# Patient Record
Sex: FEMALE | Race: WHITE | NOT HISPANIC OR LATINO | Employment: OTHER | URBAN - NONMETROPOLITAN AREA
[De-identification: names, ages, dates, MRNs, and addresses within clinical notes are randomized per-mention and may not be internally consistent; named-entity substitution may affect disease eponyms.]

---

## 2019-03-01 ENCOUNTER — HOSPITAL ENCOUNTER (OUTPATIENT)
Dept: BONE DENSITY | Facility: HOSPITAL | Age: 74
Discharge: HOME | End: 2019-03-01
Admitting: FAMILY MEDICINE

## 2019-03-01 DIAGNOSIS — N95.9 POSTMENOPAUSAL SYMPTOMS: ICD-10-CM

## 2019-03-01 PROCEDURE — 77080 DXA BONE DENSITY AXIAL: CPT | Performed by: RADIOLOGY

## 2019-03-01 PROCEDURE — 77080 DXA BONE DENSITY AXIAL: CPT

## 2021-05-13 ENCOUNTER — TRANSCRIBE ORDERS (OUTPATIENT)
Dept: ADMINISTRATIVE | Facility: HOSPITAL | Age: 76
End: 2021-05-13

## 2021-05-13 DIAGNOSIS — R41.82 ALTERED MENTAL STATUS, UNSPECIFIED ALTERED MENTAL STATUS TYPE: Primary | ICD-10-CM

## 2021-05-13 DIAGNOSIS — R47.9 SPEECH DISTURBANCE, UNSPECIFIED TYPE: ICD-10-CM

## 2021-05-14 ENCOUNTER — HOSPITAL ENCOUNTER (OUTPATIENT)
Dept: CT IMAGING | Facility: HOSPITAL | Age: 76
Discharge: HOME | End: 2021-05-14
Admitting: FAMILY MEDICINE

## 2021-05-14 ENCOUNTER — APPOINTMENT (OUTPATIENT)
Dept: CT IMAGING | Facility: HOSPITAL | Age: 76
End: 2021-05-14

## 2021-05-14 DIAGNOSIS — R41.82 ALTERED MENTAL STATUS, UNSPECIFIED ALTERED MENTAL STATUS TYPE: ICD-10-CM

## 2021-05-14 DIAGNOSIS — R47.9 SPEECH DISTURBANCE, UNSPECIFIED TYPE: ICD-10-CM

## 2021-05-14 PROCEDURE — 70450 CT HEAD/BRAIN W/O DYE: CPT

## 2021-05-14 PROCEDURE — 70450 CT HEAD/BRAIN W/O DYE: CPT | Performed by: RADIOLOGY

## 2025-06-21 ENCOUNTER — APPOINTMENT (OUTPATIENT)
Dept: CT IMAGING | Facility: HOSPITAL | Age: 80
End: 2025-06-21
Payer: MEDICARE

## 2025-06-21 ENCOUNTER — HOSPITAL ENCOUNTER (INPATIENT)
Facility: HOSPITAL | Age: 80
LOS: 17 days | Discharge: NURSING, REHAB OR LONG-TERM CARE | End: 2025-07-09
Admitting: HOSPITALIST
Payer: MEDICARE

## 2025-06-21 DIAGNOSIS — M54.40 ACUTE LOW BACK PAIN WITH SCIATICA, SCIATICA LATERALITY UNSPECIFIED, UNSPECIFIED BACK PAIN LATERALITY: ICD-10-CM

## 2025-06-21 DIAGNOSIS — S72.142A CLOSED DISPLACED INTERTROCHANTERIC FRACTURE OF LEFT FEMUR, INITIAL ENCOUNTER: Primary | ICD-10-CM

## 2025-06-21 LAB
ALBUMIN SERPL-MCNC: 4.4 G/DL (ref 3.5–5.2)
ALBUMIN/GLOB SERPL: 1.8 G/DL
ALP SERPL-CCNC: 78 U/L (ref 39–117)
ALT SERPL W P-5'-P-CCNC: 21 U/L (ref 1–33)
ANION GAP SERPL CALCULATED.3IONS-SCNC: 15.3 MMOL/L (ref 5–15)
AST SERPL-CCNC: 29 U/L (ref 1–32)
BASOPHILS # BLD AUTO: 0.05 10*3/MM3 (ref 0–0.2)
BASOPHILS NFR BLD AUTO: 0.3 % (ref 0–1.5)
BILIRUB SERPL-MCNC: 0.4 MG/DL (ref 0–1.2)
BUN SERPL-MCNC: 30.3 MG/DL (ref 8–23)
BUN/CREAT SERPL: 20.9 (ref 7–25)
CALCIUM SPEC-SCNC: 9.7 MG/DL (ref 8.6–10.5)
CHLORIDE SERPL-SCNC: 106 MMOL/L (ref 98–107)
CK MB SERPL-CCNC: 6.45 NG/ML
CK SERPL-CCNC: 326 U/L (ref 20–180)
CO2 SERPL-SCNC: 19.7 MMOL/L (ref 22–29)
CREAT SERPL-MCNC: 1.45 MG/DL (ref 0.57–1)
DEPRECATED RDW RBC AUTO: 43.9 FL (ref 37–54)
EGFRCR SERPLBLD CKD-EPI 2021: 36.1 ML/MIN/1.73
EOSINOPHIL # BLD AUTO: 0.01 10*3/MM3 (ref 0–0.4)
EOSINOPHIL NFR BLD AUTO: 0.1 % (ref 0.3–6.2)
ERYTHROCYTE [DISTWIDTH] IN BLOOD BY AUTOMATED COUNT: 12.2 % (ref 12.3–15.4)
GLOBULIN UR ELPH-MCNC: 2.4 GM/DL
GLUCOSE SERPL-MCNC: 148 MG/DL (ref 65–99)
HCT VFR BLD AUTO: 34.5 % (ref 34–46.6)
HGB BLD-MCNC: 11.5 G/DL (ref 12–15.9)
HOLD SPECIMEN: NORMAL
HOLD SPECIMEN: NORMAL
IMM GRANULOCYTES # BLD AUTO: 0.12 10*3/MM3 (ref 0–0.05)
IMM GRANULOCYTES NFR BLD AUTO: 0.7 % (ref 0–0.5)
LYMPHOCYTES # BLD AUTO: 1.74 10*3/MM3 (ref 0.7–3.1)
LYMPHOCYTES NFR BLD AUTO: 10 % (ref 19.6–45.3)
MCH RBC QN AUTO: 32.9 PG (ref 26.6–33)
MCHC RBC AUTO-ENTMCNC: 33.3 G/DL (ref 31.5–35.7)
MCV RBC AUTO: 98.6 FL (ref 79–97)
MONOCYTES # BLD AUTO: 0.93 10*3/MM3 (ref 0.1–0.9)
MONOCYTES NFR BLD AUTO: 5.3 % (ref 5–12)
NEUTROPHILS NFR BLD AUTO: 14.56 10*3/MM3 (ref 1.7–7)
NEUTROPHILS NFR BLD AUTO: 83.6 % (ref 42.7–76)
NRBC BLD AUTO-RTO: 0 /100 WBC (ref 0–0.2)
PLATELET # BLD AUTO: 253 10*3/MM3 (ref 140–450)
PMV BLD AUTO: 9.5 FL (ref 6–12)
POTASSIUM SERPL-SCNC: 4.1 MMOL/L (ref 3.5–5.2)
PROT SERPL-MCNC: 6.8 G/DL (ref 6–8.5)
RBC # BLD AUTO: 3.5 10*6/MM3 (ref 3.77–5.28)
SODIUM SERPL-SCNC: 141 MMOL/L (ref 136–145)
WBC NRBC COR # BLD AUTO: 17.41 10*3/MM3 (ref 3.4–10.8)
WHOLE BLOOD HOLD COAG: NORMAL
WHOLE BLOOD HOLD SPECIMEN: NORMAL

## 2025-06-21 PROCEDURE — 83036 HEMOGLOBIN GLYCOSYLATED A1C: CPT | Performed by: HOSPITALIST

## 2025-06-21 PROCEDURE — 72192 CT PELVIS W/O DYE: CPT | Performed by: RADIOLOGY

## 2025-06-21 PROCEDURE — 70450 CT HEAD/BRAIN W/O DYE: CPT

## 2025-06-21 PROCEDURE — 84443 ASSAY THYROID STIM HORMONE: CPT | Performed by: HOSPITALIST

## 2025-06-21 PROCEDURE — 80053 COMPREHEN METABOLIC PANEL: CPT | Performed by: PHYSICIAN ASSISTANT

## 2025-06-21 PROCEDURE — 70450 CT HEAD/BRAIN W/O DYE: CPT | Performed by: RADIOLOGY

## 2025-06-21 PROCEDURE — 84145 PROCALCITONIN (PCT): CPT | Performed by: HOSPITALIST

## 2025-06-21 PROCEDURE — 72192 CT PELVIS W/O DYE: CPT

## 2025-06-21 PROCEDURE — 82553 CREATINE MB FRACTION: CPT | Performed by: PHYSICIAN ASSISTANT

## 2025-06-21 PROCEDURE — 72131 CT LUMBAR SPINE W/O DYE: CPT

## 2025-06-21 PROCEDURE — 84439 ASSAY OF FREE THYROXINE: CPT | Performed by: HOSPITALIST

## 2025-06-21 PROCEDURE — 85025 COMPLETE CBC W/AUTO DIFF WBC: CPT | Performed by: PHYSICIAN ASSISTANT

## 2025-06-21 PROCEDURE — 86140 C-REACTIVE PROTEIN: CPT | Performed by: HOSPITALIST

## 2025-06-21 PROCEDURE — 99285 EMERGENCY DEPT VISIT HI MDM: CPT

## 2025-06-21 PROCEDURE — 25010000002 MORPHINE PER 10 MG

## 2025-06-21 PROCEDURE — 82550 ASSAY OF CK (CPK): CPT | Performed by: PHYSICIAN ASSISTANT

## 2025-06-21 PROCEDURE — 72131 CT LUMBAR SPINE W/O DYE: CPT | Performed by: RADIOLOGY

## 2025-06-21 RX ORDER — ROSUVASTATIN CALCIUM 5 MG/1
5 TABLET, COATED ORAL DAILY
COMMUNITY
Start: 2025-04-04

## 2025-06-21 RX ORDER — MORPHINE SULFATE 2 MG/ML
2 INJECTION, SOLUTION INTRAMUSCULAR; INTRAVENOUS ONCE
Status: COMPLETED | OUTPATIENT
Start: 2025-06-21 | End: 2025-06-21

## 2025-06-21 RX ORDER — OMEGA-3-ACID ETHYL ESTERS 1 G/1
2 CAPSULE, LIQUID FILLED ORAL 2 TIMES DAILY
COMMUNITY
Start: 2025-04-25

## 2025-06-21 RX ORDER — DONEPEZIL HYDROCHLORIDE 10 MG/1
10 TABLET, FILM COATED ORAL NIGHTLY
COMMUNITY
Start: 2025-03-27

## 2025-06-21 RX ORDER — ESCITALOPRAM OXALATE 10 MG/1
10 TABLET ORAL DAILY
COMMUNITY
Start: 2025-06-13

## 2025-06-21 RX ORDER — AMLODIPINE BESYLATE 5 MG/1
5 TABLET ORAL DAILY
COMMUNITY
Start: 2025-06-13 | End: 2025-07-09 | Stop reason: HOSPADM

## 2025-06-21 RX ORDER — POTASSIUM CHLORIDE 750 MG/1
10 TABLET, EXTENDED RELEASE ORAL DAILY
COMMUNITY
Start: 2025-04-15

## 2025-06-21 RX ORDER — TRIAMTERENE AND HYDROCHLOROTHIAZIDE 37.5; 25 MG/1; MG/1
1 CAPSULE ORAL DAILY
COMMUNITY
Start: 2025-05-26

## 2025-06-21 RX ORDER — METHIMAZOLE 5 MG/1
5 TABLET ORAL DAILY
Status: ON HOLD | COMMUNITY
Start: 2025-05-26 | End: 2025-07-07

## 2025-06-21 RX ORDER — LOSARTAN POTASSIUM 50 MG/1
50 TABLET ORAL DAILY
COMMUNITY
Start: 2025-04-25 | End: 2025-07-09 | Stop reason: HOSPADM

## 2025-06-21 RX ADMIN — MORPHINE SULFATE 2 MG: 2 INJECTION, SOLUTION INTRAMUSCULAR; INTRAVENOUS at 22:22

## 2025-06-21 RX ADMIN — MORPHINE SULFATE 2 MG: 2 INJECTION, SOLUTION INTRAMUSCULAR; INTRAVENOUS at 21:16

## 2025-06-21 NOTE — LETTER
[facility]  [address] [facility] [place] [address]  [phone]  [phone]        July 1, 2025      Patient: Lizbet Arzola  YOB: 1945  Date of Visit: 6/21/2025      Updated clinical information for Expedited appeal for IPR filed by pt.         NATALIE Woods

## 2025-06-22 ENCOUNTER — APPOINTMENT (OUTPATIENT)
Dept: GENERAL RADIOLOGY | Facility: HOSPITAL | Age: 80
End: 2025-06-22
Payer: MEDICARE

## 2025-06-22 PROBLEM — S72.142A CLOSED INTERTROCHANTERIC FRACTURE OF LEFT FEMUR: Status: ACTIVE | Noted: 2025-06-22

## 2025-06-22 LAB
ABO GROUP BLD: NORMAL
ALBUMIN SERPL-MCNC: 4.1 G/DL (ref 3.5–5.2)
ALBUMIN/GLOB SERPL: 1.7 G/DL
ALP SERPL-CCNC: 69 U/L (ref 39–117)
ALT SERPL W P-5'-P-CCNC: 20 U/L (ref 1–33)
ANION GAP SERPL CALCULATED.3IONS-SCNC: 15 MMOL/L (ref 5–15)
APTT PPP: 33.1 SECONDS (ref 24.5–35.9)
AST SERPL-CCNC: 32 U/L (ref 1–32)
BACTERIA UR QL AUTO: ABNORMAL /HPF
BASOPHILS # BLD AUTO: 0.07 10*3/MM3 (ref 0–0.2)
BASOPHILS NFR BLD AUTO: 0.4 % (ref 0–1.5)
BILIRUB SERPL-MCNC: 0.5 MG/DL (ref 0–1.2)
BILIRUB UR QL STRIP: NEGATIVE
BLD GP AB SCN SERPL QL: NEGATIVE
BUN SERPL-MCNC: 30.4 MG/DL (ref 8–23)
BUN/CREAT SERPL: 22.4 (ref 7–25)
CALCIUM SPEC-SCNC: 9.2 MG/DL (ref 8.6–10.5)
CHLORIDE SERPL-SCNC: 108 MMOL/L (ref 98–107)
CK SERPL-CCNC: 519 U/L (ref 20–180)
CLARITY UR: CLEAR
CO2 SERPL-SCNC: 17 MMOL/L (ref 22–29)
COLOR UR: YELLOW
CREAT SERPL-MCNC: 1.36 MG/DL (ref 0.57–1)
CRP SERPL-MCNC: <0.3 MG/DL (ref 0–0.5)
D-LACTATE SERPL-SCNC: 1.6 MMOL/L (ref 0.5–2)
D-LACTATE SERPL-SCNC: 2.1 MMOL/L (ref 0.5–2)
DEPRECATED RDW RBC AUTO: 51 FL (ref 37–54)
EGFRCR SERPLBLD CKD-EPI 2021: 39 ML/MIN/1.73
EOSINOPHIL # BLD AUTO: 0.06 10*3/MM3 (ref 0–0.4)
EOSINOPHIL NFR BLD AUTO: 0.4 % (ref 0.3–6.2)
ERYTHROCYTE [DISTWIDTH] IN BLOOD BY AUTOMATED COUNT: 12.2 % (ref 12.3–15.4)
GLOBULIN UR ELPH-MCNC: 2.4 GM/DL
GLUCOSE SERPL-MCNC: 162 MG/DL (ref 65–99)
GLUCOSE UR STRIP-MCNC: NEGATIVE MG/DL
HBA1C MFR BLD: 5.92 % (ref 4.8–5.6)
HCT VFR BLD AUTO: 37.7 % (ref 34–46.6)
HGB BLD-MCNC: 11.3 G/DL (ref 12–15.9)
HGB UR QL STRIP.AUTO: NEGATIVE
HYALINE CASTS UR QL AUTO: ABNORMAL /LPF
IMM GRANULOCYTES # BLD AUTO: 0.08 10*3/MM3 (ref 0–0.05)
IMM GRANULOCYTES NFR BLD AUTO: 0.5 % (ref 0–0.5)
INR PPP: 1.13 (ref 0.9–1.1)
KETONES UR QL STRIP: NEGATIVE
LEUKOCYTE ESTERASE UR QL STRIP.AUTO: ABNORMAL
LYMPHOCYTES # BLD AUTO: 1.55 10*3/MM3 (ref 0.7–3.1)
LYMPHOCYTES NFR BLD AUTO: 9.7 % (ref 19.6–45.3)
MCH RBC QN AUTO: 33.5 PG (ref 26.6–33)
MCHC RBC AUTO-ENTMCNC: 30 G/DL (ref 31.5–35.7)
MCV RBC AUTO: 111.9 FL (ref 79–97)
MONOCYTES # BLD AUTO: 1.1 10*3/MM3 (ref 0.1–0.9)
MONOCYTES NFR BLD AUTO: 6.9 % (ref 5–12)
NEUTROPHILS NFR BLD AUTO: 13.04 10*3/MM3 (ref 1.7–7)
NEUTROPHILS NFR BLD AUTO: 82.1 % (ref 42.7–76)
NITRITE UR QL STRIP: NEGATIVE
NRBC BLD AUTO-RTO: 0 /100 WBC (ref 0–0.2)
PH UR STRIP.AUTO: <=5 [PH] (ref 5–8)
PLATELET # BLD AUTO: 248 10*3/MM3 (ref 140–450)
PMV BLD AUTO: 9.6 FL (ref 6–12)
POTASSIUM SERPL-SCNC: 4.4 MMOL/L (ref 3.5–5.2)
PROCALCITONIN SERPL-MCNC: 0.04 NG/ML (ref 0–0.25)
PROT SERPL-MCNC: 6.5 G/DL (ref 6–8.5)
PROT UR QL STRIP: ABNORMAL
PROTHROMBIN TIME: 15.2 SECONDS (ref 12.5–15.2)
RBC # BLD AUTO: 3.37 10*6/MM3 (ref 3.77–5.28)
RBC # UR STRIP: ABNORMAL /HPF
REF LAB TEST METHOD: ABNORMAL
RH BLD: POSITIVE
SODIUM SERPL-SCNC: 140 MMOL/L (ref 136–145)
SP GR UR STRIP: 1.02 (ref 1–1.03)
SQUAMOUS #/AREA URNS HPF: ABNORMAL /HPF
T&S EXPIRATION DATE: NORMAL
T4 FREE SERPL-MCNC: 0.85 NG/DL (ref 0.92–1.68)
TSH SERPL DL<=0.05 MIU/L-ACNC: 4.9 UIU/ML (ref 0.27–4.2)
UROBILINOGEN UR QL STRIP: ABNORMAL
WBC # UR STRIP: ABNORMAL /HPF
WBC NRBC COR # BLD AUTO: 15.9 10*3/MM3 (ref 3.4–10.8)

## 2025-06-22 PROCEDURE — 25810000003 SODIUM CHLORIDE 0.9 % SOLUTION: Performed by: PHYSICIAN ASSISTANT

## 2025-06-22 PROCEDURE — 99223 1ST HOSP IP/OBS HIGH 75: CPT | Performed by: HOSPITALIST

## 2025-06-22 PROCEDURE — 86900 BLOOD TYPING SEROLOGIC ABO: CPT

## 2025-06-22 PROCEDURE — 81001 URINALYSIS AUTO W/SCOPE: CPT | Performed by: HOSPITALIST

## 2025-06-22 PROCEDURE — 83605 ASSAY OF LACTIC ACID: CPT | Performed by: HOSPITALIST

## 2025-06-22 PROCEDURE — 86901 BLOOD TYPING SEROLOGIC RH(D): CPT

## 2025-06-22 PROCEDURE — 85730 THROMBOPLASTIN TIME PARTIAL: CPT

## 2025-06-22 PROCEDURE — 25810000003 SODIUM CHLORIDE 0.9 % SOLUTION: Performed by: HOSPITALIST

## 2025-06-22 PROCEDURE — 85610 PROTHROMBIN TIME: CPT

## 2025-06-22 PROCEDURE — 82550 ASSAY OF CK (CPK): CPT | Performed by: HOSPITALIST

## 2025-06-22 PROCEDURE — 0HQ0XZZ REPAIR SCALP SKIN, EXTERNAL APPROACH: ICD-10-PCS | Performed by: PHYSICIAN ASSISTANT

## 2025-06-22 PROCEDURE — 80053 COMPREHEN METABOLIC PANEL: CPT | Performed by: HOSPITALIST

## 2025-06-22 PROCEDURE — 85025 COMPLETE CBC W/AUTO DIFF WBC: CPT | Performed by: HOSPITALIST

## 2025-06-22 PROCEDURE — 73502 X-RAY EXAM HIP UNI 2-3 VIEWS: CPT

## 2025-06-22 PROCEDURE — 73502 X-RAY EXAM HIP UNI 2-3 VIEWS: CPT | Performed by: RADIOLOGY

## 2025-06-22 PROCEDURE — 25010000002 MORPHINE PER 10 MG: Performed by: HOSPITALIST

## 2025-06-22 PROCEDURE — 86923 COMPATIBILITY TEST ELECTRIC: CPT

## 2025-06-22 PROCEDURE — 86850 RBC ANTIBODY SCREEN: CPT

## 2025-06-22 RX ORDER — SODIUM CHLORIDE 9 MG/ML
40 INJECTION, SOLUTION INTRAVENOUS AS NEEDED
Status: DISCONTINUED | OUTPATIENT
Start: 2025-06-22 | End: 2025-07-09 | Stop reason: HOSPADM

## 2025-06-22 RX ORDER — DIPHENOXYLATE HYDROCHLORIDE AND ATROPINE SULFATE 2.5; .025 MG/1; MG/1
1 TABLET ORAL NIGHTLY
COMMUNITY

## 2025-06-22 RX ORDER — SODIUM CHLORIDE 0.9 % (FLUSH) 0.9 %
10 SYRINGE (ML) INJECTION AS NEEDED
Status: DISCONTINUED | OUTPATIENT
Start: 2025-06-22 | End: 2025-07-09 | Stop reason: HOSPADM

## 2025-06-22 RX ORDER — BISACODYL 5 MG/1
5 TABLET, DELAYED RELEASE ORAL DAILY PRN
Status: DISCONTINUED | OUTPATIENT
Start: 2025-06-22 | End: 2025-07-09 | Stop reason: HOSPADM

## 2025-06-22 RX ORDER — ROSUVASTATIN CALCIUM 10 MG/1
5 TABLET, COATED ORAL DAILY
Status: DISCONTINUED | OUTPATIENT
Start: 2025-06-22 | End: 2025-07-09 | Stop reason: HOSPADM

## 2025-06-22 RX ORDER — ACETAMINOPHEN 500 MG
1000 TABLET ORAL NIGHTLY
COMMUNITY
End: 2025-07-09

## 2025-06-22 RX ORDER — ASPIRIN 81 MG/1
81 TABLET ORAL NIGHTLY
Status: DISCONTINUED | OUTPATIENT
Start: 2025-06-22 | End: 2025-07-09 | Stop reason: HOSPADM

## 2025-06-22 RX ORDER — TRIAMTERENE AND HYDROCHLOROTHIAZIDE 37.5; 25 MG/1; MG/1
1 TABLET ORAL DAILY
Status: CANCELLED | OUTPATIENT
Start: 2025-06-22

## 2025-06-22 RX ORDER — ACETAMINOPHEN 500 MG
1000 TABLET ORAL EVERY MORNING
Status: DISCONTINUED | OUTPATIENT
Start: 2025-06-22 | End: 2025-06-25

## 2025-06-22 RX ORDER — AMOXICILLIN 250 MG
2 CAPSULE ORAL 2 TIMES DAILY PRN
Status: DISCONTINUED | OUTPATIENT
Start: 2025-06-22 | End: 2025-07-09 | Stop reason: HOSPADM

## 2025-06-22 RX ORDER — LOSARTAN POTASSIUM 50 MG/1
50 TABLET ORAL DAILY
Status: CANCELLED | OUTPATIENT
Start: 2025-06-22

## 2025-06-22 RX ORDER — MORPHINE SULFATE 2 MG/ML
2 INJECTION, SOLUTION INTRAMUSCULAR; INTRAVENOUS EVERY 4 HOURS PRN
Status: DISCONTINUED | OUTPATIENT
Start: 2025-06-22 | End: 2025-06-26

## 2025-06-22 RX ORDER — NALOXONE HCL 0.4 MG/ML
0.4 VIAL (ML) INJECTION
Status: DISCONTINUED | OUTPATIENT
Start: 2025-06-22 | End: 2025-06-26

## 2025-06-22 RX ORDER — AMLODIPINE BESYLATE 5 MG/1
5 TABLET ORAL DAILY
Status: CANCELLED | OUTPATIENT
Start: 2025-06-22

## 2025-06-22 RX ORDER — CHOLECALCIFEROL (VITAMIN D3) 25 MCG
4000 TABLET ORAL NIGHTLY
Status: DISCONTINUED | OUTPATIENT
Start: 2025-06-22 | End: 2025-07-09 | Stop reason: HOSPADM

## 2025-06-22 RX ORDER — BISACODYL 10 MG
10 SUPPOSITORY, RECTAL RECTAL DAILY PRN
Status: DISCONTINUED | OUTPATIENT
Start: 2025-06-22 | End: 2025-07-09 | Stop reason: HOSPADM

## 2025-06-22 RX ORDER — ESCITALOPRAM OXALATE 10 MG/1
10 TABLET ORAL DAILY
Status: DISCONTINUED | OUTPATIENT
Start: 2025-06-22 | End: 2025-07-09 | Stop reason: HOSPADM

## 2025-06-22 RX ORDER — SODIUM CHLORIDE 0.9 % (FLUSH) 0.9 %
10 SYRINGE (ML) INJECTION EVERY 12 HOURS SCHEDULED
Status: DISCONTINUED | OUTPATIENT
Start: 2025-06-22 | End: 2025-07-09 | Stop reason: HOSPADM

## 2025-06-22 RX ORDER — DIPHENOXYLATE HYDROCHLORIDE AND ATROPINE SULFATE 2.5; .025 MG/1; MG/1
1 TABLET ORAL NIGHTLY
Status: DISCONTINUED | OUTPATIENT
Start: 2025-06-22 | End: 2025-07-09 | Stop reason: HOSPADM

## 2025-06-22 RX ORDER — METHIMAZOLE 10 MG/1
5 TABLET ORAL DAILY
Status: DISCONTINUED | OUTPATIENT
Start: 2025-06-22 | End: 2025-07-09 | Stop reason: HOSPADM

## 2025-06-22 RX ORDER — DONEPEZIL HYDROCHLORIDE 5 MG/1
10 TABLET, FILM COATED ORAL NIGHTLY
Status: DISCONTINUED | OUTPATIENT
Start: 2025-06-22 | End: 2025-07-09 | Stop reason: HOSPADM

## 2025-06-22 RX ORDER — ASPIRIN 81 MG/1
81 TABLET ORAL NIGHTLY
COMMUNITY

## 2025-06-22 RX ORDER — ACETAMINOPHEN 500 MG
1500 TABLET ORAL EVERY MORNING
COMMUNITY
End: 2025-07-09

## 2025-06-22 RX ORDER — CHOLECALCIFEROL (VITAMIN D3) 50 MCG
2 TABLET ORAL NIGHTLY
COMMUNITY

## 2025-06-22 RX ORDER — ACETAMINOPHEN 500 MG
1000 TABLET ORAL NIGHTLY
Status: DISCONTINUED | OUTPATIENT
Start: 2025-06-22 | End: 2025-06-25

## 2025-06-22 RX ORDER — POTASSIUM CHLORIDE 750 MG/1
10 TABLET, EXTENDED RELEASE ORAL DAILY
Status: CANCELLED | OUTPATIENT
Start: 2025-06-22

## 2025-06-22 RX ORDER — SODIUM CHLORIDE 9 MG/ML
100 INJECTION, SOLUTION INTRAVENOUS CONTINUOUS
Status: DISCONTINUED | OUTPATIENT
Start: 2025-06-22 | End: 2025-06-22

## 2025-06-22 RX ORDER — POLYETHYLENE GLYCOL 3350 17 G/17G
17 POWDER, FOR SOLUTION ORAL DAILY PRN
Status: DISCONTINUED | OUTPATIENT
Start: 2025-06-22 | End: 2025-07-09 | Stop reason: HOSPADM

## 2025-06-22 RX ORDER — AMLODIPINE BESYLATE 5 MG/1
5 TABLET ORAL
Status: DISCONTINUED | OUTPATIENT
Start: 2025-06-22 | End: 2025-07-08

## 2025-06-22 RX ADMIN — MORPHINE SULFATE 2 MG: 2 INJECTION, SOLUTION INTRAMUSCULAR; INTRAVENOUS at 20:15

## 2025-06-22 RX ADMIN — Medication 10 ML: at 09:05

## 2025-06-22 RX ADMIN — METHIMAZOLE 5 MG: 10 TABLET ORAL at 10:13

## 2025-06-22 RX ADMIN — ACETAMINOPHEN 1000 MG: 500 TABLET ORAL at 10:12

## 2025-06-22 RX ADMIN — THERA TABS 1 TABLET: TAB at 20:16

## 2025-06-22 RX ADMIN — MORPHINE SULFATE 2 MG: 2 INJECTION, SOLUTION INTRAMUSCULAR; INTRAVENOUS at 09:03

## 2025-06-22 RX ADMIN — ROSUVASTATIN CALCIUM 5 MG: 10 TABLET, FILM COATED ORAL at 10:13

## 2025-06-22 RX ADMIN — MORPHINE SULFATE 2 MG: 2 INJECTION, SOLUTION INTRAMUSCULAR; INTRAVENOUS at 02:00

## 2025-06-22 RX ADMIN — Medication 4000 UNITS: at 20:15

## 2025-06-22 RX ADMIN — SODIUM CHLORIDE 500 ML: 9 INJECTION, SOLUTION INTRAVENOUS at 00:50

## 2025-06-22 RX ADMIN — MORPHINE SULFATE 2 MG: 2 INJECTION, SOLUTION INTRAMUSCULAR; INTRAVENOUS at 14:32

## 2025-06-22 RX ADMIN — Medication 10 ML: at 20:17

## 2025-06-22 RX ADMIN — SODIUM CHLORIDE 100 ML/HR: 9 INJECTION, SOLUTION INTRAVENOUS at 02:11

## 2025-06-22 RX ADMIN — AMLODIPINE BESYLATE 5 MG: 5 TABLET ORAL at 09:04

## 2025-06-22 RX ADMIN — SODIUM BICARBONATE: 84 INJECTION INTRAVENOUS at 10:44

## 2025-06-22 RX ADMIN — ASPIRIN 81 MG CHEWABLE TABLET 81 MG: 81 TABLET CHEWABLE at 20:16

## 2025-06-22 RX ADMIN — ACETAMINOPHEN 1000 MG: 500 TABLET ORAL at 20:15

## 2025-06-22 RX ADMIN — Medication 10 ML: at 02:03

## 2025-06-22 RX ADMIN — ESCITALOPRAM 10 MG: 10 TABLET, FILM COATED ORAL at 10:13

## 2025-06-22 RX ADMIN — DONEPEZIL HYDROCHLORIDE 10 MG: 5 TABLET, FILM COATED ORAL at 20:15

## 2025-06-22 NOTE — CONSULTS
Referring Provider:   Reason for Consultation: Left intertrochanteric fracture    Patient Care Team:  Yaakov Riley DO as PCP - General (Family Medicine)    Chief complaint fall    Subjective     History of present illness: Vanessa is an 80-year-old female with past medical history of hypertension, hyperlipidemia, osteoarthritis, and hypothyroidism who presents today with a complaint of left hip pain after falling at home.  Patient reports that she was in the process of doing laundry and utilizing her rollator walker in order to ambulate.  She states that she tripped over her walker causing her to sustain a fall onto the floor.  She states that she landed directly against the left side and did hit her head.  Patient reports after sustaining her injury she was unable to get up and bear weight.  Patient reports that she did have to lie on the floor for roughly an hour before her  found her.  Patient was subsequently taken to the emergency room for further evaluation and treatment.  It was noted upon CT imaging that she had sustained a left hip fracture.  Patient denies any other acute injury, injections, or surgical intervention in regard to the left hip.  She states that she has had corticosteroid injections to the bilateral knees due to arthritis.  In regard to treatment she has been using over-the-counter medication and mobility modification with limited relief.  Orthopedics was consulted in regard to fracture.  Patient presents today for further evaluation and treatment.    Review of Systems  Review of Systems   Constitutional:  Positive for activity change.   Musculoskeletal:  Positive for arthralgias, gait problem, joint swelling and myalgias.   All other systems reviewed and are negative.       History  Past Medical History:   Diagnosis Date    Hyperlipidemia     Hypertension     Hyperthyroidism     Osteoarthritis    , History reviewed. No pertinent surgical history., History reviewed. No pertinent  family history.,   Social History     Tobacco Use    Smoking status: Never     Passive exposure: Never    Smokeless tobacco: Never   Vaping Use    Vaping status: Never Used   Substance Use Topics    Alcohol use: Never    Drug use: Never   ,   Medications Prior to Admission   Medication Sig Dispense Refill Last Dose/Taking    acetaminophen (TYLENOL) 500 MG tablet Take 2 tablets by mouth Every Night.   Past Week    acetaminophen (TYLENOL) 500 MG tablet Take 3 tablets by mouth Every Morning.   6/21/2025    amLODIPine (NORVASC) 5 MG tablet Take 1 tablet by mouth Daily.   6/21/2025    aspirin 81 MG EC tablet Take 1 tablet by mouth Every Night.   Past Week    BIOTIN PO Take 2 tablets by mouth Daily.   6/21/2025    Cholecalciferol 50 MCG (2000 UT) tablet Take 2 tablets by mouth Every Night.   Past Week    donepezil (ARICEPT) 10 MG tablet Take 1 tablet by mouth Every Night.   Past Week    escitalopram (LEXAPRO) 10 MG tablet Take 1 tablet by mouth Daily.   6/21/2025    losartan (COZAAR) 50 MG tablet Take 1 tablet by mouth Daily.   6/21/2025    methIMAzole (TAPAZOLE) 5 MG tablet Take 1 tablet by mouth Daily.   6/21/2025    multivitamin tablet tablet Take 1 tablet by mouth Every Night.   Past Week    omega-3 acid ethyl esters (LOVAZA) 1 g capsule Take 2 capsules by mouth 2 (Two) Times a Day.   6/21/2025 Morning    potassium chloride 10 MEQ CR tablet Take 1 tablet by mouth Daily.   6/21/2025    rosuvastatin (CRESTOR) 5 MG tablet Take 1 tablet by mouth Daily.   6/21/2025    triamterene-hydrochlorothiazide (DYAZIDE) 37.5-25 MG per capsule Take 1 capsule by mouth Daily.   6/21/2025    Diclofenac Sodium (VOLTAREN) 1 % gel gel Apply 4 g topically to the appropriate area as directed 4 (Four) Times a Day As Needed (Knee Pain).   Unknown   , Scheduled Meds:  amLODIPine, 5 mg, Oral, Q24H  sodium chloride, 10 mL, Intravenous, Q12H    , Continuous Infusions:  sodium chloride, 100 mL/hr, Last Rate: 100 mL/hr (06/22/25 0211)    , PRN Meds:     senna-docusate sodium **AND** polyethylene glycol **AND** bisacodyl **AND** bisacodyl    Morphine **AND** naloxone    sodium chloride    sodium chloride and Allergies:  Sulfa antibiotics    Objective     Vital Signs   Temp:  [97.5 °F (36.4 °C)-98.6 °F (37 °C)] 98.4 °F (36.9 °C)  Heart Rate:  [77-91] 80  Resp:  [16-20] 18  BP: (139-163)/(60-75) 155/60    Physical Exam:   Physical Exam  HENT:      Head: Normocephalic.      Nose: Nose normal.   Cardiovascular:      Rate and Rhythm: Normal rate.   Pulmonary:      Effort: Pulmonary effort is normal.   Musculoskeletal:      Cervical back: Normal range of motion.      Comments: Upon examination of the left lower extremity there is no edema, ecchymosis, erythema, wounds, drainage, or signs of an infectious process.  Patient sensation appears to be grossly intact to light touch with brisk capillary refill.  Patient able to plantar and dorsiflex at the ankle without complication.  There is a palpable pulse distally.  There is shortening and external rotation noted to the left lower extremity.   Skin:     General: Skin is warm and dry.      Capillary Refill: Capillary refill takes less than 2 seconds.   Neurological:      General: No focal deficit present.      Mental Status: She is alert and oriented to person, place, and time.   Psychiatric:         Mood and Affect: Mood normal.         Results Review:     Results from last 7 days   Lab Units 06/22/25  0529 06/22/25  0156 06/21/25 2117   CRP mg/dL  --   --  <0.30   LACTATE mmol/L 1.6 2.1*  --    WBC 10*3/mm3  --  15.90* 17.41*   HEMOGLOBIN g/dL  --  11.3* 11.5*   HEMATOCRIT %  --  37.7 34.5   MCV fL  --  111.9* 98.6*   MCHC g/dL  --  30.0* 33.3   PLATELETS 10*3/mm3  --  248 253         Results from last 7 days   Lab Units 06/22/25  0156 06/21/25 2117   SODIUM mmol/L 140 141   POTASSIUM mmol/L 4.4 4.1   CHLORIDE mmol/L 108* 106   CO2 mmol/L 17.0* 19.7*   BUN mg/dL 30.4* 30.3*   CREATININE mg/dL 1.36* 1.45*   CALCIUM mg/dL  "9.2 9.7   GLUCOSE mg/dL 162* 148*   ALBUMIN g/dL 4.1 4.4   BILIRUBIN mg/dL 0.5 0.4   ALK PHOS U/L 69 78   AST (SGOT) U/L 32 29   ALT (SGPT) U/L 20 21   Estimated Creatinine Clearance: 32.5 mL/min (A) (by C-G formula based on SCr of 1.36 mg/dL (H)).  No results found for: \"AMMONIA\"  Results from last 7 days   Lab Units 06/22/25  0156 06/21/25  2117   CK TOTAL U/L 519* 326*         Lab Results   Component Value Date    HGBA1C 5.92 (H) 06/21/2025     Lab Results   Component Value Date    TSH 4.900 (H) 06/21/2025    FREET4 0.85 (L) 06/21/2025     No results found for: \"PREGTESTUR\", \"PREGSERUM\", \"HCG\", \"HCGQUANT\"  Pain Management Panel           No data to display              No results found for: \"BLOODCX\"  No results found for: \"URINECX\"  No results found for: \"WOUNDCX\"  No results found for: \"STOOLCX\"      ---------------------------------------------------------------------------------------------------------------------   Imaging Results (Last 7 Days)       Procedure Component Value Units Date/Time    XR Hip With or Without Pelvis 2 - 3 View Left - In process [[id number]] Resulted: 06/22/25 0757     Updated: 06/22/25 0821    This result has not been signed. Information might be incomplete.      CT Lumbar Spine Without Contrast [[id number]] Collected: 06/21/25 2334     Updated: 06/21/25 2338    Narrative:      EXAMINATION: CT lumbar spine without contrast     CLINICAL HISTORY: Fall     COMPARISON: None     TECHNIQUE: Contiguous 3 mm thick slices were obtained through the lumbar  spine without contrast. Coronal and sagittal reformats were performed.     FINDINGS:  Sagittal alignment is normal. Mild left convex curvature is centered at  the L4/L5 level. Mild right convex curvature centered at the L1/L2  level. There is no acute fracture. No traumatic listhesis. Moderate  multilevel disc related degenerative changes are noted. At the L4/L5  level there is a posterior disc bulge and comminution with facet  arthrosis and " ligamentous thickening. The findings result in at least  moderate central canal stenosis. The disc bulges noted at the L5/S1  level as well.       Impression:      1. Multilevel disc related degenerative changes and facet arthrosis.  2. No evidence of recent trauma.     This report was finalized on 6/21/2025 11:36 PM by Juanjose Rocha MD.       CT Pelvis Without Contrast [[id number]] Collected: 06/21/25 2331     Updated: 06/21/25 2336    Narrative:      EXAMINATION: CT pelvis without contrast     CLINICAL HISTORY: Injury.     COMPARISON: None available.     TECHNIQUE: Contiguous 3 mm thick slices were obtained through the pelvis  without contrast. Coronal and sagittal reformats were performed.     FINDINGS:  Alignment at the hips is normal. Mild bilateral hip osteoarthrosis is  noted. There is an acute comminuted fracture through the  intertrochanteric region of the left proximal femur. There is apex  lateral angulation at the fracture site. Additionally, the distal  fragment is displaced medially with respect to the more proximal  fragment. There is apex anterior angulation at the fracture site as  well. Extensive adjacent soft tissue swelling is noted. No definite  acetabular fracture is appreciated.       Impression:      1. Acute comminuted left intertrochanteric proximal femur fracture.     This report was finalized on 6/21/2025 11:34 PM by Juanjose Rocha MD.       CT Head Without Contrast [[id number]] Collected: 06/21/25 2323     Updated: 06/21/25 2333    Narrative:      EXAMINATION: CT head without contrast     CLINICAL HISTORY: Fall     COMPARISON: None available.     TECHNIQUE: Contiguous 3 mm thick slices were obtained from the skull  base to the vertex without contrast. Coronal and sagittal reformats were  performed.     FINDINGS:  Moderate generalized volume loss is noted with prominence of the sulci  and ventricular system. White matter changes are noted in a pattern  suggesting chronic small vessel  ischemic disease. There is no acute  intracranial hemorrhage. No acute territorial infarct. No extra-axial  collection is identified. There is no shift of midline structures. No  acute calvarial fracture is appreciated. Hyperostosis frontalis is  noted.       Impression:      1. No acute intracranial process.     This report was finalized on 6/21/2025 11:31 PM by Juanjose Rocha MD.               Assessment & Plan       Closed intertrochanteric fracture of left femur      The on-call surgeon Dr. Jay has been made aware of the consult.  Recommendation at this time is for surgical intervention.    Continue with pain control.  Activity as tolerated with pain as the guide.    Patient is to remain nonweightbearing to the left lower extremity.  An order for x-ray imaging will be placed today.    Lengthy discussion was had today with the patient and family in regard to surgical versus conservative intervention.  They were informed that due to the severity of the fracture the recommendation at this time would be for surgical intervention.    The plan will be for a left hip intertrochanteric nailing.  The nature of this procedure, as well as risks, benefits, alternatives, and complications to the above operation were discussed with the patient.  Risks include but are not limited to: Anesthesia complications, death, injury to nerves/vessels, infection, blood clots, continued pain, and repeat or revision surgery.  Following the discussion the patient verbalized understanding of the risks and benefits of the above procedure and elected to proceed with surgery.    Labs including a PT/INR, PTT, and type/screen will be placed into the system today.    Patient will be n.p.o. after midnight.  An order for a case request and consent will be placed into the system.    Orthopedics will continue to follow.        KELSEY Mccann  06/22/25  08:21 EDT

## 2025-06-22 NOTE — PROGRESS NOTES
Pt seen and examined with IRMA Denis. I have reviewed Dr. Chau' H&P and discussed pt's care with him personally. Pt admitted overnight with intertrochanteric fracture of the left femur, mild CK elevation, renal insufficiency and leukocytosis. Cr improved today and IVF's adjusted to include bicarb. Leukocytosis improved and thought to be reactive. Lactate has normalized. CK mildly elevated but not consistent with rhabdomyolysis. Pain appears controlled. Ortho consulted with plans for nailing tomorrow.     Mando Shahid DO  06/22/25  16:25 EDT

## 2025-06-22 NOTE — H&P
Hospitalist History and Physical        Patient Identification  Name: Lizbet Arzola  Age/Sex: 80 y.o. female  :  1945        MRN: [id number]  Visit Number: [id number]  Admit Date: 2025   PCP: Yaakov Riley DO          Chief complaint fall, left hip pain    History of Present Illness:  Patient is a 80 y.o. female with history of HTN, HLD, OA and hyperthyroidism on methimazole, who presents with complaints of left hip pain after falling at home. She reports she was doing laundry when she tripped over her rollator and fell to the floor, hitting her head and landing on her left side. She was unable to get up and laid in the floor for an hour before her  found her. She was brought to the ED and found to have suffered a left intertronchanteric femur fracture. She also suffered a posterior scalp laceration from the fall which was stabled by her ED provider.     Review of Systems  Review of Systems   Constitutional:  Negative for activity change, appetite change, chills, diaphoresis, fatigue, fever and unexpected weight change.   HENT:  Negative for congestion, postnasal drip, rhinorrhea, sinus pressure, sinus pain and sore throat.    Eyes:  Negative for photophobia and visual disturbance.   Respiratory:  Negative for cough, shortness of breath and wheezing.    Cardiovascular:  Negative for chest pain, palpitations and leg swelling.   Gastrointestinal:  Negative for abdominal distention, abdominal pain, constipation, diarrhea, nausea and vomiting.   Genitourinary:  Negative for difficulty urinating, dysuria, flank pain and frequency.   Musculoskeletal:  Positive for arthralgias (left hip (acute), multiple other joints including left knee (chronic)).   Skin:  Positive for wound (laceration back of head from fall).   Neurological:  Negative for dizziness, tremors, seizures, syncope, weakness, light-headedness, numbness and headaches.   Hematological:  Negative for adenopathy. Does not bruise/bleed  easily.   Psychiatric/Behavioral:  Negative for agitation, behavioral problems and confusion.        History  Past Medical History:   Diagnosis Date    Hyperlipidemia     Hypertension     Hyperthyroidism     Osteoarthritis      History reviewed. No pertinent surgical history.  History reviewed. No pertinent family history.     (Not in a hospital admission)    Allergies:  Sulfa antibiotics    Objective     Vital Signs  Temp:  [98.6 °F (37 °C)] 98.6 °F (37 °C)  Heart Rate:  [77-91] 91  Resp:  [16] 16  BP: (150-163)/(63-71) 150/63  Body mass index is 21.93 kg/m².    Physical Exam:  Physical Exam  Constitutional:       General: She is not in acute distress.     Appearance: Normal appearance. She is not ill-appearing.   HENT:      Head: Normocephalic and atraumatic.      Right Ear: External ear normal.      Left Ear: External ear normal.      Nose: Nose normal.      Mouth/Throat:      Mouth: Mucous membranes are moist.      Pharynx: Oropharynx is clear.   Eyes:      Extraocular Movements: Extraocular movements intact.      Conjunctiva/sclera: Conjunctivae normal.      Pupils: Pupils are equal, round, and reactive to light.   Cardiovascular:      Rate and Rhythm: Normal rate and regular rhythm.      Pulses: Normal pulses.      Heart sounds: Normal heart sounds. No murmur heard.  Pulmonary:      Effort: Pulmonary effort is normal. No respiratory distress.      Breath sounds: Normal breath sounds. No wheezing or rales.   Abdominal:      General: Abdomen is flat. Bowel sounds are normal. There is no distension.      Palpations: Abdomen is soft.      Tenderness: There is no abdominal tenderness.   Musculoskeletal:      Cervical back: Normal range of motion and neck supple. No tenderness.      Right lower leg: No edema.      Left lower leg: No edema.      Comments: Left leg appx 1 inch shorter than right.    Lymphadenopathy:      Cervical: No cervical adenopathy.   Skin:     General: Skin is warm and dry.      Capillary  Refill: Capillary refill takes less than 2 seconds.      Comments: Laceration posterior scalp which has been stapled   Neurological:      General: No focal deficit present.      Mental Status: She is alert and oriented to person, place, and time.   Psychiatric:         Mood and Affect: Mood normal.         Behavior: Behavior normal.           Results Review:       Lab Results:  Results from last 7 days   Lab Units 06/21/25 2117   WBC 10*3/mm3 17.41*   HEMOGLOBIN g/dL 11.5*   PLATELETS 10*3/mm3 253     Results from last 7 days   Lab Units 06/21/25 2117   CRP mg/dL <0.30     Results from last 7 days   Lab Units 06/21/25 2117   SODIUM mmol/L 141   POTASSIUM mmol/L 4.1   CHLORIDE mmol/L 106   CO2 mmol/L 19.7*   BUN mg/dL 30.3*   CREATININE mg/dL 1.45*   CALCIUM mg/dL 9.7   GLUCOSE mg/dL 148*         Hemoglobin A1C   Date Value Ref Range Status   06/21/2025 5.92 (H) 4.80 - 5.60 % Final     Results from last 7 days   Lab Units 06/21/25 2117   BILIRUBIN mg/dL 0.4   ALK PHOS U/L 78   AST (SGOT) U/L 29   ALT (SGPT) U/L 21     Results from last 7 days   Lab Units 06/21/25 2117   CK TOTAL U/L 326*                   I have reviewed the patient's laboratory results.    Imaging:  Imaging Results (Last 72 Hours)       Procedure Component Value Units Date/Time    CT Lumbar Spine Without Contrast [[id number]] Collected: 06/21/25 2334     Updated: 06/21/25 2338    Narrative:      EXAMINATION: CT lumbar spine without contrast     CLINICAL HISTORY: Fall     COMPARISON: None     TECHNIQUE: Contiguous 3 mm thick slices were obtained through the lumbar  spine without contrast. Coronal and sagittal reformats were performed.     FINDINGS:  Sagittal alignment is normal. Mild left convex curvature is centered at  the L4/L5 level. Mild right convex curvature centered at the L1/L2  level. There is no acute fracture. No traumatic listhesis. Moderate  multilevel disc related degenerative changes are noted. At the L4/L5  level there is a  posterior disc bulge and comminution with facet  arthrosis and ligamentous thickening. The findings result in at least  moderate central canal stenosis. The disc bulges noted at the L5/S1  level as well.       Impression:      1. Multilevel disc related degenerative changes and facet arthrosis.  2. No evidence of recent trauma.     This report was finalized on 6/21/2025 11:36 PM by Juanjose Rocha MD.       CT Pelvis Without Contrast [[id number]] Collected: 06/21/25 2331     Updated: 06/21/25 2336    Narrative:      EXAMINATION: CT pelvis without contrast     CLINICAL HISTORY: Injury.     COMPARISON: None available.     TECHNIQUE: Contiguous 3 mm thick slices were obtained through the pelvis  without contrast. Coronal and sagittal reformats were performed.     FINDINGS:  Alignment at the hips is normal. Mild bilateral hip osteoarthrosis is  noted. There is an acute comminuted fracture through the  intertrochanteric region of the left proximal femur. There is apex  lateral angulation at the fracture site. Additionally, the distal  fragment is displaced medially with respect to the more proximal  fragment. There is apex anterior angulation at the fracture site as  well. Extensive adjacent soft tissue swelling is noted. No definite  acetabular fracture is appreciated.       Impression:      1. Acute comminuted left intertrochanteric proximal femur fracture.     This report was finalized on 6/21/2025 11:34 PM by Juanjose Rocha MD.       CT Head Without Contrast [[id number]] Collected: 06/21/25 2323     Updated: 06/21/25 2333    Narrative:      EXAMINATION: CT head without contrast     CLINICAL HISTORY: Fall     COMPARISON: None available.     TECHNIQUE: Contiguous 3 mm thick slices were obtained from the skull  base to the vertex without contrast. Coronal and sagittal reformats were  performed.     FINDINGS:  Moderate generalized volume loss is noted with prominence of the sulci  and ventricular system. White matter changes  "are noted in a pattern  suggesting chronic small vessel ischemic disease. There is no acute  intracranial hemorrhage. No acute territorial infarct. No extra-axial  collection is identified. There is no shift of midline structures. No  acute calvarial fracture is appreciated. Hyperostosis frontalis is  noted.       Impression:      1. No acute intracranial process.     This report was finalized on 6/21/2025 11:31 PM by Juanjose Rocha MD.               I have personally reviewed the patient's radiologic imaging.        EKG: ordered, results pending        Assessment & Plan     - Closed intertrochanteric fracture of left femur: admit to medical surgical unit. IV morphine available PRN. Keep NPO since already after midnight. Orthopedic surgery notified by ED; Dr Jay to see patient in consultation in the morning.   - Mild CK elevation, likely secondary to above fracture and prolonged time down on the floor at home: does not meet criteria for rhabdomyolysis at this time. Hydrate with IV fluids and continue to trend.  - Renal insufficiency, acute vs chronic: no prior labs for comparison. Patient denies history of CKD, but her daughter mentioned that her creatinine does run \"a little high\". Elevated BUN:cr ratio suggests dehydration. Avoid nephrotoxic home meds (losartan, triamterene-HCTZ). Repeat labs in the morning to monitor response to IV fluid hydration.  - Leukocytosis: likely reactive from hip fracture. CRP and procal are normal. Will check UA to look for evidence of bacteruria pre-op.   - HTN: BP stable thus far. Holding losartan and triamterene-HCTZ as noted above. Plan to continue amlodipine in the morning. Continue same dose since pain medication may help to keep BP controlled, but if not then can increase amlodipine from 5 to 10mg moving forward.  - HLD: hold statin for now in light of CK elevation.   - Hyperthyroidism: TSH mildly elevated at 4.900. Check free T4. Plan to continue methimazole once reconciled " by pharmacy.   - Mild hyperglycemia: likely reactive to some extent from hip fracture, but A1c is mildly elevated as well at 5.92 indicating pre-diabetes. Recommend lifestyle modification moving forward.     DVT Prophylaxis: SCD to right leg only    Estimated Length of Stay >2 midnights    I discussed the patient's findings, assessment and plan with the patient, her daughter at bedside, and ED provider Quincy Story PA-C    Patient is high risk due to left intertrochanteric femur fracture requiring parenteral opioids for pain control    Jose Chau MD  06/22/25  01:34 EDT

## 2025-06-22 NOTE — PLAN OF CARE
Goal Outcome Evaluation:  Plan of Care Reviewed With: patient        Progress: no change  Outcome Evaluation: Patient from ER this shift. Patient is resting in bed with family at bedside. Patient is alert/oriented. VSS on room air. IV fluids infusing per orders. Patient complained of pain, PRN meds given. No acute changes noted at this time. Will continue with plan of care.

## 2025-06-22 NOTE — ED PROVIDER NOTES
Subjective   History of Present Illness  The patient is 80-year-old female presents the emergency department today via EMS for evaluation of fall.  She states that a few hours ago she was at her home when she fell in her bedroom.  She states that she fell back struck her head against the bed or dresser.  She states that her rollator ended up on top of her.  She states that she laid in the floor for approximately an hour before she could get her 's attention.  At this time she complains of contusion of the scalp with laceration.  Additionally, she complains of low back, bilateral hip and pelvis pain.  Her daughter states that she is currently in pain.  They state that they believe she did receive some pain medication and route via EMS.    The patient's past medical history includes hypertension, hyperlipidemia, osteoarthritis, and  hyperthyroidism.            Review of Systems   Constitutional: Negative.  Negative for fever.   Respiratory: Negative.     Cardiovascular: Negative.  Negative for chest pain.   Gastrointestinal: Negative.  Negative for abdominal pain. Blood in stool: bilateral hip and pelvis pain.  Endocrine: Negative.    Genitourinary: Negative.  Negative for dysuria.   Musculoskeletal:  Positive for arthralgias and back pain.   Skin: Negative.    Neurological:  Positive for headaches.   Psychiatric/Behavioral: Negative.     All other systems reviewed and are negative.      Past Medical History:   Diagnosis Date    Hyperlipidemia     Hypertension     Hyperthyroidism     Osteoarthritis        Allergies   Allergen Reactions    Sulfa Antibiotics Hives       History reviewed. No pertinent surgical history.    History reviewed. No pertinent family history.    Social History     Socioeconomic History    Marital status:    Tobacco Use    Smoking status: Never     Passive exposure: Never    Smokeless tobacco: Never   Vaping Use    Vaping status: Never Used   Substance and Sexual Activity    Alcohol  use: Never    Drug use: Never    Sexual activity: Defer           Objective   Physical Exam    Laceration Repair    Date/Time: 6/22/2025 4:35 AM    Performed by: Quincy Story PA-C  Authorized by: Jose Chau MD    Consent:     Consent obtained:  Verbal    Consent given by:  Patient    Risks, benefits, and alternatives were discussed: yes      Risks discussed:  Infection and pain    Alternatives discussed:  No treatment  Universal protocol:     Procedure explained and questions answered to patient or proxy's satisfaction: yes      Relevant documents present and verified: yes      Test results available: yes      Imaging studies available: yes      Patient identity confirmed:  Verbally with patient  Anesthesia:     Anesthesia method:  None  Laceration details:     Location:  Scalp    Scalp location:  Occipital    Length (cm):  1  Pre-procedure details:     Preparation:  Patient was prepped and draped in usual sterile fashion  Exploration:     Limited defect created (wound extended): no      Hemostasis achieved with:  Direct pressure    Contaminated: no    Treatment:     Area cleansed with:  Chlorhexidine    Amount of cleaning:  Standard    Visualized foreign bodies/material removed: no      Debridement:  None  Skin repair:     Repair method:  Staples    Number of staples:  1  Approximation:     Approximation:  Close  Repair type:     Repair type:  Simple  Post-procedure details:     Dressing:  Open (no dressing)    Procedure completion:  Tolerated             ED Course  ED Course as of 06/22/25 0438   Sat Jun 21, 2025   2216 Patient CT scans are currently pending.  Family states she is still in pain despite 100 fentanyl and route and 2 mg morphine.  Spoke with Dr. Bloom who agreed to go ahead and give 2 more milligrams of morphine. [BA]   Sun Jun 22, 2025   0036 Narrative & Impression  EXAMINATION: CT lumbar spine without contrast     CLINICAL HISTORY: Fall     COMPARISON: None     TECHNIQUE:  Contiguous 3 mm thick slices were obtained through the lumbar  spine without contrast. Coronal and sagittal reformats were performed.     FINDINGS:  Sagittal alignment is normal. Mild left convex curvature is centered at  the L4/L5 level. Mild right convex curvature centered at the L1/L2  level. There is no acute fracture. No traumatic listhesis. Moderate  multilevel disc related degenerative changes are noted. At the L4/L5  level there is a posterior disc bulge and comminution with facet  arthrosis and ligamentous thickening. The findings result in at least  moderate central canal stenosis. The disc bulges noted at the L5/S1  level as well.     IMPRESSION:  1. Multilevel disc related degenerative changes and facet arthrosis.  2. No evidence of recent trauma.     This report was finalized on 6/21/2025 11:36 PM by Juanjose Rocha MD.      [BA]   0036 Narrative & Impression  EXAMINATION: CT pelvis without contrast     CLINICAL HISTORY: Injury.     COMPARISON: None available.     TECHNIQUE: Contiguous 3 mm thick slices were obtained through the pelvis  without contrast. Coronal and sagittal reformats were performed.     FINDINGS:  Alignment at the hips is normal. Mild bilateral hip osteoarthrosis is  noted. There is an acute comminuted fracture through the  intertrochanteric region of the left proximal femur. There is apex  lateral angulation at the fracture site. Additionally, the distal  fragment is displaced medially with respect to the more proximal  fragment. There is apex anterior angulation at the fracture site as  well. Extensive adjacent soft tissue swelling is noted. No definite  acetabular fracture is appreciated.     IMPRESSION:  1. Acute comminuted left intertrochanteric proximal femur fracture.   [BA]   0037 EXAMINATION: CT head without contrast     CLINICAL HISTORY: Fall     COMPARISON: None available.     TECHNIQUE: Contiguous 3 mm thick slices were obtained from the skull  base to the vertex without  contrast. Coronal and sagittal reformats were  performed.     FINDINGS:  Moderate generalized volume loss is noted with prominence of the sulci  and ventricular system. White matter changes are noted in a pattern  suggesting chronic small vessel ischemic disease. There is no acute  intracranial hemorrhage. No acute territorial infarct. No extra-axial  collection is identified. There is no shift of midline structures. No  acute calvarial fracture is appreciated. Hyperostosis frontalis is  noted.     IMPRESSION:  1. No acute intracranial process.     This report was finalized on 6/21/2025 11:31 PM by Juanjose Rocha MD.   [BA]   0039 Will contact the on-call orthopedist Dr. Jay for left hip fracture [BA]      ED Course User Index  [BA] Quincy Story PA-C                                                       Medical Decision Making  Problems Addressed:  Acute low back pain with sciatica, sciatica laterality unspecified, unspecified back pain laterality: complicated acute illness or injury  Closed displaced intertrochanteric fracture of left femur, initial encounter: complicated acute illness or injury    Amount and/or Complexity of Data Reviewed  Labs: ordered.  Radiology: ordered.    Risk  Prescription drug management.  Decision regarding hospitalization.        Final diagnoses:   Closed displaced intertrochanteric fracture of left femur, initial encounter   Acute low back pain with sciatica, sciatica laterality unspecified, unspecified back pain laterality       ED Disposition  ED Disposition       ED Disposition   Decision to Admit    Condition   --    Comment   Level of Care: Telemetry [5]   Diagnosis: Closed intertrochanteric fracture of left femur [[id number]]   Admitting Physician: SHAI LOMELI [[id number]]   Attending Physician: SHAI LOMELI [[id number]]   Certification: I Certify That Inpatient Hospital Services Are Medically Necessary For Greater Than 2 Midnights                 No follow-up provider  specified.       Medication List      No changes were made to your prescriptions during this visit.            Quincy Story PA-C  06/22/25 0439

## 2025-06-23 ENCOUNTER — ANESTHESIA EVENT (OUTPATIENT)
Dept: PERIOP | Facility: HOSPITAL | Age: 80
End: 2025-06-23
Payer: MEDICARE

## 2025-06-23 ENCOUNTER — APPOINTMENT (OUTPATIENT)
Dept: GENERAL RADIOLOGY | Facility: HOSPITAL | Age: 80
End: 2025-06-23
Payer: MEDICARE

## 2025-06-23 ENCOUNTER — ANESTHESIA (OUTPATIENT)
Dept: PERIOP | Facility: HOSPITAL | Age: 80
End: 2025-06-23
Payer: MEDICARE

## 2025-06-23 LAB
ALBUMIN SERPL-MCNC: 3.7 G/DL (ref 3.5–5.2)
ALBUMIN/GLOB SERPL: 1.9 G/DL
ALP SERPL-CCNC: 52 U/L (ref 39–117)
ALT SERPL W P-5'-P-CCNC: 18 U/L (ref 1–33)
ANION GAP SERPL CALCULATED.3IONS-SCNC: 10.8 MMOL/L (ref 5–15)
AST SERPL-CCNC: 36 U/L (ref 1–32)
BASOPHILS # BLD AUTO: 0.06 10*3/MM3 (ref 0–0.2)
BASOPHILS NFR BLD AUTO: 0.5 % (ref 0–1.5)
BILIRUB SERPL-MCNC: 0.6 MG/DL (ref 0–1.2)
BUN SERPL-MCNC: 27 MG/DL (ref 8–23)
BUN/CREAT SERPL: 22.5 (ref 7–25)
CALCIUM SPEC-SCNC: 8.5 MG/DL (ref 8.6–10.5)
CHLORIDE SERPL-SCNC: 104 MMOL/L (ref 98–107)
CK SERPL-CCNC: 692 U/L (ref 20–180)
CO2 SERPL-SCNC: 25.2 MMOL/L (ref 22–29)
CREAT SERPL-MCNC: 1.2 MG/DL (ref 0.57–1)
DEPRECATED RDW RBC AUTO: 45.8 FL (ref 37–54)
EGFRCR SERPLBLD CKD-EPI 2021: 45.3 ML/MIN/1.73
EOSINOPHIL # BLD AUTO: 0.28 10*3/MM3 (ref 0–0.4)
EOSINOPHIL NFR BLD AUTO: 2.4 % (ref 0.3–6.2)
ERYTHROCYTE [DISTWIDTH] IN BLOOD BY AUTOMATED COUNT: 12.3 % (ref 12.3–15.4)
GLOBULIN UR ELPH-MCNC: 1.9 GM/DL
GLUCOSE SERPL-MCNC: 132 MG/DL (ref 65–99)
HCT VFR BLD AUTO: 26.3 % (ref 34–46.6)
HGB BLD-MCNC: 8.5 G/DL (ref 12–15.9)
IMM GRANULOCYTES # BLD AUTO: 0.05 10*3/MM3 (ref 0–0.05)
IMM GRANULOCYTES NFR BLD AUTO: 0.4 % (ref 0–0.5)
LYMPHOCYTES # BLD AUTO: 2.56 10*3/MM3 (ref 0.7–3.1)
LYMPHOCYTES NFR BLD AUTO: 22.1 % (ref 19.6–45.3)
MCH RBC QN AUTO: 32.8 PG (ref 26.6–33)
MCHC RBC AUTO-ENTMCNC: 32.3 G/DL (ref 31.5–35.7)
MCV RBC AUTO: 101.5 FL (ref 79–97)
MONOCYTES # BLD AUTO: 1.27 10*3/MM3 (ref 0.1–0.9)
MONOCYTES NFR BLD AUTO: 11 % (ref 5–12)
NEUTROPHILS NFR BLD AUTO: 63.6 % (ref 42.7–76)
NEUTROPHILS NFR BLD AUTO: 7.37 10*3/MM3 (ref 1.7–7)
NRBC BLD AUTO-RTO: 0 /100 WBC (ref 0–0.2)
PLATELET # BLD AUTO: 195 10*3/MM3 (ref 140–450)
PMV BLD AUTO: 10 FL (ref 6–12)
POTASSIUM SERPL-SCNC: 3.6 MMOL/L (ref 3.5–5.2)
PROT SERPL-MCNC: 5.6 G/DL (ref 6–8.5)
RBC # BLD AUTO: 2.59 10*6/MM3 (ref 3.77–5.28)
SODIUM SERPL-SCNC: 140 MMOL/L (ref 136–145)
WBC NRBC COR # BLD AUTO: 11.59 10*3/MM3 (ref 3.4–10.8)

## 2025-06-23 PROCEDURE — 25010000002 ONDANSETRON PER 1 MG: Performed by: NURSE ANESTHETIST, CERTIFIED REGISTERED

## 2025-06-23 PROCEDURE — 25810000003 LACTATED RINGERS PER 1000 ML: Performed by: NURSE ANESTHETIST, CERTIFIED REGISTERED

## 2025-06-23 PROCEDURE — 0QS736Z REPOSITION LEFT UPPER FEMUR WITH INTRAMEDULLARY INTERNAL FIXATION DEVICE, PERCUTANEOUS APPROACH: ICD-10-PCS | Performed by: GENERAL PRACTICE

## 2025-06-23 PROCEDURE — C1713 ANCHOR/SCREW BN/BN,TIS/BN: HCPCS | Performed by: GENERAL PRACTICE

## 2025-06-23 PROCEDURE — 99232 SBSQ HOSP IP/OBS MODERATE 35: CPT | Performed by: INTERNAL MEDICINE

## 2025-06-23 PROCEDURE — 25810000003 LACTATED RINGERS PER 1000 ML: Performed by: ANESTHESIOLOGY

## 2025-06-23 PROCEDURE — 85025 COMPLETE CBC W/AUTO DIFF WBC: CPT | Performed by: INTERNAL MEDICINE

## 2025-06-23 PROCEDURE — 76000 FLUOROSCOPY <1 HR PHYS/QHP: CPT

## 2025-06-23 PROCEDURE — 25010000002 FAMOTIDINE (PF) 20 MG/2ML SOLUTION: Performed by: NURSE ANESTHETIST, CERTIFIED REGISTERED

## 2025-06-23 PROCEDURE — 25010000002 CEFAZOLIN PER 500 MG: Performed by: GENERAL PRACTICE

## 2025-06-23 PROCEDURE — 82550 ASSAY OF CK (CPK): CPT | Performed by: INTERNAL MEDICINE

## 2025-06-23 PROCEDURE — 25010000002 MORPHINE PER 10 MG: Performed by: HOSPITALIST

## 2025-06-23 PROCEDURE — 80053 COMPREHEN METABOLIC PANEL: CPT | Performed by: INTERNAL MEDICINE

## 2025-06-23 PROCEDURE — 25010000002 FENTANYL CITRATE (PF) 50 MCG/ML SOLUTION: Performed by: NURSE ANESTHETIST, CERTIFIED REGISTERED

## 2025-06-23 PROCEDURE — C1769 GUIDE WIRE: HCPCS | Performed by: GENERAL PRACTICE

## 2025-06-23 PROCEDURE — 25010000002 PROPOFOL 200 MG/20ML EMULSION: Performed by: NURSE ANESTHETIST, CERTIFIED REGISTERED

## 2025-06-23 DEVICE — LOCKING SCREW FOR IM NAIL Ø 5.0MM / L 48MM / XL25/ STER: Type: IMPLANTABLE DEVICE | Site: HIP | Status: FUNCTIONAL

## 2025-06-23 DEVICE — LOCKING SCREW FOR IM NAIL Ø 5.0MM / L 42MM / XL25/ STER: Type: IMPLANTABLE DEVICE | Site: HIP | Status: FUNCTIONAL

## 2025-06-23 DEVICE — IMPLANTABLE DEVICE: Type: IMPLANTABLE DEVICE | Site: HIP | Status: FUNCTIONAL

## 2025-06-23 DEVICE — SCRW PERF TFN ADV TI 10.5X95MM STRL: Type: IMPLANTABLE DEVICE | Site: HIP | Status: FUNCTIONAL

## 2025-06-23 RX ORDER — TRANEXAMIC ACID 10 MG/ML
1000 INJECTION, SOLUTION INTRAVENOUS ONCE
Status: COMPLETED | OUTPATIENT
Start: 2025-06-23 | End: 2025-06-23

## 2025-06-23 RX ORDER — ONDANSETRON 2 MG/ML
4 INJECTION INTRAMUSCULAR; INTRAVENOUS AS NEEDED
Status: DISCONTINUED | OUTPATIENT
Start: 2025-06-23 | End: 2025-06-23 | Stop reason: HOSPADM

## 2025-06-23 RX ORDER — ONDANSETRON 2 MG/ML
INJECTION INTRAMUSCULAR; INTRAVENOUS AS NEEDED
Status: DISCONTINUED | OUTPATIENT
Start: 2025-06-23 | End: 2025-06-23 | Stop reason: SURG

## 2025-06-23 RX ORDER — TRANEXAMIC ACID 10 MG/ML
1000 INJECTION, SOLUTION INTRAVENOUS ONCE
Status: DISCONTINUED | OUTPATIENT
Start: 2025-06-23 | End: 2025-06-23 | Stop reason: HOSPADM

## 2025-06-23 RX ORDER — SODIUM CHLORIDE, SODIUM LACTATE, POTASSIUM CHLORIDE, CALCIUM CHLORIDE 600; 310; 30; 20 MG/100ML; MG/100ML; MG/100ML; MG/100ML
125 INJECTION, SOLUTION INTRAVENOUS ONCE
Status: COMPLETED | OUTPATIENT
Start: 2025-06-23 | End: 2025-06-23

## 2025-06-23 RX ORDER — SODIUM CHLORIDE 9 MG/ML
40 INJECTION, SOLUTION INTRAVENOUS AS NEEDED
Status: DISCONTINUED | OUTPATIENT
Start: 2025-06-23 | End: 2025-06-23 | Stop reason: HOSPADM

## 2025-06-23 RX ORDER — SODIUM CHLORIDE 0.9 % (FLUSH) 0.9 %
10 SYRINGE (ML) INJECTION EVERY 12 HOURS SCHEDULED
Status: DISCONTINUED | OUTPATIENT
Start: 2025-06-23 | End: 2025-06-23 | Stop reason: HOSPADM

## 2025-06-23 RX ORDER — FAMOTIDINE 10 MG/ML
INJECTION, SOLUTION INTRAVENOUS AS NEEDED
Status: DISCONTINUED | OUTPATIENT
Start: 2025-06-23 | End: 2025-06-23 | Stop reason: SURG

## 2025-06-23 RX ORDER — FENTANYL CITRATE 50 UG/ML
INJECTION, SOLUTION INTRAMUSCULAR; INTRAVENOUS AS NEEDED
Status: DISCONTINUED | OUTPATIENT
Start: 2025-06-23 | End: 2025-06-23 | Stop reason: SURG

## 2025-06-23 RX ORDER — SODIUM CHLORIDE, SODIUM LACTATE, POTASSIUM CHLORIDE, CALCIUM CHLORIDE 600; 310; 30; 20 MG/100ML; MG/100ML; MG/100ML; MG/100ML
INJECTION, SOLUTION INTRAVENOUS CONTINUOUS PRN
Status: DISCONTINUED | OUTPATIENT
Start: 2025-06-23 | End: 2025-06-23 | Stop reason: SURG

## 2025-06-23 RX ORDER — ENOXAPARIN SODIUM 100 MG/ML
40 INJECTION SUBCUTANEOUS EVERY 24 HOURS
Status: DISCONTINUED | OUTPATIENT
Start: 2025-06-24 | End: 2025-06-25

## 2025-06-23 RX ORDER — PROPOFOL 10 MG/ML
INJECTION, EMULSION INTRAVENOUS AS NEEDED
Status: DISCONTINUED | OUTPATIENT
Start: 2025-06-23 | End: 2025-06-23 | Stop reason: SURG

## 2025-06-23 RX ORDER — IPRATROPIUM BROMIDE AND ALBUTEROL SULFATE 2.5; .5 MG/3ML; MG/3ML
3 SOLUTION RESPIRATORY (INHALATION) ONCE AS NEEDED
Status: DISCONTINUED | OUTPATIENT
Start: 2025-06-23 | End: 2025-06-23 | Stop reason: HOSPADM

## 2025-06-23 RX ORDER — SODIUM CHLORIDE 0.9 % (FLUSH) 0.9 %
10 SYRINGE (ML) INJECTION AS NEEDED
Status: DISCONTINUED | OUTPATIENT
Start: 2025-06-23 | End: 2025-06-23 | Stop reason: HOSPADM

## 2025-06-23 RX ORDER — OXYCODONE AND ACETAMINOPHEN 5; 325 MG/1; MG/1
1 TABLET ORAL ONCE AS NEEDED
Status: DISCONTINUED | OUTPATIENT
Start: 2025-06-23 | End: 2025-06-23 | Stop reason: HOSPADM

## 2025-06-23 RX ORDER — FENTANYL CITRATE 50 UG/ML
50 INJECTION, SOLUTION INTRAMUSCULAR; INTRAVENOUS
Status: DISCONTINUED | OUTPATIENT
Start: 2025-06-23 | End: 2025-06-23 | Stop reason: HOSPADM

## 2025-06-23 RX ADMIN — SODIUM BICARBONATE: 84 INJECTION INTRAVENOUS at 02:12

## 2025-06-23 RX ADMIN — SODIUM CHLORIDE, POTASSIUM CHLORIDE, SODIUM LACTATE AND CALCIUM CHLORIDE: 600; 310; 30; 20 INJECTION, SOLUTION INTRAVENOUS at 15:58

## 2025-06-23 RX ADMIN — TRANEXAMIC ACID 1000 MG: 10 INJECTION, SOLUTION INTRAVENOUS at 15:58

## 2025-06-23 RX ADMIN — FENTANYL CITRATE 50 MCG: 50 INJECTION INTRAMUSCULAR; INTRAVENOUS at 15:58

## 2025-06-23 RX ADMIN — MORPHINE SULFATE 2 MG: 2 INJECTION, SOLUTION INTRAMUSCULAR; INTRAVENOUS at 09:59

## 2025-06-23 RX ADMIN — SODIUM CHLORIDE, POTASSIUM CHLORIDE, SODIUM LACTATE AND CALCIUM CHLORIDE 125 ML/HR: 600; 310; 30; 20 INJECTION, SOLUTION INTRAVENOUS at 15:00

## 2025-06-23 RX ADMIN — PROPOFOL 100 MG: 10 INJECTION, EMULSION INTRAVENOUS at 16:02

## 2025-06-23 RX ADMIN — THERA TABS 1 TABLET: TAB at 21:44

## 2025-06-23 RX ADMIN — ACETAMINOPHEN 1000 MG: 500 TABLET ORAL at 21:44

## 2025-06-23 RX ADMIN — ASPIRIN 81 MG CHEWABLE TABLET 81 MG: 81 TABLET CHEWABLE at 21:43

## 2025-06-23 RX ADMIN — FENTANYL CITRATE 50 MCG: 50 INJECTION INTRAMUSCULAR; INTRAVENOUS at 16:26

## 2025-06-23 RX ADMIN — ONDANSETRON 4 MG: 2 INJECTION INTRAMUSCULAR; INTRAVENOUS at 15:58

## 2025-06-23 RX ADMIN — DONEPEZIL HYDROCHLORIDE 10 MG: 5 TABLET, FILM COATED ORAL at 21:44

## 2025-06-23 RX ADMIN — Medication 10 ML: at 21:45

## 2025-06-23 RX ADMIN — MORPHINE SULFATE 2 MG: 2 INJECTION, SOLUTION INTRAMUSCULAR; INTRAVENOUS at 05:53

## 2025-06-23 RX ADMIN — CEFAZOLIN 2000 MG: 2 INJECTION, POWDER, FOR SOLUTION INTRAMUSCULAR; INTRAVENOUS at 15:58

## 2025-06-23 RX ADMIN — SODIUM BICARBONATE: 84 INJECTION INTRAVENOUS at 18:31

## 2025-06-23 RX ADMIN — PROPOFOL 40 MG: 10 INJECTION, EMULSION INTRAVENOUS at 17:17

## 2025-06-23 RX ADMIN — Medication 4000 UNITS: at 21:43

## 2025-06-23 RX ADMIN — FAMOTIDINE 20 MG: 10 INJECTION, SOLUTION INTRAVENOUS at 15:58

## 2025-06-23 RX ADMIN — TRANEXAMIC ACID 1000 MG: 10 INJECTION, SOLUTION INTRAVENOUS at 17:04

## 2025-06-23 NOTE — PLAN OF CARE
Goal Outcome Evaluation:  Patient is currently resting in bed. A&Ox4. VSS. Patient has remained NPO after midnight. No visible s/s of acute distress noted at this time. No requests or complaints at this time. Plan of care ongoing.

## 2025-06-23 NOTE — ANESTHESIA PROCEDURE NOTES
Airway  Reason: elective    Date/Time: 6/23/2025 4:03 PM  Airway not difficult    General Information and Staff    Patient location during procedure: OR  CRNA/CAA: Ofe Stiles CRNA    Indications and Patient Condition  Indications for airway management: airway protection    Preoxygenated: yes  MILS maintained throughout    Mask difficulty assessment: 0 - not attempted    Final Airway Details    Final airway type: supraglottic airway      Successful airway: unique  Size: 4   Number of attempts at approach: 1  Assessment: lips, teeth, and gum same as pre-op

## 2025-06-23 NOTE — OP NOTE
Lizbet Mendez Dariusz  6/23/2025      Operative Note:     Surgeon: Cosmo Jay MD     Assistant: PRECIOUS Agee     Preoperative Diagnosis:   Left subtrochanteric hip fracture, displaced, acute     Postoperative Diagnosis:   Same     Procedure(s):    1.  Left subtrochanteric hip fracture repair with long cephalomedullary nail, CPT code 82172  2.  Intraoperative use of fluoroscopy, CPT code 40063     Anesthesia: General     Estimated Blood Loss: 100 cc     Specimen Obtained:  None     Complication(s):  None apparent.      Implants: Synthes long cephalomedullary nail, 130 degrees, 380, 12 mm, 95 mm lag screw, static locking screws     Operative Indication:      The patient is a 80-year-old who fell from standing height and sustained the above injury.  Due to the acute displaced nature of her injury she elected proceed with the above operation.  Risk benefits alternatives and complications was discussed with the patient prior to surgery.     Operative Details:     The patient was met in the preoperative suite where the correct operative site was marked. The patient was brought to the operating room where anesthesia was initiated. The patient was positioned appropriately with all bony prominences well-padded. The patient was prepped and draped in the usual sterile fashion and prior to incision a timeout was observed to verify the correct operative site, procedure and antibiotics.     A longitudinal traction slight internal rotation was utilized to provide a provisional reduction.  2 percutaneous incisions were made 1 anteriorly and 1 laterally for a bone hook laterally with the distal segment having lateral traction as well as anteriorly a ball spike to push down on the proximal segment.  A percutaneous incision was then made on the proximal lateral aspect of the thigh down to the level of the greater trochanter.  A guidepin was then placed down to the level of the lesser and once checking the AP and lateral projections of  this guidewire injury reamer utilized to gain access to the medullary canal.  The guidewire was exchanged for a guide wire.  The length of the nail was then measured once malleted in the guidewire to appropriate position just above the level of the patella.  Sequential reaming was then performed.  A nail was malleted into position.  A second percutaneous incision was then made to place a guidewire in the center center position of the femoral head.  Once checking the AP and lateral projections this was measured reamed and a lag screw was placed in the femoral head.  Compression at the fracture site and the setscrew proximally.  A third percutaneous incision was then made to place 2 static locking screw at the distal aspect of the nail.  Final intraoperative x-rays show satisfactory reduction and placement of the hardware.  All wounds were irrigated thoroughly with sterile saline.  The wounds were closed in standard layered fashion.  A soft dressing was applied.  Patient was extubated, transferred hospital bed in the PACU stable condition.  Patient tolerated procedure well without any complications.     Postoperative Plan:     Transfer to floor  Dressing-maintain dressing for now, okay to reinforce.  Saturation  Weight Bear/Lifting Status-as tolerated  DVT PPx-Lovenox 40 once daily for 14 days  Follow up-2 weeks in the office       Electronically Signed by: Cosmo Jay MD

## 2025-06-23 NOTE — PROGRESS NOTES
[facility] HOSPITALIST PROGRESS NOTE     Patient Identification:  Name:  Lizbet Arzola  Age:  80 y.o.  Sex:  female  :  1945  MRN:  [id number]  Visit Number:  [id number]  Primary Care Provider:  Yaakov Riley DO    Length of stay:  1    Chief complaint: Confusion    Subjective:    Patient seen and examined at bedside with patient's family present.  Patient was awake when I entered the room but did appear lethargic and slightly confused.  Patient's family states this has occurred since receiving pain medication.  Patient is still awaiting surgical intervention for left proximal femur fracture.  Otherwise, no significant changes or new events overnight.  ----------------------------------------------------------------------------------------------------------------------  Current Hospital Meds:  [Transfer Hold] acetaminophen, 1,000 mg, Oral, Nightly  [Transfer Hold] acetaminophen, 1,000 mg, Oral, QAM  amLODIPine, 5 mg, Oral, Q24H  [Transfer Hold] aspirin, 81 mg, Oral, Nightly  [Transfer Hold] cholecalciferol, 4,000 Units, Oral, Nightly  [Transfer Hold] donepezil, 10 mg, Oral, Nightly  [Transfer Hold] escitalopram, 10 mg, Oral, Daily  [Transfer Hold] methIMAzole, 5 mg, Oral, Daily  [Transfer Hold] multivitamin, 1 tablet, Oral, Nightly  [Transfer Hold] rosuvastatin, 5 mg, Oral, Daily  [Transfer Hold] sodium chloride, 10 mL, Intravenous, Q12H  sodium chloride, 10 mL, Intravenous, Q12H  Tranexamic Acid-NaCl, 1,000 mg, Intravenous, Once      sodium chloride 0.45 % 925 mL with sodium bicarbonate 8.4 % 75 mEq infusion, , Last Rate: 75 mL/hr at 252      ----------------------------------------------------------------------------------------------------------------------  Vital Signs:  Temp:  [97.6 °F (36.4 °C)-98.4 °F (36.9 °C)] 97.7 °F (36.5 °C)  Heart Rate:  [73-85] 85  Resp:  [18-20] 18  BP: (127-159)/(52-67) 153/65      253 25  0150 25  0500   Weight: 63.5 kg  (140 lb) 64.5 kg (142 lb 3.2 oz) 67.3 kg (148 lb 6.4 oz)     Body mass index is 23.24 kg/m².    Intake/Output Summary (Last 24 hours) at 6/23/2025 1710  Last data filed at 6/23/2025 0950  Gross per 24 hour   Intake 120 ml   Output 250 ml   Net -130 ml     NPO Diet NPO Type: Strict NPO  ----------------------------------------------------------------------------------------------------------------------  Physical exam:  Constitutional: Well-nourished  female in no apparent distress.     HENT:  Head:  Normocephalic and atraumatic.  Mouth:  Moist mucous membranes.    Eyes:  Conjunctivae and EOM are normal.  Pupils are equal, round, and reactive to light.  No scleral icterus.    Neck:  Neck supple. No thyromegaly.  No JVD present.    Cardiovascular:  Regular rate and rhythm with no murmurs, rubs, clicks or gallops appreciated.  Pulmonary/Chest:  Clear to auscultation bilaterally with no crackles, wheezes or rhonchi appreciated.  Abdominal:  Soft. Nontender. Nondistended  Bowel sounds are normal in all four quadrants. No organomegally appreciated.   Musculoskeletal:  No edema, no tenderness, and no deformity.  No red or swollen joints anywhere.    Neurological: Lethargic, Cranial nerves II-XII intact with no focal deficits.  No facial droop.  No slurred speech.   Skin:  Warm and dry to palpation with no rashes or lesions appreciated.  Peripheral vascular:  2+ radial and pedal pulses in bilateral upper and lower extremities.  Psychiatric: Lethargic but oriented x3, demonstrates appropriate judgment and insight.  ---------------------------------------------------------------------------------------  ----------------------------------------------------------------------------------------------------------------------  Results from last 7 days   Lab Units 06/23/25  0228 06/22/25  0156 06/21/25  5932   CK TOTAL U/L 692* 519* 326*   CKMB ng/mL  --   --  6.45*     Results from last 7 days   Lab Units 06/23/25  0592  "06/22/25  0835 06/22/25  0529 06/22/25  0156 06/21/25  2117   CRP mg/dL  --   --   --   --  <0.30   LACTATE mmol/L  --   --  1.6 2.1*  --    WBC 10*3/mm3 11.59*  --   --  15.90* 17.41*   HEMOGLOBIN g/dL 8.5*  --   --  11.3* 11.5*   HEMATOCRIT % 26.3*  --   --  37.7 34.5   MCV fL 101.5*  --   --  111.9* 98.6*   MCHC g/dL 32.3  --   --  30.0* 33.3   PLATELETS 10*3/mm3 195  --   --  248 253   INR   --  1.13*  --   --   --          Results from last 7 days   Lab Units 06/23/25 0228 06/22/25  0156 06/21/25  2117   SODIUM mmol/L 140 140 141   POTASSIUM mmol/L 3.6 4.4 4.1   CHLORIDE mmol/L 104 108* 106   CO2 mmol/L 25.2 17.0* 19.7*   BUN mg/dL 27.0* 30.4* 30.3*   CREATININE mg/dL 1.20* 1.36* 1.45*   CALCIUM mg/dL 8.5* 9.2 9.7   GLUCOSE mg/dL 132* 162* 148*   ALBUMIN g/dL 3.7 4.1 4.4   BILIRUBIN mg/dL 0.6 0.5 0.4   ALK PHOS U/L 52 69 78   AST (SGOT) U/L 36* 32 29   ALT (SGPT) U/L 18 20 21   Estimated Creatinine Clearance: 38.4 mL/min (A) (by C-G formula based on SCr of 1.2 mg/dL (H)).    No results found for: \"AMMONIA\"      No results found for: \"BLOODCX\"  No results found for: \"URINECX\"  No results found for: \"WOUNDCX\"  No results found for: \"STOOLCX\"    I have personally looked at the labs and they are summarized above.  ----------------------------------------------------------------------------------------------------------------------  Imaging Results (Last 24 Hours)       ** No results found for the last 24 hours. **          ----------------------------------------------------------------------------------------------------------------------  Assessment and Plan:    Left femur fracture -patient to undergo surgical intervention today, pain currently well-controlled    2.  Essential hypertension -well-controlled    3.  Hyperlipidemia -statin    4.  Hyperthyroidism -we will hold methimazole as TSH is elevated and free T4 is below lower limits of normal    5.  Suspect CKD stage IIIa -serum creatinine slowly improving " with gentle IV fluid hydration.  No evidence of acute kidney injury at this time, repeat BMP in the morning.      Disposition plan for surgical intervention of left hip fracture today.    Chano Romero DO   06/23/25   17:10 EDT

## 2025-06-23 NOTE — NURSING NOTE
Patient back from surgery ATT. Left hip three dressings noted to be clean, dry, intact with no s/sx of bleeding, bruising, or swelling noted.

## 2025-06-23 NOTE — ANESTHESIA POSTPROCEDURE EVALUATION
Patient: Lizbet Arzola    Procedure Summary       Date: 06/23/25 Room / Location:  [facility] OR 03 /  [facility]    Anesthesia Start: 1558 Anesthesia Stop: 1731    Procedure: HIP INTERTROCHANTERIC NAILING LONG WITH INTRAMEDULLARY HIP SCREW (Left: Hip) Diagnosis:       Closed displaced intertrochanteric fracture of left femur, initial encounter      (Closed displaced intertrochanteric fracture of left femur, initial encounter [S72.142A])    Surgeons: Cosmo Jay MD Provider: Tho Chapman MD    Anesthesia Type: general ASA Status: 3            Anesthesia Type: general    Vitals  Vitals Value Taken Time   /74 06/23/25 17:53   Temp 97.4 °F (36.3 °C) 06/23/25 17:33   Pulse 101 06/23/25 17:55   Resp 16 06/23/25 17:53   SpO2 87 % 06/23/25 17:55   Vitals shown include unfiled device data.        Post Anesthesia Care and Evaluation    Patient location during evaluation: PACU  Patient participation: complete - patient participated  Level of consciousness: sleepy but conscious and responsive to verbal stimuli  Pain score: 0  Pain management: satisfactory to patient  Reason for not using neuraxial anesthesia or a peripheral nerve block: regional anesthesia not indicated:  Airway patency: patent  Anesthetic complications: No anesthetic complications  PONV Status: none  Cardiovascular status: hemodynamically stable  Respiratory status: nasal cannula  Hydration status: acceptable    Comments: Patient totally comfortable and states no pain.

## 2025-06-23 NOTE — ANESTHESIA PREPROCEDURE EVALUATION
Anesthesia Evaluation     Patient summary reviewed and Nursing notes reviewed   no history of anesthetic complications:   NPO Solid Status: > 8 hours  NPO Liquid Status: > 8 hours           Airway   Mallampati: II  TM distance: >3 FB  Neck ROM: full  Dental - normal exam     Pulmonary     breath sounds clear to auscultation  (-) not a smoker  Cardiovascular   Exercise tolerance: good (4-7 METS)    Rhythm: regular  Rate: normal    (+) hypertension, hyperlipidemia      Neuro/Psych- negative ROS  GI/Hepatic/Renal/Endo    (+) thyroid problem hyperthyroidism    Musculoskeletal     Abdominal     Abdomen: soft.   Substance History - negative use     OB/GYN          Other   arthritis,     ROS/Med Hx Other: Hgb 8.5                Anesthesia Plan    ASA 3     general     intravenous induction     Anesthetic plan, risks, benefits, and alternatives have been provided, discussed and informed consent has been obtained with: patient.  Pre-procedure education provided  Use of blood products discussed with  Consented to blood products.    Plan discussed with CRNA.      CODE STATUS:    Code Status (Patient has no pulse and is not breathing): CPR (Attempt to Resuscitate)  Medical Interventions (Patient has pulse or is breathing): Full Support

## 2025-06-23 NOTE — PLAN OF CARE
Goal Outcome Evaluation:  Problem: Adult Inpatient Plan of Care  Goal: Plan of Care Review  Outcome: Progressing  Flowsheets (Taken 6/23/2025 1859)  Outcome Evaluation: Patient resting in bed. NAD noted. IV patent. Bicarb infusing as ordered. Left femur repaired this shift. Daughter at bedside. POC ongoing.  Plan of Care Reviewed With:   patient   child                         Problem: Adult Inpatient Plan of Care  Goal: Absence of Hospital-Acquired Illness or Injury  Intervention: Identify and Manage Fall Risk  Recent Flowsheet Documentation  Taken 6/23/2025 1746 by Winter Johnson, RN  Safety Promotion/Fall Prevention: (down in surgery)   patient off unit   other (see comments)  Taken 6/23/2025 1700 by Winter Johnson, RN  Safety Promotion/Fall Prevention: (down for surgery)   patient off unit   other (see comments)  Taken 6/23/2025 1500 by Winter Johnson, RN  Safety Promotion/Fall Prevention: (down in surgery)   patient off unit   other (see comments)  Taken 6/23/2025 1403 by Winter Johnson, RN  Safety Promotion/Fall Prevention: (down for surgery)   patient off unit   other (see comments)  Taken 6/23/2025 1300 by Winter Johnson, RN  Safety Promotion/Fall Prevention: safety round/check completed  Taken 6/23/2025 1100 by Winter Johnson, RN  Safety Promotion/Fall Prevention: safety round/check completed  Taken 6/23/2025 0950 by Winter Johnson, RN  Safety Promotion/Fall Prevention: safety round/check completed  Taken 6/23/2025 0722 by Winter Johnson, RN  Safety Promotion/Fall Prevention: safety round/check completed  Intervention: Prevent Skin Injury  Recent Flowsheet Documentation  Taken 6/23/2025 1300 by Winter Johnson, RN  Body Position: sitting up in bed  Taken 6/23/2025 1100 by Winter Johnson, RN  Body Position: head facing, left  Taken 6/23/2025 0950 by Winter Johnson, RN  Body Position: sitting up in bed  Skin Protection:  incontinence pads utilized  Taken 6/23/2025 0722 by Winter Johnson, RN  Body Position: head facing, left  Intervention: Prevent and Manage VTE (Venous Thromboembolism) Risk  Recent Flowsheet Documentation  Taken 6/23/2025 0950 by Winter Johnson, RN  VTE Prevention/Management:   right   SCDs (sequential compression devices) on  Taken 6/23/2025 0700 by Winter Johnson, RN  VTE Prevention/Management:   right   SCDs (sequential compression devices) on  Intervention: Prevent Infection  Recent Flowsheet Documentation  Taken 6/23/2025 1300 by Winter Johnson, RN  Infection Prevention:   rest/sleep promoted   hand hygiene promoted  Taken 6/23/2025 1100 by Winter Johnson, RN  Infection Prevention:   rest/sleep promoted   hand hygiene promoted  Taken 6/23/2025 0950 by Winter Johnson, RN  Infection Prevention: hand hygiene promoted  Taken 6/23/2025 0722 by Winter Johnson, RN  Infection Prevention:   rest/sleep promoted   hand hygiene promoted  Goal: Optimal Comfort and Wellbeing  Intervention: Monitor Pain and Promote Comfort  Recent Flowsheet Documentation  Taken 6/23/2025 0959 by Winter Johnson, RN  Pain Management Interventions: pain medication given  Intervention: Provide Person-Centered Care  Recent Flowsheet Documentation  Taken 6/23/2025 0950 by Winter Johnson, RN  Trust Relationship/Rapport:   care explained   choices provided   thoughts/feelings acknowledged     Problem: Fall Injury Risk  Goal: Absence of Fall and Fall-Related Injury  Intervention: Identify and Manage Contributors  Recent Flowsheet Documentation  Taken 6/23/2025 0950 by Winter Johnson, RN  Medication Review/Management: medications reviewed  Self-Care Promotion:   independence encouraged   BADL personal objects within reach  Intervention: Promote Injury-Free Environment  Recent Flowsheet Documentation  Taken 6/23/2025 1746 by Winter Johnson, RN  Safety Promotion/Fall  Prevention: (down in surgery)   patient off unit   other (see comments)  Taken 6/23/2025 1700 by Winter Johnson, RN  Safety Promotion/Fall Prevention: (down for surgery)   patient off unit   other (see comments)  Taken 6/23/2025 1500 by Winter Johnson, RN  Safety Promotion/Fall Prevention: (down in surgery)   patient off unit   other (see comments)  Taken 6/23/2025 1403 by Winter Johnson, RN  Safety Promotion/Fall Prevention: (down for surgery)   patient off unit   other (see comments)  Taken 6/23/2025 1300 by Winter Johnson, RN  Safety Promotion/Fall Prevention: safety round/check completed  Taken 6/23/2025 1100 by Winter Johnson, RN  Safety Promotion/Fall Prevention: safety round/check completed  Taken 6/23/2025 0950 by Winter Johnson, RN  Safety Promotion/Fall Prevention: safety round/check completed  Taken 6/23/2025 0722 by Winter Johnson, RN  Safety Promotion/Fall Prevention: safety round/check completed     Problem: Skin Injury Risk Increased  Goal: Skin Health and Integrity  Intervention: Optimize Skin Protection  Recent Flowsheet Documentation  Taken 6/23/2025 1300 by Winter Johnson, RN  Activity Management: bedrest  Head of Bed (HOB) Positioning: HOB elevated  Taken 6/23/2025 1100 by Winter Johnson, RN  Activity Management: bedrest  Head of Bed (HOB) Positioning: HOB elevated  Taken 6/23/2025 0950 by Winter Johnson, RN  Activity Management: bedrest  Pressure Reduction Techniques:   frequent weight shift encouraged   heels elevated off bed   weight shift assistance provided  Head of Bed (HOB) Positioning: HOB elevated  Pressure Reduction Devices:   pressure-redistributing mattress utilized   positioning supports utilized   heel offloading device utilized  Skin Protection: incontinence pads utilized  Taken 6/23/2025 0722 by Winter Johnson, RN  Activity Management: bedrest  Head of Bed (HOB) Positioning: HOB  elevated  Intervention: Promote and Optimize Oral Intake  Recent Flowsheet Documentation  Taken 6/23/2025 0950 by Winter Johnson, RN  Oral Nutrition Promotion: physical activity promoted     Problem: Comorbidity Management  Goal: Blood Pressure in Desired Range  Intervention: Maintain Blood Pressure Management  Recent Flowsheet Documentation  Taken 6/23/2025 0950 by Winter Johnson, RN  Medication Review/Management: medications reviewed  Goal: Maintenance of Osteoarthritis Symptom Control  Intervention: Maintain Osteoarthritis Symptom Control  Recent Flowsheet Documentation  Taken 6/23/2025 1300 by Winter Johnson, RN  Activity Management: bedrest  Taken 6/23/2025 1100 by Winter Johnson, RN  Activity Management: bedrest  Taken 6/23/2025 0950 by Winter Johnson, RN  Activity Management: bedrest  Medication Review/Management: medications reviewed  Taken 6/23/2025 0722 by Winter Johnson, RN  Activity Management: bedrest

## 2025-06-24 LAB
ANION GAP SERPL CALCULATED.3IONS-SCNC: 12.4 MMOL/L (ref 5–15)
BUN SERPL-MCNC: 26.5 MG/DL (ref 8–23)
BUN/CREAT SERPL: 22.3 (ref 7–25)
CALCIUM SPEC-SCNC: 7.8 MG/DL (ref 8.6–10.5)
CHLORIDE SERPL-SCNC: 98 MMOL/L (ref 98–107)
CO2 SERPL-SCNC: 26.6 MMOL/L (ref 22–29)
CREAT SERPL-MCNC: 1.19 MG/DL (ref 0.57–1)
DEPRECATED RDW RBC AUTO: 44.5 FL (ref 37–54)
EGFRCR SERPLBLD CKD-EPI 2021: 45.7 ML/MIN/1.73
ERYTHROCYTE [DISTWIDTH] IN BLOOD BY AUTOMATED COUNT: 12.2 % (ref 12.3–15.4)
GLUCOSE SERPL-MCNC: 168 MG/DL (ref 65–99)
HCT VFR BLD AUTO: 18.5 % (ref 34–46.6)
HCT VFR BLD AUTO: 28 % (ref 34–46.6)
HGB BLD-MCNC: 6.1 G/DL (ref 12–15.9)
HGB BLD-MCNC: 9.2 G/DL (ref 12–15.9)
MCH RBC QN AUTO: 33.2 PG (ref 26.6–33)
MCHC RBC AUTO-ENTMCNC: 33 G/DL (ref 31.5–35.7)
MCV RBC AUTO: 100.5 FL (ref 79–97)
PLATELET # BLD AUTO: 174 10*3/MM3 (ref 140–450)
PMV BLD AUTO: 10.2 FL (ref 6–12)
POTASSIUM SERPL-SCNC: 3.7 MMOL/L (ref 3.5–5.2)
RBC # BLD AUTO: 1.84 10*6/MM3 (ref 3.77–5.28)
SODIUM SERPL-SCNC: 137 MMOL/L (ref 136–145)
WBC NRBC COR # BLD AUTO: 13.44 10*3/MM3 (ref 3.4–10.8)

## 2025-06-24 PROCEDURE — 97162 PT EVAL MOD COMPLEX 30 MIN: CPT

## 2025-06-24 PROCEDURE — 86900 BLOOD TYPING SEROLOGIC ABO: CPT

## 2025-06-24 PROCEDURE — 36430 TRANSFUSION BLD/BLD COMPNT: CPT

## 2025-06-24 PROCEDURE — 85014 HEMATOCRIT: CPT | Performed by: INTERNAL MEDICINE

## 2025-06-24 PROCEDURE — P9016 RBC LEUKOCYTES REDUCED: HCPCS

## 2025-06-24 PROCEDURE — 25010000002 CEFAZOLIN PER 500 MG: Performed by: GENERAL PRACTICE

## 2025-06-24 PROCEDURE — 85027 COMPLETE CBC AUTOMATED: CPT | Performed by: INTERNAL MEDICINE

## 2025-06-24 PROCEDURE — 99232 SBSQ HOSP IP/OBS MODERATE 35: CPT | Performed by: INTERNAL MEDICINE

## 2025-06-24 PROCEDURE — 97166 OT EVAL MOD COMPLEX 45 MIN: CPT

## 2025-06-24 PROCEDURE — 85018 HEMOGLOBIN: CPT | Performed by: INTERNAL MEDICINE

## 2025-06-24 PROCEDURE — 25010000002 MORPHINE PER 10 MG: Performed by: GENERAL PRACTICE

## 2025-06-24 PROCEDURE — 25010000002 ENOXAPARIN PER 10 MG: Performed by: GENERAL PRACTICE

## 2025-06-24 PROCEDURE — 80048 BASIC METABOLIC PNL TOTAL CA: CPT | Performed by: INTERNAL MEDICINE

## 2025-06-24 RX ADMIN — Medication 4000 UNITS: at 20:48

## 2025-06-24 RX ADMIN — POLYETHYLENE GLYCOL (3350) 17 G: 17 POWDER, FOR SOLUTION ORAL at 16:12

## 2025-06-24 RX ADMIN — METHIMAZOLE 5 MG: 10 TABLET ORAL at 08:30

## 2025-06-24 RX ADMIN — DOCUSATE SODIUM 50 MG AND SENNOSIDES 8.6 MG 2 TABLET: 8.6; 5 TABLET, FILM COATED ORAL at 16:12

## 2025-06-24 RX ADMIN — CEFAZOLIN 1000 MG: 1 INJECTION, POWDER, FOR SOLUTION INTRAMUSCULAR; INTRAVENOUS; PARENTERAL at 15:39

## 2025-06-24 RX ADMIN — CEFAZOLIN 1000 MG: 1 INJECTION, POWDER, FOR SOLUTION INTRAMUSCULAR; INTRAVENOUS; PARENTERAL at 00:24

## 2025-06-24 RX ADMIN — SODIUM BICARBONATE: 84 INJECTION INTRAVENOUS at 17:30

## 2025-06-24 RX ADMIN — SODIUM BICARBONATE: 84 INJECTION INTRAVENOUS at 06:57

## 2025-06-24 RX ADMIN — DONEPEZIL HYDROCHLORIDE 10 MG: 5 TABLET, FILM COATED ORAL at 20:49

## 2025-06-24 RX ADMIN — MORPHINE SULFATE 2 MG: 2 INJECTION, SOLUTION INTRAMUSCULAR; INTRAVENOUS at 12:07

## 2025-06-24 RX ADMIN — ACETAMINOPHEN 1000 MG: 500 TABLET ORAL at 08:28

## 2025-06-24 RX ADMIN — ACETAMINOPHEN 1000 MG: 500 TABLET ORAL at 20:49

## 2025-06-24 RX ADMIN — Medication 10 ML: at 20:52

## 2025-06-24 RX ADMIN — ROSUVASTATIN CALCIUM 5 MG: 10 TABLET, FILM COATED ORAL at 08:30

## 2025-06-24 RX ADMIN — Medication 10 ML: at 08:30

## 2025-06-24 RX ADMIN — ESCITALOPRAM 10 MG: 10 TABLET, FILM COATED ORAL at 08:30

## 2025-06-24 RX ADMIN — BISACODYL 5 MG: 5 TABLET, COATED ORAL at 16:12

## 2025-06-24 RX ADMIN — ASPIRIN 81 MG CHEWABLE TABLET 81 MG: 81 TABLET CHEWABLE at 20:49

## 2025-06-24 RX ADMIN — THERA TABS 1 TABLET: TAB at 20:49

## 2025-06-24 RX ADMIN — ENOXAPARIN SODIUM 40 MG: 40 INJECTION SUBCUTANEOUS at 08:27

## 2025-06-24 NOTE — CASE MANAGEMENT/SOCIAL WORK
Discharge Planning Assessment  [facility]     Patient Name: Lizbet Arzola  MRN: [id number]  Today's Date: 6/24/2025    Admit Date: 6/21/2025    Plan: SS received consult per Case Management for advanced age and discharge planning.  SS spoke with pt at bedside with daughter Latoya.  Pt resides at home with spouse.  Pt currently does not utilize home health services.  Pt has elevated toilet seat, rollator, cane and walker via [facility].  Pt's adult children are POA's.  Pt utilizes [facility] and [facility].  Pt's PCP is Yaakov Riley.  Pt and family requests [facility] in rehab if pt is a candidate.  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   Discharge Needs Assessment       Row Name 06/24/25 1509       Living Environment    People in Home spouse    Name(s) of People in Home Lives with spouse    Current Living Arrangements home    Potentially Unsafe Housing Conditions none    Provides Primary Care For no one    Family Caregiver if Needed child(priya), adult    Family Caregiver Names Adult Children Latoya,  and casey Saini    Quality of Family Relationships helpful;involved;supportive    Able to Return to Prior Arrangements yes       Resource/Environmental Concerns    Resource/Environmental Concerns none    Transportation Concerns none       Transition Planning    Patient/Family Anticipates Transition to inpatient rehabilitation facility    Patient/Family Anticipated Services at Transition     Transportation Anticipated family or friend will provide       Discharge Needs Assessment    Equipment Currently Used at Home rollator;walker, standard;cane, straight;other (see comments)  Elevated toilet seat    Concerns to be Addressed discharge planning                   Discharge Plan       Row Name 06/24/25 1511       Plan    Plan SS received consult per Case Management for advanced age and discharge planning.  SS spoke with pt at bedside with daughter Latoya.  Pt resides  at home with spouse.  Pt currently does not utilize home health services.  Pt has elevated toilet seat, rollator, cane and walker via [facility].  Pt's adult children are POA's.  Pt utilizes [facility] and [facility].  Pt's PCP is Yaakov Riley.  Pt and family requests [facility] in rehab if pt is a candidate.  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                    Expected Discharge Date and Time       Expected Discharge Date Expected Discharge Time    Jun 25, 2025            Demographic Summary       Row Name 06/24/25 1509       General Information    Admission Type inpatient    Arrived From home    Referral Source nursing    Reason for Consult discharge planning    Preferred Language English                  Latoya Hendrix, BSW

## 2025-06-24 NOTE — THERAPY EVALUATION
Patient Name: Lizbet Arzola  : 1945    MRN: [id number]                              Today's Date: 2025       Admit Date: 2025    Visit Dx:     ICD-10-CM ICD-9-CM   1. Closed displaced intertrochanteric fracture of left femur, initial encounter  S72.142A 820.21   2. Acute low back pain with sciatica, sciatica laterality unspecified, unspecified back pain laterality  M54.40 724.2     724.3     Patient Active Problem List   Diagnosis    Closed intertrochanteric fracture of left femur     Past Medical History:   Diagnosis Date    Hyperlipidemia     Hypertension     Hyperthyroidism     Osteoarthritis      Past Surgical History:   Procedure Laterality Date    LIPOMA EXCISION        General Information       Row Name 25 1519          OT Time and Intention    Subjective Information complains of;weakness;fatigue;pain  -     Document Type evaluation  -     Mode of Treatment individual therapy;occupational therapy  -     Patient Effort good  -     Symptoms Noted During/After Treatment fatigue  -     Comment Patient agreeable to therapy. Seen for OT evaluation. Patient's daughter present throughout.  -       Row Name 25 1519          General Information    Patient Profile Reviewed yes  -     Prior Level of Function independent:  modified independent; use of rollator  -     Existing Precautions/Restrictions fall  -     Barriers to Rehab none identified  -       Row Name 25 1519          Occupational Profile    Reason for Services/Referral (Occupational Profile) Patient [facility]. She underwent LLE ORIF on 2025. Patient was referred for OT evaluation due to change in functional performance with ADLs, functional mobility, and/or tramsfers.  -       Row Name 25 1519          Living Environment    Current Living Arrangements home  -     People in Home spouse  -       Row Name 25 1519          Home Main Entrance     Number of Stairs, Main Entrance none  -       Row Name 06/24/25 1519          Cognition    Orientation Status (Cognition) oriented to;person  -[facility] Name 06/24/25 1519          Safety Issues/Impairments Affecting Functional Mobility    Safety Issues Affecting Function (Mobility) awareness of need for assistance;insight into deficits/self-awareness  -     Impairments Affecting Function (Mobility) balance;endurance/activity tolerance;pain;range of motion (ROM);strength  -               User Key  (r) = Recorded By, (t) = Taken By, (c) = Cosigned By      Initials Name Provider Type     Izzy Murillo OT Occupational Therapist                     Mobility/ADL's       Row Name 06/24/25 1521          Bed Mobility    Bed Mobility supine-sit  -     Supine-Sit [place] (Bed Mobility) maximum assist (25% patient effort);2 person assist;verbal cues  -     Bed Mobility, Safety Issues decreased use of arms for pushing/pulling;decreased use of legs for bridging/pushing  -     Assistive Device (Bed Mobility) bed rails;head of bed elevated  -[facility] Name 06/24/25 1521          Transfers    Transfers sit-stand transfer;stand-sit transfer  -[facility] Name 06/24/25 1521          Sit-Stand Transfer    Sit-Stand [place] (Transfers) dependent (less than 25% patient effort);2 person assist;verbal cues  -[facility] Name 06/24/25 1521          Stand-Sit Transfer    Stand-Sit [place] (Transfers) maximum assist (25% patient effort);2 person assist;verbal cues  [facility]     Assistive Device (Stand-Sit Transfers) walker, front-wheeled  -[facility] Name 06/24/25 1521          Activities of Daily Living    BADL Assessment/Intervention bathing;upper body dressing;lower body dressing;grooming;feeding;toileting  -[facility] Name 06/24/25 1521          Mobility    Extremity Weight-bearing Status left lower extremity  -     Left Lower Extremity (Weight-bearing Status) weight-bearing as tolerated (WBAT)   -[facility] Name 06/24/25 1521          Bathing Assessment/Intervention    [place] Level (Bathing) bathing skills;maximum assist (25% patient effort)  -KP       Row Name 06/24/25 1521          Upper Body Dressing Assessment/Training    [place] Level (Upper Body Dressing) upper body dressing skills;moderate assist (50% patient effort)  -KP       Row Name 06/24/25 1521          Lower Body Dressing Assessment/Training    [place]issie Level (Lower Body Dressing) lower body dressing skills;dependent (less than 25% patient effort)  -KP       Row Name 06/24/25 1521          Grooming Assessment/Training    [place]issie Level (Grooming) grooming skills;minimum assist (75% patient effort)  -KP       Row Name 06/24/25 1521          Self-Feeding Assessment/Training    [place]issie Level (Feeding) feeding skills;set up  -KP       Row Name 06/24/25 1521          Toileting Assessment/Training    Lissie Level (Toileting) toileting skills;dependent (less than 25% patient effort)  -               User Key  (r) = Recorded By, (t) = Taken By, (c) = Cosigned By      Initials Name Provider Type     Izzy Murillo OT Occupational Therapist                   Obj/Interventions       [facility] Name 06/24/25 1522          Sensory Assessment (Somatosensory)    Sensory Assessment (Somatosensory) UE sensation intact  -KP       Row Name 06/24/25 1522          Vision Assessment/Intervention    Visual Impairment/Limitations WFL  -KP       Row Name 06/24/25 1522          Range of Motion Comprehensive    General Range of Motion bilateral upper extremity ROM WFL  -KP       Row Name 06/24/25 1522          Strength Comprehensive (MMT)    Comment, General Manual Muscle Testing (MMT) Assessment 4-/5 MMT in BUEs  -KP       Row Name 06/24/25 1522          Motor Skills    Motor Skills coordination;functional endurance  -     Coordination WFL;bilateral;upper extremity  -     Functional Endurance fair  -[facility] Name 06/24/25 1522           Balance    Balance Assessment sitting static balance;standing static balance  -     Static Sitting Balance contact guard  -     Static Standing Balance maximum assist  -               User Key  (r) = Recorded By, (t) = Taken By, (c) = Cosigned By      Initials Name Provider Type    Izzy Motta OT Occupational Therapist                   Goals/Plan       Row Name 06/24/25 1524          Transfer Goal 1 (OT)    Activity/Assistive Device (Transfer Goal 1, OT) toilet;commode, 3-in-1  -     [place] Level/Cues Needed (Transfer Goal 1, OT) minimum assist (75% or more patient effort)  -     Time Frame (Transfer Goal 1, OT) by discharge  -       Row Name 06/24/25 1524          Problem Specific Goal 1 (OT)    Problem Specific Goal 1 (OT) Patient will perform sustained activity X12 minutes to promote functional endurance/activity tolerance needed for daily routine.  -     Time Frame (Problem Specific Goal 1, OT) by discharge  -       Row Name 06/24/25 1524          Therapy Assessment/Plan (OT)    Planned Therapy Interventions (OT) activity tolerance training;BADL retraining;functional balance retraining;patient/caregiver education/training;passive ROM/stretching;occupation/activity based interventions;ROM/therapeutic exercise;strengthening exercise;transfer/mobility retraining  -               User Key  (r) = Recorded By, (t) = Taken By, (c) = Cosigned By      Initials Name Provider Type    Izzy Motta OT Occupational Therapist                   Clinical Impression       Row Name 06/24/25 1523          Pain Scale: FACES Pre/Post-Treatment    Pain: FACES Scale, Pretreatment 2-->hurts little bit  -     Posttreatment Pain Rating 4-->hurts little more  -       Row Name 06/24/25 1526          Plan of Care Review    Plan of Care Reviewed With patient  -     Progress no change  -     Outcome Evaluation Patient seen for OT evaluation. She presents with functional limitations including  generalized weakness, pain, limited ROM, impaired balance, decreased safety, and limited functional endurance/activity tolerance. Patient would benefit from ongoing OT services to promote highest level of independence and safety prior to discharge. Patient is a good candidate for intensive therapy to return to prior level of function.  -       Row Name 06/24/25 1523          Therapy Assessment/Plan (OT)    Patient/Family Therapy Goal Statement (OT) be able to return home  -     Rehab Potential (OT) good  -     Criteria for Skilled Therapeutic Interventions Met (OT) yes;meets criteria;skilled treatment is necessary  -     Therapy Frequency (OT) 5 times/wk  -     Predicted Duration of Therapy Intervention (OT) discharge  -       Row Name 06/24/25 1523          Therapy Plan Review/Discharge Plan (OT)    Anticipated Discharge Disposition (OT) inpatient rehabilitation facility  -       Row Name 06/24/25 1523          Positioning and Restraints    Pre-Treatment Position in bed  -     Post Treatment Position bed  -     In Bed sitting EOB;notified nsg;call light within reach;encouraged to call for assist;with family/caregiver  -               User Key  (r) = Recorded By, (t) = Taken By, (c) = Cosigned By      Initials Name Provider Type     Izzy Murillo, OT Occupational Therapist                   Outcome Measures       Row Name 06/24/25 0710          How much help from another person do you currently need...    Turning from your back to your side while in flat bed without using bedrails? 2  -WB     Moving from lying on back to sitting on the side of a flat bed without bedrails? 2  -WB     Moving to and from a bed to a chair (including a wheelchair)? 1  -WB     Standing up from a chair using your arms (e.g., wheelchair, bedside chair)? 1  -WB     Climbing 3-5 steps with a railing? 1  -WB     To walk in hospital room? 1  -WB     AM-PAC 6 Clicks Score (PT) 8  -WB               User Key  (r) = Recorded  By, (t) = Taken By, (c) = Cosigned By      Initials Name Provider Type    Winter Mattson, RN Registered Nurse                      OT Recommendation and Plan  Planned Therapy Interventions (OT): activity tolerance training, BADL retraining, functional balance retraining, patient/caregiver education/training, passive ROM/stretching, occupation/activity based interventions, ROM/therapeutic exercise, strengthening exercise, transfer/mobility retraining  Therapy Frequency (OT): 5 times/wk  Plan of Care Review  Plan of Care Reviewed With: patient  Progress: no change  Outcome Evaluation: Patient seen for OT evaluation. She presents with functional limitations including generalized weakness, pain, limited ROM, impaired balance, decreased safety, and limited functional endurance/activity tolerance. Patient would benefit from ongoing OT services to promote highest level of independence and safety prior to discharge. Patient is a good candidate for intensive therapy to return to prior level of function.     Time Calculation:         Time Calculation- OT       Row Name 06/24/25 1525             Time Calculation- OT    OT Received On 06/24/25  -                User Key  (r) = Recorded By, (t) = Taken By, (c) = Cosigned By      Initials Name Provider Type    Izzy Motta OT Occupational Therapist                  Therapy Charges for Today       Code Description Service Date Service Provider Modifiers Qty    32800322320 HC OT EVAL MOD COMPLEXITY 4 6/24/2025 Izzy Murillo OT GO 1                 Izzy Murillo OT  6/24/2025

## 2025-06-24 NOTE — PLAN OF CARE
Goal Outcome Evaluation:  Patient is currently resting in bed. A&Ox3. Disoriented to place. VSS. Serous drainage noted to L hip incision dressing. Area marked and monitored. Bruising noted to inner L thigh, extending to posterior thigh. No visible s/s of acute distress noted at this time. No requests or complaints at this time. Plan of care ongoing.

## 2025-06-24 NOTE — THERAPY EVALUATION
Acute Care - Physical Therapy Initial Evaluation   [facility]     Patient Name: Lizbet Arzola  : 1945  MRN: [id number]  Today's Date: 2025      Visit Dx:     ICD-10-CM ICD-9-CM   1. Closed displaced intertrochanteric fracture of left femur, initial encounter  S72.142A 820.21   2. Acute low back pain with sciatica, sciatica laterality unspecified, unspecified back pain laterality  M54.40 724.2     724.3     Patient Active Problem List   Diagnosis    Closed intertrochanteric fracture of left femur     Past Medical History:   Diagnosis Date    Hyperlipidemia     Hypertension     Hyperthyroidism     Osteoarthritis      Past Surgical History:   Procedure Laterality Date    LIPOMA EXCISION       PT Assessment (Last 12 Hours)       PT Evaluation and Treatment       Row Name 25 1406          Physical Therapy Time and Intention    Subjective Information complains of;weakness;fatigue;pain  -KM     Document Type evaluation  -KM     Mode of Treatment physical therapy  -KM     Patient Effort good  -KM     Symptoms Noted During/After Treatment fatigue;increased pain  -KM       Row Name 25 1402          General Information    Patient Profile Reviewed yes  -KM     Patient Observations alert;cooperative;agree to therapy  -KM     Prior Level of Function --  Pt. modified independent w/ rollator for mobility and ADLs.  -KM     Existing Precautions/Restrictions fall  L hip nailing; LLE WBAT  -KM     Risks Reviewed patient:;LOB;nausea/vomiting;dizziness;increased discomfort  -KM     Benefits Reviewed patient:;improve function;increase independence;increase strength;increase balance  -KM     Barriers to Rehab none identified  -KM       Row Name 25 1405          Living Environment    Current Living Arrangements home  -KM     People in Home spouse  -KM     Primary Care Provided by self  -KM       Row Name 25 1404          Home Use of Assistive/Adaptive Equipment    Equipment Currently Used at Home  rollator  -       Row Name 06/24/25 1408          Pain    Additional Documentation Pain Scale: FACES Pre/Post-Treatment (Group)  -       Row Name 06/24/25 1408          Pain Scale: FACES Pre/Post-Treatment    Pain: FACES Scale, Pretreatment 2-->hurts little bit  -KM     Posttreatment Pain Rating 4-->hurts little more  -KM       Row Name 06/24/25 1408          Cognition    Affect/Mental Status (Cognition) WFL  -     Orientation Status (Cognition) oriented to;person  -KM     Follows Commands (Cognition) follows one-step commands  -       Row Name 06/24/25 1408          Range of Motion (ROM)    Range of Motion --  BLE ROM limited  -       Row Name 06/24/25 1408          Strength (Manual Muscle Testing)    Strength (Manual Muscle Testing) --  BLE strength grossly 2/5  -KM       Row Name 06/24/25 1408          Mobility    Extremity Weight-bearing Status left lower extremity  -     Left Lower Extremity (Weight-bearing Status) weight-bearing as tolerated (WBAT)  -       Row Name 06/24/25 1408          Bed Mobility    Bed Mobility supine-sit  -KM     Supine-Sit [place] (Bed Mobility) maximum assist (25% patient effort);2 person assist;verbal cues  -     Bed Mobility, Safety Issues decreased use of arms for pushing/pulling;decreased use of legs for bridging/pushing  -     Assistive Device (Bed Mobility) bed rails;head of bed elevated  -[facility] Name 06/24/25 1408          Transfers    Transfers sit-stand transfer;stand-sit transfer  -KM       Row Name 06/24/25 1408          Sit-Stand Transfer    Sit-Stand [place] (Transfers) dependent (less than 25% patient effort);2 person assist;verbal cues  -     Assistive Device (Sit-Stand Transfers) walker, front-wheeled  -KM     Comment, (Sit-Stand Transfer) unable to complete stand to full upright; attempted 2x  -       Row Name 06/24/25 1408          Stand-Sit Transfer    Stand-Sit [place] (Transfers) maximum assist (25% patient effort);2  person assist;verbal cues  -KM     Assistive Device (Stand-Sit Transfers) walker, front-wheeled  -KM       Row Name 06/24/25 1408          Gait/Stairs (Locomotion)    Patient was able to Ambulate no, other medical factors prevent ambulation  -KM     Reason Patient was unable to Ambulate Excessive Weakness;Uncontrolled Pain  -KM       Row Name 06/24/25 1408          Safety Issues/Impairments Affecting Functional Mobility    Safety Issues Affecting Function (Mobility) awareness of need for assistance;insight into deficits/self-awareness;safety precaution awareness  -KM     Impairments Affecting Function (Mobility) balance;endurance/activity tolerance;pain;range of motion (ROM);strength  -KM       Row Name 06/24/25 1408          Balance    Balance Assessment sitting static balance;standing static balance  -KM     Static Sitting Balance contact guard  -KM     Position, Sitting Balance sitting edge of bed  -KM     Static Standing Balance maximum assist;2-person assist  -KM     Position/Device Used, Standing Balance walker, front-wheeled  -KM       Row Name             Wound 06/23/25 1619 Left hip Surgical    Wound - Properties Group Placement Date: 06/23/25  -KB Placement Time: 1619  -KB Present on Original Admission: N  -KB Side: Left  -KB Location: hip  -KB Primary Wound Type: Surgical  -KB, incision sites x4     Retired Wound - Properties Group Placement Date: 06/23/25  -KB Placement Time: 1619  -KB Present on Original Admission: N  -KB Side: Left  -KB Location: hip  -KB    Retired Wound - Properties Group Placement Date: 06/23/25  -KB Placement Time: 1619  -KB Present on Original Admission: N  -KB Side: Left  -KB Location: hip  -KB    Retired Wound - Properties Group Date first assessed: 06/23/25  -KB Time first assessed: 1619  -KB Present on Original Admission: N  -KB Side: Left  -KB Location: hip  -KB      Row Name 06/24/25 1408          Plan of Care Review    Plan of Care Reviewed With patient  -KM     Outcome  Evaluation Pt. evaluation completed during PT session. She was able to perform functional mobility skills w/ maxA x2. She was unable to ambulate at time of evaluation d/t weakness and pain. She demonstrates impaired strength and ROM. Pt. would benefit from inpatient rehab to address weakness, pain, impaired mobility, safety precautions, and fall risk.  -       Row Name 06/24/25 1406          Therapy Assessment/Plan (PT)    Patient/Family Therapy Goals Statement (PT) return to PLOF  -KM     Functional Level at Time of Evaluation (PT) maxA  -KM     PT Diagnosis (PT) decreased mobility secondary to hip fx  -KM     Rehab Potential (PT) good  -KM     Criteria for Skilled Interventions Met (PT) yes;skilled treatment is necessary  -KM     Therapy Frequency (PT) 6 times/wk  -KM     Predicted Duration of Therapy Intervention (PT) until discharge  -KM     Problem List (PT) problems related to;balance;mobility;range of motion (ROM);strength;pain  -KM     Activity Limitations Related to Problem List (PT) unable to ambulate safely;unable to transfer safely  -KM       Row Name 06/24/25 3971          Therapy Plan Review/Discharge Plan (PT)    Therapy Plan Review (PT) evaluation/treatment results reviewed;care plan/treatment goals reviewed;risks/benefits reviewed;patient;daughter  -       Row Name 06/24/25 140          Physical Therapy Goals    Bed Mobility Goal Selection (PT) bed mobility, PT goal 1  -KM     Transfer Goal Selection (PT) transfer, PT goal 1  -KM     Gait Training Goal Selection (PT) gait training, PT goal 1  -KM       Row Name 06/24/25 5968          Bed Mobility Goal 1 (PT)    Activity/Assistive Device (Bed Mobility Goal 1, PT) bed mobility activities, all  -KM     [place] Level/Cues Needed (Bed Mobility Goal 1, PT) minimum assist (75% or more patient effort)  -KM     Time Frame (Bed Mobility Goal 1, PT) by discharge  -       Row Name 06/24/25 3025          Transfer Goal 1 (PT)    Activity/Assistive  Device (Transfer Goal 1, PT) transfers, all;walker, rolling  -KM     [place] Level/Cues Needed (Transfer Goal 1, PT) minimum assist (75% or more patient effort)  -KM     Time Frame (Transfer Goal 1, PT) by discharge  -       Row Name 06/24/25 1408          Gait Training Goal 1 (PT)    Activity/Assistive Device (Gait Training Goal 1, PT) gait (walking locomotion);assistive device use;walker, rolling  -KM     [place] Level (Gait Training Goal 1, PT) minimum assist (75% or more patient effort)  -KM     Distance (Gait Training Goal 1, PT) 20'  -KM     Time Frame (Gait Training Goal 1, PT) by discharge  -               User Key  (r) = Recorded By, (t) = Taken By, (c) = Cosigned By      Initials Name Provider Type    Cheryl Aponte, RN Registered Nurse    Sage Mccain, YUSUF Physical Therapist                    Physical Therapy Education       Title: PT OT SLP Therapies (Done)       Topic: Physical Therapy (Done)       Point: Mobility training (Done)       Learning Progress Summary            Patient Acceptance, E,TB, VU by  at 6/24/2025 1421                      Point: Home exercise program (Done)       Learning Progress Summary            Patient Acceptance, E,TB, VU by  at 6/24/2025 1421                      Point: Body mechanics (Done)       Learning Progress Summary            Patient Acceptance, E,TB, VU by  at 6/24/2025 1421                      Point: Precautions (Done)       Learning Progress Summary            Patient Acceptance, E,TB, VU by  at 6/24/2025 1421                                      User Key       Initials Effective Dates Name Provider Type Discipline     05/24/22 -  Sage Fuentes, YUSUF Physical Therapist PT                  PT Recommendation and Plan  Anticipated Discharge Disposition (PT): inpatient rehabilitation facility  Planned Therapy Interventions (PT): balance training, bed mobility training, gait training, home exercise program, patient/family education,  postural re-education, ROM (range of motion), strengthening, stretching, transfer training, stair training, wheelchair management/propulsion training  Therapy Frequency (PT): 6 times/wk  Plan of Care Reviewed With: patient  Outcome Evaluation: Pt. evaluation completed during PT session. She was able to perform functional mobility skills w/ maxA x2. She was unable to ambulate at time of evaluation d/t weakness and pain. She demonstrates impaired strength and ROM. Pt. would benefit from inpatient rehab to address weakness, pain, impaired mobility, safety precautions, and fall risk.       Time Calculation:    PT Charges       Row Name 06/24/25 1407             Time Calculation    PT Received On 06/24/25  -KM      PT Goal Re-Cert Due Date 07/08/25  -KM                User Key  (r) = Recorded By, (t) = Taken By, (c) = Cosigned By      Initials Name Provider Type    Sage Mccain, PT Physical Therapist                  Therapy Charges for Today       Code Description Service Date Service Provider Modifiers Qty    42646231691 HC PT EVAL MOD COMPLEXITY 4 6/24/2025 Sage Fuentes, PT GP 1            PT G-Codes  AM-PAC 6 Clicks Score (PT): 8    Sage Fuentes PT  6/24/2025

## 2025-06-24 NOTE — PROGRESS NOTES
Inpatient Progress Note  Lizbet Arzola  Date: 06/24/25  MRN: [id number]      Subjective: Patient seen and evaluated.  Her daughter is present in the room.  The patient states her pain is relatively well-controlled at this time.  Postop hemoglobin dropped to 6.1.  Patient has received 2 units of packed red blood cells.  She denies chest pain or shortness of breath.      Objective:      Vitals:    06/24/25 0627 06/24/25 0642 06/24/25 0704 06/24/25 0910   BP: 123/47 115/46 121/47    BP Location:       Patient Position:       Pulse: 83 82 79 84   Resp: 16 16 18    Temp: 97.6 °F (36.4 °C) 98.7 °F (37.1 °C) 98.3 °F (36.8 °C)    TempSrc: Oral Oral Oral    SpO2: 92% 94% 94% 94%   Weight:       Height:              Physical Exam:  Constitutional:  Well-developed, well-nourished.  No acute distress.        Musculoskeletal: Left hip exam: Surgical dressing is intact to the left hip.  Mild to moderate saturation noted.  Thigh is supple.  No concern for hematoma noted.  Light touch sensation is intact all dermatomes left lower extremity.  Able to dorsi and plantarflex at the left ankle.  Digits are warm pink and mobile.  There is a palpable +2 DP pulse.    Labs:    Results from last 7 days   Lab Units 06/24/25  0439 06/23/25  0514 06/22/25  0835 06/22/25  0529 06/22/25  0156 06/21/25  2117   CRP mg/dL  --   --   --   --   --  <0.30   LACTATE mmol/L  --   --   --  1.6 2.1*  --    WBC 10*3/mm3 13.44* 11.59*  --   --  15.90* 17.41*   HEMOGLOBIN g/dL 6.1* 8.5*  --   --  11.3* 11.5*   HEMATOCRIT % 18.5* 26.3*  --   --  37.7 34.5   MCV fL 100.5* 101.5*  --   --  111.9* 98.6*   MCHC g/dL 33.0 32.3  --   --  30.0* 33.3   PLATELETS 10*3/mm3 174 195  --   --  248 253   INR   --   --  1.13*  --   --   --          Results from last 7 days   Lab Units 06/24/25  0152 06/23/25 0228 06/22/25 0156 06/21/25 2117   SODIUM mmol/L 137 140 140 141   POTASSIUM mmol/L 3.7 3.6 4.4 4.1   CHLORIDE mmol/L 98 104 108* 106   CO2 mmol/L 26.6 25.2  "17.0* 19.7*   BUN mg/dL 26.5* 27.0* 30.4* 30.3*   CREATININE mg/dL 1.19* 1.20* 1.36* 1.45*   CALCIUM mg/dL 7.8* 8.5* 9.2 9.7   GLUCOSE mg/dL 168* 132* 162* 148*   ALBUMIN g/dL  --  3.7 4.1 4.4   BILIRUBIN mg/dL  --  0.6 0.5 0.4   ALK PHOS U/L  --  52 69 78   AST (SGOT) U/L  --  36* 32 29   ALT (SGPT) U/L  --  18 20 21   Estimated Creatinine Clearance: 38.6 mL/min (A) (by C-G formula based on SCr of 1.19 mg/dL (H)).  No results found for: \"AMMONIA\"  Results from last 7 days   Lab Units 06/23/25  0228 06/22/25  0156 06/21/25  2117   CK TOTAL U/L 692* 519* 326*         Hemoglobin A1C   Date Value Ref Range Status   06/21/2025 5.92 (H) 4.80 - 5.60 % Final     Lab Results   Component Value Date    TSH 4.900 (H) 06/21/2025    FREET4 0.85 (L) 06/21/2025         Pain Management Panel           No data to display                No results found for: \"BLOODCX\"  No results found for: \"URINECX\"  No results found for: \"WOUNDCX\"  No results found for: \"STOOLCX\"              Radiology:  Imaging Results (Last 72 Hours)       Procedure Component Value Units Date/Time    FL Surgery Fluoro [[id number]] Collected: 06/24/25 0901     Updated: 06/24/25 0905    Narrative:      EXAMINATION: FL SURGERY FLUORO-      CLINICAL INDICATION:     left hip intertrocanteric nailing;  S72.142A-Displaced intertrochanteric fracture of left femur, initial  encounter for closed fracture; M54.40-Lumbago with sciatica, unspecified  side     TECHNIQUE:  FL SURGERY FLUORO-      FLUOROSCOPY TIME: 213.5 seconds     FINDINGS:   Intraoperative fluoroscopy for left hip fixation.       Impression:      As above.     This report was finalized on 6/24/2025 9:03 AM by Dr. Cosmo Baker MD.       XR Hip With or Without Pelvis 2 - 3 View Left [[id number]] Collected: 06/22/25 1017     Updated: 06/22/25 1020    Narrative:      EXAM:    XR Left Hip With Pelvis When Performed, 2 or 3 Views     EXAM DATE:    6/22/2025 7:57 AM     CLINICAL HISTORY:    fracture; " S72.142A-Displaced intertrochanteric fracture of left femur,  initial encounter for closed fracture; M54.40-Lumbago with sciatica,  unspecified side     TECHNIQUE:    Two or three views of the left hip with pelvis when performed.     COMPARISON:    No relevant prior studies available.     FINDINGS:    Bones/joints:  Displaced fracture proximal left femur.  No  dislocation.    Soft tissues:  Unremarkable.       Impression:        Displaced fracture proximal left femur.     This report was finalized on 6/22/2025 10:17 AM by Dr. Duncan Mays MD.       CT Lumbar Spine Without Contrast [[id number]] Collected: 06/21/25 2334     Updated: 06/21/25 2338    Narrative:      EXAMINATION: CT lumbar spine without contrast     CLINICAL HISTORY: Fall     COMPARISON: None     TECHNIQUE: Contiguous 3 mm thick slices were obtained through the lumbar  spine without contrast. Coronal and sagittal reformats were performed.     FINDINGS:  Sagittal alignment is normal. Mild left convex curvature is centered at  the L4/L5 level. Mild right convex curvature centered at the L1/L2  level. There is no acute fracture. No traumatic listhesis. Moderate  multilevel disc related degenerative changes are noted. At the L4/L5  level there is a posterior disc bulge and comminution with facet  arthrosis and ligamentous thickening. The findings result in at least  moderate central canal stenosis. The disc bulges noted at the L5/S1  level as well.       Impression:      1. Multilevel disc related degenerative changes and facet arthrosis.  2. No evidence of recent trauma.     This report was finalized on 6/21/2025 11:36 PM by Juanjose Rocha MD.       CT Pelvis Without Contrast [[id number]] Collected: 06/21/25 2331     Updated: 06/21/25 2336    Narrative:      EXAMINATION: CT pelvis without contrast     CLINICAL HISTORY: Injury.     COMPARISON: None available.     TECHNIQUE: Contiguous 3 mm thick slices were obtained through the pelvis  without contrast.  Coronal and sagittal reformats were performed.     FINDINGS:  Alignment at the hips is normal. Mild bilateral hip osteoarthrosis is  noted. There is an acute comminuted fracture through the  intertrochanteric region of the left proximal femur. There is apex  lateral angulation at the fracture site. Additionally, the distal  fragment is displaced medially with respect to the more proximal  fragment. There is apex anterior angulation at the fracture site as  well. Extensive adjacent soft tissue swelling is noted. No definite  acetabular fracture is appreciated.       Impression:      1. Acute comminuted left intertrochanteric proximal femur fracture.     This report was finalized on 6/21/2025 11:34 PM by Juanjose Rocha MD.       CT Head Without Contrast [[id number]] Collected: 06/21/25 2323     Updated: 06/21/25 2333    Narrative:      EXAMINATION: CT head without contrast     CLINICAL HISTORY: Fall     COMPARISON: None available.     TECHNIQUE: Contiguous 3 mm thick slices were obtained from the skull  base to the vertex without contrast. Coronal and sagittal reformats were  performed.     FINDINGS:  Moderate generalized volume loss is noted with prominence of the sulci  and ventricular system. White matter changes are noted in a pattern  suggesting chronic small vessel ischemic disease. There is no acute  intracranial hemorrhage. No acute territorial infarct. No extra-axial  collection is identified. There is no shift of midline structures. No  acute calvarial fracture is appreciated. Hyperostosis frontalis is  noted.       Impression:      1. No acute intracranial process.     This report was finalized on 6/21/2025 11:31 PM by Juanjose Rocha MD.                 Assessment: POD #1 status post left subtrochanteric hip fracture repair with long cephalomedullary nail          Plan:    Dressing-maintain dressing for now, okay to reinforce for saturation    PT/OT: Weight Bear/Lifting Status-as tolerated    Continue to trend  H&H and transfuse as needed    DVT PPx-Lovenox 40 once daily for 14 days    Discharge planning: To be determined by the hospitalist service.    Follow up-2 weeks in the office      KELSEY Fischer   06/24/25   09:19 EDT   16

## 2025-06-24 NOTE — PROGRESS NOTES
[facility]     Patient Identification:  Name:  Lizbet Arzola  Age:  80 y.o.  Sex:  female  :  1945  MRN:  [id number]  Visit Number:  [id number]  Primary Care Provider:  Yaakov Riley DO    Length of stay:  2    Chief complaint: Confusion    Subjective:    Patient seen and examined at bedside with patient's family present.  Patient was awake when I entered the room, but did fall asleep during the exam. Patient and family requesting to be evaluated by PT/OT for possible inpatient rehab placement.  ----------------------------------------------------------------------------------------------------------------------  Current Hospital Meds:  acetaminophen, 1,000 mg, Oral, Nightly  acetaminophen, 1,000 mg, Oral, QAM  amLODIPine, 5 mg, Oral, Q24H  aspirin, 81 mg, Oral, Nightly  ceFAZolin, 1,000 mg, Intravenous, Q8H  cholecalciferol, 4,000 Units, Oral, Nightly  donepezil, 10 mg, Oral, Nightly  enoxaparin sodium, 40 mg, Subcutaneous, Q24H  escitalopram, 10 mg, Oral, Daily  methIMAzole, 5 mg, Oral, Daily  multivitamin, 1 tablet, Oral, Nightly  rosuvastatin, 5 mg, Oral, Daily  sodium chloride, 10 mL, Intravenous, Q12H      sodium chloride 0.45 % 925 mL with sodium bicarbonate 8.4 % 75 mEq infusion, , Last Rate: 75 mL/hr at 25 0657      ----------------------------------------------------------------------------------------------------------------------  Vital Signs:  Temp:  [97.4 °F (36.3 °C)-98.7 °F (37.1 °C)] 98.4 °F (36.9 °C)  Heart Rate:  [] 82  Resp:  [15-18] 18  BP: (100-153)/(46-84) 119/47      25  0150 25  0500 25  0500   Weight: 64.5 kg (142 lb 3.2 oz) 67.3 kg (148 lb 6.4 oz) 67.1 kg (147 lb 14.9 oz)     Body mass index is 23.17 kg/m².    Intake/Output Summary (Last 24 hours) at 2025 1115  Last data filed at 2025 0954  Gross per 24 hour   Intake 941 ml   Output --   Net 941 ml     Diet: Regular/House; Fluid Consistency:  Thin (IDDSI 0)  ----------------------------------------------------------------------------------------------------------------------  Physical exam:  Constitutional: Well-nourished  female in no apparent distress.     HENT:  Head:  Normocephalic and atraumatic.  Mouth:  Moist mucous membranes.    Eyes:  Conjunctivae and EOM are normal.  Pupils are equal, round, and reactive to light.  No scleral icterus.    Neck:  Neck supple. No thyromegaly.  No JVD present.    Cardiovascular:  Regular rate and rhythm with no murmurs, rubs, clicks or gallops appreciated.  Pulmonary/Chest:  Clear to auscultation bilaterally with no crackles, wheezes or rhonchi appreciated.  Abdominal:  Soft. Nontender. Nondistended  Bowel sounds are normal in all four quadrants. No organomegally appreciated.   Musculoskeletal:  No edema, no tenderness, and no deformity.  Surgical dressing applied to left hip joint  Neurological: Awake, Cranial nerves II-XII intact with no focal deficits.  No facial droop.  No slurred speech.   Skin:  Warm and dry to palpation with no rashes or lesions appreciated.  Peripheral vascular:  2+ radial and pedal pulses in bilateral upper and lower extremities.  Psychiatric: Awake and oriented x3, demonstrates appropriate judgment and insight.  ---------------------------------------------------------------------------------------  ----------------------------------------------------------------------------------------------------------------------  Results from last 7 days   Lab Units 06/23/25  0228 06/22/25 0156 06/21/25 2117   CK TOTAL U/L 692* 519* 326*   CKMB ng/mL  --   --  6.45*     Results from last 7 days   Lab Units 06/24/25  0439 06/23/25  0514 06/22/25  0835 06/22/25  0529 06/22/25 0156 06/21/25 2117   CRP mg/dL  --   --   --   --   --  <0.30   LACTATE mmol/L  --   --   --  1.6 2.1*  --    WBC 10*3/mm3 13.44* 11.59*  --   --  15.90* 17.41*   HEMOGLOBIN g/dL 6.1* 8.5*  --   --  11.3* 11.5*  "  HEMATOCRIT % 18.5* 26.3*  --   --  37.7 34.5   MCV fL 100.5* 101.5*  --   --  111.9* 98.6*   MCHC g/dL 33.0 32.3  --   --  30.0* 33.3   PLATELETS 10*3/mm3 174 195  --   --  248 253   INR   --   --  1.13*  --   --   --          Results from last 7 days   Lab Units 06/24/25  0152 06/23/25 0228 06/22/25 0156 06/21/25  2117   SODIUM mmol/L 137 140 140 141   POTASSIUM mmol/L 3.7 3.6 4.4 4.1   CHLORIDE mmol/L 98 104 108* 106   CO2 mmol/L 26.6 25.2 17.0* 19.7*   BUN mg/dL 26.5* 27.0* 30.4* 30.3*   CREATININE mg/dL 1.19* 1.20* 1.36* 1.45*   CALCIUM mg/dL 7.8* 8.5* 9.2 9.7   GLUCOSE mg/dL 168* 132* 162* 148*   ALBUMIN g/dL  --  3.7 4.1 4.4   BILIRUBIN mg/dL  --  0.6 0.5 0.4   ALK PHOS U/L  --  52 69 78   AST (SGOT) U/L  --  36* 32 29   ALT (SGPT) U/L  --  18 20 21   Estimated Creatinine Clearance: 38.6 mL/min (A) (by C-G formula based on SCr of 1.19 mg/dL (H)).    No results found for: \"AMMONIA\"      No results found for: \"BLOODCX\"  No results found for: \"URINECX\"  No results found for: \"WOUNDCX\"  No results found for: \"STOOLCX\"    I have personally looked at the labs and they are summarized above.  ----------------------------------------------------------------------------------------------------------------------  Imaging Results (Last 24 Hours)       Procedure Component Value Units Date/Time    FL Surgery Fluoro [[id number]] Collected: 06/24/25 0901     Updated: 06/24/25 0905    Narrative:      EXAMINATION: FL SURGERY FLUORO-      CLINICAL INDICATION:     left hip intertrocanteric nailing;  S72.142A-Displaced intertrochanteric fracture of left femur, initial  encounter for closed fracture; M54.40-Lumbago with sciatica, unspecified  side     TECHNIQUE:  FL SURGERY FLUORO-      FLUOROSCOPY TIME: 213.5 seconds     FINDINGS:   Intraoperative fluoroscopy for left hip fixation.       Impression:      As above.     This report was finalized on 6/24/2025 9:03 AM by Dr. Cosmo Baker MD.         "     ----------------------------------------------------------------------------------------------------------------------  Assessment and Plan:    Left femur fracture - patient underwent left hip intertrochanteric nailing on 6/23/2025, patient tolerated the procedure well with no complications.    2. Acute Blood Loss Anemia - Hgb decreased to 6.1 today, currently receiving 1 unit PRBC transfusion, will repeat hemoglobin/hematocrit 1 hour after completion of PRBC transfusion.    2.  Essential hypertension - well-controlled, continue to monitor closely and make anti-hypertensive medication adjustments as necessary    3.  Hyperlipidemia -statin    4.  Hyperthyroidism - continue to hold methimazole as TSH is elevated and free T4 is below lower limits of normal    5.  Suspect CKD stage IIIa -serum creatinine essentially unchanged today at 1.2.  Continue to monitor closely with BMP in the morning.      Disposition currently being evaluated for possible inpatient rehab placement.    Chano Romero,    06/24/25   11:15 EDT

## 2025-06-25 ENCOUNTER — APPOINTMENT (OUTPATIENT)
Dept: CT IMAGING | Facility: HOSPITAL | Age: 80
End: 2025-06-25
Payer: MEDICARE

## 2025-06-25 LAB
ANION GAP SERPL CALCULATED.3IONS-SCNC: 10.4 MMOL/L (ref 5–15)
APTT PPP: 36.3 SECONDS (ref 24.5–35.9)
BASOPHILS # BLD AUTO: 0.03 10*3/MM3 (ref 0–0.2)
BASOPHILS NFR BLD AUTO: 0.3 % (ref 0–1.5)
BH BB BLOOD EXPIRATION DATE: NORMAL
BH BB BLOOD EXPIRATION DATE: NORMAL
BH BB BLOOD TYPE BARCODE: 7300
BH BB BLOOD TYPE BARCODE: 7300
BH BB DISPENSE STATUS: NORMAL
BH BB DISPENSE STATUS: NORMAL
BH BB PRODUCT CODE: NORMAL
BH BB PRODUCT CODE: NORMAL
BH BB UNIT NUMBER: NORMAL
BH BB UNIT NUMBER: NORMAL
BUN SERPL-MCNC: 23 MG/DL (ref 8–23)
BUN/CREAT SERPL: 20.7 (ref 7–25)
CALCIUM SPEC-SCNC: 7.8 MG/DL (ref 8.6–10.5)
CHLORIDE SERPL-SCNC: 101 MMOL/L (ref 98–107)
CO2 SERPL-SCNC: 27.6 MMOL/L (ref 22–29)
CREAT SERPL-MCNC: 1.11 MG/DL (ref 0.57–1)
CROSSMATCH INTERPRETATION: NORMAL
CROSSMATCH INTERPRETATION: NORMAL
DEPRECATED RDW RBC AUTO: 47 FL (ref 37–54)
DEPRECATED RDW RBC AUTO: 48.8 FL (ref 37–54)
EGFRCR SERPLBLD CKD-EPI 2021: 49.7 ML/MIN/1.73
EOSINOPHIL # BLD AUTO: 0.24 10*3/MM3 (ref 0–0.4)
EOSINOPHIL NFR BLD AUTO: 2.2 % (ref 0.3–6.2)
ERYTHROCYTE [DISTWIDTH] IN BLOOD BY AUTOMATED COUNT: 13.3 % (ref 12.3–15.4)
ERYTHROCYTE [DISTWIDTH] IN BLOOD BY AUTOMATED COUNT: 13.4 % (ref 12.3–15.4)
GLUCOSE SERPL-MCNC: 142 MG/DL (ref 65–99)
HCT VFR BLD AUTO: 24.4 % (ref 34–46.6)
HCT VFR BLD AUTO: 25.3 % (ref 34–46.6)
HGB BLD-MCNC: 7.7 G/DL (ref 12–15.9)
HGB BLD-MCNC: 8.5 G/DL (ref 12–15.9)
IMM GRANULOCYTES # BLD AUTO: 0.06 10*3/MM3 (ref 0–0.05)
IMM GRANULOCYTES NFR BLD AUTO: 0.5 % (ref 0–0.5)
INR PPP: 1.15 (ref 0.9–1.1)
LYMPHOCYTES # BLD AUTO: 2.15 10*3/MM3 (ref 0.7–3.1)
LYMPHOCYTES NFR BLD AUTO: 19.5 % (ref 19.6–45.3)
MAGNESIUM SERPL-MCNC: 1.9 MG/DL (ref 1.6–2.4)
MCH RBC QN AUTO: 31.6 PG (ref 26.6–33)
MCH RBC QN AUTO: 32.2 PG (ref 26.6–33)
MCHC RBC AUTO-ENTMCNC: 31.6 G/DL (ref 31.5–35.7)
MCHC RBC AUTO-ENTMCNC: 33.6 G/DL (ref 31.5–35.7)
MCV RBC AUTO: 100 FL (ref 79–97)
MCV RBC AUTO: 95.8 FL (ref 79–97)
MONOCYTES # BLD AUTO: 1.16 10*3/MM3 (ref 0.1–0.9)
MONOCYTES NFR BLD AUTO: 10.5 % (ref 5–12)
NEUTROPHILS NFR BLD AUTO: 67 % (ref 42.7–76)
NEUTROPHILS NFR BLD AUTO: 7.37 10*3/MM3 (ref 1.7–7)
NRBC BLD AUTO-RTO: 0 /100 WBC (ref 0–0.2)
PLATELET # BLD AUTO: 158 10*3/MM3 (ref 140–450)
PLATELET # BLD AUTO: 190 10*3/MM3 (ref 140–450)
PMV BLD AUTO: 10.2 FL (ref 6–12)
PMV BLD AUTO: 9.8 FL (ref 6–12)
POTASSIUM SERPL-SCNC: 3 MMOL/L (ref 3.5–5.2)
PROTHROMBIN TIME: 15.5 SECONDS (ref 12.5–15.2)
QT INTERVAL: 370 MS
QTC INTERVAL: 469 MS
RBC # BLD AUTO: 2.44 10*6/MM3 (ref 3.77–5.28)
RBC # BLD AUTO: 2.64 10*6/MM3 (ref 3.77–5.28)
SODIUM SERPL-SCNC: 139 MMOL/L (ref 136–145)
UFH PPP CHRO-ACNC: 0.16 IU/ML (ref 0.3–0.7)
UFH PPP CHRO-ACNC: 0.33 IU/ML (ref 0.3–0.7)
UNIT  ABO: NORMAL
UNIT  ABO: NORMAL
UNIT  RH: NORMAL
UNIT  RH: NORMAL
WBC NRBC COR # BLD AUTO: 11.01 10*3/MM3 (ref 3.4–10.8)
WBC NRBC COR # BLD AUTO: 11.62 10*3/MM3 (ref 3.4–10.8)

## 2025-06-25 PROCEDURE — 97530 THERAPEUTIC ACTIVITIES: CPT

## 2025-06-25 PROCEDURE — 73700 CT LOWER EXTREMITY W/O DYE: CPT | Performed by: RADIOLOGY

## 2025-06-25 PROCEDURE — 80048 BASIC METABOLIC PNL TOTAL CA: CPT | Performed by: INTERNAL MEDICINE

## 2025-06-25 PROCEDURE — 85610 PROTHROMBIN TIME: CPT | Performed by: INTERNAL MEDICINE

## 2025-06-25 PROCEDURE — 85520 HEPARIN ASSAY: CPT | Performed by: INTERNAL MEDICINE

## 2025-06-25 PROCEDURE — 97110 THERAPEUTIC EXERCISES: CPT

## 2025-06-25 PROCEDURE — 85025 COMPLETE CBC W/AUTO DIFF WBC: CPT | Performed by: INTERNAL MEDICINE

## 2025-06-25 PROCEDURE — 25010000002 HEPARIN (PORCINE) 25000-0.45 UT/250ML-% SOLUTION: Performed by: INTERNAL MEDICINE

## 2025-06-25 PROCEDURE — 25010000002 ENOXAPARIN PER 10 MG: Performed by: GENERAL PRACTICE

## 2025-06-25 PROCEDURE — 25010000002 HEPARIN (PORCINE) PER 1000 UNITS: Performed by: INTERNAL MEDICINE

## 2025-06-25 PROCEDURE — 25010000002 MORPHINE PER 10 MG: Performed by: GENERAL PRACTICE

## 2025-06-25 PROCEDURE — 73700 CT LOWER EXTREMITY W/O DYE: CPT

## 2025-06-25 PROCEDURE — 85520 HEPARIN ASSAY: CPT

## 2025-06-25 PROCEDURE — 85730 THROMBOPLASTIN TIME PARTIAL: CPT | Performed by: INTERNAL MEDICINE

## 2025-06-25 PROCEDURE — 99232 SBSQ HOSP IP/OBS MODERATE 35: CPT | Performed by: INTERNAL MEDICINE

## 2025-06-25 PROCEDURE — 93010 ELECTROCARDIOGRAM REPORT: CPT | Performed by: INTERNAL MEDICINE

## 2025-06-25 PROCEDURE — 93005 ELECTROCARDIOGRAM TRACING: CPT | Performed by: INTERNAL MEDICINE

## 2025-06-25 PROCEDURE — 83735 ASSAY OF MAGNESIUM: CPT | Performed by: INTERNAL MEDICINE

## 2025-06-25 PROCEDURE — 85027 COMPLETE CBC AUTOMATED: CPT | Performed by: INTERNAL MEDICINE

## 2025-06-25 RX ORDER — POTASSIUM CHLORIDE 1500 MG/1
40 TABLET, EXTENDED RELEASE ORAL ONCE
Status: COMPLETED | OUTPATIENT
Start: 2025-06-25 | End: 2025-06-25

## 2025-06-25 RX ORDER — HEPARIN SODIUM 5000 [USP'U]/ML
4000 INJECTION, SOLUTION INTRAVENOUS; SUBCUTANEOUS ONCE
Status: COMPLETED | OUTPATIENT
Start: 2025-06-25 | End: 2025-06-25

## 2025-06-25 RX ORDER — HYDROCODONE BITARTRATE AND ACETAMINOPHEN 5; 325 MG/1; MG/1
1 TABLET ORAL EVERY 6 HOURS PRN
Refills: 0 | Status: DISPENSED | OUTPATIENT
Start: 2025-06-25 | End: 2025-06-30

## 2025-06-25 RX ORDER — HEPARIN SODIUM 5000 [USP'U]/ML
25 INJECTION, SOLUTION INTRAVENOUS; SUBCUTANEOUS AS NEEDED
Status: DISCONTINUED | OUTPATIENT
Start: 2025-06-25 | End: 2025-06-25

## 2025-06-25 RX ORDER — HEPARIN SODIUM 5000 [USP'U]/ML
50 INJECTION, SOLUTION INTRAVENOUS; SUBCUTANEOUS AS NEEDED
Status: DISCONTINUED | OUTPATIENT
Start: 2025-06-25 | End: 2025-06-25

## 2025-06-25 RX ORDER — HEPARIN SODIUM 10000 [USP'U]/100ML
12 INJECTION, SOLUTION INTRAVENOUS
Status: DISCONTINUED | OUTPATIENT
Start: 2025-06-25 | End: 2025-06-26

## 2025-06-25 RX ADMIN — ESCITALOPRAM 10 MG: 10 TABLET, FILM COATED ORAL at 08:49

## 2025-06-25 RX ADMIN — HEPARIN SODIUM 4000 UNITS: 5000 INJECTION INTRAVENOUS; SUBCUTANEOUS at 17:13

## 2025-06-25 RX ADMIN — ASPIRIN 81 MG CHEWABLE TABLET 81 MG: 81 TABLET CHEWABLE at 19:48

## 2025-06-25 RX ADMIN — HYDROCODONE BITARTRATE AND ACETAMINOPHEN 1 TABLET: 5; 325 TABLET ORAL at 19:49

## 2025-06-25 RX ADMIN — ROSUVASTATIN CALCIUM 5 MG: 10 TABLET, FILM COATED ORAL at 08:49

## 2025-06-25 RX ADMIN — HEPARIN SODIUM 12 UNITS/KG/HR: 10000 INJECTION, SOLUTION INTRAVENOUS at 17:13

## 2025-06-25 RX ADMIN — MORPHINE SULFATE 2 MG: 2 INJECTION, SOLUTION INTRAMUSCULAR; INTRAVENOUS at 09:29

## 2025-06-25 RX ADMIN — ENOXAPARIN SODIUM 40 MG: 40 INJECTION SUBCUTANEOUS at 08:49

## 2025-06-25 RX ADMIN — DONEPEZIL HYDROCHLORIDE 10 MG: 5 TABLET, FILM COATED ORAL at 19:47

## 2025-06-25 RX ADMIN — THERA TABS 1 TABLET: TAB at 19:49

## 2025-06-25 RX ADMIN — DOCUSATE SODIUM 50 MG AND SENNOSIDES 8.6 MG 2 TABLET: 8.6; 5 TABLET, FILM COATED ORAL at 06:00

## 2025-06-25 RX ADMIN — Medication 4000 UNITS: at 19:47

## 2025-06-25 RX ADMIN — Medication 10 ML: at 08:49

## 2025-06-25 RX ADMIN — ACETAMINOPHEN 1000 MG: 500 TABLET ORAL at 06:00

## 2025-06-25 RX ADMIN — POTASSIUM CHLORIDE 40 MEQ: 1500 TABLET, EXTENDED RELEASE ORAL at 11:02

## 2025-06-25 RX ADMIN — AMLODIPINE BESYLATE 5 MG: 5 TABLET ORAL at 08:48

## 2025-06-25 RX ADMIN — SODIUM BICARBONATE: 84 INJECTION INTRAVENOUS at 11:02

## 2025-06-25 NOTE — PROGRESS NOTES
HEPARIN INFUSION  Lizbet Arzola is a  80 y.o. female receiving heparin infusion.     Therapy for (VTE/Cardiac):   Cardiac  Patient Dosing Weight: 67.7 kg  Initial Bolus (Y/N):   Y  Any Bolus (Y/N):   Y        Signs or Symptoms of Bleeding: N    Cardiac or Other (Not VTE)   Initial Bolus: 60 units/kg (Max 4,000 units)  Initial rate: 12 units/kg/hr (Max 1,000 units/hr)   Anti Xa Rebolus Infusion Hold time Change infusion Dose (Units/kg/hr) Next Anti Xa or aPTT Level Due   < 0.11 50 Units/kg  (4000 Units Max) None Increase by  3 Units/kg/hr 6 hours   0.11- 0.19 25 Units/kg  (2000 Units Max) None Increase by  2 Units/kg/hr 6 hours   0.2 - 0.29 0 None Increase by  1 Units/kg/hr 6 hours   0.3 - 0.5 0 None No Change 6 hours (after 2 consecutive levels in range check q24h @0700)   0.51 - 0.6 0 None Decrease by  1 Units/kg/hr 6 hours   0.61 - 0.8 0 30 Minutes Decrease by  2 Units/kg/hr 6 hours   0.81 - 1 0 60 Minutes Decrease by  3 Units/kg/hr 6 hours   >1 0 Hold  After Anti Xa less than 0.5 decrease previous rate by  4 Units/kg/hr  Every 2 hours until Anti Xa  less than 0.5 then when infusion restarts in 6 hours         Recommend anti-Xa every 6 hours.          Date   Time   Anti-Xa Current Rate (Unit/kg/hr) Bolus   (Units) Rate Change   (Unit/kg/hr) New Rate (Unit/kg/hr) Next   Anti-Xa Comments  Pump Check Daily   6/25 1534 0.16  4000  12 2300 D/w IRMA Cooper                                                                                                                                                                                                                                 Pharmacy will continue to follow anti-Xa results and monitor for signs and symptoms of bleeding or thrombosis.    Estefanía Wang, PharmD  06/25/25 16:26 EDT

## 2025-06-25 NOTE — PLAN OF CARE
Goal Outcome Evaluation:         Taking over care of pt at this time. Pt currently resting in bed. No s/s of acute distress noted at this time. No complaints verbalized at this time. Plan of care ongoing.

## 2025-06-25 NOTE — PROGRESS NOTES
[facility] HOSPITALIST PROGRESS NOTE     Patient Identification:  Name:  Lizbet Arzola  Age:  80 y.o.  Sex:  female  :  1945  MRN:  [id number]  Visit Number:  [id number]  Primary Care Provider:  Yaakov Riley DO    Length of stay:  3    Chief complaint: Confusion    Subjective:    Patient seen and examined at bedside with no nursing staff present.  Patient was awake and watching TV when I entered the room.  She appeared much more awake and conversant today.  She did not remember seeing me yesterday.  She does demonstrate appropriate judgment and insight.  Patient did have episode of paroxysmal A-fib earlier this morning and did spontaneously convert to normal sinus rhythm this afternoon.  Otherwise, no significant new events overnight.  ----------------------------------------------------------------------------------------------------------------------  Current Hospital Meds:  amLODIPine, 5 mg, Oral, Q24H  aspirin, 81 mg, Oral, Nightly  cholecalciferol, 4,000 Units, Oral, Nightly  donepezil, 10 mg, Oral, Nightly  escitalopram, 10 mg, Oral, Daily  heparin (porcine), 4,000 Units, Intravenous, Once  [Held by provider] methIMAzole, 5 mg, Oral, Daily  multivitamin, 1 tablet, Oral, Nightly  rosuvastatin, 5 mg, Oral, Daily  sodium chloride, 10 mL, Intravenous, Q12H      heparin, 12 Units/kg/hr  Pharmacy to Dose Heparin,   sodium chloride 0.45 % 925 mL with sodium bicarbonate 8.4 % 75 mEq infusion, , Last Rate: 75 mL/hr at 25 1102      ----------------------------------------------------------------------------------------------------------------------  Vital Signs:  Temp:  [98.2 °F (36.8 °C)-99.3 °F (37.4 °C)] 98.4 °F (36.9 °C)  Heart Rate:  [75-90] 79  Resp:  [18] 18  BP: (127-145)/(49-69) 127/56      25  0500 25  0500 25  0500   Weight: 67.3 kg (148 lb 6.4 oz) 67.1 kg (147 lb 14.9 oz) 67.7 kg (149 lb 4 oz)     Body mass index is 23.38 kg/m².    Intake/Output Summary  (Last 24 hours) at 6/25/2025 1528  Last data filed at 6/25/2025 1300  Gross per 24 hour   Intake 6447.09 ml   Output 0 ml   Net 6447.09 ml     Diet: Regular/House; Fluid Consistency: Thin (IDDSI 0)  ----------------------------------------------------------------------------------------------------------------------  Physical exam:  Constitutional: Well-nourished  female in no apparent distress.     HENT:  Head:  Normocephalic and atraumatic.  Mouth:  Moist mucous membranes.    Eyes:  Conjunctivae and EOM are normal.  Pupils are equal, round, and reactive to light.  No scleral icterus.    Neck:  Neck supple. No thyromegaly.  No JVD present.    Cardiovascular:  Regular rate and rhythm with no murmurs, rubs, clicks or gallops appreciated.  Pulmonary/Chest:  Clear to auscultation bilaterally with no crackles, wheezes or rhonchi appreciated.  Abdominal:  Soft. Nontender. Nondistended  Bowel sounds are normal in all four quadrants. No organomegally appreciated.   Musculoskeletal:  No edema, no tenderness, and no deformity.  Surgical dressing applied to left hip joint  Neurological: Awake, Cranial nerves II-XII intact with no focal deficits.  No facial droop.  No slurred speech.   Skin:  Warm and dry to palpation with no rashes or lesions appreciated.  Peripheral vascular:  2+ radial and pedal pulses in bilateral upper and lower extremities.  Psychiatric: Awake and oriented x3, demonstrates appropriate judgment and insight.      ---------------------------------------------------------------------------------------  ----------------------------------------------------------------------------------------------------------------------  Results from last 7 days   Lab Units 06/23/25  0228 06/22/25  0156 06/21/25  2117   CK TOTAL U/L 692* 519* 326*   CKMB ng/mL  --   --  6.45*     Results from last 7 days   Lab Units 06/25/25  0111 06/24/25  1414 06/24/25  0439 06/23/25  0514 06/22/25  0835 06/22/25  0529  "06/22/25  0156 06/21/25 2117   CRP mg/dL  --   --   --   --   --   --   --  <0.30   LACTATE mmol/L  --   --   --   --   --  1.6 2.1*  --    WBC 10*3/mm3 11.62*  --  13.44* 11.59*  --   --  15.90* 17.41*   HEMOGLOBIN g/dL 8.5* 9.2* 6.1* 8.5*  --   --  11.3* 11.5*   HEMATOCRIT % 25.3* 28.0* 18.5* 26.3*  --   --  37.7 34.5   MCV fL 95.8  --  100.5* 101.5*  --   --  111.9* 98.6*   MCHC g/dL 33.6  --  33.0 32.3  --   --  30.0* 33.3   PLATELETS 10*3/mm3 158  --  174 195  --   --  248 253   INR   --   --   --   --  1.13*  --   --   --          Results from last 7 days   Lab Units 06/25/25  0111 06/24/25  0152 06/23/25 0228 06/22/25 0156 06/21/25 2117   SODIUM mmol/L 139 137 140 140 141   POTASSIUM mmol/L 3.0* 3.7 3.6 4.4 4.1   MAGNESIUM mg/dL 1.9  --   --   --   --    CHLORIDE mmol/L 101 98 104 108* 106   CO2 mmol/L 27.6 26.6 25.2 17.0* 19.7*   BUN mg/dL 23.0 26.5* 27.0* 30.4* 30.3*   CREATININE mg/dL 1.11* 1.19* 1.20* 1.36* 1.45*   CALCIUM mg/dL 7.8* 7.8* 8.5* 9.2 9.7   GLUCOSE mg/dL 142* 168* 132* 162* 148*   ALBUMIN g/dL  --   --  3.7 4.1 4.4   BILIRUBIN mg/dL  --   --  0.6 0.5 0.4   ALK PHOS U/L  --   --  52 69 78   AST (SGOT) U/L  --   --  36* 32 29   ALT (SGPT) U/L  --   --  18 20 21   Estimated Creatinine Clearance: 41.8 mL/min (A) (by C-G formula based on SCr of 1.11 mg/dL (H)).    No results found for: \"AMMONIA\"      No results found for: \"BLOODCX\"  No results found for: \"URINECX\"  No results found for: \"WOUNDCX\"  No results found for: \"STOOLCX\"    I have personally looked at the labs and they are summarized above.  ----------------------------------------------------------------------------------------------------------------------  Imaging Results (Last 24 Hours)       ** No results found for the last 24 hours. **          ----------------------------------------------------------------------------------------------------------------------  Assessment and Plan:    Left femur fracture - patient underwent left " hip intertrochanteric nailing on 6/23/2025, patient tolerated the procedure well with no complications.    2. Acute Blood Loss Anemia - Hgb has improved to 8.5 today after receiving 1 unit PRBC transfusion.  Continue to monitor hemoglobin closely and transfuse for hemoglobin less than 7.    3.  New onset paroxysmal A-fib -patient with new onset paroxysmal A-fib this morning, now converted back to normal sinus rhythm.  Will start patient on heparin drip and monitor closely for drop in hemoglobin over the next 24 hours.  If no significant drop, we will likely transition to Eliquis.  Will start patient on Lopressor 25 mg p.o. twice daily.  Will obtain transthoracic echo    4.  Essential hypertension - well-controlled, continue to monitor closely and make anti-hypertensive medication adjustments as necessary    5.  Hyperlipidemia -statin    6.  Hyperthyroidism - continue to hold methimazole as TSH is elevated and free T4 is below lower limits of normal    7.  Suspect CKD stage IIIa -serum creatinine essentially unchanged today at 1.2.  Continue to monitor closely with BMP in the morning.      Disposition currently being evaluated for possible inpatient rehab placement.    Chano Romero,    06/25/25   15:28 EDT

## 2025-06-25 NOTE — PROGRESS NOTES
Inpatient Progress Note  Lizbet Arzola  Date: 06/25/25  MRN: [id number]      Subjective: Patient seen and evaluated at the bedside.  Per family member she has been complaining of left knee pain more than hip pain.  She sat on the edge of the bed with physical therapy today with the max assist.  She was unable to ambulate.     Objective:      Vitals:    06/25/25 0356 06/25/25 0500 06/25/25 0653 06/25/25 1251   BP: 132/69  145/56 134/66   BP Location: Left arm  Left arm Right arm   Patient Position: Lying  Lying Lying   Pulse: 90  82 75   Resp: 18  18 18   Temp: 98.4 °F (36.9 °C)  99.3 °F (37.4 °C) 98.4 °F (36.9 °C)   TempSrc: Oral  Oral Oral   SpO2: 90%  92% 94%   Weight:  67.7 kg (149 lb 4 oz)     Height:              Physical Exam:  Constitutional:  Well-developed, well-nourished.  No acute distress.        Musculoskeletal: Left lower extremity: Surgical dressings are intact.  There is minimal drainage noted.  The left thigh is supple.  No concern for hematoma.  There is a large effusion noted to the left knee.  There is global tenderness to palpation.  There is no erythema.  Range of motion was not assessed due to the patient's severe pain.  Light touch sensation is intact distally.  Dorsi and plantarflexion is intact to the ankle.  There is a palpable +2 DP pulse.    Labs:    Results from last 7 days   Lab Units 06/25/25  0111 06/24/25  1414 06/24/25  0439 06/23/25  0514 06/22/25  0835 06/22/25  0529 06/22/25  0156 06/21/25  2117   CRP mg/dL  --   --   --   --   --   --   --  <0.30   LACTATE mmol/L  --   --   --   --   --  1.6 2.1*  --    WBC 10*3/mm3 11.62*  --  13.44* 11.59*  --   --  15.90* 17.41*   HEMOGLOBIN g/dL 8.5* 9.2* 6.1* 8.5*  --   --  11.3* 11.5*   HEMATOCRIT % 25.3* 28.0* 18.5* 26.3*  --   --  37.7 34.5   MCV fL 95.8  --  100.5* 101.5*  --   --  111.9* 98.6*   MCHC g/dL 33.6  --  33.0 32.3  --   --  30.0* 33.3   PLATELETS 10*3/mm3 158  --  174 195  --   --  248 253   INR   --   --   --   --   "1.13*  --   --   --          Results from last 7 days   Lab Units 06/25/25  0111 06/24/25  0152 06/23/25 0228 06/22/25 0156 06/21/25 2117   SODIUM mmol/L 139 137 140 140 141   POTASSIUM mmol/L 3.0* 3.7 3.6 4.4 4.1   MAGNESIUM mg/dL 1.9  --   --   --   --    CHLORIDE mmol/L 101 98 104 108* 106   CO2 mmol/L 27.6 26.6 25.2 17.0* 19.7*   BUN mg/dL 23.0 26.5* 27.0* 30.4* 30.3*   CREATININE mg/dL 1.11* 1.19* 1.20* 1.36* 1.45*   CALCIUM mg/dL 7.8* 7.8* 8.5* 9.2 9.7   GLUCOSE mg/dL 142* 168* 132* 162* 148*   ALBUMIN g/dL  --   --  3.7 4.1 4.4   BILIRUBIN mg/dL  --   --  0.6 0.5 0.4   ALK PHOS U/L  --   --  52 69 78   AST (SGOT) U/L  --   --  36* 32 29   ALT (SGPT) U/L  --   --  18 20 21   Estimated Creatinine Clearance: 41.8 mL/min (A) (by C-G formula based on SCr of 1.11 mg/dL (H)).  No results found for: \"AMMONIA\"  Results from last 7 days   Lab Units 06/23/25 0228 06/22/25 0156 06/21/25 2117   CK TOTAL U/L 692* 519* 326*         No results found for: \"HGBA1C\"  Lab Results   Component Value Date    TSH 4.900 (H) 06/21/2025    FREET4 0.85 (L) 06/21/2025         Pain Management Panel           No data to display                No results found for: \"BLOODCX\"  No results found for: \"URINECX\"  No results found for: \"WOUNDCX\"  No results found for: \"STOOLCX\"              Radiology:  Imaging Results (Last 72 Hours)       Procedure Component Value Units Date/Time    FL Surgery Fluoro [[id number]] Collected: 06/24/25 0901     Updated: 06/24/25 0905    Narrative:      EXAMINATION: FL SURGERY FLUORO-      CLINICAL INDICATION:     left hip intertrocanteric nailing;  S72.142A-Displaced intertrochanteric fracture of left femur, initial  encounter for closed fracture; M54.40-Lumbago with sciatica, unspecified  side     TECHNIQUE:  FL SURGERY FLUORO-      FLUOROSCOPY TIME: 213.5 seconds     FINDINGS:   Intraoperative fluoroscopy for left hip fixation.       Impression:      As above.     This report was finalized on 6/24/2025 9:03 " AM by Dr. Cosmo Baker MD.                 Assessment: POD #2 status post left hip fracture repair with long cephalomedullary nail          Plan:  Dressing-maintain dressings for now, okay to reinforce for saturation    Patient discussed with Dr. Oleary.  A CT scan of the left knee will be ordered today.     PT/OT: Weight Bear/Lifting Status-as tolerated     Patient received 2 units of PRBCs yesterday.  Hemoglobin is 8.5.  Continue to trend H&H and transfuse as needed     DVT PPx-Lovenox 40 once daily for 14 days     Discharge planning: To be determined by the hospitalist service.     Follow up-2 weeks in the office        KELSEY Fischer   06/25/25   14:15 EDT

## 2025-06-25 NOTE — CASE MANAGEMENT/SOCIAL WORK
Discharge Planning Assessment   [place]     Patient Name: Lizbet Arzola  MRN: [id number]  Today's Date: 6/25/2025    Admit Date: 6/21/2025    Plan: SS noted inpt rehab consult and spoke with rehab at ext [phone] who is following pt.  Pt's family requests inpt rehab as a preferred rehab option and if not accepted then [facility].  SS will follow.       Discharge Plan       Row Name 06/25/25 1247       Plan    Plan SS noted inpt rehab consult and spoke with rehab at ext 87 who is following pt.  Pt's family requests inpt rehab as a preferred rehab option and if not accepted then [facility].  SS will follow.      Row Name 06/25/25 0958                                Expected Discharge Date and Time       Expected Discharge Date Expected Discharge Time    Jun 26, 2025        JOSE LUIS WoodsW

## 2025-06-25 NOTE — PLAN OF CARE
Goal Outcome Evaluation:              pt resting in bed at this time with no complaints or concerns. Bicard infusing per order. pt refused ambulation due to increase pain, educated pt . Dressing on hip changed due to copious amount of drainage. no acute changes this shift. plan of care on going

## 2025-06-25 NOTE — THERAPY TREATMENT NOTE
Acute Care - Physical Therapy Treatment Note   [facility]     Patient Name: Lizbet Arzola  : 1945  MRN: [id number]  Today's Date: 2025      Visit Dx:     ICD-10-CM ICD-9-CM   1. Closed displaced intertrochanteric fracture of left femur, initial encounter  S72.142A 820.21   2. Acute low back pain with sciatica, sciatica laterality unspecified, unspecified back pain laterality  M54.40 724.2     724.3     Patient Active Problem List   Diagnosis    Closed intertrochanteric fracture of left femur     Past Medical History:   Diagnosis Date    Hyperlipidemia     Hypertension     Hyperthyroidism     Osteoarthritis      Past Surgical History:   Procedure Laterality Date    LIPOMA EXCISION       PT Assessment (Last 12 Hours)       PT Evaluation and Treatment       Row Name 25 142          Physical Therapy Time and Intention    Subjective Information complains of;weakness;fatigue;pain  -KM     Document Type therapy note (daily note)  -KM     Mode of Treatment physical therapy  -KM     Patient Effort good  -KM     Symptoms Noted During/After Treatment fatigue;increased pain  -KM       Row Name 25 142          General Information    Patient Profile Reviewed yes  -KM     Patient Observations alert;cooperative;agree to therapy  -KM     Existing Precautions/Restrictions fall  -KM       Row Name 25 142          Pain Scale: FACES Pre/Post-Treatment    Pain: FACES Scale, Pretreatment 2-->hurts little bit  -KM     Posttreatment Pain Rating 4-->hurts little more  -KM       Row Name 25 142          Cognition    Affect/Mental Status (Cognition) WFL  -KM     Orientation Status (Cognition) oriented to;person  -KM     Follows Commands (Cognition) follows one-step commands  -KM       Row Name 25 142          Mobility    Extremity Weight-bearing Status left lower extremity  -KM     Left Lower Extremity (Weight-bearing Status) weight-bearing as tolerated (WBAT)  -KM       Row Name 25 142           Bed Mobility    Bed Mobility supine-sit;sit-supine  -KM     Supine-Sit [place] (Bed Mobility) moderate assist (50% patient effort);verbal cues  -KM     Sit-Supine [place] (Bed Mobility) moderate assist (50% patient effort);2 person assist;verbal cues  -KM     Bed Mobility, Safety Issues decreased use of arms for pushing/pulling;decreased use of legs for bridging/pushing  -KM     Assistive Device (Bed Mobility) bed rails;head of bed elevated  -KM       Row Name 06/25/25 1421          Transfers    Transfers sit-stand transfer;stand-sit transfer  -KM       Row Name 06/25/25 1421          Sit-Stand Transfer    Sit-Stand [place] (Transfers) maximum assist (25% patient effort);2 person assist;verbal cues  -KM     Assistive Device (Sit-Stand Transfers) walker, front-wheeled  -KM     Comment, (Sit-Stand Transfer) x5  -KM       Row Name 06/25/25 1421          Stand-Sit Transfer    Stand-Sit [place] (Transfers) maximum assist (25% patient effort);2 person assist;verbal cues  -KM     Assistive Device (Stand-Sit Transfers) walker, front-wheeled  -KM     Comment, (Stand-Sit Transfer) x5  -KM       Row Name 06/25/25 1421          Gait/Stairs (Locomotion)    Patient was able to Ambulate no, other medical factors prevent ambulation  -KM     Reason Patient was unable to Ambulate Excessive Weakness;Uncontrolled Pain  -KM       Row Name 06/25/25 1421          Safety Issues/Impairments Affecting Functional Mobility    Impairments Affecting Function (Mobility) balance;endurance/activity tolerance;pain;range of motion (ROM);strength  -KM       Row Name 06/25/25 1421          Motor Skills    Comments, Therapeutic Exercise EOB ther-ex  -KM     Additional Documentation Comments, Therapeutic Exercise (Row)  -KM       Row Name             Wound 06/23/25 1619 Left hip Surgical    Wound - Properties Group Placement Date: 06/23/25  -KB Placement Time: 1619  -KB Present on Original Admission: N  -KB Side: Left  -KB  Location: hip  -KB Primary Wound Type: Surgical  -KB, incision sites x4     Retired Wound - Properties Group Placement Date: 06/23/25  -KB Placement Time: 1619  -KB Present on Original Admission: N  -KB Side: Left  -KB Location: hip  -KB    Retired Wound - Properties Group Placement Date: 06/23/25  -KB Placement Time: 1619  -KB Present on Original Admission: N  -KB Side: Left  -KB Location: hip  -KB    Retired Wound - Properties Group Date first assessed: 06/23/25  -KB Time first assessed: 1619  -KB Present on Original Admission: N  -KB Side: Left  -KB Location: hip  -KB      Row Name 06/25/25 1421          Progress Summary (PT)    Daily Progress Summary (PT) Pt. was able to perform bed mobility skills w/ modA. She was able to perform transfers w/ maxA. She tolerated increased activity compared to prior session. Pt. would continue to benefit from more intense PT services at this time.  -               User Key  (r) = Recorded By, (t) = Taken By, (c) = Cosigned By      Initials Name Provider Type    Cheryl Aponte, RN Registered Nurse    Sage Mccain, PT Physical Therapist                    Physical Therapy Education       Title: PT OT SLP Therapies (Done)       Topic: Physical Therapy (Done)       Point: Mobility training (Done)       Learning Progress Summary            Patient Acceptance, E,TB, VU by  at 6/24/2025 1421                      Point: Home exercise program (Done)       Learning Progress Summary            Patient Acceptance, E,TB, VU by  at 6/24/2025 1421                      Point: Body mechanics (Done)       Learning Progress Summary            Patient Acceptance, E,TB, VU by  at 6/24/2025 1421                      Point: Precautions (Done)       Learning Progress Summary            Patient Acceptance, E,TB, VU by  at 6/24/2025 1421                                      User Key       Initials Effective Dates Name Provider Type Discipline    ANGEL LUIS 05/24/22 -  Sage Fuentes, PT  Physical Therapist PT                  PT Recommendation and Plan  Anticipated Discharge Disposition (PT): inpatient rehabilitation facility  Planned Therapy Interventions (PT): balance training, bed mobility training, gait training, home exercise program, patient/family education, postural re-education, ROM (range of motion), strengthening, stretching, transfer training, stair training, wheelchair management/propulsion training  Therapy Frequency (PT): 6 times/wk  Progress Summary (PT)  Daily Progress Summary (PT): Pt. was able to perform bed mobility skills w/ modA. She was able to perform transfers w/ maxA. She tolerated increased activity compared to prior session. Pt. would continue to benefit from more intense PT services at this time.  Plan of Care Reviewed With: patient  Outcome Evaluation: Pt. evaluation completed during PT session. She was able to perform functional mobility skills w/ maxA x2. She was unable to ambulate at time of evaluation d/t weakness and pain. She demonstrates impaired strength and ROM. Pt. would benefit from inpatient rehab to address weakness, pain, impaired mobility, safety precautions, and fall risk.       Time Calculation:    PT Charges       Row Name 06/25/25 1419             Time Calculation    PT Received On 06/25/25  -KM         Time Calculation- PT    Total Timed Code Minutes- PT 38 minute(s)  -KM                User Key  (r) = Recorded By, (t) = Taken By, (c) = Cosigned By      Initials Name Provider Type    Sage Mccain PT Physical Therapist                  Therapy Charges for Today       Code Description Service Date Service Provider Modifiers Qty    97639848418  PT EVAL MOD COMPLEXITY 4 6/24/2025 Sage Fuentes, PT GP 1    57473008816  PT THERAPEUTIC ACT EA 15 MIN 6/25/2025 Sage Fuentes, PT GP 2    [id number]  PT THER PROC EA 15 MIN 6/25/2025 Sage Fuentes, PT GP 1            PT G-Codes  AM-PAC 6 Clicks Score (PT): 8    Sage Fuentes PT  6/25/2025

## 2025-06-25 NOTE — PLAN OF CARE
Goal Outcome Evaluation:  Plan of Care Reviewed With: patient        Progress: no change  Outcome Evaluation: Pt's resting in bed, A&O, family by bedside and helping with care, pain controlled with PRN meds. Pt converted to a-fib and returned to sinus later, started on heparin drip per MD order. K+3.0, one dose replacement given. Pt worked with PT, sat up for lunch. Had large BM during the day.

## 2025-06-25 NOTE — THERAPY TREATMENT NOTE
Acute Care - Occupational Therapy Treatment Note  [facility]al     Patient Name: Lizbet Arzola  : 1945  MRN: [id number]  Today's Date: 2025             Admit Date: 2025       ICD-10-CM ICD-9-CM   1. Closed displaced intertrochanteric fracture of left femur, initial encounter  S72.142A 820.21   2. Acute low back pain with sciatica, sciatica laterality unspecified, unspecified back pain laterality  M54.40 724.2     724.3     Patient Active Problem List   Diagnosis    Closed intertrochanteric fracture of left femur     Past Medical History:   Diagnosis Date    Hyperlipidemia     Hypertension     Hyperthyroidism     Osteoarthritis      Past Surgical History:   Procedure Laterality Date    LIPOMA EXCISION           OT ASSESSMENT FLOWSHEET (Last 12 Hours)       OT Evaluation and Treatment       Row Name 25 1419                   OT Time and Intention    Subjective Information complains of;pain;fatigue  -LA        Document Type therapy note (daily note)  -LA        Mode of Treatment occupational therapy  -LA        Patient Effort good  -LA        Symptoms Noted During/After Treatment increased pain  -LA           General Information    Patient Profile Reviewed yes  -LA        General Observations of Patient Patient agreeable to OT visit this date. Patient with improved sit to stand.  -LA        Existing Precautions/Restrictions fall  -LA           Pain Assessment    Pretreatment Pain Rating 4/10  -LA        Posttreatment Pain Rating 6/10  -LA        Pain Location hip;knee  -LA        Pain Side/Orientation left  -LA           Cognition    Affect/Mental Status (Cognition) WFL  -LA        Orientation Status (Cognition) oriented to;person;place;situation  -LA        Follows Commands (Cognition) follows one-step commands  -LA           Bed Mobility    Supine-Sit [place] (Bed Mobility) moderate assist (50% patient effort);maximum assist (25% patient effort)  -LA           Sit-Stand Transfer     Sit-Stand [place] (Transfers) maximum assist (25% patient effort);2 person assist  -LA           Motor Skills    Motor Skills coordination;functional endurance  -LA        Functional Endurance Improved activity/ standing tolerance this date  -LA           Wound 06/23/25 1619 Left hip Surgical    Wound - Properties Group Placement Date: 06/23/25  -KB Placement Time: 1619  -KB Present on Original Admission: N  -KB Side: Left  -KB Location: hip  -KB Primary Wound Type: Surgical  -KB, incision sites x4     Retired Wound - Properties Group Placement Date: 06/23/25  -KB Placement Time: 1619  -KB Present on Original Admission: N  -KB Side: Left  -KB Location: hip  -KB    Retired Wound - Properties Group Placement Date: 06/23/25  -KB Placement Time: 1619  -KB Present on Original Admission: N  -KB Side: Left  -KB Location: hip  -KB    Retired Wound - Properties Group Date first assessed: 06/23/25  -KB Time first assessed: 1619  -KB Present on Original Admission: N  -KB Side: Left  -KB Location: hip  -KB       Plan of Care Review    Plan of Care Reviewed With patient  -LA           Positioning and Restraints    Pre-Treatment Position in bed  -LA        Post Treatment Position bed  -LA        In Bed supine;call light within reach;encouraged to call for assist;exit alarm on;with family/caregiver  -LA                  User Key  (r) = Recorded By, (t) = Taken By, (c) = Cosigned By      Initials Name Effective Dates    Cheryl Aponte RN 05/26/22 -     Wendy Delgado OT 02/14/22 -                            OT Recommendation and Plan     Plan of Care Review  Plan of Care Reviewed With: patient  Plan of Care Reviewed With: patient        Time Calculation:     Therapy Charges for Today       Code Description Service Date Service Provider Modifiers Qty    88832358372 HC OT THER PROC EA 15 MIN 6/25/2025 Wendy Apple OT GO 1    25908719111 HC OT THERAPEUTIC ACT EA 15 MIN 6/25/2025 Wendy Apple OT GO 1                  Wendy Apple, OT  6/25/2025

## 2025-06-26 LAB
HCT VFR BLD AUTO: 27.4 % (ref 34–46.6)
HGB BLD-MCNC: 9 G/DL (ref 12–15.9)
QT INTERVAL: 386 MS
QTC INTERVAL: 440 MS
UFH PPP CHRO-ACNC: 0.26 IU/ML (ref 0.3–0.7)

## 2025-06-26 PROCEDURE — 85520 HEPARIN ASSAY: CPT

## 2025-06-26 PROCEDURE — 97530 THERAPEUTIC ACTIVITIES: CPT

## 2025-06-26 PROCEDURE — 85014 HEMATOCRIT: CPT

## 2025-06-26 PROCEDURE — 99232 SBSQ HOSP IP/OBS MODERATE 35: CPT | Performed by: INTERNAL MEDICINE

## 2025-06-26 PROCEDURE — 85018 HEMOGLOBIN: CPT

## 2025-06-26 PROCEDURE — 97110 THERAPEUTIC EXERCISES: CPT

## 2025-06-26 PROCEDURE — 97116 GAIT TRAINING THERAPY: CPT

## 2025-06-26 PROCEDURE — 25010000002 MORPHINE PER 10 MG: Performed by: GENERAL PRACTICE

## 2025-06-26 RX ORDER — NALOXONE HCL 0.4 MG/ML
0.4 VIAL (ML) INJECTION
Status: DISCONTINUED | OUTPATIENT
Start: 2025-06-26 | End: 2025-07-09 | Stop reason: HOSPADM

## 2025-06-26 RX ORDER — HEPARIN SODIUM 10000 [USP'U]/100ML
13 INJECTION, SOLUTION INTRAVENOUS
Status: DISCONTINUED | OUTPATIENT
Start: 2025-06-26 | End: 2025-06-26

## 2025-06-26 RX ORDER — DIAZEPAM 2 MG/1
2 TABLET ORAL ONCE
Status: DISCONTINUED | OUTPATIENT
Start: 2025-06-26 | End: 2025-07-09 | Stop reason: HOSPADM

## 2025-06-26 RX ADMIN — DONEPEZIL HYDROCHLORIDE 10 MG: 5 TABLET, FILM COATED ORAL at 21:01

## 2025-06-26 RX ADMIN — Medication 4000 UNITS: at 21:00

## 2025-06-26 RX ADMIN — Medication 10 ML: at 21:01

## 2025-06-26 RX ADMIN — Medication 10 ML: at 08:26

## 2025-06-26 RX ADMIN — AMLODIPINE BESYLATE 5 MG: 5 TABLET ORAL at 08:26

## 2025-06-26 RX ADMIN — ASPIRIN 81 MG CHEWABLE TABLET 81 MG: 81 TABLET CHEWABLE at 21:01

## 2025-06-26 RX ADMIN — THERA TABS 1 TABLET: TAB at 21:01

## 2025-06-26 RX ADMIN — HYDROCODONE BITARTRATE AND ACETAMINOPHEN 1 TABLET: 5; 325 TABLET ORAL at 16:33

## 2025-06-26 RX ADMIN — MORPHINE SULFATE 2 MG: 2 INJECTION, SOLUTION INTRAMUSCULAR; INTRAVENOUS at 05:53

## 2025-06-26 RX ADMIN — HYDROCODONE BITARTRATE AND ACETAMINOPHEN 1 TABLET: 5; 325 TABLET ORAL at 08:25

## 2025-06-26 RX ADMIN — ESCITALOPRAM 10 MG: 10 TABLET, FILM COATED ORAL at 08:26

## 2025-06-26 RX ADMIN — DICLOFENAC SODIUM 4 G: 10 GEL TOPICAL at 12:42

## 2025-06-26 RX ADMIN — ROSUVASTATIN CALCIUM 5 MG: 10 TABLET, FILM COATED ORAL at 08:26

## 2025-06-26 RX ADMIN — DICLOFENAC SODIUM 4 G: 10 GEL TOPICAL at 21:01

## 2025-06-26 NOTE — THERAPY TREATMENT NOTE
Acute Care - Occupational Therapy Treatment Note  [facility]     Patient Name: Lizbet Arzola  : 1945  MRN: [id number]  Today's Date: 2025             Admit Date: 2025       ICD-10-CM ICD-9-CM   1. Closed displaced intertrochanteric fracture of left femur, initial encounter  S72.142A 820.21   2. Acute low back pain with sciatica, sciatica laterality unspecified, unspecified back pain laterality  M54.40 724.2     724.3     Patient Active Problem List   Diagnosis    Closed intertrochanteric fracture of left femur     Past Medical History:   Diagnosis Date    Hyperlipidemia     Hypertension     Hyperthyroidism     Osteoarthritis      Past Surgical History:   Procedure Laterality Date    LIPOMA EXCISION           OT ASSESSMENT FLOWSHEET (Last 12 Hours)       OT Evaluation and Treatment       Row Name 25 1448                   OT Time and Intention    Document Type therapy note (daily note)  -LA        Mode of Treatment occupational therapy  -LA        Patient Effort excellent  -LA        Symptoms Noted During/After Treatment none  -LA           General Information    Patient Profile Reviewed yes  -LA        General Observations of Patient Patient agreeable to therapy this date. Patient remains motivated to improve level of independence/safety with ADLs. Patient with improved bed mobility, sit to stand, fx transfer and patient able to demonstrate side steps this date.  -LA        Existing Precautions/Restrictions fall  -LA           Pain Assessment    Pretreatment Pain Rating 5/10  -LA        Posttreatment Pain Rating 5/10  -LA        Pain Location knee  -LA        Pain Side/Orientation lower  -LA           Cognition    Affect/Mental Status (Cognition) WFL  -LA        Orientation Status (Cognition) oriented x 3  -LA        Follows Commands (Cognition) WFL  -LA           Range of Motion Comprehensive    General Range of Motion bilateral upper extremity ROM WFL  -LA           Bed Mobility     Supine-Sit [place] (Bed Mobility) moderate assist (50% patient effort);2 person assist  -LA        Sit-Supine [place] (Bed Mobility) moderate assist (50% patient effort);2 person assist  -LA           Functional Mobility    Patient was able to Ambulate yes  Patient able to demonstrate some side stepping this date with modA x2 person. Patient continues to complain of pain in left knee however pain appears to be significantly less today and patient seen hours after pain medication  -LA           Transfer Assessment/Treatment    Transfers bed-chair transfer;chair-bed transfer  -LA           Bed-Chair Transfer    Bed-Chair [place] (Transfers) moderate assist (50% patient effort);2 person assist  -LA           Chair-Bed Transfer    Chair-Bed [place] (Transfers) moderate assist (50% patient effort);2 person assist  -LA           Sit-Stand Transfer    Sit-Stand [place] (Transfers) moderate assist (50% patient effort);2 person assist  -LA           Stand-Sit Transfer    Stand-Sit [place] (Transfers) moderate assist (50% patient effort);2 person assist  -LA           Motor Skills    Motor Skills coordination;functional endurance  -LA        Coordination WFL  -LA        Functional Endurance good-  -LA           Balance    Static Sitting Balance standby assist  -LA        Static Standing Balance moderate assist;2-person assist  -LA           Wound 06/23/25 1619 Left hip Surgical    Wound - Properties Group Placement Date: 06/23/25  -KB Placement Time: 1619  -KB Present on Original Admission: N  -KB Side: Left  -KB Location: hip  -KB Primary Wound Type: Surgical  -KB, incision sites x4     Retired Wound - Properties Group Placement Date: 06/23/25  -KB Placement Time: 1619  -KB Present on Original Admission: N  -KB Side: Left  -KB Location: hip  -KB    Retired Wound - Properties Group Placement Date: 06/23/25  -KB Placement Time: 1619  -KB Present on Original Admission: N  -KB Side: Left  -KB  Location: hip  -KB    Retired Wound - Properties Group Date first assessed: 06/23/25  -KB Time first assessed: 1619  -KB Present on Original Admission: N  -KB Side: Left  -KB Location: hip  -KB       Plan of Care Review    Plan of Care Reviewed With patient;daughter  -LA        Progress improving  -LA           Positioning and Restraints    Pre-Treatment Position in bed  -LA        Post Treatment Position bed  -LA        In Bed supine;call light within reach;encouraged to call for assist;exit alarm on  -LA           Progress Summary (OT)    Daily Progress Summary (OT) --  Patient continues to demonstrate progress toward goals. Patient able to perform transfer to/from chair this date with modAx2 person. Patient also with less pain reported this date with bed mobilty and sit to stand. Patient able to take side steps  -LA           Therapy Plan Review/Discharge Plan (OT)    Therapy Plan Review (OT) patient;daughter  -LA        Anticipated Discharge Disposition (OT) --  OT recommended d/c disposition is inpatient rehab as patient with good participation and motivation to return to [facility]. Patient was previously modified indep with rollator. Patient pain limits mobility however this appears to be improving.  -LA        Patient/Family Concerns, Anticipated Discharge Disposition (OT) Patient and patient family understand IPR requirements of therapy 3 hrs/daily. Patient very motivated with good participation with therapy.  -LA                  User Key  (r) = Recorded By, (t) = Taken By, (c) = Cosigned By      Initials Name Effective Dates    Cheryl Aponte, IRMA 05/26/22 -     Wendy Delgado, MICHAEL 02/14/22 -                            OT Recommendation and Plan     Plan of Care Review  Plan of Care Reviewed With: patient, daughter  Progress: improving  Plan of Care Reviewed With: patient, daughter        Time Calculation:     Therapy Charges for Today       Code Description Service Date Service Provider Modifiers  Qty    04680862118 HC OT THER PROC EA 15 MIN 6/25/2025 Wendy Apple, OT GO 1    16121790106 HC OT THERAPEUTIC ACT EA 15 MIN 6/25/2025 Wendy Apple OT GO 1    49158159633 HC OT THERAPEUTIC ACT EA 15 MIN 6/26/2025 Wendy Apple OT GO 1    31266168013 HC OT THER PROC EA 15 MIN 6/26/2025 Wendy Apple OT GO 1                 Wendy Apple OT  6/26/2025

## 2025-06-26 NOTE — CASE MANAGEMENT/SOCIAL WORK
Discharge Planning Assessment   [facility]     Patient Name: Lizbet Arzola  MRN: [id number]  Today's Date: 6/26/2025    Admit Date: 6/21/2025    Plan: Inpt rehab following pt for possible admit.  SS will follow up in am.       Discharge Plan       Row Name 06/26/25 1716       Plan    Plan Inpt rehab following pt for possible admit.  SS will follow up in am.                    Expected Discharge Date and Time       Expected Discharge Date Expected Discharge Time    Jun 26, 2025           JOSE LUIS [facility]

## 2025-06-26 NOTE — PROGRESS NOTES
[facility] HOSPITALIST PROGRESS NOTE     Patient Identification:  Name:  Lizbet Arzola  Age:  80 y.o.  Sex:  female  :  1945  MRN:  [id number]  Visit Number:  [id number]  Primary Care Provider:  Yaakov Riley DO    Length of stay:  4    Chief complaint: Confusion    Subjective:    Patient seen and examined at bedside with patient's daughter present. Patient reports feeling well, but does complain of pain when attempting to work with PT. CT of left knee obtained yesterday did raise question of possible fibula fracture. Did discuss obtaining MRI left knee today, to which patient is agreeable. Note was made of morning labs demonstrating a drop in hemoglobin, so heparin was discontinued.  ----------------------------------------------------------------------------------------------------------------------  Current [facility] Meds:  amLODIPine, 5 mg, Oral, Q24H  aspirin, 81 mg, Oral, Nightly  cholecalciferol, 4,000 Units, Oral, Nightly  diazePAM, 2 mg, Oral, Once  Diclofenac Sodium, 4 g, Topical, BID  donepezil, 10 mg, Oral, Nightly  escitalopram, 10 mg, Oral, Daily  [Held by provider] methIMAzole, 5 mg, Oral, Daily  multivitamin, 1 tablet, Oral, Nightly  rosuvastatin, 5 mg, Oral, Daily  sodium chloride, 10 mL, Intravenous, Q12H           ----------------------------------------------------------------------------------------------------------------------  Vital Signs:  Temp:  [98.2 °F (36.8 °C)-99 °F (37.2 °C)] 98.7 °F (37.1 °C)  Heart Rate:  [67-79] 74  Resp:  [17-18] 17  BP: (119-158)/(48-62) 119/49      25  0500 25  0500 25  0500   Weight: 67.1 kg (147 lb 14.9 oz) 67.7 kg (149 lb 4 oz) 73.6 kg (162 lb 3.2 oz)     Body mass index is 25.4 kg/m².    Intake/Output Summary (Last 24 hours) at 2025 1308  Last data filed at 2025 0900  Gross per 24 hour   Intake 1155 ml   Output --   Net 1155 ml     Diet: Regular/House; Fluid Consistency: Thin (IDDSI  0)  ----------------------------------------------------------------------------------------------------------------------  Physical exam:  Constitutional: Well-nourished  female in no apparent distress.     HENT:  Head:  Normocephalic and atraumatic.  Mouth:  Moist mucous membranes.    Eyes:  Conjunctivae and EOM are normal.  Pupils are equal, round, and reactive to light.  No scleral icterus.    Neck:  Neck supple. No thyromegaly.  No JVD present.    Cardiovascular:  Regular rate and rhythm with no murmurs, rubs, clicks or gallops appreciated.  Pulmonary/Chest:  Clear to auscultation bilaterally with no crackles, wheezes or rhonchi appreciated.  Abdominal:  Soft. Nontender. Nondistended  Bowel sounds are normal in all four quadrants. No organomegally appreciated.   Musculoskeletal:  No edema, no tenderness, and no deformity.  Surgical dressing applied to left hip joint  Neurological: Awake, Cranial nerves II-XII intact with no focal deficits.  No facial droop.  No slurred speech.   Skin:  Warm and dry to palpation with no rashes or lesions appreciated.  Peripheral vascular:  2+ radial and pedal pulses in bilateral upper and lower extremities.  Psychiatric: Awake and oriented x3, demonstrates appropriate judgment and insight.    No significant change in physical exam in comparison to 6/25/2025  ---------------------------------------------------------------------------------------  ----------------------------------------------------------------------------------------------------------------------  Results from last 7 days   Lab Units 06/23/25  0228 06/22/25  0156 06/21/25 2117   CK TOTAL U/L 692* 519* 326*   CKMB ng/mL  --   --  6.45*     Results from last 7 days   Lab Units 06/26/25  0834 06/25/25  2304 06/25/25  1534 06/25/25  0111 06/24/25  1414 06/24/25  0439 06/23/25  0514 06/22/25  0835 06/22/25  0529 06/22/25  0156 06/21/25 2117   CRP mg/dL  --   --   --   --   --   --   --   --   --   --  <0.30  "  LACTATE mmol/L  --   --   --   --   --   --   --   --  1.6 2.1*  --    WBC 10*3/mm3  --  11.01*  --  11.62*  --  13.44*   < >  --   --  15.90* 17.41*   HEMOGLOBIN g/dL 9.0* 7.7*  --  8.5*   < > 6.1*   < >  --   --  11.3* 11.5*   HEMATOCRIT % 27.4* 24.4*  --  25.3*   < > 18.5*   < >  --   --  37.7 34.5   MCV fL  --  100.0*  --  95.8  --  100.5*   < >  --   --  111.9* 98.6*   MCHC g/dL  --  31.6  --  33.6  --  33.0   < >  --   --  30.0* 33.3   PLATELETS 10*3/mm3  --  190  --  158  --  174   < >  --   --  248 253   INR   --   --  1.15*  --   --   --   --  1.13*  --   --   --     < > = values in this interval not displayed.         Results from last 7 days   Lab Units 06/25/25  0111 06/24/25  0152 06/23/25  0228 06/22/25  0156 06/21/25  2117   SODIUM mmol/L 139 137 140 140 141   POTASSIUM mmol/L 3.0* 3.7 3.6 4.4 4.1   MAGNESIUM mg/dL 1.9  --   --   --   --    CHLORIDE mmol/L 101 98 104 108* 106   CO2 mmol/L 27.6 26.6 25.2 17.0* 19.7*   BUN mg/dL 23.0 26.5* 27.0* 30.4* 30.3*   CREATININE mg/dL 1.11* 1.19* 1.20* 1.36* 1.45*   CALCIUM mg/dL 7.8* 7.8* 8.5* 9.2 9.7   GLUCOSE mg/dL 142* 168* 132* 162* 148*   ALBUMIN g/dL  --   --  3.7 4.1 4.4   BILIRUBIN mg/dL  --   --  0.6 0.5 0.4   ALK PHOS U/L  --   --  52 69 78   AST (SGOT) U/L  --   --  36* 32 29   ALT (SGPT) U/L  --   --  18 20 21   Estimated Creatinine Clearance: 45.4 mL/min (A) (by C-G formula based on SCr of 1.11 mg/dL (H)).    No results found for: \"AMMONIA\"      No results found for: \"BLOODCX\"  No results found for: \"URINECX\"  No results found for: \"WOUNDCX\"  No results found for: \"STOOLCX\"    I have personally looked at the labs and they are summarized above.  ----------------------------------------------------------------------------------------------------------------------  Imaging Results (Last 24 Hours)       Procedure Component Value Units Date/Time    CT Lower Extremity Left Without Contrast [[id number]] Collected: 06/25/25 1938     Updated: 06/25/25 1940 "    Narrative:      REASON FOR EXAMINATION: Left knee pain and effusion.     COMPARISON: None available.     TECHNIQUE: CT of the left knee is performed on a multi-detector CT.  Coronal and sagittal reconstructions were obtained. CT Scan performed  according to as low as reasonably achievable dose protocol.     FINDINGS:  IM piedad left femur.  Severe osteoarthritis left knee.  Ossification of  the medial meniscus.  Marked degenerative cystic change medial  femorotibial joint space.  Large joint effusion.  There could be a  fracture of the proximal shaft of the fibula but this cannot be stated  with certainty as there is severe motion artifact in this area during  the scan.  Furthermore, a definite fracture is not clearly seen on the   image.       Impression:      Severe osteoarthritis of the left knee.     Possible fracture of the proximal fibular shaft, study compromised by  severe motion artifact.  This is felt more likely to be artifactual in  nature as it is not definitely seen on the  film.  Plain film  correlation may be helpful.     This report was finalized on 6/25/2025 7:38 PM by FLACA RUFFIN MD.             ----------------------------------------------------------------------------------------------------------------------  Assessment and Plan:    Left femur fracture - patient underwent left hip intertrochanteric nailing on 6/23/2025, patient tolerated the procedure well with no complications.    2. Acute Blood Loss Anemia -patient has required 1 unit PRBC transfusion during this hospital stay.  A.m. labs demonstrated drop in hemoglobin from 8.5 to 7.7.  As such, heparin drip was discontinued.  However, repeat H&H was obtained which demonstrated hemoglobin of 9. Will likely restart anticoagulation in the next 24-48 hours.     3.  New onset paroxysmal A-fib -patient with new onset paroxysmal A-fib this morning, now converted back to normal sinus rhythm.  See problem #2.  Will continue  Lopressor 25 mg p.o. twice daily and obtain transthoracic echo    4.  Essential hypertension - well-controlled, continue to monitor closely and make anti-hypertensive medication adjustments as necessary    5.  Hyperlipidemia -statin    6.  Hyperthyroidism - continue to hold methimazole as TSH is elevated and free T4 is below lower limits of normal    7.  Suspect CKD stage IIIa -serum creatinine essentially unchanged today at 1.1.  Continue to monitor closely with BMP in the morning.      Disposition currently being evaluated for possible inpatient rehab placement.    Chano Romero DO   06/26/25   13:08 EDT

## 2025-06-26 NOTE — PLAN OF CARE
Goal Outcome Evaluation:   Patient is pleasant, compliant with care. Patient shows no visible s/s of distress at this time. Patient resting in bed, call light in reach. Will continue with plan of care.

## 2025-06-26 NOTE — PROGRESS NOTES
[facility] INFUSION  Lizbet Arzola is a  80 y.o. female receiving heparin infusion.     Therapy for (VTE/Cardiac):   Cardiac  Patient Dosing Weight: 67.7 kg  Initial Bolus (Y/N):   Y  Any Bolus (Y/N):   Y        Signs or Symptoms of Bleeding: N    Cardiac or Other (Not VTE)   Initial Bolus: 60 units/kg (Max 4,000 units)  Initial rate: 12 units/kg/hr (Max 1,000 units/hr)   Anti Xa Rebolus Infusion Hold time Change infusion Dose (Units/kg/hr) Next Anti Xa or aPTT Level Due   < 0.11 50 Units/kg  (4000 Units Max) None Increase by  3 Units/kg/hr 6 hours   0.11- 0.19 25 Units/kg  (2000 Units Max) None Increase by  2 Units/kg/hr 6 hours   0.2 - 0.29 0 None Increase by  1 Units/kg/hr 6 hours   0.3 - 0.5 0 None No Change 6 hours (after 2 consecutive levels in range check q24h @0700)   0.51 - 0.6 0 None Decrease by  1 Units/kg/hr 6 hours   0.61 - 0.8 0 30 Minutes Decrease by  2 Units/kg/hr 6 hours   0.81 - 1 0 60 Minutes Decrease by  3 Units/kg/hr 6 hours   >1 0 Hold  After Anti Xa less than 0.5 decrease previous rate by  4 Units/kg/hr  Every 2 hours until Anti Xa  less than 0.5 then when infusion restarts in 6 hours         Recommend anti-Xa every 6 hours.          Date   Time   Anti-Xa Current Rate (Unit/kg/hr) Bolus   (Units) Rate Change   (Unit/kg/hr) New Rate (Unit/kg/hr) Next   Anti-Xa Comments  Pump Check Daily   6/25 1534 0.16  4000  12 2300 D/w IRMA Cooper   06/26 0000 0.33 12 - - 12 0700 Tx x 1, no changes, no s/s bleeding per IRMA Denis    6/26 0908 0.26 12 - +1 13 1530 Increase rate. Spoke with IRMA Barth. Bleeding from surgical site noted. He will notify Ortho and Dr. Romero to determine if any changes to AC plan                                                                                                                                                                                                            Pharmacy will continue to follow anti-Xa results and monitor for signs and symptoms of bleeding or  thrombosis.    Thank you.  Delia Batista, Pharm.D.  6/26/2025  09:13 EDT

## 2025-06-26 NOTE — THERAPY TREATMENT NOTE
Acute Care - Physical Therapy Treatment Note  [facility]     Patient Name: Lizbet Arzola  : 1945  MRN: [id number]  Today's Date: 2025      Visit Dx:     ICD-10-CM ICD-9-CM   1. Closed displaced intertrochanteric fracture of left femur, initial encounter  S72.142A 820.21   2. Acute low back pain with sciatica, sciatica laterality unspecified, unspecified back pain laterality  M54.40 724.2     724.3     Patient Active Problem List   Diagnosis    Closed intertrochanteric fracture of left femur     Past Medical History:   Diagnosis Date    Hyperlipidemia     Hypertension     Hyperthyroidism     Osteoarthritis      Past Surgical History:   Procedure Laterality Date    LIPOMA EXCISION       PT Assessment (Last 12 Hours)       PT Evaluation and Treatment       Row Name 25 144          Physical Therapy Time and Intention    Subjective Information complains of;weakness;fatigue;pain  -KM     Document Type therapy note (daily note)  -KM     Mode of Treatment physical therapy  -KM     Patient Effort excellent  -KM     Symptoms Noted During/After Treatment fatigue;increased pain  -KM       Row Name 25 144          General Information    Patient Profile Reviewed yes  -KM     Patient Observations alert;cooperative;agree to therapy  -KM     Existing Precautions/Restrictions fall  LLE WBAT  -KM       Row Name 25 144          Pain    Pretreatment Pain Rating 5/10  -KM     Posttreatment Pain Rating 5/10  -KM     Pain Location hip;knee  -KM     Pain Side/Orientation left  -KM       Row Name 25 1447          Cognition    Affect/Mental Status (Cognition) WFL  -KM     Orientation Status (Cognition) oriented x 3  -KM     Follows Commands (Cognition) WFL  -KM       Row Name 25 144          Mobility    Extremity Weight-bearing Status left lower extremity  -KM     Left Lower Extremity (Weight-bearing Status) weight-bearing as tolerated (WBAT)  -KM       Row Name 25 1447          Bed  Mobility    Bed Mobility supine-sit;sit-supine  -KM     Supine-Sit [place] (Bed Mobility) moderate assist (50% patient effort);verbal cues  -KM     Sit-Supine [place] (Bed Mobility) moderate assist (50% patient effort);2 person assist;verbal cues  -KM     Bed Mobility, Safety Issues decreased use of arms for pushing/pulling;decreased use of legs for bridging/pushing  -KM     Assistive Device (Bed Mobility) bed rails;head of bed elevated  -KM       Row Name 06/26/25 1447          Transfers    Transfers sit-stand transfer;stand-sit transfer;chair-bed transfer  -KM       Row Name 06/26/25 1447          Chair-Bed Transfer    Chair-Bed [place] (Transfers) moderate assist (50% patient effort);maximum assist (25% patient effort)  -KM       Row Name 06/26/25 1447          Sit-Stand Transfer    Sit-Stand [place] (Transfers) moderate assist (50% patient effort);2 person assist  -KM     Assistive Device (Sit-Stand Transfers) walker, front-wheeled  -KM     Comment, (Sit-Stand Transfer) x5  -KM       Row Name 06/26/25 1447          Stand-Sit Transfer    Stand-Sit [place] (Transfers) moderate assist (50% patient effort);2 person assist  -KM     Assistive Device (Stand-Sit Transfers) walker, front-wheeled  -KM     Comment, (Stand-Sit Transfer) x5  -KM       Row Name 06/26/25 1447          Gait/Stairs (Locomotion)    Gait/Stairs Locomotion gait/ambulation independence;gait/ambulation assistive device;distance ambulated  -KM     [place] Level (Gait) moderate assist (50% patient effort);2 person assist;verbal cues  -KM     Assistive Device (Gait) walker, front-wheeled  -KM     Patient was able to Ambulate yes  -KM     Distance in Feet (Gait) 3  -KM     Pattern (Gait) step-to  -KM     Deviations/Abnormal Patterns (Gait) antalgic;ataxic;base of support, narrow;gait speed decreased  -KM     Bilateral Gait Deviations forward flexed posture  -KM     Left Sided Gait Deviations weight shift ability decreased   -KM       Row Name 06/26/25 1447          Safety Issues/Impairments Affecting Functional Mobility    Impairments Affecting Function (Mobility) balance;endurance/activity tolerance;pain;range of motion (ROM);strength  -KM       Row Name 06/26/25 1447          Motor Skills    Comments, Therapeutic Exercise EOB ther-ex  -KM       Row Name             Wound 06/23/25 1619 Left hip Surgical    Wound - Properties Group Placement Date: 06/23/25  -KB Placement Time: 1619  -KB Present on Original Admission: N  -KB Side: Left  -KB Location: hip  -KB Primary Wound Type: Surgical  -KB, incision sites x4     Retired Wound - Properties Group Placement Date: 06/23/25  -KB Placement Time: 1619  -KB Present on Original Admission: N  -KB Side: Left  -KB Location: hip  -KB    Retired Wound - Properties Group Placement Date: 06/23/25  -KB Placement Time: 1619  -KB Present on Original Admission: N  -KB Side: Left  -KB Location: hip  -KB    Retired Wound - Properties Group Date first assessed: 06/23/25  -KB Time first assessed: 1619  -KB Present on Original Admission: N  -KB Side: Left  -KB Location: hip  -KB      Row Name 06/26/25 1447          Progress Summary (PT)    Daily Progress Summary (PT) Pt. continues to demonstrate progress w/ all mobility skills. She was able to ambulate short distance w/ RW. She was able to transfer from bed-chair and chair-bed. Pt. was an excellent participant and eager to further work w/ PT to improve her strength, mobility, and decrease fall risk. Pt. would highly benefit from inpatient rehab to assist in her return to functional mobility. Pt. is able to tolerate 3 hours of therapy at this time.  -KM               User Key  (r) = Recorded By, (t) = Taken By, (c) = Cosigned By      Initials Name Provider Type    Cheryl Aponte, RN Registered Nurse    Sage Mccain, PT Physical Therapist                    Physical Therapy Education       Title: PT OT SLP Therapies (Done)       Topic: Physical  Therapy (Done)       Point: Mobility training (Done)       Learning Progress Summary            Patient Acceptance, E,TB, VU by  at 6/24/2025 1421                      Point: Home exercise program (Done)       Learning Progress Summary            Patient Acceptance, E,TB, VU by  at 6/24/2025 1421                      Point: Body mechanics (Done)       Learning Progress Summary            Patient Acceptance, E,TB, VU by  at 6/24/2025 1421                      Point: Precautions (Done)       Learning Progress Summary            Patient Acceptance, E,TB, VU by  at 6/24/2025 1421                                      User Key       Initials Effective Dates Name Provider Type Discipline     05/24/22 -  Sage Fuentes, PT Physical Therapist PT                  PT Recommendation and Plan  Anticipated Discharge Disposition (PT): inpatient rehabilitation facility  Planned Therapy Interventions (PT): balance training, bed mobility training, gait training, home exercise program, patient/family education, postural re-education, ROM (range of motion), strengthening, stretching, transfer training, stair training, wheelchair management/propulsion training  Therapy Frequency (PT): 6 times/wk  Progress Summary (PT)  Daily Progress Summary (PT): Pt. continues to demonstrate progress w/ all mobility skills. She was able to ambulate short distance w/ RW. She was able to transfer from bed-chair and chair-bed. Pt. was an excellent participant and eager to further work w/ PT to improve her strength, mobility, and decrease fall risk. Pt. would highly benefit from inpatient rehab to assist in her return to functional mobility. Pt. is able to tolerate 3 hours of therapy at this time.  Plan of Care Reviewed With: patient  Outcome Evaluation: Pt. evaluation completed during PT session. She was able to perform functional mobility skills w/ maxA x2. She was unable to ambulate at time of evaluation d/t weakness and pain. She demonstrates  impaired strength and ROM. Pt. would benefit from inpatient rehab to address weakness, pain, impaired mobility, safety precautions, and fall risk.       Time Calculation:    PT Charges       Row Name 06/26/25 1446             Time Calculation    PT Received On 06/26/25  -KM         Time Calculation- PT    Total Timed Code Minutes- PT 30 minute(s)  -KM                User Key  (r) = Recorded By, (t) = Taken By, (c) = Cosigned By      Initials Name Provider Type    Sage Mccain, PT Physical Therapist                  Therapy Charges for Today       Code Description Service Date Service Provider Modifiers Qty    91606454968 HC PT THERAPEUTIC ACT EA 15 MIN 6/25/2025 Sage Fuentes, PT GP 2    23326350295 HC PT THER PROC EA 15 MIN 6/25/2025 Sage Fuentes, PT GP 1    [id number]  PT THERAPEUTIC ACT EA 15 MIN 6/26/2025 Sage Fuentes, PT GP 1    63656368564 HC GAIT TRAINING EA 15 MIN 6/26/2025 Sage Fuentes, PT GP 1            PT G-Codes  AM-PAC 6 Clicks Score (PT): 8    Sage Fuentes PT  6/26/2025

## 2025-06-26 NOTE — PROGRESS NOTES
Inpatient Progress Note  Lizbet Arzola  Date: 06/26/25  MRN: [id number]      Subjective: Patient seen and evaluated at the bedside.  She is awake and alert and sitting up in the bed.  Her pain is currently controlled.  She does continue to endorse knee pain and hip pain with activity.  Patient had an episode of paroxysmal A-fib yesterday morning.  She was started on a heparin drip but this has been discontinued due to drop in her hemoglobin to 7.7.  Patient had a CT scan of the knee completed yesterday due to complaints of knee pain and swelling.    Objective:      Vitals:    06/25/25 2353 06/26/25 0356 06/26/25 0500 06/26/25 0646   BP: 129/48 158/59  158/62   BP Location: Right arm Right arm  Right arm   Patient Position: Lying Lying  Lying   Pulse: 77 74  77   Resp: 18 18  18   Temp: 98.2 °F (36.8 °C) 98.3 °F (36.8 °C)  99 °F (37.2 °C)   TempSrc: Oral Oral  Axillary   SpO2: 95% 94%  96%   Weight:   73.6 kg (162 lb 3.2 oz)    Height:              Physical Exam:  Constitutional:  Well-developed, well-nourished.  No acute distress.        Musculoskeletal: Left lower extremity: Left lower extremity: Surgical dressings are intact.  There is minimal drainage noted.  The left thigh is supple.  No concern for hematoma.  There is a large effusion noted to the left knee.  There is global tenderness to palpation.  There is no erythema.  Range of motion was not assessed due to the patient's severe pain.  Light touch sensation is intact distally.  Dorsi and plantarflexion is intact to the ankle.  There is a palpable +2 DP pulse.     Labs:    Results from last 7 days   Lab Units 06/26/25  0834 06/25/25  2304 06/25/25  1534 06/25/25  0111 06/24/25  1414 06/24/25  0439 06/23/25  0514 06/22/25  0835 06/22/25  0529 06/22/25  0156 06/21/25  2117   CRP mg/dL  --   --   --   --   --   --   --   --   --   --  <0.30   LACTATE mmol/L  --   --   --   --   --   --   --   --  1.6 2.1*  --    WBC 10*3/mm3  --  11.01*  --  11.62*  --   "13.44*   < >  --   --  15.90* 17.41*   HEMOGLOBIN g/dL 9.0* 7.7*  --  8.5*   < > 6.1*   < >  --   --  11.3* 11.5*   HEMATOCRIT % 27.4* 24.4*  --  25.3*   < > 18.5*   < >  --   --  37.7 34.5   MCV fL  --  100.0*  --  95.8  --  100.5*   < >  --   --  111.9* 98.6*   MCHC g/dL  --  31.6  --  33.6  --  33.0   < >  --   --  30.0* 33.3   PLATELETS 10*3/mm3  --  190  --  158  --  174   < >  --   --  248 253   INR   --   --  1.15*  --   --   --   --  1.13*  --   --   --     < > = values in this interval not displayed.         Results from last 7 days   Lab Units 06/25/25  0111 06/24/25  0152 06/23/25  0228 06/22/25  0156 06/21/25  2117   SODIUM mmol/L 139 137 140 140 141   POTASSIUM mmol/L 3.0* 3.7 3.6 4.4 4.1   MAGNESIUM mg/dL 1.9  --   --   --   --    CHLORIDE mmol/L 101 98 104 108* 106   CO2 mmol/L 27.6 26.6 25.2 17.0* 19.7*   BUN mg/dL 23.0 26.5* 27.0* 30.4* 30.3*   CREATININE mg/dL 1.11* 1.19* 1.20* 1.36* 1.45*   CALCIUM mg/dL 7.8* 7.8* 8.5* 9.2 9.7   GLUCOSE mg/dL 142* 168* 132* 162* 148*   ALBUMIN g/dL  --   --  3.7 4.1 4.4   BILIRUBIN mg/dL  --   --  0.6 0.5 0.4   ALK PHOS U/L  --   --  52 69 78   AST (SGOT) U/L  --   --  36* 32 29   ALT (SGPT) U/L  --   --  18 20 21   Estimated Creatinine Clearance: 45.4 mL/min (A) (by C-G formula based on SCr of 1.11 mg/dL (H)).  No results found for: \"AMMONIA\"  Results from last 7 days   Lab Units 06/23/25  0228 06/22/25  0156 06/21/25  2117   CK TOTAL U/L 692* 519* 326*         No results found for: \"HGBA1C\"  Lab Results   Component Value Date    TSH 4.900 (H) 06/21/2025    FREET4 0.85 (L) 06/21/2025         Pain Management Panel           No data to display                No results found for: \"BLOODCX\"  No results found for: \"URINECX\"  No results found for: \"WOUNDCX\"  No results found for: \"STOOLCX\"              Radiology:  Imaging Results (Last 72 Hours)       Procedure Component Value Units Date/Time    CT Lower Extremity Left Without Contrast [[id number]] Collected: 06/25/25 " 1938     Updated: 06/25/25 1940    Narrative:      REASON FOR EXAMINATION: Left knee pain and effusion.     COMPARISON: None available.     TECHNIQUE: CT of the left knee is performed on a multi-detector CT.  Coronal and sagittal reconstructions were obtained. CT Scan performed  according to as low as reasonably achievable dose protocol.     FINDINGS:  IM piedad left femur.  Severe osteoarthritis left knee.  Ossification of  the medial meniscus.  Marked degenerative cystic change medial  femorotibial joint space.  Large joint effusion.  There could be a  fracture of the proximal shaft of the fibula but this cannot be stated  with certainty as there is severe motion artifact in this area during  the scan.  Furthermore, a definite fracture is not clearly seen on the   image.       Impression:      Severe osteoarthritis of the left knee.     Possible fracture of the proximal fibular shaft, study compromised by  severe motion artifact.  This is felt more likely to be artifactual in  nature as it is not definitely seen on the  film.  Plain film  correlation may be helpful.     This report was finalized on 6/25/2025 7:38 PM by FLACA RUFFIN MD.       FL Surgery Fluoro [[id number]] Collected: 06/24/25 0901     Updated: 06/24/25 0905    Narrative:      EXAMINATION: FL SURGERY FLUORO-      CLINICAL INDICATION:     left hip intertrocanteric nailing;  S72.142A-Displaced intertrochanteric fracture of left femur, initial  encounter for closed fracture; M54.40-Lumbago with sciatica, unspecified  side     TECHNIQUE:  FL SURGERY FLUORO-      FLUOROSCOPY TIME: 213.5 seconds     FINDINGS:   Intraoperative fluoroscopy for left hip fixation.       Impression:      As above.     This report was finalized on 6/24/2025 9:03 AM by Dr. Cosmo Baker MD.                 Assessment: POD #3 status post left hip fracture repair with long cephalomedullary nail          Plan:     CT scan of the left knee was reviewed.  Case discussed  with Dr. Jay this morning who reviewed images.  He does not feel that there is evidence of fracture.  MRI has been ordered by the hospitalist service.    Continue PT/OT patient may continue to weight-bear as tolerated to the left lower extremity.     Hemoglobin is 9.0 today.  Continue to trend H&H and transfuse as needed.    Elevate and polar ice to the left knee for pain and swelling.     Discharge planning: To be determined per the medical hospitalist service.    Follow-up in the office 2 weeks at discharge      KELSEY Fischer   06/26/25   10:37 EDT

## 2025-06-27 ENCOUNTER — APPOINTMENT (OUTPATIENT)
Dept: MRI IMAGING | Facility: HOSPITAL | Age: 80
End: 2025-06-27
Payer: MEDICARE

## 2025-06-27 ENCOUNTER — APPOINTMENT (OUTPATIENT)
Dept: CARDIOLOGY | Facility: HOSPITAL | Age: 80
End: 2025-06-27
Payer: MEDICARE

## 2025-06-27 LAB
AORTIC DIMENSIONLESS INDEX: 0.85 (DI)
AV MEAN PRESS GRAD SYS DOP V1V2: 9 MMHG
AV VMAX SYS DOP: 196 CM/SEC
BH CV ECHO MEAS - ACS: 1.5 CM
BH CV ECHO MEAS - AI P1/2T: 421.6 MSEC
BH CV ECHO MEAS - AO MAX PG: 15.4 MMHG
BH CV ECHO MEAS - AO ROOT DIAM: 2.6 CM
BH CV ECHO MEAS - AO V2 VTI: 39.9 CM
BH CV ECHO MEAS - AVA(I,D): 2.17 CM2
BH CV ECHO MEAS - EDV(CUBED): 110.6 ML
BH CV ECHO MEAS - EDV(MOD-SP4): 49.9 ML
BH CV ECHO MEAS - EF(MOD-SP4): 68.3 %
BH CV ECHO MEAS - ESV(CUBED): 19.7 ML
BH CV ECHO MEAS - ESV(MOD-SP4): 15.8 ML
BH CV ECHO MEAS - FS: 43.8 %
BH CV ECHO MEAS - IVS/LVPW: 1 CM
BH CV ECHO MEAS - IVSD: 1 CM
BH CV ECHO MEAS - LA DIMENSION: 3.5 CM
BH CV ECHO MEAS - LAT PEAK E' VEL: 6.7 CM/SEC
BH CV ECHO MEAS - LV DIASTOLIC VOL/BSA (35-75): 26.9 CM2
BH CV ECHO MEAS - LV MASS(C)D: 170.2 GRAMS
BH CV ECHO MEAS - LV MAX PG: 11.2 MMHG
BH CV ECHO MEAS - LV MEAN PG: 5 MMHG
BH CV ECHO MEAS - LV SYSTOLIC VOL/BSA (12-30): 8.5 CM2
BH CV ECHO MEAS - LV V1 MAX: 167 CM/SEC
BH CV ECHO MEAS - LV V1 VTI: 34.1 CM
BH CV ECHO MEAS - LVIDD: 4.8 CM
BH CV ECHO MEAS - LVIDS: 2.7 CM
BH CV ECHO MEAS - LVOT AREA: 2.5 CM2
BH CV ECHO MEAS - LVOT DIAM: 1.8 CM
BH CV ECHO MEAS - LVPWD: 1 CM
BH CV ECHO MEAS - MED PEAK E' VEL: 5.4 CM/SEC
BH CV ECHO MEAS - MV A MAX VEL: 148 CM/SEC
BH CV ECHO MEAS - MV E MAX VEL: 90.8 CM/SEC
BH CV ECHO MEAS - MV E/A: 0.61
BH CV ECHO MEAS - PA ACC TIME: 0.07 SEC
BH CV ECHO MEAS - PA V2 MAX: 113 CM/SEC
BH CV ECHO MEAS - RVDD: 3 CM
BH CV ECHO MEAS - SV(LVOT): 86.8 ML
BH CV ECHO MEAS - SV(MOD-SP4): 34.1 ML
BH CV ECHO MEAS - SVI(LVOT): 46.8 ML/M2
BH CV ECHO MEAS - SVI(MOD-SP4): 18.4 ML/M2
BH CV ECHO MEAS - TAPSE (>1.6): 2.6 CM
BH CV ECHO MEASUREMENTS AVERAGE E/E' RATIO: 15.01
BH CV XLRA - RV BASE: 3.1 CM
BH CV XLRA - RV LENGTH: 4.9 CM
BH CV XLRA - RV MID: 2.3 CM
LEFT ATRIUM VOLUME INDEX: 27.6 ML/M2

## 2025-06-27 PROCEDURE — 73721 MRI JNT OF LWR EXTRE W/O DYE: CPT | Performed by: RADIOLOGY

## 2025-06-27 PROCEDURE — 93306 TTE W/DOPPLER COMPLETE: CPT

## 2025-06-27 PROCEDURE — 97116 GAIT TRAINING THERAPY: CPT

## 2025-06-27 PROCEDURE — 97530 THERAPEUTIC ACTIVITIES: CPT

## 2025-06-27 PROCEDURE — 93306 TTE W/DOPPLER COMPLETE: CPT | Performed by: INTERNAL MEDICINE

## 2025-06-27 PROCEDURE — 73721 MRI JNT OF LWR EXTRE W/O DYE: CPT

## 2025-06-27 PROCEDURE — 99232 SBSQ HOSP IP/OBS MODERATE 35: CPT | Performed by: INTERNAL MEDICINE

## 2025-06-27 RX ADMIN — ESCITALOPRAM 10 MG: 10 TABLET, FILM COATED ORAL at 08:20

## 2025-06-27 RX ADMIN — Medication 4000 UNITS: at 20:46

## 2025-06-27 RX ADMIN — DONEPEZIL HYDROCHLORIDE 10 MG: 5 TABLET, FILM COATED ORAL at 20:46

## 2025-06-27 RX ADMIN — DICLOFENAC SODIUM 4 G: 10 GEL TOPICAL at 20:47

## 2025-06-27 RX ADMIN — HYDROCODONE BITARTRATE AND ACETAMINOPHEN 1 TABLET: 5; 325 TABLET ORAL at 15:54

## 2025-06-27 RX ADMIN — HYDROCODONE BITARTRATE AND ACETAMINOPHEN 1 TABLET: 5; 325 TABLET ORAL at 01:01

## 2025-06-27 RX ADMIN — DICLOFENAC SODIUM 4 G: 10 GEL TOPICAL at 08:21

## 2025-06-27 RX ADMIN — HYDROCODONE BITARTRATE AND ACETAMINOPHEN 1 TABLET: 5; 325 TABLET ORAL at 10:27

## 2025-06-27 RX ADMIN — THERA TABS 1 TABLET: TAB at 20:46

## 2025-06-27 RX ADMIN — Medication 10 ML: at 20:46

## 2025-06-27 RX ADMIN — Medication 10 ML: at 08:20

## 2025-06-27 RX ADMIN — ROSUVASTATIN CALCIUM 5 MG: 10 TABLET, FILM COATED ORAL at 08:20

## 2025-06-27 RX ADMIN — AMLODIPINE BESYLATE 5 MG: 5 TABLET ORAL at 08:20

## 2025-06-27 RX ADMIN — ASPIRIN 81 MG CHEWABLE TABLET 81 MG: 81 TABLET CHEWABLE at 20:46

## 2025-06-27 NOTE — PLAN OF CARE
Goal Outcome Evaluation:      Patient resting in bed at this time with family at bedside. Pt pleasant and cooperative with care this shift taking p.o. meds whole, no complaints or concerns currently being voiced. IV access maintained, no s/s of acute distress noted at this time, plan of care ongoing.

## 2025-06-27 NOTE — PROGRESS NOTES
[facility]     Patient Identification:  Name:  Lizbet Arzola  Age:  80 y.o.  Sex:  female  :  1945  MRN:  [id number]  Visit Number:  [id number]  Primary Care Provider:  Yaakov Riley DO    Length of stay:  5    Chief complaint: Confusion    Subjective:    Patient seen and examined at bedside with patient's family present.  Did review MRI findings of left knee and informed patient she does not have a fractured fibula but does have severe osteoarthritis of left knee joint.  Patient is currently being evaluated for inpatient rehab  ----------------------------------------------------------------------------------------------------------------------  [facility] Meds:  amLODIPine, 5 mg, Oral, Q24H  aspirin, 81 mg, Oral, Nightly  cholecalciferol, 4,000 Units, Oral, Nightly  diazePAM, 2 mg, Oral, Once  Diclofenac Sodium, 4 g, Topical, BID  donepezil, 10 mg, Oral, Nightly  escitalopram, 10 mg, Oral, Daily  [Held by provider] methIMAzole, 5 mg, Oral, Daily  multivitamin, 1 tablet, Oral, Nightly  rosuvastatin, 5 mg, Oral, Daily  sodium chloride, 10 mL, Intravenous, Q12H           ----------------------------------------------------------------------------------------------------------------------  Vital Signs:  Temp:  [97.6 °F (36.4 °C)-98.5 °F (36.9 °C)] 98.3 °F (36.8 °C)  Heart Rate:  [78-87] 78  Resp:  [16-18] 16  BP: (129-190)/(53-80) 190/80      25  0500 25  0500 25  0500   Weight: 67.7 kg (149 lb 4 oz) 73.6 kg (162 lb 3.2 oz) 74.1 kg (163 lb 6.4 oz)     Body mass index is 25.59 kg/m².    Intake/Output Summary (Last 24 hours) at 2025 1642  Last data filed at 2025 0230  Gross per 24 hour   Intake --   Output 700 ml   Net -700 ml     Diet: Regular/House; Fluid Consistency: Thin (IDDSI 0)  ----------------------------------------------------------------------------------------------------------------------  Physical  exam:  Constitutional: Well-nourished  female in no apparent distress.     HENT:  Head:  Normocephalic and atraumatic.  Mouth:  Moist mucous membranes.    Eyes:  Conjunctivae and EOM are normal.  Pupils are equal, round, and reactive to light.  No scleral icterus.    Neck:  Neck supple. No thyromegaly.  No JVD present.    Cardiovascular:  Regular rate and rhythm with no murmurs, rubs, clicks or gallops appreciated.  Pulmonary/Chest:  Clear to auscultation bilaterally with no crackles, wheezes or rhonchi appreciated.  Abdominal:  Soft. Nontender. Nondistended  Bowel sounds are normal in all four quadrants. No organomegally appreciated.   Musculoskeletal:  No edema, no tenderness, and no deformity.  Surgical dressing applied to left hip joint  Neurological: Awake, Cranial nerves II-XII intact with no focal deficits.  No facial droop.  No slurred speech.   Skin:  Warm and dry to palpation with no rashes or lesions appreciated.  Peripheral vascular:  2+ radial and pedal pulses in bilateral upper and lower extremities.  Psychiatric: Awake and oriented x3, demonstrates appropriate judgment and insight.    No significant change in physical exam in comparison to 6/26/2025  ---------------------------------------------------------------------------------------  ----------------------------------------------------------------------------------------------------------------------  Results from last 7 days   Lab Units 06/23/25  0228 06/22/25  0156 06/21/25  2117   CK TOTAL U/L 692* 519* 326*   CKMB ng/mL  --   --  6.45*     Results from last 7 days   Lab Units 06/26/25  0834 06/25/25  2304 06/25/25  1534 06/25/25  0111 06/24/25  1414 06/24/25  0439 06/23/25  0514 06/22/25  0835 06/22/25  0529 06/22/25  0156 06/21/25  2117   CRP mg/dL  --   --   --   --   --   --   --   --   --   --  <0.30   LACTATE mmol/L  --   --   --   --   --   --   --   --  1.6 2.1*  --    WBC 10*3/mm3  --  11.01*  --  11.62*  --  13.44*   < >  --   " --  15.90* 17.41*   HEMOGLOBIN g/dL 9.0* 7.7*  --  8.5*   < > 6.1*   < >  --   --  11.3* 11.5*   HEMATOCRIT % 27.4* 24.4*  --  25.3*   < > 18.5*   < >  --   --  37.7 34.5   MCV fL  --  100.0*  --  95.8  --  100.5*   < >  --   --  111.9* 98.6*   MCHC g/dL  --  31.6  --  33.6  --  33.0   < >  --   --  30.0* 33.3   PLATELETS 10*3/mm3  --  190  --  158  --  174   < >  --   --  248 253   INR   --   --  1.15*  --   --   --   --  1.13*  --   --   --     < > = values in this interval not displayed.         Results from last 7 days   Lab Units 06/25/25  0111 06/24/25  0152 06/23/25  0228 06/22/25  0156 06/21/25  2117   SODIUM mmol/L 139 137 140 140 141   POTASSIUM mmol/L 3.0* 3.7 3.6 4.4 4.1   MAGNESIUM mg/dL 1.9  --   --   --   --    CHLORIDE mmol/L 101 98 104 108* 106   CO2 mmol/L 27.6 26.6 25.2 17.0* 19.7*   BUN mg/dL 23.0 26.5* 27.0* 30.4* 30.3*   CREATININE mg/dL 1.11* 1.19* 1.20* 1.36* 1.45*   CALCIUM mg/dL 7.8* 7.8* 8.5* 9.2 9.7   GLUCOSE mg/dL 142* 168* 132* 162* 148*   ALBUMIN g/dL  --   --  3.7 4.1 4.4   BILIRUBIN mg/dL  --   --  0.6 0.5 0.4   ALK PHOS U/L  --   --  52 69 78   AST (SGOT) U/L  --   --  36* 32 29   ALT (SGPT) U/L  --   --  18 20 21   Estimated Creatinine Clearance: 41.1 mL/min (A) (by C-G formula based on SCr of 1.11 mg/dL (H)).    No results found for: \"AMMONIA\"      No results found for: \"BLOODCX\"  No results found for: \"URINECX\"  No results found for: \"WOUNDCX\"  No results found for: \"STOOLCX\"    I have personally looked at the labs and they are summarized above.  ----------------------------------------------------------------------------------------------------------------------  Imaging Results (Last 24 Hours)       Procedure Component Value Units Date/Time    MRI Knee Left Without Contrast [[id number]] Collected: 06/27/25 0431     Updated: 06/27/25 0452    Narrative:      PROCEDURE: MRI examination of the left knee performed without IV  contrast on June 27, 2025. Imaging performed utilizing " proton density  fat saturation and T1 sequences with imaging performed in the axial,  sagittal, and coronal planes.     HISTORY: Possible left proximal fibular fracture.     COMPARISON: None.     FINDINGS:     Large left knee joint effusion.  Mild chondromalacia of the patella noted affecting the medial and  lateral articular facets as well as at the apex of the patella.  Intact quadriceps tendon and patellar tendon.  Moderate to severe osteoarthritis in the medial compartment with  prominent subchondral cyst formation in the medial femoral condyle and  medial tibial plateau.  Subchondral cyst formation also noted at the base of the tibial spines.  Moderate osteoarthritis in the lateral compartment of the left knee with  associated joint space narrowing and osteophyte formation.  Degenerative type changes noted affecting the anterior horn and  posterior horn of the medial meniscus.  Chronic free edge tear involving the anterior horn of the medial  meniscus as seen on image 13, series 10. Chronic tear involving the  superior inferior margins of the body of the medial meniscus.  Mild frayed margins to the superior aspect of the anterior horn of the  lateral meniscus  No acute process seen involving the posterior horn of the lateral  meniscus.  Intact medial and fibular collateral ligaments.  Moderate soft tissue edema noted surrounding the left knee and edema  extends into the distal left lower extremity surrounding the left calf  musculature.  No bone marrow edema at the left proximal fibula.  Chronic chondral abnormalities identified along the articular surface of  the medial femoral condyle and medial tibial plateau.       Impression:         Large left knee joint effusion  Moderate to severe osteoarthritis in the medial compartment with  prominent subchondral cyst formation in the medial femoral condyle and  medial tibial plateau appear  Chondral defects with chondral loss noted along the articular surface of  the  medial compartment.  Moderate osteoarthritis in the lateral compartment with joint space  narrowing and osteophyte formation.  Mild chondromalacia of the patella.  Intact quadriceps tendon and patellar tendon.  No fracture at the proximal left fibula  Intact cruciate and collateral ligaments  Intact medial and fibular collateral ligaments.  Frayed margins and chronic tears involving the medial meniscus.  Soft tissue edema noted to surround the left knee and extending into the  left calf surrounding the left calf musculature.  Subchondral cyst formation noted at the base of the tibial spines and  medial aspect of the lateral tibial plateau.  Probable chronic impaction injury affecting the medial tibial plateau.              This report was finalized on 6/27/2025 4:50 AM by Armando Heart MD.       MRI Knee Left Without Contrast [[id number]] Collected: 06/27/25 0431     Updated: 06/27/25 0452    Narrative:      PROCEDURE: MRI examination of the left knee performed without IV  contrast on June 27, 2025. Imaging performed utilizing proton density  fat saturation and T1 sequences with imaging performed in the axial,  sagittal, and coronal planes.     HISTORY: Possible left proximal fibular fracture.     COMPARISON: None.     FINDINGS:     Large left knee joint effusion.  Mild chondromalacia of the patella noted affecting the medial and  lateral articular facets as well as at the apex of the patella.  Intact quadriceps tendon and patellar tendon.  Moderate to severe osteoarthritis in the medial compartment with  prominent subchondral cyst formation in the medial femoral condyle and  medial tibial plateau.  Subchondral cyst formation also noted at the base of the tibial spines.  Moderate osteoarthritis in the lateral compartment of the left knee with  associated joint space narrowing and osteophyte formation.  Degenerative type changes noted affecting the anterior horn and  posterior horn of the medial meniscus.  Chronic  free edge tear involving the anterior horn of the medial  meniscus as seen on image 13, series 10. Chronic tear involving the  superior inferior margins of the body of the medial meniscus.  Mild frayed margins to the superior aspect of the anterior horn of the  lateral meniscus  No acute process seen involving the posterior horn of the lateral  meniscus.  Intact medial and fibular collateral ligaments.  Moderate soft tissue edema noted surrounding the left knee and edema  extends into the distal left lower extremity surrounding the left calf  musculature.  No bone marrow edema at the left proximal fibula.  Chronic chondral abnormalities identified along the articular surface of  the medial femoral condyle and medial tibial plateau.       Impression:         Large left knee joint effusion  Moderate to severe osteoarthritis in the medial compartment with  prominent subchondral cyst formation in the medial femoral condyle and  medial tibial plateau appear  Chondral defects with chondral loss noted along the articular surface of  the medial compartment.  Moderate osteoarthritis in the lateral compartment with joint space  narrowing and osteophyte formation.  Mild chondromalacia of the patella.  Intact quadriceps tendon and patellar tendon.  No fracture at the proximal left fibula  Intact cruciate and collateral ligaments  Intact medial and fibular collateral ligaments.  Frayed margins and chronic tears involving the medial meniscus.  Soft tissue edema noted to surround the left knee and extending into the  left calf surrounding the left calf musculature.  Subchondral cyst formation noted at the base of the tibial spines and  medial aspect of the lateral tibial plateau.  Probable chronic impaction injury affecting the medial tibial plateau.              This report was finalized on 6/27/2025 4:50 AM by Armando Heart MD.              ----------------------------------------------------------------------------------------------------------------------  Assessment and Plan:    Left femur fracture - patient underwent left hip intertrochanteric nailing on 6/23/2025, patient tolerated the procedure well with no complications.    2. Acute Blood Loss Anemia -patient has required 1 unit PRBC transfusion during this hospital stay.  Hemoglobin and hematocrit are stable, will repeat CBC in the morning.  If hemoglobin stable, will consider restarting heparin drip.     3.  New onset paroxysmal A-fib -patient with new onset paroxysmal A-fib this morning, now converted back to normal sinus rhythm.  See problem #2.  Will continue Lopressor 25 mg p.o. twice daily and obtain transthoracic echo    4.  Essential hypertension - well-controlled, continue to monitor closely and make anti-hypertensive medication adjustments as necessary    5.  Hyperlipidemia -statin    6.  Hyperthyroidism - continue to hold methimazole as TSH is elevated and free T4 is below lower limits of normal    7.  Suspect CKD stage IIIa -serum creatinine essentially unchanged today at 1.1.  Continue to monitor closely with BMP in the morning.      Disposition currently being evaluated for possible inpatient rehab placement.    Chano Romero DO   06/27/25   16:42 EDT

## 2025-06-27 NOTE — CASE MANAGEMENT/SOCIAL WORK
Discharge Planning Assessment   [facility]     Patient Name: Lizbet Arzola  MRN: [id number]  Today's Date: 6/27/2025    Admit Date: 6/21/2025    Plan: Inpt rehab submitted pt's information to insurance on 6/26 for possible admit.  SS will follow and assist with discharge needs.       Discharge Plan       Row Name 06/27/25 1536       Plan    Plan Inpt rehab submitted pt's information to insurance on 6/26 for possible admit.  SS will follow and assist with discharge needs.                  Continued Care and Services - Admitted Since 6/21/2025    No active coordination exists.       Expected Discharge Date and Time       Expected Discharge Date Expected Discharge Time    Jun 28, 2025      JOSE LUIS WoodsW

## 2025-06-27 NOTE — PROGRESS NOTES
Inpatient Progress Note  Lizbet Arzola  Date: 06/27/25  MRN: [id number]      Subjective: Patient seen and evaluated.  She is sitting out in the chair with an ice pack on the knee.  She has also been using a polar ice machine.  Her daughter is present in the room.  She does state some improvement in her knee pain.  Her hip pain is controlled.  Left knee MRI results were discussed with her.  She has been working with physical therapy.      Objective:      Vitals:    06/26/25 2333 06/27/25 0500 06/27/25 0654 06/27/25 1135   BP: 148/57  162/72 151/71   BP Location: Right arm  Left arm Right arm   Patient Position: Lying  Lying Lying   Pulse: 87  78 83   Resp: 18  18 16   Temp: 98.5 °F (36.9 °C)  98.4 °F (36.9 °C) 97.6 °F (36.4 °C)   TempSrc: Oral  Oral Oral   SpO2: 93%  96% 94%   Weight:  74.1 kg (163 lb 6.4 oz)     Height:              Physical Exam:  Constitutional:  Well-developed, well-nourished.  No acute distress.        Musculoskeletal: Left lower extremity: Proximal dressing to the left hip is saturated.  Recommend dressing change.  Remaining dressings are clean dry and intact.  Thigh is supple.  There is a large effusion to the left knee.  ROM deferred.  Calf is soft nontender to palpation.  Light touch sensation is grossly intact all dermatomes.  Dorsi and plantarflexion intact to left ankle.  There is a palpable +2 DP pulse.    Labs:    Results from last 7 days   Lab Units 06/26/25  0834 06/25/25  2304 06/25/25  1534 06/25/25  0111 06/24/25  1414 06/24/25  0439 06/23/25  0514 06/22/25  0835 06/22/25  0529 06/22/25  0156 06/21/25  2117   CRP mg/dL  --   --   --   --   --   --   --   --   --   --  <0.30   LACTATE mmol/L  --   --   --   --   --   --   --   --  1.6 2.1*  --    WBC 10*3/mm3  --  11.01*  --  11.62*  --  13.44*   < >  --   --  15.90* 17.41*   HEMOGLOBIN g/dL 9.0* 7.7*  --  8.5*   < > 6.1*   < >  --   --  11.3* 11.5*   HEMATOCRIT % 27.4* 24.4*  --  25.3*   < > 18.5*   < >  --   --  37.7 34.5  "  MCV fL  --  100.0*  --  95.8  --  100.5*   < >  --   --  111.9* 98.6*   MCHC g/dL  --  31.6  --  33.6  --  33.0   < >  --   --  30.0* 33.3   PLATELETS 10*3/mm3  --  190  --  158  --  174   < >  --   --  248 253   INR   --   --  1.15*  --   --   --   --  1.13*  --   --   --     < > = values in this interval not displayed.         Results from last 7 days   Lab Units 06/25/25  0111 06/24/25 0152 06/23/25 0228 06/22/25 0156 06/21/25 2117   SODIUM mmol/L 139 137 140 140 141   POTASSIUM mmol/L 3.0* 3.7 3.6 4.4 4.1   MAGNESIUM mg/dL 1.9  --   --   --   --    CHLORIDE mmol/L 101 98 104 108* 106   CO2 mmol/L 27.6 26.6 25.2 17.0* 19.7*   BUN mg/dL 23.0 26.5* 27.0* 30.4* 30.3*   CREATININE mg/dL 1.11* 1.19* 1.20* 1.36* 1.45*   CALCIUM mg/dL 7.8* 7.8* 8.5* 9.2 9.7   GLUCOSE mg/dL 142* 168* 132* 162* 148*   ALBUMIN g/dL  --   --  3.7 4.1 4.4   BILIRUBIN mg/dL  --   --  0.6 0.5 0.4   ALK PHOS U/L  --   --  52 69 78   AST (SGOT) U/L  --   --  36* 32 29   ALT (SGPT) U/L  --   --  18 20 21   Estimated Creatinine Clearance: 41.1 mL/min (A) (by C-G formula based on SCr of 1.11 mg/dL (H)).  No results found for: \"AMMONIA\"  Results from last 7 days   Lab Units 06/23/25 0228 06/22/25 0156 06/21/25 2117   CK TOTAL U/L 692* 519* 326*         No results found for: \"HGBA1C\"  Lab Results   Component Value Date    TSH 4.900 (H) 06/21/2025    FREET4 0.85 (L) 06/21/2025         Pain Management Panel           No data to display                No results found for: \"BLOODCX\"  No results found for: \"URINECX\"  No results found for: \"WOUNDCX\"  No results found for: \"STOOLCX\"              Radiology:  Imaging Results (Last 72 Hours)       Procedure Component Value Units Date/Time    MRI Knee Left Without Contrast [[id number]] Collected: 06/27/25 0431     Updated: 06/27/25 0452    Narrative:      PROCEDURE: MRI examination of the left knee performed without IV  contrast on June 27, 2025. Imaging performed utilizing proton density  fat " saturation and T1 sequences with imaging performed in the axial,  sagittal, and coronal planes.     HISTORY: Possible left proximal fibular fracture.     COMPARISON: None.     FINDINGS:     Large left knee joint effusion.  Mild chondromalacia of the patella noted affecting the medial and  lateral articular facets as well as at the apex of the patella.  Intact quadriceps tendon and patellar tendon.  Moderate to severe osteoarthritis in the medial compartment with  prominent subchondral cyst formation in the medial femoral condyle and  medial tibial plateau.  Subchondral cyst formation also noted at the base of the tibial spines.  Moderate osteoarthritis in the lateral compartment of the left knee with  associated joint space narrowing and osteophyte formation.  Degenerative type changes noted affecting the anterior horn and  posterior horn of the medial meniscus.  Chronic free edge tear involving the anterior horn of the medial  meniscus as seen on image 13, series 10. Chronic tear involving the  superior inferior margins of the body of the medial meniscus.  Mild frayed margins to the superior aspect of the anterior horn of the  lateral meniscus  No acute process seen involving the posterior horn of the lateral  meniscus.  Intact medial and fibular collateral ligaments.  Moderate soft tissue edema noted surrounding the left knee and edema  extends into the distal left lower extremity surrounding the left calf  musculature.  No bone marrow edema at the left proximal fibula.  Chronic chondral abnormalities identified along the articular surface of  the medial femoral condyle and medial tibial plateau.       Impression:         Large left knee joint effusion  Moderate to severe osteoarthritis in the medial compartment with  prominent subchondral cyst formation in the medial femoral condyle and  medial tibial plateau appear  Chondral defects with chondral loss noted along the articular surface of  the medial  compartment.  Moderate osteoarthritis in the lateral compartment with joint space  narrowing and osteophyte formation.  Mild chondromalacia of the patella.  Intact quadriceps tendon and patellar tendon.  No fracture at the proximal left fibula  Intact cruciate and collateral ligaments  Intact medial and fibular collateral ligaments.  Frayed margins and chronic tears involving the medial meniscus.  Soft tissue edema noted to surround the left knee and extending into the  left calf surrounding the left calf musculature.  Subchondral cyst formation noted at the base of the tibial spines and  medial aspect of the lateral tibial plateau.  Probable chronic impaction injury affecting the medial tibial plateau.              This report was finalized on 6/27/2025 4:50 AM by Armando Heart MD.       MRI Knee Left Without Contrast [[id number]] Collected: 06/27/25 0431     Updated: 06/27/25 0452    Narrative:      PROCEDURE: MRI examination of the left knee performed without IV  contrast on June 27, 2025. Imaging performed utilizing proton density  fat saturation and T1 sequences with imaging performed in the axial,  sagittal, and coronal planes.     HISTORY: Possible left proximal fibular fracture.     COMPARISON: None.     FINDINGS:     Large left knee joint effusion.  Mild chondromalacia of the patella noted affecting the medial and  lateral articular facets as well as at the apex of the patella.  Intact quadriceps tendon and patellar tendon.  Moderate to severe osteoarthritis in the medial compartment with  prominent subchondral cyst formation in the medial femoral condyle and  medial tibial plateau.  Subchondral cyst formation also noted at the base of the tibial spines.  Moderate osteoarthritis in the lateral compartment of the left knee with  associated joint space narrowing and osteophyte formation.  Degenerative type changes noted affecting the anterior horn and  posterior horn of the medial meniscus.  Chronic free edge  tear involving the anterior horn of the medial  meniscus as seen on image 13, series 10. Chronic tear involving the  superior inferior margins of the body of the medial meniscus.  Mild frayed margins to the superior aspect of the anterior horn of the  lateral meniscus  No acute process seen involving the posterior horn of the lateral  meniscus.  Intact medial and fibular collateral ligaments.  Moderate soft tissue edema noted surrounding the left knee and edema  extends into the distal left lower extremity surrounding the left calf  musculature.  No bone marrow edema at the left proximal fibula.  Chronic chondral abnormalities identified along the articular surface of  the medial femoral condyle and medial tibial plateau.       Impression:         Large left knee joint effusion  Moderate to severe osteoarthritis in the medial compartment with  prominent subchondral cyst formation in the medial femoral condyle and  medial tibial plateau appear  Chondral defects with chondral loss noted along the articular surface of  the medial compartment.  Moderate osteoarthritis in the lateral compartment with joint space  narrowing and osteophyte formation.  Mild chondromalacia of the patella.  Intact quadriceps tendon and patellar tendon.  No fracture at the proximal left fibula  Intact cruciate and collateral ligaments  Intact medial and fibular collateral ligaments.  Frayed margins and chronic tears involving the medial meniscus.  Soft tissue edema noted to surround the left knee and extending into the  left calf surrounding the left calf musculature.  Subchondral cyst formation noted at the base of the tibial spines and  medial aspect of the lateral tibial plateau.  Probable chronic impaction injury affecting the medial tibial plateau.              This report was finalized on 6/27/2025 4:50 AM by Armando Heart MD.       CT Lower Extremity Left Without Contrast [[id number]] Collected: 06/25/25 1938     Updated: 06/25/25 1940     Narrative:      REASON FOR EXAMINATION: Left knee pain and effusion.     COMPARISON: None available.     TECHNIQUE: CT of the left knee is performed on a multi-detector CT.  Coronal and sagittal reconstructions were obtained. CT Scan performed  according to as low as reasonably achievable dose protocol.     FINDINGS:  IM piedad left femur.  Severe osteoarthritis left knee.  Ossification of  the medial meniscus.  Marked degenerative cystic change medial  femorotibial joint space.  Large joint effusion.  There could be a  fracture of the proximal shaft of the fibula but this cannot be stated  with certainty as there is severe motion artifact in this area during  the scan.  Furthermore, a definite fracture is not clearly seen on the   image.       Impression:      Severe osteoarthritis of the left knee.     Possible fracture of the proximal fibular shaft, study compromised by  severe motion artifact.  This is felt more likely to be artifactual in  nature as it is not definitely seen on the  film.  Plain film  correlation may be helpful.     This report was finalized on 6/25/2025 7:38 PM by FLACA RUFFIN MD.                 Assessment:   POD #4 status post left hip fracture repair with long cephalomedullary nail     Severe left knee osteoarthritis with effusion        Plan:      MRI results were reviewed.  There was no evidence of acute fracture.  There is severe osteoarthritis.     Continue PT/OT patient may continue to weight-bear as tolerated to the left lower extremity.     Orders placed to change the proximal island dressing to the left hip.     Continue to trend H&H and transfuse as needed.     Elevate and polar ice to the left knee for pain and swelling.      Discharge planning: To be determined per the medical hospitalist service.     Follow-up in the office 2 weeks at discharge        KELSEY Fischer   06/27/25   13:10 EDT

## 2025-06-27 NOTE — THERAPY TREATMENT NOTE
Patient Name: Lizbet Arzola  : 1945    MRN: [id number]                              Today's Date: 2025       Admit Date: 2025    Visit Dx:     ICD-10-CM ICD-9-CM   1. Closed displaced intertrochanteric fracture of left femur, initial encounter  S72.142A 820.21   2. Acute low back pain with sciatica, sciatica laterality unspecified, unspecified back pain laterality  M54.40 724.2     724.3     Patient Active Problem List   Diagnosis    Closed intertrochanteric fracture of left femur     Past Medical History:   Diagnosis Date    Hyperlipidemia     Hypertension     Hyperthyroidism     Osteoarthritis      Past Surgical History:   Procedure Laterality Date    LIPOMA EXCISION        General Information       Row Name 25 1438          OT Time and Intention    Subjective Information complains of;weakness;pain  -     Document Type therapy note (daily note)  -     Mode of Treatment individual therapy;occupational therapy  -     Patient Effort good  -KP     Symptoms Noted During/After Treatment none  -     Comment Patient agreeable to therapy with good participation and motivation.  -       Row Name 25 1438          General Information    Patient Profile Reviewed yes  -     Existing Precautions/Restrictions fall  -     Barriers to Rehab none identified  -       Row Name 25 1438          Cognition    Orientation Status (Cognition) oriented x 3  -       Row Name 25 1438          Safety Issues/Impairments Affecting Functional Mobility    Impairments Affecting Function (Mobility) balance;endurance/activity tolerance;pain;range of motion (ROM);strength  -               User Key  (r) = Recorded By, (t) = Taken By, (c) = Cosigned By      Initials Name Provider Type    KP Izzy Murillo OT Occupational Therapist                     Mobility/ADL's       Row Name 25 1440          Bed Mobility    Bed Mobility supine-sit  -KP     Supine-Sit [place] (Bed  Mobility) moderate assist (50% patient effort);2 person assist  -     Bed Mobility, Safety Issues decreased use of arms for pushing/pulling;decreased use of legs for bridging/pushing  -     Assistive Device (Bed Mobility) bed rails;head of bed elevated  -       Row Name 06/27/25 1440          Transfers    Transfers sit-stand transfer;stand-sit transfer;bed-chair transfer  -       Row Name 06/27/25 1440          Bed-Chair Transfer    Bed-Chair [place] (Transfers) moderate assist (50% patient effort);2 person assist  -     Assistive Device (Bed-Chair Transfers) walker, front-wheeled  -       Row Name 06/27/25 1440          Sit-Stand Transfer    Sit-Stand [place] (Transfers) moderate assist (50% patient effort);2 person assist  -     Assistive Device (Sit-Stand Transfers) walker, front-wheeled  -       Row Name 06/27/25 1440          Stand-Sit Transfer    Stand-Sit [place] (Transfers) moderate assist (50% patient effort);2 person assist  -     Assistive Device (Stand-Sit Transfers) walker, front-wheeled  -       Row Name 06/27/25 1440          Mobility    Extremity Weight-bearing Status left lower extremity  -     Left Lower Extremity (Weight-bearing Status) weight-bearing as tolerated (WBAT)  -       Row Name 06/27/25 1440          Toileting Assessment/Training    [place] Level (Toileting) toileting skills;dependent (less than 25% patient effort)  -     Comment, (Toileting) incontinent urine  -               User Key  (r) = Recorded By, (t) = Taken By, (c) = Cosigned By      Initials Name Provider Type     Izzy Murillo OT Occupational Therapist                   Obj/Interventions       Row Name 06/27/25 1441          Motor Skills    Motor Skills functional endurance  -     Functional Endurance good minus  -       Row Name 06/27/25 1441          Balance    Balance Assessment sitting static balance;standing static balance  -     Static Sitting Balance standby  assist  -     Static Standing Balance moderate assist;2-person assist  -     Position/Device Used, Standing Balance walker, rolling  -               User Key  (r) = Recorded By, (t) = Taken By, (c) = Cosigned By      Initials Name Provider Type    Izzy Motta OT Occupational Therapist                   Goals/Plan    No documentation.                  Clinical Impression       [facility] Name 06/27/25 1442          Pain Assessment    Pretreatment Pain Rating 1/10  -     Posttreatment Pain Rating 2/10  -     Pain Location knee  -     Pain Side/Orientation left  -[facility] Name 06/27/25 1442          Plan of Care Review    Plan of Care Reviewed With patient;daughter  -     Progress improving  -     Outcome Evaluation Patient seen for OT treatment. She was assisted to chair and able to sit in unsupported and supported position at edge of bed for extended time to complete functional task. Patient functional transfers impacted by pain, limited ROM, weakness, and incontinence. She is progressing towards goals and continues to be a good candidate for intensive therapy.  -[facility] Name 06/27/25 1442          Therapy Plan Review/Discharge Plan (OT)    Anticipated Discharge Disposition (OT) inpatient rehabilitation facility  -[facility] Name 06/27/25 1442          Positioning and Restraints    Pre-Treatment Position in bed  -     Post Treatment Position chair  -     In Chair sitting;call light within reach;encouraged to call for assist;with family/caregiver  -               User Key  (r) = Recorded By, (t) = Taken By, (c) = Cosigned By      Initials Name Provider Type    Izzy Motta OT Occupational Therapist                   Outcome Measures    No documentation.                     OT Recommendation and Plan  Planned Therapy Interventions (OT): activity tolerance training, BADL retraining, functional balance retraining, patient/caregiver education/training, passive ROM/stretching,  occupation/activity based interventions, ROM/therapeutic exercise, strengthening exercise, transfer/mobility retraining  Therapy Frequency (OT): 5 times/wk  Plan of Care Review  Plan of Care Reviewed With: patient, daughter  Progress: improving  Outcome Evaluation: Patient seen for OT treatment. She was assisted to chair and able to sit in unsupported and supported position at edge of bed for extended time to complete functional task. Patient functional transfers impacted by pain, limited ROM, weakness, and incontinence. She is progressing towards goals and continues to be a good candidate for intensive therapy.     Time Calculation:         Time Calculation- OT       Row Name 06/27/25 1444             Time Calculation- OT    OT Received On 06/27/25  -                User Key  (r) = Recorded By, (t) = Taken By, (c) = Cosigned By      Initials Name Provider Type    Izzy Motta OT Occupational Therapist                  Therapy Charges for Today       Code Description Service Date Service Provider Modifiers Qty    [id number]  OT THERAPEUTIC ACT EA 15 MIN 6/27/2025 Izzy Murillo OT GO 1                 Izzy Murillo OT  6/27/2025

## 2025-06-27 NOTE — THERAPY TREATMENT NOTE
Acute Care - Physical Therapy Treatment Note   [facility]     Patient Name: Lizbet Arzola  : 1945  MRN: [id number]  Today's Date: 2025      Visit Dx:     ICD-10-CM ICD-9-CM   1. Closed displaced intertrochanteric fracture of left femur, initial encounter  S72.142A 820.21   2. Acute low back pain with sciatica, sciatica laterality unspecified, unspecified back pain laterality  M54.40 724.2     724.3     Patient Active Problem List   Diagnosis    Closed intertrochanteric fracture of left femur     Past Medical History:   Diagnosis Date    Hyperlipidemia     Hypertension     Hyperthyroidism     Osteoarthritis      Past Surgical History:   Procedure Laterality Date    LIPOMA EXCISION       PT Assessment (Last 12 Hours)       PT Evaluation and Treatment       Row Name 25 142          Physical Therapy Time and Intention    Subjective Information complains of;weakness;fatigue;pain  -KM     Document Type therapy note (daily note)  -KM     Mode of Treatment physical therapy  -KM     Patient Effort excellent  -KM     Symptoms Noted During/After Treatment none  -KM       Row Name 25 142          General Information    Patient Profile Reviewed yes  -KM     Patient Observations alert;cooperative;agree to therapy  -KM     Existing Precautions/Restrictions fall  -KM       Row Name 25 142          Pain    Pretreatment Pain Rating 5/10  -KM     Posttreatment Pain Rating 5/10  -KM     Pain Location hip;knee  -KM     Pain Side/Orientation left  -KM       Row Name 25 142          Cognition    Affect/Mental Status (Cognition) WFL  -KM     Orientation Status (Cognition) oriented x 3  -KM     Follows Commands (Cognition) WFL  -KM       Row Name 25 1425          Mobility    Extremity Weight-bearing Status left lower extremity  -KM     Left Lower Extremity (Weight-bearing Status) weight-bearing as tolerated (WBAT)  -KM       Row Name 25 1425          Bed Mobility    Bed Mobility  supine-sit  -KM     Supine-Sit [place] (Bed Mobility) moderate assist (50% patient effort);2 person assist  -KM     Bed Mobility, Safety Issues decreased use of arms for pushing/pulling;decreased use of legs for bridging/pushing  -KM     Assistive Device (Bed Mobility) bed rails;head of bed elevated  -KM       Row Name 06/27/25 1425          Transfers    Transfers sit-stand transfer;stand-sit transfer;bed-chair transfer;chair-bed transfer  -KM       Row Name 06/27/25 1425          Bed-Chair Transfer    Bed-Chair [place] (Transfers) moderate assist (50% patient effort);2 person assist  -KM     Assistive Device (Bed-Chair Transfers) walker, front-wheeled  -KM       Row Name 06/27/25 1425          Chair-Bed Transfer    Chair-Bed [place] (Transfers) moderate assist (50% patient effort);2 person assist  -KM     Assistive Device (Chair-Bed Transfers) walker, front-wheeled  -KM       Row Name 06/27/25 1425          Sit-Stand Transfer    Sit-Stand [place] (Transfers) moderate assist (50% patient effort);2 person assist  -KM     Assistive Device (Sit-Stand Transfers) walker, front-wheeled  -KM       Row Name 06/27/25 1425          Stand-Sit Transfer    Stand-Sit [place] (Transfers) moderate assist (50% patient effort);2 person assist  -KM     Assistive Device (Stand-Sit Transfers) walker, front-wheeled  -KM       Row Name 06/27/25 1425          Gait/Stairs (Locomotion)    Gait/Stairs Locomotion gait/ambulation independence;gait/ambulation assistive device;distance ambulated  -KM     [place] Level (Gait) moderate assist (50% patient effort);2 person assist;verbal cues  -KM     Assistive Device (Gait) walker, front-wheeled  -KM     Patient was able to Ambulate yes  -KM     Distance in Feet (Gait) 5  -KM     Pattern (Gait) step-to  -KM     Deviations/Abnormal Patterns (Gait) antalgic;ataxic;base of support, narrow;gait speed decreased  -KM     Bilateral Gait Deviations forward flexed posture  -KM      Left Sided Gait Deviations weight shift ability decreased  -KM       Row Name 06/27/25 1425          Safety Issues/Impairments Affecting Functional Mobility    Impairments Affecting Function (Mobility) balance;endurance/activity tolerance;pain;range of motion (ROM);strength  -KM       Row Name             Wound 06/23/25 1619 Left hip Surgical    Wound - Properties Group Placement Date: 06/23/25  -KB Placement Time: 1619  -KB Present on Original Admission: N  -KB Side: Left  -KB Location: hip  -KB Primary Wound Type: Surgical  -KB, incision sites x4     Retired Wound - Properties Group Placement Date: 06/23/25  -KB Placement Time: 1619  -KB Present on Original Admission: N  -KB Side: Left  -KB Location: hip  -KB    Retired Wound - Properties Group Placement Date: 06/23/25  -KB Placement Time: 1619  -KB Present on Original Admission: N  -KB Side: Left  -KB Location: hip  -KB    Retired Wound - Properties Group Date first assessed: 06/23/25  -KB Time first assessed: 1619  -KB Present on Original Admission: N  -KB Side: Left  -KB Location: hip  -KB      Row Name 06/27/25 1425          Progress Summary (PT)    Daily Progress Summary (PT) Pt. continues to demonstrate improved functional mobility skills. She was able to improve ambulation quality and distance w/ RW. She continues to demonstrate high motivation to improve mobility through effort w/ therapy. Pt. would continue to most benefit from inpatient rehab to address her impairments.  -               User Key  (r) = Recorded By, (t) = Taken By, (c) = Cosigned By      Initials Name Provider Type    Cheryl Aponte, RN Registered Nurse    Sage Mccain, YUSUF Physical Therapist                    Physical Therapy Education       Title: PT OT SLP Therapies (Done)       Topic: Physical Therapy (Done)       Point: Mobility training (Done)       Learning Progress Summary            Patient Acceptance, E,TB, VU by ANGEL LUIS at 6/24/2025 6488                       Point: Home exercise program (Done)       Learning Progress Summary            Patient Acceptance, E,TB, VU by  at 6/24/2025 1421                      Point: Body mechanics (Done)       Learning Progress Summary            Patient Acceptance, E,TB, VU by  at 6/24/2025 1421                      Point: Precautions (Done)       Learning Progress Summary            Patient Acceptance, E,TB, VU by  at 6/24/2025 1421                                      User Key       Initials Effective Dates Name Provider Type Discipline     05/24/22 -  Sage Fuentes, PT Physical Therapist PT                  PT Recommendation and Plan  Anticipated Discharge Disposition (PT): inpatient rehabilitation facility  Planned Therapy Interventions (PT): balance training, bed mobility training, gait training, home exercise program, patient/family education, postural re-education, ROM (range of motion), strengthening, stretching, transfer training, stair training, wheelchair management/propulsion training  Therapy Frequency (PT): 6 times/wk  Progress Summary (PT)  Daily Progress Summary (PT): Pt. continues to demonstrate improved functional mobility skills. She was able to improve ambulation quality and distance w/ RW. She continues to demonstrate high motivation to improve mobility through effort w/ therapy. Pt. would continue to most benefit from inpatient rehab to address her impairments.  Plan of Care Reviewed With: patient  Outcome Evaluation: Pt. evaluation completed during PT session. She was able to perform functional mobility skills w/ maxA x2. She was unable to ambulate at time of evaluation d/t weakness and pain. She demonstrates impaired strength and ROM. Pt. would benefit from inpatient rehab to address weakness, pain, impaired mobility, safety precautions, and fall risk.       Time Calculation:    PT Charges       Row Name 06/27/25 1424             Time Calculation    PT Received On 06/27/25  -KM         Time Calculation- PT     Total Timed Code Minutes- PT 30 minute(s)  -ANGEL LUIS                User Key  (r) = Recorded By, (t) = Taken By, (c) = Cosigned By      Initials Name Provider Type    Sage Mccain, PT Physical Therapist                  Therapy Charges for Today       Code Description Service Date Service Provider Modifiers Qty    14150921454 HC PT THERAPEUTIC ACT EA 15 MIN 6/26/2025 Sage Fuentes, PT GP 1    82766819363 HC GAIT TRAINING EA 15 MIN 6/26/2025 Sage Fuentes, PT GP 1    81949410628 HC PT THERAPEUTIC ACT EA 15 MIN 6/27/2025 Sage Fuentes, PT GP 1    99310627967 HC GAIT TRAINING EA 15 MIN 6/27/2025 Sage Fuentes, PT GP 1            PT G-Codes  AM-PAC 6 Clicks Score (PT): 10    Sage Fuentes, PT  6/27/2025

## 2025-06-28 LAB
ANION GAP SERPL CALCULATED.3IONS-SCNC: 10.2 MMOL/L (ref 5–15)
BUN SERPL-MCNC: 22.4 MG/DL (ref 8–23)
BUN/CREAT SERPL: 24.3 (ref 7–25)
CALCIUM SPEC-SCNC: 8.7 MG/DL (ref 8.6–10.5)
CHLORIDE SERPL-SCNC: 103 MMOL/L (ref 98–107)
CO2 SERPL-SCNC: 24.8 MMOL/L (ref 22–29)
CREAT SERPL-MCNC: 0.92 MG/DL (ref 0.57–1)
DEPRECATED RDW RBC AUTO: 47.2 FL (ref 37–54)
EGFRCR SERPLBLD CKD-EPI 2021: 62.3 ML/MIN/1.73
ERYTHROCYTE [DISTWIDTH] IN BLOOD BY AUTOMATED COUNT: 12.8 % (ref 12.3–15.4)
GLUCOSE SERPL-MCNC: 117 MG/DL (ref 65–99)
HCT VFR BLD AUTO: 27.4 % (ref 34–46.6)
HGB BLD-MCNC: 8.7 G/DL (ref 12–15.9)
MCH RBC QN AUTO: 32.1 PG (ref 26.6–33)
MCHC RBC AUTO-ENTMCNC: 31.8 G/DL (ref 31.5–35.7)
MCV RBC AUTO: 101.1 FL (ref 79–97)
PLATELET # BLD AUTO: 285 10*3/MM3 (ref 140–450)
PMV BLD AUTO: 9 FL (ref 6–12)
POTASSIUM SERPL-SCNC: 3.5 MMOL/L (ref 3.5–5.2)
RBC # BLD AUTO: 2.71 10*6/MM3 (ref 3.77–5.28)
SODIUM SERPL-SCNC: 138 MMOL/L (ref 136–145)
URATE SERPL-MCNC: 5.3 MG/DL (ref 2.4–5.7)
WBC NRBC COR # BLD AUTO: 9.75 10*3/MM3 (ref 3.4–10.8)

## 2025-06-28 PROCEDURE — 99232 SBSQ HOSP IP/OBS MODERATE 35: CPT | Performed by: INTERNAL MEDICINE

## 2025-06-28 PROCEDURE — 80048 BASIC METABOLIC PNL TOTAL CA: CPT | Performed by: INTERNAL MEDICINE

## 2025-06-28 PROCEDURE — 84550 ASSAY OF BLOOD/URIC ACID: CPT | Performed by: INTERNAL MEDICINE

## 2025-06-28 PROCEDURE — 97530 THERAPEUTIC ACTIVITIES: CPT

## 2025-06-28 PROCEDURE — 85027 COMPLETE CBC AUTOMATED: CPT | Performed by: INTERNAL MEDICINE

## 2025-06-28 RX ORDER — INDOMETHACIN 25 MG/1
25 CAPSULE ORAL
Status: COMPLETED | OUTPATIENT
Start: 2025-06-28 | End: 2025-06-29

## 2025-06-28 RX ORDER — COLCHICINE 0.6 MG/1
0.6 TABLET ORAL 2 TIMES DAILY
Status: DISCONTINUED | OUTPATIENT
Start: 2025-06-28 | End: 2025-06-30

## 2025-06-28 RX ADMIN — AMLODIPINE BESYLATE 5 MG: 5 TABLET ORAL at 09:10

## 2025-06-28 RX ADMIN — Medication 4000 UNITS: at 21:25

## 2025-06-28 RX ADMIN — DICLOFENAC SODIUM 4 G: 10 GEL TOPICAL at 09:11

## 2025-06-28 RX ADMIN — THERA TABS 1 TABLET: TAB at 21:26

## 2025-06-28 RX ADMIN — Medication 10 ML: at 09:11

## 2025-06-28 RX ADMIN — DICLOFENAC SODIUM 4 G: 10 GEL TOPICAL at 21:26

## 2025-06-28 RX ADMIN — HYDROCODONE BITARTRATE AND ACETAMINOPHEN 1 TABLET: 5; 325 TABLET ORAL at 11:02

## 2025-06-28 RX ADMIN — ROSUVASTATIN CALCIUM 5 MG: 10 TABLET, FILM COATED ORAL at 09:10

## 2025-06-28 RX ADMIN — HYDROCODONE BITARTRATE AND ACETAMINOPHEN 1 TABLET: 5; 325 TABLET ORAL at 04:33

## 2025-06-28 RX ADMIN — ASPIRIN 81 MG CHEWABLE TABLET 81 MG: 81 TABLET CHEWABLE at 21:26

## 2025-06-28 RX ADMIN — COLCHICINE 0.6 MG: 0.6 TABLET, FILM COATED ORAL at 21:25

## 2025-06-28 RX ADMIN — POLYETHYLENE GLYCOL (3350) 17 G: 17 POWDER, FOR SOLUTION ORAL at 21:26

## 2025-06-28 RX ADMIN — INDOMETHACIN 25 MG: 25 CAPSULE ORAL at 17:30

## 2025-06-28 RX ADMIN — Medication 10 ML: at 21:27

## 2025-06-28 RX ADMIN — DONEPEZIL HYDROCHLORIDE 10 MG: 5 TABLET, FILM COATED ORAL at 21:25

## 2025-06-28 RX ADMIN — DOCUSATE SODIUM 50 MG AND SENNOSIDES 8.6 MG 2 TABLET: 8.6; 5 TABLET, FILM COATED ORAL at 09:23

## 2025-06-28 RX ADMIN — ESCITALOPRAM 10 MG: 10 TABLET, FILM COATED ORAL at 09:10

## 2025-06-28 NOTE — PROGRESS NOTES
[facility]     Patient Identification:  Name:  Lizbet Arzola  Age:  80 y.o.  Sex:  female  :  1945  MRN:  [id number]  Visit Number:  [id number]  Primary Care Provider:  Yaakov Riley DO    Length of stay:  6    Chief complaint: Confusion    Subjective:    Patient seen and examined at bedside with patient's family present.  Patient was sitting up in a chair at bedside, and states she is doing well with minimal complaints.  She does report development of redness of left 1st MTP with pain on passive ROM.   ----------------------------------------------------------------------------------------------------------------------  [facility] Meds:  amLODIPine, 5 mg, Oral, Q24H  aspirin, 81 mg, Oral, Nightly  cholecalciferol, 4,000 Units, Oral, Nightly  diazePAM, 2 mg, Oral, Once  Diclofenac Sodium, 4 g, Topical, BID  donepezil, 10 mg, Oral, Nightly  escitalopram, 10 mg, Oral, Daily  [Held by provider] methIMAzole, 5 mg, Oral, Daily  multivitamin, 1 tablet, Oral, Nightly  rosuvastatin, 5 mg, Oral, Daily  sodium chloride, 10 mL, Intravenous, Q12H           ----------------------------------------------------------------------------------------------------------------------  Vital Signs:  Temp:  [98 °F (36.7 °C)-98.7 °F (37.1 °C)] 98 °F (36.7 °C)  Heart Rate:  [75-87] 75  Resp:  [16-18] 18  BP: (136-190)/(56-80) 163/66      25  0500 25  0500 25  0500   Weight: 73.6 kg (162 lb 3.2 oz) 74.1 kg (163 lb 6.4 oz) 73.2 kg (161 lb 4.8 oz)     Body mass index is 25.26 kg/m².    Intake/Output Summary (Last 24 hours) at 2025 1258  Last data filed at 2025 0949  Gross per 24 hour   Intake 360 ml   Output 200 ml   Net 160 ml     Diet: Regular/House; Fluid Consistency: Thin (IDDSI 0)  ----------------------------------------------------------------------------------------------------------------------  Physical exam:  Constitutional: Well-nourished   female in no apparent distress.     HENT:  Head:  Normocephalic and atraumatic.  Mouth:  Moist mucous membranes.    Eyes:  Conjunctivae and EOM are normal.  Pupils are equal, round, and reactive to light.  No scleral icterus.    Neck:  Neck supple. No thyromegaly.  No JVD present.    Cardiovascular:  Regular rate and rhythm with no murmurs, rubs, clicks or gallops appreciated.  Pulmonary/Chest:  Clear to auscultation bilaterally with no crackles, wheezes or rhonchi appreciated.  Abdominal:  Soft. Nontender. Nondistended  Bowel sounds are normal in all four quadrants. No organomegally appreciated.   Musculoskeletal:  No edema, mild to moderate tenderness at left 1st MTP with surrounding redness, Surgical dressing applied to left hip joint  Neurological: Awake, Cranial nerves II-XII intact with no focal deficits.  No facial droop.  No slurred speech.   Skin:  Warm and dry to palpation with no rashes or lesions appreciated.  Peripheral vascular:  2+ radial and pedal pulses in bilateral upper and lower extremities.  Psychiatric: Awake and oriented x3, demonstrates appropriate judgment and insight.      ---------------------------------------------------------------------------------------  ----------------------------------------------------------------------------------------------------------------------  Results from last 7 days   Lab Units 06/23/25  0228 06/22/25  0156 06/21/25  2117   CK TOTAL U/L 692* 519* 326*   CKMB ng/mL  --   --  6.45*     Results from last 7 days   Lab Units 06/28/25  0342 06/26/25  0834 06/25/25  2304 06/25/25  1534 06/25/25  0111 06/23/25  0514 06/22/25  0835 06/22/25  0529 06/22/25  0156 06/21/25  2117   CRP mg/dL  --   --   --   --   --   --   --   --   --  <0.30   LACTATE mmol/L  --   --   --   --   --   --   --  1.6 2.1*  --    WBC 10*3/mm3 9.75  --  11.01*  --  11.62*   < >  --   --  15.90* 17.41*   HEMOGLOBIN g/dL 8.7* 9.0* 7.7*  --  8.5*   < >  --   --  11.3* 11.5*  "  HEMATOCRIT % 27.4* 27.4* 24.4*  --  25.3*   < >  --   --  37.7 34.5   MCV fL 101.1*  --  100.0*  --  95.8   < >  --   --  111.9* 98.6*   MCHC g/dL 31.8  --  31.6  --  33.6   < >  --   --  30.0* 33.3   PLATELETS 10*3/mm3 285  --  190  --  158   < >  --   --  248 253   INR   --   --   --  1.15*  --   --  1.13*  --   --   --     < > = values in this interval not displayed.         Results from last 7 days   Lab Units 06/28/25  0342 06/25/25  0111 06/24/25  0152 06/23/25  0228 06/22/25  0156 06/21/25  2117   SODIUM mmol/L 138 139 137 140 140 141   POTASSIUM mmol/L 3.5 3.0* 3.7 3.6 4.4 4.1   MAGNESIUM mg/dL  --  1.9  --   --   --   --    CHLORIDE mmol/L 103 101 98 104 108* 106   CO2 mmol/L 24.8 27.6 26.6 25.2 17.0* 19.7*   BUN mg/dL 22.4 23.0 26.5* 27.0* 30.4* 30.3*   CREATININE mg/dL 0.92 1.11* 1.19* 1.20* 1.36* 1.45*   CALCIUM mg/dL 8.7 7.8* 7.8* 8.5* 9.2 9.7   GLUCOSE mg/dL 117* 142* 168* 132* 162* 148*   ALBUMIN g/dL  --   --   --  3.7 4.1 4.4   BILIRUBIN mg/dL  --   --   --  0.6 0.5 0.4   ALK PHOS U/L  --   --   --  52 69 78   AST (SGOT) U/L  --   --   --  36* 32 29   ALT (SGPT) U/L  --   --   --  18 20 21   Estimated Creatinine Clearance: 54.5 mL/min (by C-G formula based on SCr of 0.92 mg/dL).    No results found for: \"AMMONIA\"      No results found for: \"BLOODCX\"  No results found for: \"URINECX\"  No results found for: \"WOUNDCX\"  No results found for: \"STOOLCX\"    I have personally looked at the labs and they are summarized above.  ----------------------------------------------------------------------------------------------------------------------  Imaging Results (Last 24 Hours)       ** No results found for the last 24 hours. **          ----------------------------------------------------------------------------------------------------------------------  Assessment and Plan:    Left femur fracture - patient underwent left hip intertrochanteric nailing on 6/23/2025, patient tolerated the procedure well with " no complications.    2. Acute Blood Loss Anemia -patient has required 1 unit PRBC transfusion during this hospital stay.  Hemoglobin and hematocrit are stable.    3.  New onset paroxysmal A-fib -patient remains in normal sinus rhythm, continue Lopressor 25 mg p.o. twice daily.  Transthoracic echo demonstrates normal left ventricular systolic function.  Will defer anticoagulation at this time, and have further discussoins with patient regarding starting Eliquis or not, as family does report she is a high fall risk at home.    4.  Essential hypertension - well-controlled, continue to monitor closely and make anti-hypertensive medication adjustments as necessary    5.  Hyperlipidemia -statin    6.  Hyperthyroidism - continue to hold methimazole as TSH is elevated and free T4 is below lower limits of normal    7.  Suspect CKD stage IIIa -serum creatinine essentially unchanged today at 1.1.  Continue to monitor closely with BMP in the morning.    8. Gout -suspect patient has gout left first MTP.  Will start colchicine 0.6 mg p.o. twice daily and obtain serum uric acid.      Disposition currently being evaluated for possible inpatient rehab placement.    Chano Romero,    06/28/25   12:58 EDT

## 2025-06-28 NOTE — THERAPY TREATMENT NOTE
Acute Care - Physical Therapy Treatment Note   [facility]     Patient Name: Lizbet Arzola  : 1945  MRN: [id number]  Today's Date: 2025      Visit Dx:     ICD-10-CM ICD-9-CM   1. Closed displaced intertrochanteric fracture of left femur, initial encounter  S72.142A 820.21   2. Acute low back pain with sciatica, sciatica laterality unspecified, unspecified back pain laterality  M54.40 724.2     724.3     Patient Active Problem List   Diagnosis    Closed intertrochanteric fracture of left femur     Past Medical History:   Diagnosis Date    Hyperlipidemia     Hypertension     Hyperthyroidism     Osteoarthritis      Past Surgical History:   Procedure Laterality Date    LIPOMA EXCISION       PT Assessment (Last 12 Hours)       PT Evaluation and Treatment       Row Name 25 1216          Physical Therapy Time and Intention    Subjective Information complains of;pain  -     Document Type therapy note (daily note)  -     Mode of Treatment physical therapy  -     Patient Effort good  -       Row Name 25 1216          General Information    Patient Profile Reviewed yes  -     Patient Observations alert;cooperative;agree to therapy  -     Patient/Family/Caregiver Comments/Observations Daughter present during therapy treatment  -     Existing Precautions/Restrictions fall;other (see comments)  WBAT left hip nailing  -       Row Name 25 1216          Pain    Additional Documentation Pain Scale: FACES Pre/Post-Treatment (Group)  -       Row Name 25 1216          Pain Scale: FACES Pre/Post-Treatment    Pain: FACES Scale, Pretreatment 2-->hurts little bit  -     Posttreatment Pain Rating 4-->hurts little more  -     Pre/Posttreatment Pain Comment left knee/leg  -       Row Name 25 1216          Cognition    Orientation Status (Cognition) oriented to;person;place;situation  -     Follows Commands (Cognition) follows one-step commands;75-90% accuracy;delayed  response/completion;increased processing time needed;initiation impaired;physical/tactile prompts required;repetition of directions required;verbal cues/prompting required;visual cue  -     Cognitive Function (Cognition) safety deficit  -     Personal Safety Interventions fall prevention program maintained;gait belt;nonskid shoes/slippers when out of bed;muscle strengthening facilitated;supervised activity  -       Row Name 06/28/25 1216          Range of Motion Comprehensive    Comment, General Range of Motion Decreased tolerance to ER on left LE.  Pt likes to hold it in more IR.  Educated on proper alignment with keeping left hip, knee and ankle in a line.  -       Row Name 06/28/25 1216          Mobility    Left Lower Extremity (Weight-bearing Status) weight-bearing as tolerated (WBAT)  -       Row Name 06/28/25 1216          Bed Mobility    Supine-Sit [place] (Bed Mobility) moderate assist (50% patient effort);1 person assist;verbal cues  -     Bed Mobility, Safety Issues decreased use of arms for pushing/pulling;decreased use of legs for bridging/pushing  -     Assistive Device (Bed Mobility) bed rails;head of bed elevated;repositioning sheet  -       Row Name 06/28/25 1216          Bed-Chair Transfer    Bed-Chair [place] (Transfers) moderate assist (50% patient effort);2 person assist;verbal cues;nonverbal cues (demo/gesture)  -     Assistive Device (Bed-Chair Transfers) walker, front-wheeled  -       Row Name 06/28/25 1216          Gait/Stairs (Locomotion)    Patient was able to Ambulate no, other medical factors prevent ambulation  -     Reason Patient was unable to Ambulate Excessive Weakness;Uncontrolled Pain;Other (Comment)  Pt had difficulty stepping during PT but RN reported she did better earlier this morning getting to the [facility].  -       Row Name 06/28/25 1216          Balance    Balance Assessment sitting dynamic balance  -     Static Sitting Balance contact  guard;1-person assist  -     Dynamic Sitting Balance contact guard;minimal assist;1-person assist  -     Position, Sitting Balance sitting edge of bed  -     Static Standing Balance moderate assist;2-person assist  -     Position/Device Used, Standing Balance walker, front-wheeled  -       Row Name 06/28/25 1216          Motor Skills    Therapeutic Exercise hip;knee;ankle  -       Row Name 06/28/25 1216          Knee (Therapeutic Exercise)    Knee (Therapeutic Exercise) AAROM (active assistive range of motion)  -     Knee AAROM (Therapeutic Exercise) left;right;flexion;extension;sitting;5 repetitions  -       Row Name 06/28/25 1216          Ankle (Therapeutic Exercise)    Ankle (Therapeutic Exercise) AAROM (active assistive range of motion)  -     Ankle AAROM (Therapeutic Exercise) left;right;dorsiflexion;plantarflexion;sitting;10 repetitions  -       Row Name             Wound 06/23/25 1619 Left hip Surgical    Wound - Properties Group Placement Date: 06/23/25  -KB Placement Time: 1619  -KB Present on Original Admission: N  -KB Side: Left  -KB Location: hip  - Primary Wound Type: Surgical  -KB, incision sites x4     Retired Wound - Properties Group Placement Date: 06/23/25  -KB Placement Time: 1619  -KB Present on Original Admission: N  -KB Side: Left  -KB Location: hip  -KB    Retired Wound - Properties Group Placement Date: 06/23/25  -KB Placement Time: 1619  -KB Present on Original Admission: N  -KB Side: Left  -KB Location: hip  -KB    Retired Wound - Properties Group Date first assessed: 06/23/25  -KB Time first assessed: 1619  -KB Present on Original Admission: N  -KB Side: Left  -KB Location: hip  -KB      Row Name 06/28/25 1216          Plan of Care Review    Plan of Care Reviewed With patient  -     Outcome Evaluation PT treatment completed.  Two assist required for all mobility.  Patient is continuing to demonstrate improved functional mobility and willingness to work with therapy.   Continue PT POC.  -       Row Name 06/28/25 1216          Positioning and Restraints    Pre-Treatment Position in bed  -     Post Treatment Position chair  -     In Chair notified nsg;sitting;call light within reach;encouraged to call for assist;with family/caregiver  -               User Key  (r) = Recorded By, (t) = Taken By, (c) = Cosigned By      Initials Name Provider Type    Cheryl Aponte, RN Registered Nurse    Radha Sheppard, PT Physical Therapist                    Physical Therapy Education       Title: PT OT SLP Therapies (Done)       Topic: Physical Therapy (Done)       Point: Mobility training (Done)       Learning Progress Summary            Patient Acceptance, E,TB, VU by  at 6/24/2025 1421                      Point: Home exercise program (Done)       Learning Progress Summary            Patient Acceptance, E,TB, VU by  at 6/24/2025 1421                      Point: Body mechanics (Done)       Learning Progress Summary            Patient Acceptance, E,TB, VU by  at 6/24/2025 1421                      Point: Precautions (Done)       Learning Progress Summary            Patient Acceptance, E,TB, VU by  at 6/24/2025 1421                                      User Key       Initials Effective Dates Name Provider Type Discipline     05/24/22 -  Sage Fuentes, YUSUF Physical Therapist PT                  PT Recommendation and Plan  Anticipated Discharge Disposition (PT): inpatient rehabilitation facility  Plan of Care Reviewed With: patient  Outcome Evaluation: PT treatment completed.  Two assist required for all mobility.  Patient is continuing to demonstrate improved functional mobility and willingness to work with therapy.  Continue PT POC.       Time Calculation:    PT Charges       Row Name 06/28/25 1221             Time Calculation    PT Received On 06/28/25  -      PT Goal Re-Cert Due Date 07/08/25  [facility] [place]         Timed Charges    16870 - PT Therapeutic Activity Minutes 25   -KP         Total Minutes    Timed Charges Total Minutes 25  -KP       Total Minutes 25  -KP                User Key  (r) = Recorded By, (t) = Taken By, (c) = Cosigned By      Initials Name Provider Type    Radha Sheppard, PT Physical Therapist                  Therapy Charges for Today       Code Description Service Date Service Provider Modifiers Qty    60472340670  PT THERAPEUTIC ACT EA 15 MIN 6/28/2025 Radha Brooks, PT GP 2            PT G-Codes  AM-PAC 6 Clicks Score (PT): 12    Radha Brooks PT  6/28/2025

## 2025-06-28 NOTE — PLAN OF CARE
Goal Outcome Evaluation:      Patient resting in bed at this time with family at bedside, pt complaining of pain in her L hip and L knee, PRN medication given, see MAR. IV access maintained, no s/s of acute distress noted at this time, plan of care ongoing.

## 2025-06-29 LAB
DEPRECATED RDW RBC AUTO: 45.1 FL (ref 37–54)
ERYTHROCYTE [DISTWIDTH] IN BLOOD BY AUTOMATED COUNT: 12.8 % (ref 12.3–15.4)
HCT VFR BLD AUTO: 25.6 % (ref 34–46.6)
HGB BLD-MCNC: 8.4 G/DL (ref 12–15.9)
MCH RBC QN AUTO: 32.1 PG (ref 26.6–33)
MCHC RBC AUTO-ENTMCNC: 32.8 G/DL (ref 31.5–35.7)
MCV RBC AUTO: 97.7 FL (ref 79–97)
PLATELET # BLD AUTO: 338 10*3/MM3 (ref 140–450)
PMV BLD AUTO: 9.2 FL (ref 6–12)
RBC # BLD AUTO: 2.62 10*6/MM3 (ref 3.77–5.28)
WBC NRBC COR # BLD AUTO: 11.12 10*3/MM3 (ref 3.4–10.8)

## 2025-06-29 PROCEDURE — 85027 COMPLETE CBC AUTOMATED: CPT | Performed by: INTERNAL MEDICINE

## 2025-06-29 PROCEDURE — 99231 SBSQ HOSP IP/OBS SF/LOW 25: CPT | Performed by: INTERNAL MEDICINE

## 2025-06-29 RX ADMIN — ESCITALOPRAM 10 MG: 10 TABLET, FILM COATED ORAL at 09:31

## 2025-06-29 RX ADMIN — HYDROCODONE BITARTRATE AND ACETAMINOPHEN 1 TABLET: 5; 325 TABLET ORAL at 09:31

## 2025-06-29 RX ADMIN — COLCHICINE 0.6 MG: 0.6 TABLET, FILM COATED ORAL at 09:31

## 2025-06-29 RX ADMIN — ROSUVASTATIN CALCIUM 5 MG: 10 TABLET, FILM COATED ORAL at 09:30

## 2025-06-29 RX ADMIN — INDOMETHACIN 25 MG: 25 CAPSULE ORAL at 09:31

## 2025-06-29 RX ADMIN — ASPIRIN 81 MG CHEWABLE TABLET 81 MG: 81 TABLET CHEWABLE at 21:24

## 2025-06-29 RX ADMIN — Medication 4000 UNITS: at 21:24

## 2025-06-29 RX ADMIN — DICLOFENAC SODIUM 4 G: 10 GEL TOPICAL at 21:25

## 2025-06-29 RX ADMIN — DICLOFENAC SODIUM 4 G: 10 GEL TOPICAL at 09:32

## 2025-06-29 RX ADMIN — Medication 10 ML: at 09:32

## 2025-06-29 RX ADMIN — COLCHICINE 0.6 MG: 0.6 TABLET, FILM COATED ORAL at 21:24

## 2025-06-29 RX ADMIN — INDOMETHACIN 25 MG: 25 CAPSULE ORAL at 11:44

## 2025-06-29 RX ADMIN — Medication 10 ML: at 21:24

## 2025-06-29 RX ADMIN — THERA TABS 1 TABLET: TAB at 21:24

## 2025-06-29 RX ADMIN — HYDROCODONE BITARTRATE AND ACETAMINOPHEN 1 TABLET: 5; 325 TABLET ORAL at 00:23

## 2025-06-29 RX ADMIN — AMLODIPINE BESYLATE 5 MG: 5 TABLET ORAL at 09:31

## 2025-06-29 RX ADMIN — DONEPEZIL HYDROCHLORIDE 10 MG: 5 TABLET, FILM COATED ORAL at 21:24

## 2025-06-29 NOTE — PLAN OF CARE
Goal Outcome Evaluation:      Patient resting in bed at this time voicing no complaints or concerns. Pt pleasant and cooperative with care this shift taking p.o. meds whole, IV access maintained, no s/s of acute distress noted at this time, plan of care ongoing.

## 2025-06-29 NOTE — PLAN OF CARE
Goal Outcome Evaluation:    Pt is resting in bed with no s/s of distress noted. VSS. IV access maintained. Hip dressings are C/D/I, x 1 dressing change this shift. Will continue with plan of care.

## 2025-06-29 NOTE — PROGRESS NOTES
"    [facility] HOSPITALIST PROGRESS NOTE     Patient Identification:  Name:  Lizbet Arzola  Age:  80 y.o.  Sex:  female  :  1945  MRN:  [id number]  Visit Number:  [id number]  Primary Care Provider:  Yaakov Riley DO    Length of stay:  7    Chief complaint: Confusion    Subjective:    Patient seen and examined at bedside with family members present.  Patient was asleep when entered the room but easily awakened.  Patient states she feels well today but did have issues with \"whole body pain\" earlier this morning.  This did improve with urination.  She denies any dysuria, hematuria or any other symptoms at this time.  Patient does report mild improvement in redness surrounding left first MTP with improvement in pain.  ----------------------------------------------------------------------------------------------------------------------  Current Hospital Meds:  amLODIPine, 5 mg, Oral, Q24H  aspirin, 81 mg, Oral, Nightly  cholecalciferol, 4,000 Units, Oral, Nightly  colchicine, 0.6 mg, Oral, BID  diazePAM, 2 mg, Oral, Once  Diclofenac Sodium, 4 g, Topical, BID  donepezil, 10 mg, Oral, Nightly  escitalopram, 10 mg, Oral, Daily  [Held by provider] methIMAzole, 5 mg, Oral, Daily  multivitamin, 1 tablet, Oral, Nightly  rosuvastatin, 5 mg, Oral, Daily  sodium chloride, 10 mL, Intravenous, Q12H           ----------------------------------------------------------------------------------------------------------------------  Vital Signs:  Temp:  [97.9 °F (36.6 °C)-98.8 °F (37.1 °C)] 98.1 °F (36.7 °C)  Heart Rate:  [63-79] 63  Resp:  [18-20] 18  BP: (147-170)/(55-70) 155/64      25  0500 25  0500 25  0500   Weight: 74.1 kg (163 lb 6.4 oz) 73.2 kg (161 lb 4.8 oz) 74 kg (163 lb 2.3 oz)     Body mass index is 25.55 kg/m².    Intake/Output Summary (Last 24 hours) at 2025 1457  Last data filed at 2025 1300  Gross per 24 hour   Intake 1320 ml   Output 200 ml   Net 1120 ml     Diet: " Regular/House; Fluid Consistency: Thin (IDDSI 0)  ----------------------------------------------------------------------------------------------------------------------  Physical exam:  Constitutional: Well-nourished  female in no apparent distress.     HENT:  Head:  Normocephalic and atraumatic.  Mouth:  Moist mucous membranes.    Eyes:  Conjunctivae and EOM are normal.  Pupils are equal, round, and reactive to light.  No scleral icterus.    Neck:  Neck supple. No thyromegaly.  No JVD present.    Cardiovascular:  Regular rate and rhythm with no murmurs, rubs, clicks or gallops appreciated.  Pulmonary/Chest:  Clear to auscultation bilaterally with no crackles, wheezes or rhonchi appreciated.  Abdominal:  Soft. Nontender. Nondistended  Bowel sounds are normal in all four quadrants. No organomegally appreciated.   Musculoskeletal:  No edema, improvement in mild tenderness at left 1st MTP with surrounding redness, Surgical dressing applied to left hip joint  Neurological: Awake, Cranial nerves II-XII intact with no focal deficits.  No facial droop.  No slurred speech.   Skin:  Warm and dry to palpation with no rashes or lesions appreciated.  Peripheral vascular:  2+ radial and pedal pulses in bilateral upper and lower extremities.  Psychiatric: Awake and oriented x3, demonstrates appropriate judgment and insight.      ---------------------------------------------------------------------------------------  ----------------------------------------------------------------------------------------------------------------------  Results from last 7 days   Lab Units 06/23/25  0228   CK TOTAL U/L 692*     Results from last 7 days   Lab Units 06/29/25  0107 06/28/25  0342 06/26/25  0834 06/25/25  2304 06/25/25  1534   WBC 10*3/mm3 11.12* 9.75  --  11.01*  --    HEMOGLOBIN g/dL 8.4* 8.7* 9.0* 7.7*  --    HEMATOCRIT % 25.6* 27.4* 27.4* 24.4*  --    MCV fL 97.7* 101.1*  --  100.0*  --    MCHC g/dL 32.8 31.8  --  31.6  --   "  PLATELETS 10*3/mm3 338 285  --  190  --    INR   --   --   --   --  1.15*         Results from last 7 days   Lab Units 06/28/25  0342 06/25/25  0111 06/24/25  0152 06/23/25  0228   SODIUM mmol/L 138 139 137 140   POTASSIUM mmol/L 3.5 3.0* 3.7 3.6   MAGNESIUM mg/dL  --  1.9  --   --    CHLORIDE mmol/L 103 101 98 104   CO2 mmol/L 24.8 27.6 26.6 25.2   BUN mg/dL 22.4 23.0 26.5* 27.0*   CREATININE mg/dL 0.92 1.11* 1.19* 1.20*   CALCIUM mg/dL 8.7 7.8* 7.8* 8.5*   GLUCOSE mg/dL 117* 142* 168* 132*   ALBUMIN g/dL  --   --   --  3.7   BILIRUBIN mg/dL  --   --   --  0.6   ALK PHOS U/L  --   --   --  52   AST (SGOT) U/L  --   --   --  36*   ALT (SGPT) U/L  --   --   --  18   Estimated Creatinine Clearance: 49.6 mL/min (by C-G formula based on SCr of 0.92 mg/dL).    No results found for: \"AMMONIA\"      No results found for: \"BLOODCX\"  No results found for: \"URINECX\"  No results found for: \"WOUNDCX\"  No results found for: \"STOOLCX\"    I have personally looked at the labs and they are summarized above.  ----------------------------------------------------------------------------------------------------------------------  Imaging Results (Last 24 Hours)       ** No results found for the last 24 hours. **          ----------------------------------------------------------------------------------------------------------------------  Assessment and Plan:    Left femur fracture - patient underwent left hip intertrochanteric nailing on 6/23/2025, patient tolerated the procedure well with no complications.    2. Acute Blood Loss Anemia -patient has required 1 unit PRBC transfusion during this hospital stay.  Hemoglobin and hematocrit are stable.    3.  New onset paroxysmal A-fib -patient remains in normal sinus rhythm, continue Lopressor 25 mg p.o. twice daily.  Transthoracic echo demonstrates normal left ventricular systolic function.  Will defer anticoagulation at this time, and have further discussoins with patient regarding " starting Eliquis or not, as family does report she is a high fall risk at home.    4.  Essential hypertension - well-controlled, continue to monitor closely and make anti-hypertensive medication adjustments as necessary    5.  Hyperlipidemia -statin    6.  Hyperthyroidism - continue to hold methimazole as TSH is elevated and free T4 is below lower limits of normal    7.  Suspect CKD stage IIIa -serum creatinine essentially unchanged today at 0.9.  Continue to monitor closely with BMP in the morning.    8. Gout -suspect patient has gout left first MTP.  Will continue colchicine 0.6 mg p.o. twice daily, uric acid within normal limits at 5.3.      Disposition currently being evaluated for possible inpatient rehab placement.    Chano Romero,    06/29/25   14:53 EDT

## 2025-06-30 PROCEDURE — 97530 THERAPEUTIC ACTIVITIES: CPT

## 2025-06-30 PROCEDURE — 97116 GAIT TRAINING THERAPY: CPT

## 2025-06-30 PROCEDURE — 99232 SBSQ HOSP IP/OBS MODERATE 35: CPT | Performed by: INTERNAL MEDICINE

## 2025-06-30 RX ORDER — HYDROCODONE BITARTRATE AND ACETAMINOPHEN 5; 325 MG/1; MG/1
1 TABLET ORAL EVERY 6 HOURS PRN
Refills: 0 | Status: DISPENSED | OUTPATIENT
Start: 2025-06-30 | End: 2025-07-05

## 2025-06-30 RX ADMIN — ROSUVASTATIN CALCIUM 5 MG: 10 TABLET, FILM COATED ORAL at 08:45

## 2025-06-30 RX ADMIN — COLCHICINE 0.6 MG: 0.6 TABLET, FILM COATED ORAL at 08:45

## 2025-06-30 RX ADMIN — Medication 10 ML: at 21:04

## 2025-06-30 RX ADMIN — AMLODIPINE BESYLATE 5 MG: 5 TABLET ORAL at 08:45

## 2025-06-30 RX ADMIN — DICLOFENAC SODIUM 4 G: 10 GEL TOPICAL at 08:46

## 2025-06-30 RX ADMIN — ASPIRIN 81 MG CHEWABLE TABLET 81 MG: 81 TABLET CHEWABLE at 21:03

## 2025-06-30 RX ADMIN — Medication 4000 UNITS: at 21:03

## 2025-06-30 RX ADMIN — THERA TABS 1 TABLET: TAB at 21:03

## 2025-06-30 RX ADMIN — ESCITALOPRAM 10 MG: 10 TABLET, FILM COATED ORAL at 08:46

## 2025-06-30 RX ADMIN — DICLOFENAC SODIUM 4 G: 10 GEL TOPICAL at 21:04

## 2025-06-30 RX ADMIN — HYDROCODONE BITARTRATE AND ACETAMINOPHEN 1 TABLET: 5; 325 TABLET ORAL at 17:50

## 2025-06-30 RX ADMIN — HYDROCODONE BITARTRATE AND ACETAMINOPHEN 1 TABLET: 5; 325 TABLET ORAL at 08:44

## 2025-06-30 RX ADMIN — DONEPEZIL HYDROCHLORIDE 10 MG: 5 TABLET, FILM COATED ORAL at 21:03

## 2025-06-30 RX ADMIN — Medication 10 ML: at 08:49

## 2025-06-30 RX ADMIN — Medication 10 ML: at 08:46

## 2025-06-30 NOTE — THERAPY TREATMENT NOTE
Patient Name: Lizbet Arzola  : 1945    MRN: [id number]                              Today's Date: 2025       Admit Date: 2025    Visit Dx:     ICD-10-CM ICD-9-CM   1. Closed displaced intertrochanteric fracture of left femur, initial encounter  S72.142A 820.21   2. Acute low back pain with sciatica, sciatica laterality unspecified, unspecified back pain laterality  M54.40 724.2     724.3     Patient Active Problem List   Diagnosis    Closed intertrochanteric fracture of left femur     Past Medical History:   Diagnosis Date    Hyperlipidemia     Hypertension     Hyperthyroidism     Osteoarthritis      Past Surgical History:   Procedure Laterality Date    LIPOMA EXCISION        General Information       Row Name 25 1035          OT Time and Intention    Subjective Information complains of;pain  -     Document Type therapy note (daily note)  -     Mode of Treatment individual therapy;occupational therapy  -     Patient Effort good  -KP     Symptoms Noted During/After Treatment increased pain  -     Comment Patient agreeable to therapy with no complaints other than pain. She was up in chair with daughter present.  -       Row Name 25 1035          General Information    Patient Profile Reviewed yes  -     Existing Precautions/Restrictions fall;weight bearing  WBAT  -     Barriers to Rehab none identified  -       Row Name 25 1035          Cognition    Orientation Status (Cognition) oriented to;person;place;situation  -       Row Name 25 1035          Safety Issues/Impairments Affecting Functional Mobility    Impairments Affecting Function (Mobility) balance;endurance/activity tolerance;pain;range of motion (ROM);strength  -               User Key  (r) = Recorded By, (t) = Taken By, (c) = Cosigned By      Initials Name Provider Type     Izzy Murillo, OT Occupational Therapist                     Mobility/ADL's       Row Name 25 1036          Bed  Mobility    Comment, (Bed Mobility) up in chair upon arrival  -       Row Name 06/30/25 1036          Sit-Stand Transfer    Sit-Stand Stevens (Transfers) moderate assist (50% patient effort);2 person assist  -     Assistive Device (Sit-Stand Transfers) walker, front-wheeled  -[facility] Name 06/30/25 1036          Stand-Sit Transfer    Stand-Sit Stevens (Transfers) moderate assist (50% patient effort);2 person assist  -     Assistive Device (Stand-Sit Transfers) walker, front-wheeled  -       Row Name 06/30/25 1036          Functional Mobility    Functional Mobility- Ind. Level minimum assist (75% patient effort);2 person assist required  -     Functional Mobility- Device walker, front-wheeled  -     Functional Mobility- Comment extra time, 10 feet  -       Row Name 06/30/25 1036          Activities of Daily Living    BADL Assessment/Intervention toileting  -[facility] Name 06/30/25 1036          Toileting Assessment/Training    Stevens Level (Toileting) toileting skills;dependent (less than 25% patient effort)  -     Comment, (Toileting) incontinent urine  -               User Key  (r) = Recorded By, (t) = Taken By, (c) = Cosigned By      Initials Name Provider Type    Izzy Motta OT Occupational Therapist                   Obj/Interventions       Row Name 06/30/25 1037          Motor Skills    Motor Skills functional endurance  -     Functional Endurance good minus  -[facility] Name 06/30/25 1037          Balance    Balance Assessment sitting static balance;standing static balance  -     Static Sitting Balance standby assist  -     Static Standing Balance minimal assist  -     Position/Device Used, Standing Balance walker, rolling  -               User Key  (r) = Recorded By, (t) = Taken By, (c) = Cosigned By      Initials Name Provider Type    Izzy Motta OT Occupational Therapist                   Goals/Plan    No documentation.                   Clinical Impression       Row Name 06/30/25 1037          Pain Assessment    Pain Location knee  -     Pain Side/Orientation left  -[facility] Name 06/30/25 1037          Pain Scale: FACES Pre/Post-Treatment    Pain: FACES Scale, Pretreatment 2-->hurts little bit  -     Posttreatment Pain Rating 4-->hurts little more  -       Row Name 06/30/25 1037          Plan of Care Review    Plan of Care Reviewed With patient  -     Progress improving  -     Outcome Evaluation Patient seen for OT treatment. She tolerated functional mobility on this date. Per patient and daughter she had been on BSC prior and up in chair upon OT arrival. She is progressing towards goals. Intensive therapy will be most beneficial for patient to return to prior level of function.  -       Row Name 06/30/25 1037          Therapy Assessment/Plan (OT)    Rehab Potential (OT) good  -     Criteria for Skilled Therapeutic Interventions Met (OT) yes;meets criteria;skilled treatment is necessary  -       Row Name 06/30/25 1037          Therapy Plan Review/Discharge Plan (OT)    Anticipated Discharge Disposition (OT) inpatient rehabilitation facility  -[facility] Name 06/30/25 1037          Positioning and Restraints    Pre-Treatment Position sitting in chair/recliner  -     Post Treatment Position chair  -KP     In Chair sitting;call light within reach;encouraged to call for assist;with family/caregiver  -               User Key  (r) = Recorded By, (t) = Taken By, (c) = Cosigned By      Initials Name Provider Type    Izzy Motta, OT Occupational Therapist                   Outcome Measures    No documentation.                     OT Recommendation and Plan  Planned Therapy Interventions (OT): activity tolerance training, BADL retraining, functional balance retraining, patient/caregiver education/training, passive ROM/stretching, occupation/activity based interventions, ROM/therapeutic exercise, strengthening exercise,  transfer/mobility retraining  Therapy Frequency (OT): 5 times/wk  Plan of Care Review  Plan of Care Reviewed With: patient  Progress: improving  Outcome Evaluation: Patient seen for OT treatment. She tolerated functional mobility on this date. Per patient and daughter she had been on BSC prior and up in chair upon OT arrival. She is progressing towards goals. Intensive therapy will be most beneficial for patient to return to prior level of function.     Time Calculation:         Time Calculation- OT       Row Name 06/30/25 1039             Time Calculation- OT    OT Received On 06/30/25  -                User Key  (r) = Recorded By, (t) = Taken By, (c) = Cosigned By      Initials Name Provider Type    Izzy Motta OT Occupational Therapist                  Therapy Charges for Today       Code Description Service Date Service Provider Modifiers Qty    [id number]  OT THERAPEUTIC ACT EA 15 MIN 6/30/2025 Izzy Murillo OT GO 1                 Izzy Murillo OT  6/30/2025

## 2025-06-30 NOTE — PROGRESS NOTES
[facility]     Patient Identification:  Name:  Lizbet Arzola  Age:  80 y.o.  Sex:  female  :  1945  MRN:  [id number]  Visit Number:  [id number]  Primary Care Provider:  Yaakov Riley DO    Length of stay:  8    Chief complaint: None    Subjective:    Patient seen and examined at bedside with patient's daughter present.  Patient was sitting up in a chair having her hair fixed by her daughter when I entered the room.  She reports feeling well today with no complaints.  She was able to participate with physical therapy today and did ambulate a short distance.  ----------------------------------------------------------------------------------------------------------------------  Current [facility] Meds:  amLODIPine, 5 mg, Oral, Q24H  aspirin, 81 mg, Oral, Nightly  cholecalciferol, 4,000 Units, Oral, Nightly  colchicine, 0.6 mg, Oral, BID  diazePAM, 2 mg, Oral, Once  Diclofenac Sodium, 4 g, Topical, BID  donepezil, 10 mg, Oral, Nightly  escitalopram, 10 mg, Oral, Daily  [Held by provider] methIMAzole, 5 mg, Oral, Daily  multivitamin, 1 tablet, Oral, Nightly  rosuvastatin, 5 mg, Oral, Daily  sodium chloride, 10 mL, Intravenous, Q12H           ----------------------------------------------------------------------------------------------------------------------  Vital Signs:  Temp:  [98.1 °F (36.7 °C)-98.6 °F (37 °C)] 98.2 °F (36.8 °C)  Heart Rate:  [64-86] 74  Resp:  [16-18] 18  BP: (149-166)/(58-82) 151/60      25  0500 25  0500 25  0500   Weight: 73.2 kg (161 lb 4.8 oz) 74 kg (163 lb 2.3 oz) 71.2 kg (157 lb)     Body mass index is 24.59 kg/m².    Intake/Output Summary (Last 24 hours) at 2025 1242  Last data filed at 2025 0930  Gross per 24 hour   Intake 960 ml   Output --   Net 960 ml     Diet: Regular/House; Fluid Consistency: Thin (IDDSI  0)  ----------------------------------------------------------------------------------------------------------------------  Physical exam:  Constitutional: Well-nourished  female in no apparent distress.     HENT:  Head:  Normocephalic and atraumatic.  Mouth:  Moist mucous membranes.    Eyes:  Conjunctivae and EOM are normal.  Pupils are equal, round, and reactive to light.  No scleral icterus.    Neck:  Neck supple. No thyromegaly.  No JVD present.    Cardiovascular:  Regular rate and rhythm with no murmurs, rubs, clicks or gallops appreciated.  Pulmonary/Chest:  Clear to auscultation bilaterally with no crackles, wheezes or rhonchi appreciated.  Abdominal:  Soft. Nontender. Nondistended  Bowel sounds are normal in all four quadrants. No organomegally appreciated.   Musculoskeletal:  No edema, surgical dressing applied to left hip joint  Neurological: Awake, Cranial nerves II-XII intact with no focal deficits.  No facial droop.  No slurred speech.   Skin:  Warm and dry to palpation with no rashes or lesions appreciated.  Peripheral vascular:  2+ radial and pedal pulses in bilateral upper and lower extremities.  Psychiatric: Awake and oriented x3, demonstrates appropriate judgment and insight.    No significant change in physical exam in comparison to 6/29/2025  ---------------------------------------------------------------------------------------  ----------------------------------------------------------------------------------------------------------------------        Results from last 7 days   Lab Units 06/29/25  0107 06/28/25  0342 06/26/25  0834 06/25/25  2304 06/25/25  1534   WBC 10*3/mm3 11.12* 9.75  --  11.01*  --    HEMOGLOBIN g/dL 8.4* 8.7* 9.0* 7.7*  --    HEMATOCRIT % 25.6* 27.4* 27.4* 24.4*  --    MCV fL 97.7* 101.1*  --  100.0*  --    MCHC g/dL 32.8 31.8  --  31.6  --    PLATELETS 10*3/mm3 338 285  --  190  --    INR   --   --   --   --  1.15*         Results from last 7 days   Lab Units  "06/28/25  0342 06/25/25  0111 06/24/25  0152   SODIUM mmol/L 138 139 137   POTASSIUM mmol/L 3.5 3.0* 3.7   MAGNESIUM mg/dL  --  1.9  --    CHLORIDE mmol/L 103 101 98   CO2 mmol/L 24.8 27.6 26.6   BUN mg/dL 22.4 23.0 26.5*   CREATININE mg/dL 0.92 1.11* 1.19*   CALCIUM mg/dL 8.7 7.8* 7.8*   GLUCOSE mg/dL 117* 142* 168*   Estimated Creatinine Clearance: 53 mL/min (by C-G formula based on SCr of 0.92 mg/dL).    No results found for: \"AMMONIA\"      No results found for: \"BLOODCX\"  No results found for: \"URINECX\"  No results found for: \"WOUNDCX\"  No results found for: \"STOOLCX\"    I have personally looked at the labs and they are summarized above.  ----------------------------------------------------------------------------------------------------------------------  Imaging Results (Last 24 Hours)       ** No results found for the last 24 hours. **          ----------------------------------------------------------------------------------------------------------------------  Assessment and Plan:    Left femur fracture - patient underwent left hip intertrochanteric nailing on 6/23/2025, patient tolerated the procedure well with no complications.    2. Acute Blood Loss Anemia -patient has required 1 unit PRBC transfusion during this hospital stay.  Hemoglobin and hematocrit are stable.    3.  New onset paroxysmal A-fib -patient remains in normal sinus rhythm, continue Lopressor 25 mg p.o. twice daily.  Transthoracic echo demonstrates normal left ventricular systolic function.  Continue to defer anticoagulation at this time as patient is considered a high fall risk.    4.  Essential hypertension - well-controlled, continue to monitor closely and make anti-hypertensive medication adjustments as necessary    5.  Hyperlipidemia -statin    6.  Hyperthyroidism - continue to hold methimazole as TSH is elevated and free T4 is below lower limits of normal    7.  Suspect CKD stage IIIa -serum creatinine essentially unchanged today at " 0.9.  Continue to monitor closely with BMP in the morning.    8. Gout -suspect patient has gout left first MTP.  Patient with marked improvement in range of motion of first MTP joint.  Patient without pain today.  Will discontinue indomethacin and colchicine.      Disposition currently being evaluated for possible inpatient rehab placement.    Chano Romero DO   06/30/25   12:42 EDT

## 2025-06-30 NOTE — CASE MANAGEMENT/SOCIAL WORK
Discharge Planning Assessment  [facility]     Patient Name: Lizbet Arzola  MRN: [id number]  Today's Date: 6/30/2025    Admit Date: 6/21/2025    Plan: Pt's insurance denied pt admit and offered a peer to peer to be completed by noon on this date.  Peer to Peer sent to PA.  PA elected not to pursue peer to peer due to inablility to complete 3 hours of therapy a day and lack of need for Physician.  SS spoke with pt's family per Daughter Latoya who wants to pursue an expedited appeal.  SS will follow.     Discharge Plan       Row Name 06/30/25 1239       Plan    Plan Pt's insurance denied pt admit and offered a peer to peer to be completed by noon on this date.  Peer to Peer sent to PA.  PA elected not to pursue peer to peer due to inablility to complete 3 hours of therapy a day and lack of need for Physician.  SS spoke with pt's family per Daughter Latoya who wants to pursue an expedited appeal.  SS will follow.      Row Name 06/30/25 1101       Plan               Continued Care and Services - Admitted Since 6/21/2025    No active coordination exists.       Expected Discharge Date and Time       Expected Discharge Date Expected Discharge Time    Jun 30, 2025                   NATALIE Woods

## 2025-06-30 NOTE — THERAPY TREATMENT NOTE
Acute Care - Physical Therapy Treatment Note   [facility]     Patient Name: Lizbet Arzola  : 1945  MRN: [id number]  Today's Date: 2025      Visit Dx:     ICD-10-CM ICD-9-CM   1. Closed displaced intertrochanteric fracture of left femur, initial encounter  S72.142A 820.21   2. Acute low back pain with sciatica, sciatica laterality unspecified, unspecified back pain laterality  M54.40 724.2     724.3     Patient Active Problem List   Diagnosis    Closed intertrochanteric fracture of left femur     Past Medical History:   Diagnosis Date    Hyperlipidemia     Hypertension     Hyperthyroidism     Osteoarthritis      Past Surgical History:   Procedure Laterality Date    LIPOMA EXCISION       PT Assessment (Last 12 Hours)       PT Evaluation and Treatment       Row Name 25 1039          Physical Therapy Time and Intention    Subjective Information complains of;pain  -KM     Document Type therapy note (daily note)  -KM     Mode of Treatment physical therapy  -KM     Patient Effort good  -KM     Symptoms Noted During/After Treatment increased pain  -KM       Row Name 25 1039          General Information    Patient Profile Reviewed yes  -KM     Patient Observations alert;cooperative;agree to therapy  -KM     Existing Precautions/Restrictions fall;weight bearing;other (see comments)  WBAT  -KM       Row Name 25 1039          Pain Scale: FACES Pre/Post-Treatment    Pain: FACES Scale, Pretreatment 2-->hurts little bit  -KM     Posttreatment Pain Rating 6-->hurts even more  -KM       Row Name 25 1039          Cognition    Affect/Mental Status (Cognition) WFL  -KM     Orientation Status (Cognition) oriented to;person;place;situation  -KM     Follows Commands (Cognition) follows one-step commands  -KM       Row Name 25 1039          Bed Mobility    Comment, (Bed Mobility) up in chair upon arrival  -KM       Row Name 25 1039          Transfers    Transfers sit-stand  transfer;stand-sit transfer  -KM       Row Name 06/30/25 1039          Sit-Stand Transfer    Sit-Stand Mascot (Transfers) moderate assist (50% patient effort);2 person assist  -KM     Assistive Device (Sit-Stand Transfers) walker, front-wheeled  -KM       Row Name 06/30/25 1039          Stand-Sit Transfer    Stand-Sit [place]ascot (Transfers) moderate assist (50% patient effort);2 person assist  -KM     Assistive Device (Stand-Sit Transfers) walker, front-wheeled  -KM       Row Name 06/30/25 1039          Gait/Stairs (Locomotion)    Gait/Stairs Locomotion gait/ambulation independence;gait/ambulation assistive device;distance ambulated  -KM     Mascot Level (Gait) minimum assist (75% patient effort);1 person to manage equipment  -KM     Assistive Device (Gait) walker, front-wheeled  -KM     Patient was able to Ambulate yes  -KM     Distance in Feet (Gait) 8  -KM     Pattern (Gait) step-to  -KM     Deviations/Abnormal Patterns (Gait) antalgic;ataxic;base of support, narrow;gait speed decreased  -KM     Bilateral Gait Deviations forward flexed posture  -KM     Left Sided Gait Deviations weight shift ability decreased  -KM       Row Name 06/30/25 1039          Safety Issues/Impairments Affecting Functional Mobility    Impairments Affecting Function (Mobility) balance;endurance/activity tolerance;pain;range of motion (ROM);strength  -KM       Row Name 06/30/25 1039          Balance    Balance Assessment standing dynamic balance  -KM     Dynamic Standing Balance minimal assist  -KM     Position/Device Used, Standing Balance walker, front-wheeled  -KM       Row Name             Wound 06/23/25 1619 Left hip Surgical    Wound - Properties Group Placement Date: 06/23/25  -KB Placement Time: 1619 -KB Present on Original Admission: N  -KB Side: Left  -KB Location: hip  -KB Primary Wound Type: Surgical  -KB, incision sites x4     Retired Wound - Properties Group Placement Date: 06/23/25  -KB Placement Time: 1619   -KB Present on Original Admission: N  -KB Side: Left  -KB Location: hip  -KB    Retired Wound - Properties Group Placement Date: 06/23/25  -KB Placement Time: 1619  -KB Present on Original Admission: N  -KB Side: Left  -KB Location: hip  -KB    Retired Wound - Properties Group Date first assessed: 06/23/25  -KB Time first assessed: 1619  -KB Present on Original Admission: N  -KB Side: Left  -KB Location: hip  -ISAEL      Row Name 06/30/25 1039          Progress Summary (PT)    Daily Progress Summary (PT) Pt. was able to show progress w/ all mobility skills and ambulation. She was able to perform transfers w/ modA. She was able to ambulate increased distance and quality w/ RW. She continues to demonstrate high motivation to work w/ therapy services to get back to functional mobility. Pt. would most benefit from inpatient rehab to provide the more intense rehab services required.  -               User Key  (r) = Recorded By, (t) = Taken By, (c) = Cosigned By      Initials Name Provider Type    Cheryl Aponte, RN Registered Nurse    Sage Mccain, PT Physical Therapist                    Physical Therapy Education       Title: PT OT SLP Therapies (Done)       Topic: Physical Therapy (Done)       Point: Mobility training (Done)       Learning Progress Summary            Patient Acceptance, E,TB, VU by  at 6/30/2025 0604    Acceptance, E,TB, VU by KM at 6/24/2025 1421   Family Acceptance, E,TB, VU by HG at 6/30/2025 0604                      Point: Home exercise program (Done)       Learning Progress Summary            Patient Acceptance, E,TB, VU by HG at 6/30/2025 0604    Acceptance, E,TB, VU by KM at 6/24/2025 1421   Family Acceptance, E,TB, VU by HG at 6/30/2025 0604                      Point: Body mechanics (Done)       Learning Progress Summary            Patient Acceptance, E,TB, VU by HG at 6/30/2025 0604    Acceptance, E,TB, VU by KM at 6/24/2025 1421   Family Acceptance, E,TB, VU by  at  6/30/2025 0604                      Point: Precautions (Done)       Learning Progress Summary            Patient Acceptance, E,TB, VU by  at 6/30/2025 0604    Acceptance, E,TB, VU by  at 6/24/2025 1421   Family Acceptance, E,TB, VU by  at 6/30/2025 0604                                      User Key       Initials Effective Dates Name Provider Type Discipline     05/24/22 -  Sage Fuentes, PT Physical Therapist PT     01/22/25 -  Christine Lepe RN Registered Nurse Nurse                  PT Recommendation and Plan  Anticipated Discharge Disposition (PT): inpatient rehabilitation facility  Planned Therapy Interventions (PT): balance training, bed mobility training, gait training, home exercise program, patient/family education, postural re-education, ROM (range of motion), strengthening, stretching, transfer training, stair training, wheelchair management/propulsion training  Therapy Frequency (PT): 6 times/wk  Progress Summary (PT)  Daily Progress Summary (PT): Pt. was able to show progress w/ all mobility skills and ambulation. She was able to perform transfers w/ modA. She was able to ambulate increased distance and quality w/ RW. She continues to demonstrate high motivation to work w/ therapy services to get back to functional mobility. Pt. would most benefit from inpatient rehab to provide the more intense rehab services required.  Plan of Care Reviewed With: patient  Outcome Evaluation: Pt. evaluation completed during PT session. She was able to perform functional mobility skills w/ maxA x2. She was unable to ambulate at time of evaluation d/t weakness and pain. She demonstrates impaired strength and ROM. Pt. would benefit from inpatient rehab to address weakness, pain, impaired mobility, safety precautions, and fall risk.       Time Calculation:    PT Charges       Row Name 06/30/25 1039             Time Calculation    PT Received On 06/30/25  -KM         Time Calculation- PT    Total Timed Code  Minutes- PT 23 minute(s)  -ANGEL LUIS                User Key  (r) = Recorded By, (t) = Taken By, (c) = Cosigned By      Initials Name Provider Type    Sage Mccain, PT Physical Therapist                  Therapy Charges for Today       Code Description Service Date Service Provider Modifiers Qty    40174988603  PT THERAPEUTIC ACT EA 15 MIN 6/30/2025 Sage Fuentes, PT GP 1    96332353059  GAIT TRAINING EA 15 MIN 6/30/2025 Sage Fuentes, PT GP 1            PT G-Codes  AM-PAC 6 Clicks Score (PT): 11    Sage Fuentes PT  6/30/2025

## 2025-06-30 NOTE — PLAN OF CARE
Goal Outcome Evaluation:      Patient has been resting this shift with no s/s of acute distress noted att. VSS. Tele and IV access maintained. Plan of care ongoing.

## 2025-07-01 LAB
ANION GAP SERPL CALCULATED.3IONS-SCNC: 11.3 MMOL/L (ref 5–15)
BUN SERPL-MCNC: 23.5 MG/DL (ref 8–23)
BUN/CREAT SERPL: 21 (ref 7–25)
CALCIUM SPEC-SCNC: 9.1 MG/DL (ref 8.6–10.5)
CHLORIDE SERPL-SCNC: 102 MMOL/L (ref 98–107)
CO2 SERPL-SCNC: 26.7 MMOL/L (ref 22–29)
CREAT SERPL-MCNC: 1.12 MG/DL (ref 0.57–1)
DEPRECATED RDW RBC AUTO: 46.7 FL (ref 37–54)
EGFRCR SERPLBLD CKD-EPI 2021: 49.2 ML/MIN/1.73
ERYTHROCYTE [DISTWIDTH] IN BLOOD BY AUTOMATED COUNT: 13.3 % (ref 12.3–15.4)
GLUCOSE SERPL-MCNC: 127 MG/DL (ref 65–99)
HCT VFR BLD AUTO: 26.8 % (ref 34–46.6)
HGB BLD-MCNC: 8.7 G/DL (ref 12–15.9)
MCH RBC QN AUTO: 32.3 PG (ref 26.6–33)
MCHC RBC AUTO-ENTMCNC: 32.5 G/DL (ref 31.5–35.7)
MCV RBC AUTO: 99.6 FL (ref 79–97)
PLATELET # BLD AUTO: 436 10*3/MM3 (ref 140–450)
PMV BLD AUTO: 9 FL (ref 6–12)
POTASSIUM SERPL-SCNC: 3.5 MMOL/L (ref 3.5–5.2)
RBC # BLD AUTO: 2.69 10*6/MM3 (ref 3.77–5.28)
SODIUM SERPL-SCNC: 140 MMOL/L (ref 136–145)
WBC NRBC COR # BLD AUTO: 11.46 10*3/MM3 (ref 3.4–10.8)

## 2025-07-01 PROCEDURE — 25010000002 ENOXAPARIN PER 10 MG: Performed by: INTERNAL MEDICINE

## 2025-07-01 PROCEDURE — 80048 BASIC METABOLIC PNL TOTAL CA: CPT | Performed by: INTERNAL MEDICINE

## 2025-07-01 PROCEDURE — 97530 THERAPEUTIC ACTIVITIES: CPT

## 2025-07-01 PROCEDURE — 97535 SELF CARE MNGMENT TRAINING: CPT

## 2025-07-01 PROCEDURE — 99232 SBSQ HOSP IP/OBS MODERATE 35: CPT | Performed by: INTERNAL MEDICINE

## 2025-07-01 PROCEDURE — 85027 COMPLETE CBC AUTOMATED: CPT | Performed by: INTERNAL MEDICINE

## 2025-07-01 PROCEDURE — 97116 GAIT TRAINING THERAPY: CPT

## 2025-07-01 RX ORDER — ENOXAPARIN SODIUM 100 MG/ML
40 INJECTION SUBCUTANEOUS EVERY 24 HOURS
Status: DISCONTINUED | OUTPATIENT
Start: 2025-07-01 | End: 2025-07-09 | Stop reason: HOSPADM

## 2025-07-01 RX ADMIN — Medication 10 ML: at 21:18

## 2025-07-01 RX ADMIN — ENOXAPARIN SODIUM 40 MG: 100 INJECTION SUBCUTANEOUS at 15:40

## 2025-07-01 RX ADMIN — HYDROCODONE BITARTRATE AND ACETAMINOPHEN 1 TABLET: 5; 325 TABLET ORAL at 06:08

## 2025-07-01 RX ADMIN — ESCITALOPRAM 10 MG: 10 TABLET, FILM COATED ORAL at 09:11

## 2025-07-01 RX ADMIN — AMLODIPINE BESYLATE 5 MG: 5 TABLET ORAL at 09:11

## 2025-07-01 RX ADMIN — HYDROCODONE BITARTRATE AND ACETAMINOPHEN 1 TABLET: 5; 325 TABLET ORAL at 16:39

## 2025-07-01 RX ADMIN — Medication 4000 UNITS: at 21:17

## 2025-07-01 RX ADMIN — ASPIRIN 81 MG CHEWABLE TABLET 81 MG: 81 TABLET CHEWABLE at 21:18

## 2025-07-01 RX ADMIN — THERA TABS 1 TABLET: TAB at 21:18

## 2025-07-01 RX ADMIN — ROSUVASTATIN CALCIUM 5 MG: 10 TABLET, FILM COATED ORAL at 09:11

## 2025-07-01 RX ADMIN — Medication 10 ML: at 09:11

## 2025-07-01 RX ADMIN — DICLOFENAC SODIUM 4 G: 10 GEL TOPICAL at 21:18

## 2025-07-01 RX ADMIN — DICLOFENAC SODIUM 4 G: 10 GEL TOPICAL at 09:11

## 2025-07-01 RX ADMIN — DONEPEZIL HYDROCHLORIDE 10 MG: 5 TABLET, FILM COATED ORAL at 21:18

## 2025-07-01 NOTE — CASE MANAGEMENT/SOCIAL WORK
Discharge Planning Assessment   [place]     Patient Name: Lizbet Arzola  MRN: [id number]  Today's Date: 7/1/2025    Admit Date: 6/21/2025    Plan: SS faxed pt clinical information to pt's insurance for expedited appeal at [phone].  SS will follow.       Discharge Plan       Row Name 07/01/25 1230       Plan    Plan SS faxed pt clinical information to pt's insurance for expedited appeal at [phone].  SS will follow.      Row Name 07/01/25 5513       Plan    Plan SS at bedside with pt and daughter Latoya.  Expedited Appeal filed this am with reference number [id number] with determination within 72 hours via Tami with insurance at [phone].  SS will follow.                  Continued Care and Services - Admitted Since 6/21/2025    No active coordination exists.       Expected Discharge Date and Time       Expected Discharge Date Expected Discharge Time    Jul 2, 2025           NATALIE Woods

## 2025-07-01 NOTE — NURSING NOTE
X1 staple removed from back of pt head per order. No issues noted with site. Site is scabbed and dry at this time.

## 2025-07-01 NOTE — THERAPY TREATMENT NOTE
Patient Name: Lizbet Arzola  : 1945    MRN: [id number]                              Today's Date: 2025       Admit Date: 2025    Visit Dx:     ICD-10-CM ICD-9-CM   1. Closed displaced intertrochanteric fracture of left femur, initial encounter  S72.142A 820.21   2. Acute low back pain with sciatica, sciatica laterality unspecified, unspecified back pain laterality  M54.40 724.2     724.3     Patient Active Problem List   Diagnosis    Closed intertrochanteric fracture of left femur     Past Medical History:   Diagnosis Date    Hyperlipidemia     Hypertension     Hyperthyroidism     Osteoarthritis      Past Surgical History:   Procedure Laterality Date    LIPOMA EXCISION        General Information       Row Name 25 1425          OT Time and Intention    Subjective Information complains of;pain  -     Document Type therapy note (daily note)  -     Mode of Treatment individual therapy;occupational therapy  -     Patient Effort good  -KP     Symptoms Noted During/After Treatment increased pain  -     Comment Patient seen for OT treatment. Ace wrap on left knee with functional mobility/transfers for support.  -       Row Name 25 1425          General Information    Patient Profile Reviewed yes  -     Existing Precautions/Restrictions fall;weight bearing;other (see comments)  WBAT  -       Row Name 25 1425          Cognition    Orientation Status (Cognition) oriented to;person;place;situation  -       Row Name 25 1425          Safety Issues/Impairments Affecting Functional Mobility    Impairments Affecting Function (Mobility) balance;endurance/activity tolerance;pain;strength  -               User Key  (r) = Recorded By, (t) = Taken By, (c) = Cosigned By      Initials Name Provider Type     Izzy Murillo OT Occupational Therapist                     Mobility/ADL's       Row Name 25 1428          Bed Mobility    Comment, (Bed Mobility) sitting up in  chair upon arrival  -KP       Row Name 07/01/25 1428          Transfers    Transfers sit-stand transfer;stand-sit transfer  -KP       Row Name 07/01/25 1428          Sit-Stand Transfer    Sit-Stand Weber (Transfers) moderate assist (50% patient effort);verbal cues  -     Assistive Device (Sit-Stand Transfers) walker, front-wheeled  -KP       Row Name 07/01/25 1428          Stand-Sit Transfer    Stand-Sit Weber (Transfers) moderate assist (50% patient effort);verbal cues  -     Assistive Device (Stand-Sit Transfers) walker, front-wheeled  -KP       Row Name 07/01/25 1428          Functional Mobility    Functional Mobility- Ind. Level minimum assist (75% patient effort);2 person assist required  -     Functional Mobility- Device walker, front-wheeled  -KP       Row Name 07/01/25 1428          Activities of Daily Living    BADL Assessment/Intervention toileting;lower body dressing  -KP       Row Name 07/01/25 1428          Mobility    Extremity Weight-bearing Status left lower extremity  -     Left Lower Extremity (Weight-bearing Status) weight-bearing as tolerated (WBAT)  -KP       Row Name 07/01/25 1428          Lower Body Dressing Assessment/Training    Weber Level (Lower Body Dressing) lower body dressing skills;maximum assist (25% patient effort)  -KP       Row Name 07/01/25 1428          Toileting Assessment/Training    Weber Level (Toileting) toileting skills;dependent (less than 25% patient effort)  -               User Key  (r) = Recorded By, (t) = Taken By, (c) = Cosigned By      Initials Name Provider Type     Izzy Murillo OT Occupational Therapist                   Obj/Interventions       Row Name 07/01/25 1438          Motor Skills    Motor Skills functional endurance  -     Functional Endurance good minus  -               User Key  (r) = Recorded By, (t) = Taken By, (c) = Cosigned By      Initials Name Provider Type    Izzy Motta OT Occupational  Therapist                   Goals/Plan    No documentation.                  Clinical Impression       Row Name 07/01/25 1438          Pain Assessment    Pain Location knee  -     Pain Side/Orientation left  -       Row Name 07/01/25 1438          Pain Scale: FACES Pre/Post-Treatment    Pain: FACES Scale, Pretreatment 0-->no hurt  -     Posttreatment Pain Rating 4-->hurts little more  -       Row Name 07/01/25 1438          Plan of Care Review    Plan of Care Reviewed With patient  -     Progress improving  -     Outcome Evaluation Patient seen for OT treatment. She continues to improve with tolerance for functional mobility/transfers. She still requires significant assist for ADLs. Continue plan of care.Patient would benefit from intensive therapy, and could tolerate 3 hrs/day.  -       Row Name 07/01/25 1438          Therapy Plan Review/Discharge Plan (OT)    Anticipated Discharge Disposition (OT) inpatient rehabilitation facility  -       Row Name 07/01/25 1438          Positioning and Restraints    Pre-Treatment Position sitting in chair/recliner  -     Post Treatment Position chair  -KP     In Chair sitting;call light within reach;encouraged to call for assist;with family/caregiver  -               User Key  (r) = Recorded By, (t) = Taken By, (c) = Cosigned By      Initials Name Provider Type    Izzy Motta, OT Occupational Therapist                   Outcome Measures       Row Name 07/01/25 0948          How much help from another person do you currently need...    Turning from your back to your side while in flat bed without using bedrails? 3  -LB     Moving from lying on back to sitting on the side of a flat bed without bedrails? 3  -LB     Moving to and from a bed to a chair (including a wheelchair)? 3  -LB     Standing up from a chair using your arms (e.g., wheelchair, bedside chair)? 3  -LB     Climbing 3-5 steps with a railing? 2  -LB     To walk in hospital room? 3  -LB      AM-PAC 6 Clicks Score (PT) 17  -LB               User Key  (r) = Recorded By, (t) = Taken By, (c) = Cosigned By      Initials Name Provider Type    Teresa Dey, RN Registered Nurse                      OT Recommendation and Plan  Planned Therapy Interventions (OT): activity tolerance training, BADL retraining, functional balance retraining, patient/caregiver education/training, passive ROM/stretching, occupation/activity based interventions, ROM/therapeutic exercise, strengthening exercise, transfer/mobility retraining  Therapy Frequency (OT): 5 times/wk  Plan of Care Review  Plan of Care Reviewed With: patient  Progress: improving  Outcome Evaluation: Patient seen for OT treatment. She continues to improve with tolerance for functional mobility/transfers. She still requires significant assist for ADLs. Continue plan of care.Patient would benefit from intensive therapy, and could tolerate 3 hrs/day.     Time Calculation:         Time Calculation- OT       Row Name 07/01/25 1443             Time Calculation- OT    OT Received On 07/01/25  -                User Key  (r) = Recorded By, (t) = Taken By, (c) = Cosigned By      Initials Name Provider Type     Izzy Murillo OT Occupational Therapist                  Therapy Charges for Today       Code Description Service Date Service Provider Modifiers Qty    78886899326 HC OT THERAPEUTIC ACT EA 15 MIN 6/30/2025 Izzy Murillo OT GO 1    94522023250 HC OT THERAPEUTIC ACT EA 15 MIN 7/1/2025 Izzy Murillo OT GO 1    82712428772 HC OT SELF CARE/MGMT/TRAIN EA 15 MIN 7/1/2025 Izzy Murillo OT GO 1                 Izzy Murillo OT  7/1/2025

## 2025-07-01 NOTE — DISCHARGE PLACEMENT REQUEST
"Dariusz Lizbetitzel Mendez \"Vanessa\" (80 y.o. Female)       Date of Birth   1945    Social Security Number       Address   [address] [place] [address] [place] [address]    Home Phone   [phone]    MRN   [id number]       [facility]    Marital Status                               Admission Date   6/21/2025    Admission Type   Emergency    Admitting Provider   Jose Chau MD    Attending Provider   Mando Shahid DO    Department, Room/Bed   [address], 3311/1S       Discharge Date       Discharge Disposition       Discharge Destination                                 Attending Provider: Mando Shahid DO    Allergies: Sulfa Antibiotics    Isolation: None   Infection: None   Code Status: CPR    Ht: 170.2 cm (67\")   Wt: 70.8 kg (156 lb 1.6 oz)    Admission Cmt: None   Principal Problem: Closed intertrochanteric fracture of left femur [S72.142A]                   Active Insurance as of 6/21/2025       Primary Coverage       Payor Plan Insurance Group Employer/Plan Group    [employer] MEDICARE REPLACEMENT [employer] Medicare Advantage GROUP PPO [id number]       Payor Plan Address Payor Plan Phone Number Payor Plan Fax Number Effective Dates    [address] [address]   1/1/2025 - None Entered    [facility] [id number]         Subscriber Name Subscriber Birth Date Member ID       LIZBET RIVERA VANESSA 1945 [id number]                     Emergency Contacts        (Rel.) Home Phone Work Phone Mobile Phone    kyara(POA)carlos (Daughter) -- -- [phone]    jakob(POA),maye (Daughter) [phone] -- [phone]    Dariusz(POA)Parveen (Son) -- -- [phone]              Emergency Contact Information       Name Relation Home Work Mobile    kyara(POA)carlos Daughter   [phone]    jakob(POA),maye Daughter [phone]    Dariusz(POA)Parveen Son   [phone]          Other Contacts    None on File       Insurance Information  "                 [employer] MEDICARE REPLACEMENT/[facility] Medicare Advantage GROUP PPO Phone: --    Subscriber: Lizbet Arzola Subscriber#: [id number]    Group#: [id number] Precert#: --    Authorization#: -- Effective Date: --             History & Physical        Jose Chau MD at 25 0131          Hospitalist History and Physical        Patient Identification  Name: Lizbet Arzola  Age/Sex: 80 y.o. female  :  1945        MRN: [id number]  Visit Number: [id number]  Admit Date: 2025   PCP: Yaakov Riley DO          Chief complaint fall, left hip pain    History of Present Illness:  Patient is a 80 y.o. female with history of HTN, HLD, OA and hyperthyroidism on methimazole, who presents with complaints of left hip pain after falling at home. She reports she was doing laundry when she tripped over her rollator and fell to the floor, hitting her head and landing on her left side. She was unable to get up and laid in the floor for an hour before her  found her. She was brought to the ED and found to have suffered a left intertronchanteric femur fracture. She also suffered a posterior scalp laceration from the fall which was stabled by her ED provider.     Review of Systems  Review of Systems   Constitutional:  Negative for activity change, appetite change, chills, diaphoresis, fatigue, fever and unexpected weight change.   HENT:  Negative for congestion, postnasal drip, rhinorrhea, sinus pressure, sinus pain and sore throat.    Eyes:  Negative for photophobia and visual disturbance.   Respiratory:  Negative for cough, shortness of breath and wheezing.    Cardiovascular:  Negative for chest pain, palpitations and leg swelling.   Gastrointestinal:  Negative for abdominal distention, abdominal pain, constipation, diarrhea, nausea and vomiting.   Genitourinary:  Negative for difficulty urinating, dysuria, flank pain and frequency.   Musculoskeletal:  Positive for arthralgias (left  hip (acute), multiple other joints including left knee (chronic)).   Skin:  Positive for wound (laceration back of head from fall).   Neurological:  Negative for dizziness, tremors, seizures, syncope, weakness, light-headedness, numbness and headaches.   Hematological:  Negative for adenopathy. Does not bruise/bleed easily.   Psychiatric/Behavioral:  Negative for agitation, behavioral problems and confusion.        History  Past Medical History:   Diagnosis Date    Hyperlipidemia     Hypertension     Hyperthyroidism     Osteoarthritis      History reviewed. No pertinent surgical history.  History reviewed. No pertinent family history.     (Not in a hospital admission)    Allergies:  Sulfa antibiotics    Objective    Vital Signs  Temp:  [98.6 °F (37 °C)] 98.6 °F (37 °C)  Heart Rate:  [77-91] 91  Resp:  [16] 16  BP: (150-163)/(63-71) 150/63  Body mass index is 21.93 kg/m².    Physical Exam:  Physical Exam  Constitutional:       General: She is not in acute distress.     Appearance: Normal appearance. She is not ill-appearing.   HENT:      Head: Normocephalic and atraumatic.      Right Ear: External ear normal.      Left Ear: External ear normal.      Nose: Nose normal.      Mouth/Throat:      Mouth: Mucous membranes are moist.      Pharynx: Oropharynx is clear.   Eyes:      Extraocular Movements: Extraocular movements intact.      Conjunctiva/sclera: Conjunctivae normal.      Pupils: Pupils are equal, round, and reactive to light.   Cardiovascular:      Rate and Rhythm: Normal rate and regular rhythm.      Pulses: Normal pulses.      Heart sounds: Normal heart sounds. No murmur heard.  Pulmonary:      Effort: Pulmonary effort is normal. No respiratory distress.      Breath sounds: Normal breath sounds. No wheezing or rales.   Abdominal:      General: Abdomen is flat. Bowel sounds are normal. There is no distension.      Palpations: Abdomen is soft.      Tenderness: There is no abdominal tenderness.   Musculoskeletal:       Cervical back: Normal range of motion and neck supple. No tenderness.      Right lower leg: No edema.      Left lower leg: No edema.      Comments: Left leg appx 1 inch shorter than right.    Lymphadenopathy:      Cervical: No cervical adenopathy.   Skin:     General: Skin is warm and dry.      Capillary Refill: Capillary refill takes less than 2 seconds.      Comments: Laceration posterior scalp which has been stapled   Neurological:      General: No focal deficit present.      Mental Status: She is alert and oriented to person, place, and time.   Psychiatric:         Mood and Affect: Mood normal.         Behavior: Behavior normal.           Results Review:       Lab Results:  Results from last 7 days   Lab Units 06/21/25 2117   WBC 10*3/mm3 17.41*   HEMOGLOBIN g/dL 11.5*   PLATELETS 10*3/mm3 253     Results from last 7 days   Lab Units 06/21/25 2117   CRP mg/dL <0.30     Results from last 7 days   Lab Units 06/21/25 2117   SODIUM mmol/L 141   POTASSIUM mmol/L 4.1   CHLORIDE mmol/L 106   CO2 mmol/L 19.7*   BUN mg/dL 30.3*   CREATININE mg/dL 1.45*   CALCIUM mg/dL 9.7   GLUCOSE mg/dL 148*         Hemoglobin A1C   Date Value Ref Range Status   06/21/2025 5.92 (H) 4.80 - 5.60 % Final     Results from last 7 days   Lab Units 06/21/25 2117   BILIRUBIN mg/dL 0.4   ALK PHOS U/L 78   AST (SGOT) U/L 29   ALT (SGPT) U/L 21     Results from last 7 days   Lab Units 06/21/25 2117   CK TOTAL U/L 326*                   I have reviewed the patient's laboratory results.    Imaging:  Imaging Results (Last 72 Hours)       Procedure Component Value Units Date/Time    CT Lumbar Spine Without Contrast [[id number]] Collected: 06/21/25 2334     Updated: 06/21/25 2338    Narrative:      EXAMINATION: CT lumbar spine without contrast     CLINICAL HISTORY: Fall     COMPARISON: None     TECHNIQUE: Contiguous 3 mm thick slices were obtained through the lumbar  spine without contrast. Coronal and sagittal reformats were performed.      FINDINGS:  Sagittal alignment is normal. Mild left convex curvature is centered at  the L4/L5 level. Mild right convex curvature centered at the L1/L2  level. There is no acute fracture. No traumatic listhesis. Moderate  multilevel disc related degenerative changes are noted. At the L4/L5  level there is a posterior disc bulge and comminution with facet  arthrosis and ligamentous thickening. The findings result in at least  moderate central canal stenosis. The disc bulges noted at the L5/S1  level as well.       Impression:      1. Multilevel disc related degenerative changes and facet arthrosis.  2. No evidence of recent trauma.     This report was finalized on 6/21/2025 11:36 PM by Juanjose Rocha MD.       CT Pelvis Without Contrast [[id number]] Collected: 06/21/25 2331     Updated: 06/21/25 2336    Narrative:      EXAMINATION: CT pelvis without contrast     CLINICAL HISTORY: Injury.     COMPARISON: None available.     TECHNIQUE: Contiguous 3 mm thick slices were obtained through the pelvis  without contrast. Coronal and sagittal reformats were performed.     FINDINGS:  Alignment at the hips is normal. Mild bilateral hip osteoarthrosis is  noted. There is an acute comminuted fracture through the  intertrochanteric region of the left proximal femur. There is apex  lateral angulation at the fracture site. Additionally, the distal  fragment is displaced medially with respect to the more proximal  fragment. There is apex anterior angulation at the fracture site as  well. Extensive adjacent soft tissue swelling is noted. No definite  acetabular fracture is appreciated.       Impression:      1. Acute comminuted left intertrochanteric proximal femur fracture.     This report was finalized on 6/21/2025 11:34 PM by Juanjose Rocha MD.       CT Head Without Contrast [[id number]] Collected: 06/21/25 2323     Updated: 06/21/25 2333    Narrative:      EXAMINATION: CT head without contrast     CLINICAL HISTORY: Fall    "  COMPARISON: None available.     TECHNIQUE: Contiguous 3 mm thick slices were obtained from the skull  base to the vertex without contrast. Coronal and sagittal reformats were  performed.     FINDINGS:  Moderate generalized volume loss is noted with prominence of the sulci  and ventricular system. White matter changes are noted in a pattern  suggesting chronic small vessel ischemic disease. There is no acute  intracranial hemorrhage. No acute territorial infarct. No extra-axial  collection is identified. There is no shift of midline structures. No  acute calvarial fracture is appreciated. Hyperostosis frontalis is  noted.       Impression:      1. No acute intracranial process.     This report was finalized on 6/21/2025 11:31 PM by Juanjose Rocha MD.               I have personally reviewed the patient's radiologic imaging.        EKG: ordered, results pending        Assessment & Plan    - Closed intertrochanteric fracture of left femur: admit to medical surgical unit. IV morphine available PRN. Keep NPO since already after midnight. Orthopedic surgery notified by ED; Dr Jay to see patient in consultation in the morning.   - Mild CK elevation, likely secondary to above fracture and prolonged time down on the floor at home: does not meet criteria for rhabdomyolysis at this time. Hydrate with IV fluids and continue to trend.  - Renal insufficiency, acute vs chronic: no prior labs for comparison. Patient denies history of CKD, but her daughter mentioned that her creatinine does run \"a little high\". Elevated BUN:cr ratio suggests dehydration. Avoid nephrotoxic home meds (losartan, triamterene-HCTZ). Repeat labs in the morning to monitor response to IV fluid hydration.  - Leukocytosis: likely reactive from hip fracture. CRP and procal are normal. Will check UA to look for evidence of bacteruria pre-op.   - HTN: BP stable thus far. Holding losartan and triamterene-HCTZ as noted above. Plan to continue amlodipine in the " morning. Continue same dose since pain medication may help to keep BP controlled, but if not then can increase amlodipine from 5 to 10mg moving forward.  - HLD: hold statin for now in light of CK elevation.   - Hyperthyroidism: TSH mildly elevated at 4.900. Check free T4. Plan to continue methimazole once reconciled by pharmacy.   - Mild hyperglycemia: likely reactive to some extent from hip fracture, but A1c is mildly elevated as well at 5.92 indicating pre-diabetes. Recommend lifestyle modification moving forward.     DVT Prophylaxis: SCD to right leg only    Estimated Length of Stay >2 midnights    I discussed the patient's findings, assessment and plan with the patient, her daughter at bedside, and ED provider Quincy Story PA-C    Patient is high risk due to left intertrochanteric femur fracture requiring parenteral opioids for pain control    Jose Chau MD  06/22/25  01:34 EDT      Electronically signed by Jose Chau MD at 06/22/25 0146       Current Facility-Administered Medications   Medication Dose Route Frequency Provider Last Rate Last Admin    amLODIPine (NORVASC) tablet 5 mg  5 mg Oral Q24H Cosmo Jay MD   5 mg at 07/01/25 0911    aspirin EC tablet 81 mg  81 mg Oral Nightly Cosmo Jay MD   81 mg at 06/30/25 2103    sennosides-docusate (PERICOLACE) 8.6-50 MG per tablet 2 tablet  2 tablet Oral BID PRN Cosmo Jay MD   2 tablet at 06/28/25 0923    And    polyethylene glycol (MIRALAX) packet 17 g  17 g Oral Daily PRN Cosmo Jay MD   17 g at 06/28/25 2126    And    bisacodyl (DULCOLAX) EC tablet 5 mg  5 mg Oral Daily PRN Cosmo Jay MD   5 mg at 06/24/25 1612    And    bisacodyl (DULCOLAX) suppository 10 mg  10 mg Rectal Daily PRN Cosmo Jay MD        cholecalciferol (VITAMIN D3) tablet 4,000 Units  4,000 Units Oral Nightly Cosmo Jay MD   4,000 Units at 06/30/25 2103    diazePAM (VALIUM) tablet 2 mg  2 mg Oral Once Chano Romero DO        Diclofenac  Sodium (VOLTAREN) 1 % gel 4 g  4 g Topical BID Chano Romero DO   4 g at 07/01/25 0911    donepezil (ARICEPT) tablet 10 mg  10 mg Oral Nightly Cosmo Jay MD   10 mg at 06/30/25 2103    escitalopram (LEXAPRO) tablet 10 mg  10 mg Oral Daily Cosmo Jay MD   10 mg at 07/01/25 0911    HYDROcodone-acetaminophen (NORCO) 5-325 MG per tablet 1 tablet  1 tablet Oral Q6H PRN Chano Romero DO   1 tablet at 07/01/25 0608    [Held by provider] methIMAzole (TAPAZOLE) tablet 5 mg  5 mg Oral Daily Cosmo Jay MD   5 mg at 06/24/25 0830    multivitamin (THERAGRAN) tablet 1 tablet  1 tablet Oral Nightly Cosmo Jay MD   1 tablet at 06/30/25 2103    naloxone (NARCAN) injection 0.4 mg  0.4 mg Intravenous Q5 Min PRN Chano Romero DO        rosuvastatin (CRESTOR) tablet 5 mg  5 mg Oral Daily Cosmo Jay MD   5 mg at 07/01/25 0911    sodium chloride 0.9 % flush 10 mL  10 mL Intravenous Q12H Cosmo Jay MD   10 mL at 07/01/25 0911    sodium chloride 0.9 % flush 10 mL  10 mL Intravenous PRN Cosmo Jay MD   10 mL at 06/30/25 0846    sodium chloride 0.9 % infusion 40 mL  40 mL Intravenous PRN Cosmo Jay MD         Lab Results (most recent)       Procedure Component Value Units Date/Time    Basic Metabolic Panel [[id number]]  (Abnormal) Collected: 07/01/25 0227    Specimen: Blood Updated: 07/01/25 0343     Glucose 127 mg/dL      BUN 23.5 mg/dL      Creatinine 1.12 mg/dL      Sodium 140 mmol/L      Potassium 3.5 mmol/L      Chloride 102 mmol/L      CO2 26.7 mmol/L      Calcium 9.1 mg/dL      BUN/Creatinine Ratio 21.0     Anion Gap 11.3 mmol/L      eGFR 49.2 mL/min/1.73     Narrative:      GFR Categories in Chronic Kidney Disease (CKD)              GFR Category          GFR (mL/min/1.73)    Interpretation  G1                    90 or greater        Normal or high (1)  G2                    60-89                Mild decrease (1)  G3a                   45-59                Mild to moderate  decrease  G3b                   30-44                Moderate to severe decrease  G4                    15-29                Severe decrease  G5                    14 or less           Kidney failure    (1)In the absence of evidence of kidney disease, neither GFR category G1 or G2 fulfill the criteria for CKD.    eGFR calculation 2021 CKD-EPI creatinine equation, which does not include race as a factor    CBC (No Diff) [[id number]]  (Abnormal) Collected: 07/01/25 0227    Specimen: Blood Updated: 07/01/25 0324     WBC 11.46 10*3/mm3      RBC 2.69 10*6/mm3      Hemoglobin 8.7 g/dL      Hematocrit 26.8 %      MCV 99.6 fL      MCH 32.3 pg      MCHC 32.5 g/dL      RDW 13.3 %      RDW-SD 46.7 fl      MPV 9.0 fL      Platelets 436 10*3/mm3     CBC (No Diff) [[id number]]  (Abnormal) Collected: 06/29/25 0107    Specimen: Blood Updated: 06/29/25 0154     WBC 11.12 10*3/mm3      RBC 2.62 10*6/mm3      Hemoglobin 8.4 g/dL      Hematocrit 25.6 %      MCV 97.7 fL      MCH 32.1 pg      MCHC 32.8 g/dL      RDW 12.8 %      RDW-SD 45.1 fl      MPV 9.2 fL      Platelets 338 10*3/mm3     Uric Acid [[id number]]  (Normal) Collected: 06/28/25 0342    Specimen: Blood Updated: 06/28/25 1358     Uric Acid 5.3 mg/dL     Basic Metabolic Panel [[id number]]  (Abnormal) Collected: 06/28/25 0342    Specimen: Blood Updated: 06/28/25 0412     Glucose 117 mg/dL      BUN 22.4 mg/dL      Creatinine 0.92 mg/dL      Sodium 138 mmol/L      Potassium 3.5 mmol/L      Chloride 103 mmol/L      CO2 24.8 mmol/L      Calcium 8.7 mg/dL      BUN/Creatinine Ratio 24.3     Anion Gap 10.2 mmol/L      eGFR 62.3 mL/min/1.73     Narrative:      GFR Categories in Chronic Kidney Disease (CKD)              GFR Category          GFR (mL/min/1.73)    Interpretation  G1                    90 or greater        Normal or high (1)  G2                    60-89                Mild decrease (1)  G3a                   45-59                Mild to moderate decrease  G3b                    30-44                Moderate to severe decrease  G4                    15-29                Severe decrease  G5                    14 or less           Kidney failure    (1)In the absence of evidence of kidney disease, neither GFR category G1 or G2 fulfill the criteria for CKD.    eGFR calculation 2021 CKD-EPI creatinine equation, which does not include race as a factor    Heparin Anti-Xa [[id number]]  (Abnormal) Collected: 06/26/25 0834    Specimen: Blood Updated: 06/26/25 0855     Heparin Anti-Xa (UFH) 0.26 IU/ml     Hemoglobin & Hematocrit, Blood [[id number]]  (Abnormal) Collected: 06/26/25 0834    Specimen: Blood Updated: 06/26/25 0844     Hemoglobin 9.0 g/dL      Hematocrit 27.4 %     Heparin Anti-Xa [[id number]]  (Normal) Collected: 06/25/25 2304    Specimen: Blood Updated: 06/25/25 2328     Heparin Anti-Xa (UFH) 0.33 IU/ml     CBC & Differential [[id number]]  (Abnormal) Collected: 06/25/25 2304    Specimen: Blood Updated: 06/25/25 2327    Narrative:      The following orders were created for panel order CBC & Differential.  Procedure                               Abnormality         Status                     ---------                               -----------         ------                     CBC Auto Differential[[id number]]        Abnormal            Final result                 Please view results for these tests on the individual orders.    CBC Auto Differential [[id number]]  (Abnormal) Collected: 06/25/25 2304    Specimen: Blood Updated: 06/25/25 2327     WBC 11.01 10*3/mm3      RBC 2.44 10*6/mm3      Hemoglobin 7.7 g/dL      Hematocrit 24.4 %      .0 fL      MCH 31.6 pg      MCHC 31.6 g/dL      RDW 13.3 %      RDW-SD 48.8 fl      MPV 9.8 fL      Platelets 190 10*3/mm3      Neutrophil % 67.0 %      Lymphocyte % 19.5 %      Monocyte % 10.5 %      Eosinophil % 2.2 %      Basophil % 0.3 %      Immature Grans % 0.5 %      Neutrophils, Absolute 7.37 10*3/mm3      Lymphocytes, Absolute 2.15 10*3/mm3       Monocytes, Absolute 1.16 10*3/mm3      Eosinophils, Absolute 0.24 10*3/mm3      Basophils, Absolute 0.03 10*3/mm3      Immature Grans, Absolute 0.06 10*3/mm3      nRBC 0.0 /100 WBC     Protime-INR [[id number]]  (Abnormal) Collected: 06/25/25 1534    Specimen: Blood Updated: 06/25/25 1602     Protime 15.5 Seconds      INR 1.15    Narrative:      Suggested INR therapeutic range for stable oral anticoagulant therapy:    Low Intensity therapy:   1.5-2.0  Moderate Intensity therapy:   2.0-3.0  High Intensity therapy:   2.5-4.0    aPTT [[id number]]  (Abnormal) Collected: 06/25/25 1534    Specimen: Blood Updated: 06/25/25 1602     PTT 36.3 seconds     Narrative:      PTT Heparin Therapeutic Range:  45 - 116 seconds      Magnesium [[id number]]  (Normal) Collected: 06/25/25 0111    Specimen: Blood Updated: 06/25/25 1021     Magnesium 1.9 mg/dL     Hemoglobin & Hematocrit, Blood [[id number]]  (Abnormal) Collected: 06/24/25 1414    Specimen: Blood Updated: 06/24/25 1429     Hemoglobin 9.2 g/dL      Hematocrit 28.0 %     Narrative:      Post Transfusion Specimen        CBC & Differential [[id number]]  (Abnormal) Collected: 06/23/25 0514    Specimen: Blood Updated: 06/23/25 0630    Narrative:      The following orders were created for panel order CBC & Differential.  Procedure                               Abnormality         Status                     ---------                               -----------         ------                     CBC Auto Differential[[id number]]        Abnormal            Final result                 Please view results for these tests on the individual orders.    CBC Auto Differential [[id number]]  (Abnormal) Collected: 06/23/25 0514    Specimen: Blood Updated: 06/23/25 0630     WBC 11.59 10*3/mm3      RBC 2.59 10*6/mm3      Hemoglobin 8.5 g/dL      Hematocrit 26.3 %      .5 fL      MCH 32.8 pg      MCHC 32.3 g/dL      RDW 12.3 %      RDW-SD 45.8 fl      MPV 10.0 fL      Platelets 195 10*3/mm3       Neutrophil % 63.6 %      Lymphocyte % 22.1 %      Monocyte % 11.0 %      Eosinophil % 2.4 %      Basophil % 0.5 %      Immature Grans % 0.4 %      Neutrophils, Absolute 7.37 10*3/mm3      Lymphocytes, Absolute 2.56 10*3/mm3      Monocytes, Absolute 1.27 10*3/mm3      Eosinophils, Absolute 0.28 10*3/mm3      Basophils, Absolute 0.06 10*3/mm3      Immature Grans, Absolute 0.05 10*3/mm3      nRBC 0.0 /100 WBC     Comprehensive Metabolic Panel [[id number]]  (Abnormal) Collected: 06/23/25 0228    Specimen: Blood Updated: 06/23/25 0313     Glucose 132 mg/dL      BUN 27.0 mg/dL      Creatinine 1.20 mg/dL      Sodium 140 mmol/L      Potassium 3.6 mmol/L      Chloride 104 mmol/L      CO2 25.2 mmol/L      Calcium 8.5 mg/dL      Total Protein 5.6 g/dL      Albumin 3.7 g/dL      ALT (SGPT) 18 U/L      AST (SGOT) 36 U/L      Alkaline Phosphatase 52 U/L      Total Bilirubin 0.6 mg/dL      Globulin 1.9 gm/dL      A/G Ratio 1.9 g/dL      BUN/Creatinine Ratio 22.5     Anion Gap 10.8 mmol/L      eGFR 45.3 mL/min/1.73     Narrative:      GFR Categories in Chronic Kidney Disease (CKD)              GFR Category          GFR (mL/min/1.73)    Interpretation  G1                    90 or greater        Normal or high (1)  G2                    60-89                Mild decrease (1)  G3a                   45-59                Mild to moderate decrease  G3b                   30-44                Moderate to severe decrease  G4                    15-29                Severe decrease  G5                    14 or less           Kidney failure    (1)In the absence of evidence of kidney disease, neither GFR category G1 or G2 fulfill the criteria for CKD.    eGFR calculation 2021 CKD-EPI creatinine equation, which does not include race as a factor    CK [[id number]]  (Abnormal) Collected: 06/23/25 0228    Specimen: Blood Updated: 06/23/25 0313     Creatine Kinase 692 U/L     Protime-INR [[id number]]  (Abnormal) Collected: 06/22/25 0842    Specimen:  Blood Updated: 06/22/25 0859     Protime 15.2 Seconds      INR 1.13    Narrative:      Suggested INR therapeutic range for stable oral anticoagulant therapy:    Low Intensity therapy:   1.5-2.0  Moderate Intensity therapy:   2.0-3.0  High Intensity therapy:   2.5-4.0    aPTT [[id number]]  (Normal) Collected: 06/22/25 0835    Specimen: Blood Updated: 06/22/25 0859     PTT 33.1 seconds     Narrative:      PTT Heparin Therapeutic Range:  45 - 116 seconds      Urinalysis With Culture If Indicated - Urine, Clean Catch [[id number]]  (Abnormal) Collected: 06/22/25 0657    Specimen: Urine, Clean Catch Updated: 06/22/25 0715     Color, UA Yellow     Appearance, UA Clear     pH, UA <=5.0     Specific Gravity, UA 1.019     Glucose, UA Negative     Ketones, UA Negative     Bilirubin, UA Negative     Blood, UA Negative     Protein, UA 30 mg/dL (1+)     Leuk Esterase, UA Trace     Nitrite, UA Negative     Urobilinogen, UA 0.2 E.U./dL    Narrative:      In absence of clinical symptoms, the presence of pyuria, bacteria, and/or nitrites on the urinalysis result does not correlate with infection.    Urinalysis, Microscopic Only - Urine, Clean Catch [[id number]]  (Abnormal) Collected: 06/22/25 0657    Specimen: Urine, Clean Catch Updated: 06/22/25 0715     RBC, UA 0-2 /HPF      WBC, UA 3-5 /HPF      Comment: Urine culture not indicated.        Bacteria, UA None Seen /HPF      Squamous Epithelial Cells, UA 3-6 /HPF      Hyaline Casts, UA None Seen /LPF      Methodology Automated Microscopy    STAT Lactic Acid, Reflex [[id number]]  (Normal) Collected: 06/22/25 0529    Specimen: Blood Updated: 06/22/25 0703     Lactate 1.6 mmol/L     Lactic Acid, Plasma [[id number]]  (Abnormal) Collected: 06/22/25 0156    Specimen: Blood Updated: 06/22/25 0308     Lactate 2.1 mmol/L     Comprehensive Metabolic Panel [[id number]]  (Abnormal) Collected: 06/22/25 0156    Specimen: Blood Updated: 06/22/25 0304     Glucose 162 mg/dL      BUN 30.4 mg/dL       Creatinine 1.36 mg/dL      Sodium 140 mmol/L      Potassium 4.4 mmol/L      Chloride 108 mmol/L      CO2 17.0 mmol/L      Calcium 9.2 mg/dL      Total Protein 6.5 g/dL      Albumin 4.1 g/dL      ALT (SGPT) 20 U/L      AST (SGOT) 32 U/L      Alkaline Phosphatase 69 U/L      Total Bilirubin 0.5 mg/dL      Globulin 2.4 gm/dL      A/G Ratio 1.7 g/dL      BUN/Creatinine Ratio 22.4     Anion Gap 15.0 mmol/L      eGFR 39.0 mL/min/1.73     Narrative:      GFR Categories in Chronic Kidney Disease (CKD)              GFR Category          GFR (mL/min/1.73)    Interpretation  G1                    90 or greater        Normal or high (1)  G2                    60-89                Mild decrease (1)  G3a                   45-59                Mild to moderate decrease  G3b                   30-44                Moderate to severe decrease  G4                    15-29                Severe decrease  G5                    14 or less           Kidney failure    (1)In the absence of evidence of kidney disease, neither GFR category G1 or G2 fulfill the criteria for CKD.    eGFR calculation 2021 CKD-EPI creatinine equation, which does not include race as a factor    CK [[id number]]  (Abnormal) Collected: 06/22/25 0156    Specimen: Blood Updated: 06/22/25 0256     Creatine Kinase 519 U/L     T4, Free [[id number]]  (Abnormal) Collected: 06/21/25 2117    Specimen: Blood from Arm, Left Updated: 06/22/25 0206     Free T4 0.85 ng/dL     TSH [[id number]]  (Abnormal) Collected: 06/21/25 2117    Specimen: Blood from Arm, Left Updated: 06/22/25 0134     TSH 4.900 uIU/mL     Procalcitonin [[id number]]  (Normal) Collected: 06/21/25 2117    Specimen: Blood from Arm, Left Updated: 06/22/25 0113     Procalcitonin 0.04 ng/mL     Narrative:      As a Marker for Sepsis (Non-Neonates):    1. <0.5 ng/mL represents a low risk of severe sepsis and/or septic shock.  2. >2 ng/mL represents a high risk of severe sepsis and/or septic shock.    As a Marker for Lower  "Respiratory Tract Infections that require antibiotic therapy:    PCT on Admission    Antibiotic Therapy       6-12 Hrs later    >0.5                Strongly Recommended  >0.25 - <0.5        Recommended   0.1 - 0.25          Discouraged              Remeasure/reassess PCT  <0.1                Strongly Discouraged     Remeasure/reassess PCT    As 28 day mortality risk marker: \"Change in Procalcitonin Result\" (>80% or <=80%) if Day 0 (or Day 1) and Day 4 values are available. Refer to http://[email or web address]-[email or web address]-[email or web address]    Change in PCT <=80%  A decrease of PCT levels below or equal to 80% defines a positive change in PCT test result representing a higher risk for 28-day all-cause mortality of patients diagnosed with severe sepsis for septic shock.    Change in PCT >80%  A decrease of PCT levels of more than 80% defines a negative change in PCT result representing a lower risk for 28-day all-cause mortality of patients diagnosed with severe sepsis or septic shock.       Hemoglobin A1c [[id number]]  (Abnormal) Collected: 06/21/25 2117    Specimen: Blood from Arm, Left Updated: 06/22/25 0111     Hemoglobin A1C 5.92 %     Narrative:      Hemoglobin A1C Ranges:    Increased Risk for Diabetes  5.7% to 6.4%  Diabetes                     >= 6.5%  Diabetic Goal                < 7.0%    C-reactive Protein [[id number]]  (Normal) Collected: 06/21/25 2117    Specimen: Blood from Arm, Left Updated: 06/22/25 0106     C-Reactive Protein <0.30 mg/dL     CK Total & CKMB [[id number]]  (Abnormal) Collected: 06/21/25 2117    Specimen: Blood from Arm, Left Updated: 06/21/25 2327     CKMB 6.45 ng/mL      Creatine Kinase 326 U/L     Narrative:      CKMB results may be falsely decreased if patient taking Biotin.    Rembert Draw [[id number]] Collected: 06/21/25 2117    Specimen: Blood from Arm, Left Updated: 06/21/25 2132    Narrative:      The following orders were created for panel order R[facility]t Draw.  Procedure                               " Abnormality         Status                     ---------                               -----------         ------                     Green Top (Gel)[[id number]]                                  Final result               Lavender Top[[id number]]                                     Final result               Gold Top - SST[[id number]]                                   Final result               Light Blue Top[[id number]]                                   Final result                 Please view results for these tests on the individual orders.    Green Top (Gel) [[id number]] Collected: 06/21/25 2117    Specimen: Blood from Arm, Left Updated: 06/21/25 2132     Extra Tube Hold for add-ons.     Comment: Auto resulted.       Lavender Top [[id number]] Collected: 06/21/25 2117    Specimen: Blood from Arm, Left Updated: 06/21/25 2132     Extra Tube hold for add-on     Comment: Auto resulted       Gold Top - SST [[id number]] Collected: 06/21/25 2117    Specimen: Blood from Arm, Left Updated: 06/21/25 2132     Extra Tube Hold for add-ons.     Comment: Auto resulted.       Light Blue Top [[id number]] Collected: 06/21/25 2117    Specimen: Blood from Arm, Left Updated: 06/21/25 2132     Extra Tube Hold for add-ons.     Comment: Auto resulted             Orders (last 24 hrs)        Start     Ordered    07/02/25 0600  CBC & Differential  Morning Draw         07/01/25 0819 07/02/25 0600  Basic Metabolic Panel  Morning Draw         07/01/25 0819    07/01/25 0600  CBC (No Diff)  Morning Draw         06/30/25 1245    07/01/25 0600  Basic Metabolic Panel  Morning Draw         06/30/25 1245    06/30/25 2045  Telemetry Scan  Once         06/30/25 2045 06/30/25 1747  HYDROcodone-acetaminophen (NORCO) 5-325 MG per tablet 1 tablet  Every 6 Hours PRN         06/30/25 1747    06/30/25 1702  Dietary Nutrition Supplements Boost Plus (Ensure Enlive, Ensure Plus)  Once         06/30/25 1701 06/28/25 2100  colchicine tablet 0.6 mg  2 Times  "Daily,   Status:  Discontinued         06/28/25 1331    06/26/25 1145  Diclofenac Sodium (VOLTAREN) 1 % gel 4 g  2 Times Daily         06/26/25 1046    06/26/25 1145  diazePAM (VALIUM) tablet 2 mg  Once         06/26/25 1049    06/26/25 0959  morphine injection 4 mg  Every 3 Hours PRN,   Status:  Discontinued        Placed in \"And\" Linked Group    06/26/25 1000    06/26/25 0959  naloxone (NARCAN) injection 0.4 mg  Every 5 Minutes PRN        Placed in \"And\" Linked Group    06/26/25 1000    06/25/25 0236  Silicone Border Dressing to Bony Prominences  Every Shift       06/25/25 0235    06/23/25 1814  Ambulate Patient  Every Shift       06/23/25 1813    06/22/25 2100  multivitamin (THERAGRAN) tablet 1 tablet  Nightly         06/22/25 0906    06/22/25 2100  aspirin EC tablet 81 mg  Nightly         06/22/25 0906 06/22/25 2100  donepezil (ARICEPT) tablet 10 mg  Nightly         06/22/25 0906    06/22/25 2100  cholecalciferol (VITAMIN D3) tablet 4,000 Units  Nightly         06/22/25 0906 06/22/25 1000  escitalopram (LEXAPRO) tablet 10 mg  Daily         06/22/25 0906 06/22/25 1000  [Held by provider]  methIMAzole (TAPAZOLE) tablet 5 mg  Daily        (On hold since Tue 6/24/2025 at 1543 until manually unheld; held by Chano Romero DOHold Reason: Abnormal Labs)    06/22/25 0906 06/22/25 1000  rosuvastatin (CRESTOR) tablet 5 mg  Daily         06/22/25 0906 06/22/25 0900  amLODIPine (NORVASC) tablet 5 mg  Every 24 Hours Scheduled         06/22/25 0142    06/22/25 0800  Oral Care  2 Times Daily       06/22/25 0148    06/22/25 0245  sodium chloride 0.9 % flush 10 mL  Every 12 Hours Scheduled         06/22/25 0148    06/22/25 0149  Daily Weights  Daily       06/22/25 0148    06/22/25 0148  sennosides-docusate (PERICOLACE) 8.6-50 MG per tablet 2 tablet  2 Times Daily PRN        Placed in \"And\" Linked Group    06/22/25 0148    06/22/25 0148  polyethylene glycol (MIRALAX) packet 17 g  Daily PRN        Placed " "in \"And\" Linked Group    06/22/25 0148    06/22/25 0148  bisacodyl (DULCOLAX) EC tablet 5 mg  Daily PRN        Placed in \"And\" Linked Group    06/22/25 0148    06/22/25 0148  bisacodyl (DULCOLAX) suppository 10 mg  Daily PRN        Placed in \"And\" Linked Group    06/22/25 0148    06/22/25 0148  sodium chloride 0.9 % flush 10 mL  As Needed         06/22/25 0148    06/22/25 0148  sodium chloride 0.9 % infusion 40 mL  As Needed         06/22/25 0148    06/13/25 0000  amLODIPine (NORVASC) 5 MG tablet  Daily         06/21/25 2052 06/13/25 0000  escitalopram (LEXAPRO) 10 MG tablet  Daily         06/21/25 2052 06/11/25 0000  Diclofenac Sodium (VOLTAREN) 1 % gel gel  4 Times Daily PRN         06/21/25 2052 05/26/25 0000  methIMAzole (TAPAZOLE) 5 MG tablet  Daily         06/21/25 2052 05/26/25 0000  triamterene-hydrochlorothiazide (DYAZIDE) 37.5-25 MG per capsule  Daily         06/21/25 2052 04/25/25 0000  losartan (COZAAR) 50 MG tablet  Daily         06/21/25 2052 04/25/25 0000  omega-3 acid ethyl esters (LOVAZA) 1 g capsule  2 Times Daily         06/21/25 2052    04/15/25 0000  potassium chloride 10 MEQ CR tablet  Daily         06/21/25 2052 04/04/25 0000  rosuvastatin (CRESTOR) 5 MG tablet  Daily         06/21/25 2052 03/27/25 0000  donepezil (ARICEPT) 10 MG tablet  Nightly         06/21/25 2052    Unscheduled  Wound Care  As Needed       06/25/25 0235    --  acetaminophen (TYLENOL) 500 MG tablet  Nightly         06/22/25 0257    --  multivitamin tablet tablet  Nightly         06/22/25 0257    --  aspirin 81 MG EC tablet  Nightly         06/22/25 0257    --  acetaminophen (TYLENOL) 500 MG tablet  Every Morning         06/22/25 0300    --  BIOTIN PO  Daily         06/22/25 0300    --  Cholecalciferol 50 MCG (2000 UT) tablet  Nightly         06/22/25 0300                     Operative/Procedure Notes (all)        Cosmo Jay MD at 06/23/25 1544  Version 1 of 1         Lizbet Mendez " Dariusz  6/23/2025      Operative Note:     Surgeon: Cosmo Jay MD     Assistant: PRECIOUS Agee     Preoperative Diagnosis:   Left subtrochanteric hip fracture, displaced, acute     Postoperative Diagnosis:   Same     Procedure(s):    1.  Left subtrochanteric hip fracture repair with long cephalomedullary nail, CPT code 11333  2.  Intraoperative use of fluoroscopy, CPT code 01607     Anesthesia: General     Estimated Blood Loss: 100 cc     Specimen Obtained:  None     Complication(s):  None apparent.      Implants: Synthes long cephalomedullary nail, 130 degrees, 380, 12 mm, 95 mm lag screw, static locking screws     Operative Indication:      The patient is a 80-year-old who fell from standing height and sustained the above injury.  Due to the acute displaced nature of her injury she elected proceed with the above operation.  Risk benefits alternatives and complications was discussed with the patient prior to surgery.     Operative Details:     The patient was met in the preoperative suite where the correct operative site was marked. The patient was brought to the operating room where anesthesia was initiated. The patient was positioned appropriately with all bony prominences well-padded. The patient was prepped and draped in the usual sterile fashion and prior to incision a timeout was observed to verify the correct operative site, procedure and antibiotics.     A longitudinal traction slight internal rotation was utilized to provide a provisional reduction.  2 percutaneous incisions were made 1 anteriorly and 1 laterally for a bone hook laterally with the distal segment having lateral traction as well as anteriorly a ball spike to push down on the proximal segment.  A percutaneous incision was then made on the proximal lateral aspect of the thigh down to the level of the greater trochanter.  A guidepin was then placed down to the level of the lesser and once checking the AP and lateral projections of this  guidewire injury reamer utilized to gain access to the medullary canal.  The guidewire was exchanged for a guide wire.  The length of the nail was then measured once malleted in the guidewire to appropriate position just above the level of the patella.  Sequential reaming was then performed.  A nail was malleted into position.  A second percutaneous incision was then made to place a guidewire in the center center position of the femoral head.  Once checking the AP and lateral projections this was measured reamed and a lag screw was placed in the femoral head.  Compression at the fracture site and the setscrew proximally.  A third percutaneous incision was then made to place 2 static locking screw at the distal aspect of the nail.  Final intraoperative x-rays show satisfactory reduction and placement of the hardware.  All wounds were irrigated thoroughly with sterile saline.  The wounds were closed in standard layered fashion.  A soft dressing was applied.  Patient was extubated, transferred hospital bed in the PACU stable condition.  Patient tolerated procedure well without any complications.     Postoperative Plan:     Transfer to floor  Dressing-maintain dressing for now, okay to reinforce.  Saturation  Weight Bear/Lifting Status-as tolerated  DVT PPx-Lovenox 40 once daily for 14 days  Follow up-2 weeks in the office       Electronically Signed by: Cosmo Jay MD        Electronically signed by Cosmo Jay MD at [phone]          Physician Progress Notes (most recent note)        Chano Romero DO at 25 1242              [facility]     Patient Identification:  Name:  Lizbet Arzola  Age:  80 y.o.  Sex:  female  :  1945  MRN:  [id number]  Visit Number:  [id number]  Primary Care Provider:  Yaakov Riley DO    Length of stay:  8    Chief complaint: None    Subjective:    Patient seen and examined at bedside with patient's daughter present.   Patient was sitting up in a chair having her hair fixed by her daughter when I entered the room.  She reports feeling well today with no complaints.  She was able to participate with physical therapy today and did ambulate a short distance.  ----------------------------------------------------------------------------------------------------------------------  Current [facility] Meds:  amLODIPine, 5 mg, Oral, Q24H  aspirin, 81 mg, Oral, Nightly  cholecalciferol, 4,000 Units, Oral, Nightly  colchicine, 0.6 mg, Oral, BID  diazePAM, 2 mg, Oral, Once  Diclofenac Sodium, 4 g, Topical, BID  donepezil, 10 mg, Oral, Nightly  escitalopram, 10 mg, Oral, Daily  [Held by provider] methIMAzole, 5 mg, Oral, Daily  multivitamin, 1 tablet, Oral, Nightly  rosuvastatin, 5 mg, Oral, Daily  sodium chloride, 10 mL, Intravenous, Q12H           ----------------------------------------------------------------------------------------------------------------------  Vital Signs:  Temp:  [98.1 °F (36.7 °C)-98.6 °F (37 °C)] 98.2 °F (36.8 °C)  Heart Rate:  [64-86] 74  Resp:  [16-18] 18  BP: (149-166)/(58-82) 151/60      06/28/25  0500 06/29/25  0500 06/30/25  0500   Weight: 73.2 kg (161 lb 4.8 oz) 74 kg (163 lb 2.3 oz) 71.2 kg (157 lb)     Body mass index is 24.59 kg/m².    Intake/Output Summary (Last 24 hours) at 6/30/2025 1242  Last data filed at 6/30/2025 0930  Gross per 24 hour   Intake 960 ml   Output --   Net 960 ml     Diet: Regular/House; Fluid Consistency: Thin (IDDSI 0)  ----------------------------------------------------------------------------------------------------------------------  Physical exam:  Constitutional: Well-nourished  female in no apparent distress.     HENT:  Head:  Normocephalic and atraumatic.  Mouth:  Moist mucous membranes.    Eyes:  Conjunctivae and EOM are normal.  Pupils are equal, round, and reactive to light.  No scleral icterus.    Neck:  Neck supple. No thyromegaly.  No JVD present.   "  Cardiovascular:  Regular rate and rhythm with no murmurs, rubs, clicks or gallops appreciated.  Pulmonary/Chest:  Clear to auscultation bilaterally with no crackles, wheezes or rhonchi appreciated.  Abdominal:  Soft. Nontender. Nondistended  Bowel sounds are normal in all four quadrants. No organomegally appreciated.   Musculoskeletal:  No edema, surgical dressing applied to left hip joint  Neurological: Awake, Cranial nerves II-XII intact with no focal deficits.  No facial droop.  No slurred speech.   Skin:  Warm and dry to palpation with no rashes or lesions appreciated.  Peripheral vascular:  2+ radial and pedal pulses in bilateral upper and lower extremities.  Psychiatric: Awake and oriented x3, demonstrates appropriate judgment and insight.    No significant change in physical exam in comparison to 6/29/2025  ---------------------------------------------------------------------------------------  ----------------------------------------------------------------------------------------------------------------------        Results from last 7 days   Lab Units 06/29/25  0107 06/28/25  0342 06/26/25  0834 06/25/25  2304 06/25/25  1534   WBC 10*3/mm3 11.12* 9.75  --  11.01*  --    HEMOGLOBIN g/dL 8.4* 8.7* 9.0* 7.7*  --    HEMATOCRIT % 25.6* 27.4* 27.4* 24.4*  --    MCV fL 97.7* 101.1*  --  100.0*  --    MCHC g/dL 32.8 31.8  --  31.6  --    PLATELETS 10*3/mm3 338 285  --  190  --    INR   --   --   --   --  1.15*         Results from last 7 days   Lab Units 06/28/25  0342 06/25/25  0111 06/24/25  0152   SODIUM mmol/L 138 139 137   POTASSIUM mmol/L 3.5 3.0* 3.7   MAGNESIUM mg/dL  --  1.9  --    CHLORIDE mmol/L 103 101 98   CO2 mmol/L 24.8 27.6 26.6   BUN mg/dL 22.4 23.0 26.5*   CREATININE mg/dL 0.92 1.11* 1.19*   CALCIUM mg/dL 8.7 7.8* 7.8*   GLUCOSE mg/dL 117* 142* 168*   Estimated Creatinine Clearance: 53 mL/min (by C-G formula based on SCr of 0.92 mg/dL).    No results found for: \"AMMONIA\"      No results found " "for: \"BLOODCX\"  No results found for: \"URINECX\"  No results found for: \"WOUNDCX\"  No results found for: \"STOOLCX\"    I have personally looked at the labs and they are summarized above.  ----------------------------------------------------------------------------------------------------------------------  Imaging Results (Last 24 Hours)       ** No results found for the last 24 hours. **          ----------------------------------------------------------------------------------------------------------------------  Assessment and Plan:    Left femur fracture - patient underwent left hip intertrochanteric nailing on 6/23/2025, patient tolerated the procedure well with no complications.    2. Acute Blood Loss Anemia -patient has required 1 unit PRBC transfusion during this hospital stay.  Hemoglobin and hematocrit are stable.    3.  New onset paroxysmal A-fib -patient remains in normal sinus rhythm, continue Lopressor 25 mg p.o. twice daily.  Transthoracic echo demonstrates normal left ventricular systolic function.  Continue to defer anticoagulation at this time as patient is considered a high fall risk.    4.  Essential hypertension - well-controlled, continue to monitor closely and make anti-hypertensive medication adjustments as necessary    5.  Hyperlipidemia -statin    6.  Hyperthyroidism - continue to hold methimazole as TSH is elevated and free T4 is below lower limits of normal    7.  Suspect CKD stage IIIa -serum creatinine essentially unchanged today at 0.9.  Continue to monitor closely with BMP in the morning.    8. Gout -suspect patient has gout left first MTP.  Patient with marked improvement in range of motion of first MTP joint.  Patient without pain today.  Will discontinue indomethacin and colchicine.      Disposition currently being evaluated for possible inpatient rehab placement.    Chano Romero DO   06/30/25   12:42 EDT       Electronically signed by Chano Romero DO at " 06/30/25 1244          Consult Notes (most recent note)        Jamilah Rosa APRN at 06/22/25 0821       Attestation signed by Cosmo Jay MD at 06/22/25 1017      I have reviewed this documentation and agree.    Cosmo Jay MD                          Referring Provider:   Reason for Consultation: Left intertrochanteric fracture    Patient Care Team:  Yaakov Riley DO as PCP - General (Family Medicine)    Chief complaint fall    Subjective     History of present illness: Vanessa is an 80-year-old female with past medical history of hypertension, hyperlipidemia, osteoarthritis, and hypothyroidism who presents today with a complaint of left hip pain after falling at home.  Patient reports that she was in the process of doing laundry and utilizing her rollator walker in order to ambulate.  She states that she tripped over her walker causing her to sustain a fall onto the floor.  She states that she landed directly against the left side and did hit her head.  Patient reports after sustaining her injury she was unable to get up and bear weight.  Patient reports that she did have to lie on the floor for roughly an hour before her  found her.  Patient was subsequently taken to the emergency room for further evaluation and treatment.  It was noted upon CT imaging that she had sustained a left hip fracture.  Patient denies any other acute injury, injections, or surgical intervention in regard to the left hip.  She states that she has had corticosteroid injections to the bilateral knees due to arthritis.  In regard to treatment she has been using over-the-counter medication and mobility modification with limited relief.  Orthopedics was consulted in regard to fracture.  Patient presents today for further evaluation and treatment.    Review of Systems  Review of Systems   Constitutional:  Positive for activity change.   Musculoskeletal:  Positive for arthralgias, gait problem, joint swelling and myalgias.    All other systems reviewed and are negative.       History  Past Medical History:   Diagnosis Date    Hyperlipidemia     Hypertension     Hyperthyroidism     Osteoarthritis    , History reviewed. No pertinent surgical history., History reviewed. No pertinent family history.,   Social History     Tobacco Use    Smoking status: Never     Passive exposure: Never    Smokeless tobacco: Never   Vaping Use    Vaping status: Never Used   Substance Use Topics    Alcohol use: Never    Drug use: Never   ,   Medications Prior to Admission   Medication Sig Dispense Refill Last Dose/Taking    acetaminophen (TYLENOL) 500 MG tablet Take 2 tablets by mouth Every Night.   Past Week    acetaminophen (TYLENOL) 500 MG tablet Take 3 tablets by mouth Every Morning.   6/21/2025    amLODIPine (NORVASC) 5 MG tablet Take 1 tablet by mouth Daily.   6/21/2025    aspirin 81 MG EC tablet Take 1 tablet by mouth Every Night.   Past Week    BIOTIN PO Take 2 tablets by mouth Daily.   6/21/2025    Cholecalciferol 50 MCG (2000 UT) tablet Take 2 tablets by mouth Every Night.   Past Week    donepezil (ARICEPT) 10 MG tablet Take 1 tablet by mouth Every Night.   Past Week    escitalopram (LEXAPRO) 10 MG tablet Take 1 tablet by mouth Daily.   6/21/2025    losartan (COZAAR) 50 MG tablet Take 1 tablet by mouth Daily.   6/21/2025    methIMAzole (TAPAZOLE) 5 MG tablet Take 1 tablet by mouth Daily.   6/21/2025    multivitamin tablet tablet Take 1 tablet by mouth Every Night.   Past Week    omega-3 acid ethyl esters (LOVAZA) 1 g capsule Take 2 capsules by mouth 2 (Two) Times a Day.   6/21/2025 Morning    potassium chloride 10 MEQ CR tablet Take 1 tablet by mouth Daily.   6/21/2025    rosuvastatin (CRESTOR) 5 MG tablet Take 1 tablet by mouth Daily.   6/21/2025    triamterene-hydrochlorothiazide (DYAZIDE) 37.5-25 MG per capsule Take 1 capsule by mouth Daily.   6/21/2025    Diclofenac Sodium (VOLTAREN) 1 % gel gel Apply 4 g topically to the appropriate area as  directed 4 (Four) Times a Day As Needed (Knee Pain).   Unknown   , Scheduled Meds:  amLODIPine, 5 mg, Oral, Q24H  sodium chloride, 10 mL, Intravenous, Q12H    , Continuous Infusions:  sodium chloride, 100 mL/hr, Last Rate: 100 mL/hr (06/22/25 0211)    , PRN Meds:    senna-docusate sodium **AND** polyethylene glycol **AND** bisacodyl **AND** bisacodyl    Morphine **AND** naloxone    sodium chloride    sodium chloride and Allergies:  Sulfa antibiotics    Objective     Vital Signs   Temp:  [97.5 °F (36.4 °C)-98.6 °F (37 °C)] 98.4 °F (36.9 °C)  Heart Rate:  [77-91] 80  Resp:  [16-20] 18  BP: (139-163)/(60-75) 155/60    Physical Exam:   Physical Exam  HENT:      Head: Normocephalic.      Nose: Nose normal.   Cardiovascular:      Rate and Rhythm: Normal rate.   Pulmonary:      Effort: Pulmonary effort is normal.   Musculoskeletal:      Cervical back: Normal range of motion.      Comments: Upon examination of the left lower extremity there is no edema, ecchymosis, erythema, wounds, drainage, or signs of an infectious process.  Patient sensation appears to be grossly intact to light touch with brisk capillary refill.  Patient able to plantar and dorsiflex at the ankle without complication.  There is a palpable pulse distally.  There is shortening and external rotation noted to the left lower extremity.   Skin:     General: Skin is warm and dry.      Capillary Refill: Capillary refill takes less than 2 seconds.   Neurological:      General: No focal deficit present.      Mental Status: She is alert and oriented to person, place, and time.   Psychiatric:         Mood and Affect: Mood normal.         Results Review:     Results from last 7 days   Lab Units 06/22/25  0529 06/22/25  0156 06/21/25  2117   CRP mg/dL  --   --  <0.30   LACTATE mmol/L 1.6 2.1*  --    WBC 10*3/mm3  --  15.90* 17.41*   HEMOGLOBIN g/dL  --  11.3* 11.5*   HEMATOCRIT %  --  37.7 34.5   MCV fL  --  111.9* 98.6*   MCHC g/dL  --  30.0* 33.3   PLATELETS  "10*3/mm3  --  248 253         Results from last 7 days   Lab Units 06/22/25  0156 06/21/25 2117   SODIUM mmol/L 140 141   POTASSIUM mmol/L 4.4 4.1   CHLORIDE mmol/L 108* 106   CO2 mmol/L 17.0* 19.7*   BUN mg/dL 30.4* 30.3*   CREATININE mg/dL 1.36* 1.45*   CALCIUM mg/dL 9.2 9.7   GLUCOSE mg/dL 162* 148*   ALBUMIN g/dL 4.1 4.4   BILIRUBIN mg/dL 0.5 0.4   ALK PHOS U/L 69 78   AST (SGOT) U/L 32 29   ALT (SGPT) U/L 20 21   Estimated Creatinine Clearance: 32.5 mL/min (A) (by C-G formula based on SCr of 1.36 mg/dL (H)).  No results found for: \"AMMONIA\"  Results from last 7 days   Lab Units 06/22/25  0156 06/21/25 2117   CK TOTAL U/L 519* 326*         Lab Results   Component Value Date    HGBA1C 5.92 (H) 06/21/2025     Lab Results   Component Value Date    TSH 4.900 (H) 06/21/2025    FREET4 0.85 (L) 06/21/2025     No results found for: \"PREGTESTUR\", \"PREGSERUM\", \"HCG\", \"HCGQUANT\"  Pain Management Panel           No data to display              No results found for: \"BLOODCX\"  No results found for: \"URINECX\"  No results found for: \"WOUNDCX\"  No results found for: \"STOOLCX\"      ---------------------------------------------------------------------------------------------------------------------   Imaging Results (Last 7 Days)       Procedure Component Value Units Date/Time    XR Hip With or Without Pelvis 2 - 3 View Left - In process [[id number]] Resulted: 06/22/25 0757     Updated: 06/22/25 0821    This result has not been signed. Information might be incomplete.      CT Lumbar Spine Without Contrast [[id number]] Collected: 06/21/25 2334     Updated: 06/21/25 2338    Narrative:      EXAMINATION: CT lumbar spine without contrast     CLINICAL HISTORY: Fall     COMPARISON: None     TECHNIQUE: Contiguous 3 mm thick slices were obtained through the lumbar  spine without contrast. Coronal and sagittal reformats were performed.     FINDINGS:  Sagittal alignment is normal. Mild left convex curvature is centered at  the L4/L5 level. " Mild right convex curvature centered at the L1/L2  level. There is no acute fracture. No traumatic listhesis. Moderate  multilevel disc related degenerative changes are noted. At the L4/L5  level there is a posterior disc bulge and comminution with facet  arthrosis and ligamentous thickening. The findings result in at least  moderate central canal stenosis. The disc bulges noted at the L5/S1  level as well.       Impression:      1. Multilevel disc related degenerative changes and facet arthrosis.  2. No evidence of recent trauma.     This report was finalized on 6/21/2025 11:36 PM by Juanjose Rocha MD.       CT Pelvis Without Contrast [[id number]] Collected: 06/21/25 2331     Updated: 06/21/25 2336    Narrative:      EXAMINATION: CT pelvis without contrast     CLINICAL HISTORY: Injury.     COMPARISON: None available.     TECHNIQUE: Contiguous 3 mm thick slices were obtained through the pelvis  without contrast. Coronal and sagittal reformats were performed.     FINDINGS:  Alignment at the hips is normal. Mild bilateral hip osteoarthrosis is  noted. There is an acute comminuted fracture through the  intertrochanteric region of the left proximal femur. There is apex  lateral angulation at the fracture site. Additionally, the distal  fragment is displaced medially with respect to the more proximal  fragment. There is apex anterior angulation at the fracture site as  well. Extensive adjacent soft tissue swelling is noted. No definite  acetabular fracture is appreciated.       Impression:      1. Acute comminuted left intertrochanteric proximal femur fracture.     This report was finalized on 6/21/2025 11:34 PM by Juanjose Rocha MD.       CT Head Without Contrast [[id number]] Collected: 06/21/25 2323     Updated: 06/21/25 2333    Narrative:      EXAMINATION: CT head without contrast     CLINICAL HISTORY: Fall     COMPARISON: None available.     TECHNIQUE: Contiguous 3 mm thick slices were obtained from the skull  base to  the vertex without contrast. Coronal and sagittal reformats were  performed.     FINDINGS:  Moderate generalized volume loss is noted with prominence of the sulci  and ventricular system. White matter changes are noted in a pattern  suggesting chronic small vessel ischemic disease. There is no acute  intracranial hemorrhage. No acute territorial infarct. No extra-axial  collection is identified. There is no shift of midline structures. No  acute calvarial fracture is appreciated. Hyperostosis frontalis is  noted.       Impression:      1. No acute intracranial process.     This report was finalized on 6/21/2025 11:31 PM by Juanjose Rocha MD.               Assessment & Plan       Closed intertrochanteric fracture of left femur      The on-call surgeon Dr. Jay has been made aware of the consult.  Recommendation at this time is for surgical intervention.    Continue with pain control.  Activity as tolerated with pain as the guide.    Patient is to remain nonweightbearing to the left lower extremity.  An order for x-ray imaging will be placed today.    Lengthy discussion was had today with the patient and family in regard to surgical versus conservative intervention.  They were informed that due to the severity of the fracture the recommendation at this time would be for surgical intervention.    The plan will be for a left hip intertrochanteric nailing.  The nature of this procedure, as well as risks, benefits, alternatives, and complications to the above operation were discussed with the patient.  Risks include but are not limited to: Anesthesia complications, death, injury to nerves/vessels, infection, blood clots, continued pain, and repeat or revision surgery.  Following the discussion the patient verbalized understanding of the risks and benefits of the above procedure and elected to proceed with surgery.    Labs including a PT/INR, PTT, and type/screen will be placed into the system today.    Patient will be  n.p.o. after midnight.  An order for a case request and consent will be placed into the system.    Orthopedics will continue to follow.        Jamilah Rosa, KELSEY  25  08:21 EDT            Electronically signed by Cosmo Jay MD at 25 1017          Physical Therapy Notes (most recent note)        Sage Fuentes, PT at 25 1044  Version 1 of 1         Acute Care - Physical Therapy Treatment Note   [place]     Patient Name: Lizbet Arzola  : 1945  MRN: [id number]  Today's Date: 2025      Visit Dx:     ICD-10-CM ICD-9-CM   1. Closed displaced intertrochanteric fracture of left femur, initial encounter  S72.142A 820.21   2. Acute low back pain with sciatica, sciatica laterality unspecified, unspecified back pain laterality  M54.40 724.2     724.3     Patient Active Problem List   Diagnosis    Closed intertrochanteric fracture of left femur     Past Medical History:   Diagnosis Date    Hyperlipidemia     Hypertension     Hyperthyroidism     Osteoarthritis      Past Surgical History:   Procedure Laterality Date    LIPOMA EXCISION       PT Assessment (Last 12 Hours)       PT Evaluation and Treatment       Row Name 25 1039          Physical Therapy Time and Intention    Subjective Information complains of;pain  -KM     Document Type therapy note (daily note)  -KM     Mode of Treatment physical therapy  -KM     Patient Effort good  -KM     Symptoms Noted During/After Treatment increased pain  -KM       Row Name 25 1039          General Information    Patient Profile Reviewed yes  -KM     Patient Observations alert;cooperative;agree to therapy  -KM     Existing Precautions/Restrictions fall;weight bearing;other (see comments)  WBAT  -KM       Row Name 25 1039          Pain Scale: FACES Pre/Post-Treatment    Pain: FACES Scale, Pretreatment 2-->hurts little bit  -KM     Posttreatment Pain Rating 6-->hurts even more  -KM       Row Name 25 1039          Cognition     Affect/Mental Status (Cognition) WFL  -KM     Orientation Status (Cognition) oriented to;person;place;situation  -KM     Follows Commands (Cognition) follows one-step commands  -KM       Row Name 06/30/25 1039          Bed Mobility    Comment, (Bed Mobility) up in chair upon arrival  -KM       Row Name 06/30/25 1039          Transfers    Transfers sit-stand transfer;stand-sit transfer  -KM       Row Name 06/30/25 1039          Sit-Stand Transfer    Sit-Stand [place] (Transfers) moderate assist (50% patient effort);2 person assist  -KM     Assistive Device (Sit-Stand Transfers) walker, front-wheeled  -       Row Name 06/30/25 1039          Stand-Sit Transfer    Stand-Sit [place] (Transfers) moderate assist (50% patient effort);2 person assist  -KM     Assistive Device (Stand-Sit Transfers) walker, front-wheeled  -KM       Row Name 06/30/25 1039          Gait/Stairs (Locomotion)    Gait/Stairs Locomotion gait/ambulation independence;gait/ambulation assistive device;distance ambulated  -KM     [place] Level (Gait) minimum assist (75% patient effort);1 person to manage equipment  -KM     Assistive Device (Gait) walker, front-[facility]     Patient was able to Ambulate yes  -KM     Distance in Feet (Gait) 8  -KM     Pattern (Gait) step-to  -KM     Deviations/Abnormal Patterns (Gait) antalgic;ataxic;base of support, narrow;gait speed decreased  -KM     Bilateral Gait Deviations forward flexed posture  -KM     Left Sided Gait Deviations weight shift ability decreased  -KM       Row Name 06/30/25 1039          Safety Issues/Impairments Affecting Functional Mobility    Impairments Affecting Function (Mobility) balance;endurance/activity tolerance;pain;range of motion (ROM);strength  -KM       Row Name 06/30/25 1039          Balance    Balance Assessment standing dynamic balance  -KM     Dynamic Standing Balance minimal assist  -KM     Position/Device Used, Standing Balance walker, front-wheeled  -KM        Row Name             Wound 06/23/25 1619 Left hip Surgical    Wound - Properties Group Placement Date: 06/23/25  -KB Placement Time: 1619  -KB Present on Original Admission: N  -KB Side: Left  -KB Location: hip  -KB Primary Wound Type: Surgical  -KB, incision sites x4     Retired Wound - Properties Group Placement Date: 06/23/25  -KB Placement Time: 1619  -KB Present on Original Admission: N  -KB Side: Left  -KB Location: hip  -KB    Retired Wound - Properties Group Placement Date: 06/23/25  -KB Placement Time: 1619  -KB Present on Original Admission: N  -KB Side: Left  -KB Location: hip  -KB    Retired Wound - Properties Group Date first assessed: 06/23/25  -KB Time first assessed: 1619  -KB Present on Original Admission: N  -KB Side: Left  -KB Location: hip  -KB      Row Name 06/30/25 1039          Progress Summary (PT)    Daily Progress Summary (PT) Pt. was able to show progress w/ all mobility skills and ambulation. She was able to perform transfers w/ modA. She was able to ambulate increased distance and quality w/ RW. She continues to demonstrate high motivation to work w/ therapy services to get back to functional mobility. Pt. would most benefit from inpatient rehab to provide the more intense rehab services required.  -KM               User Key  (r) = Recorded By, (t) = Taken By, (c) = Cosigned By      Initials Name Provider Type    Cheryl Aponte, RN Registered Nurse    Sage Mccain, YUSUF Physical Therapist                    Physical Therapy Education       Title: PT OT SLP Therapies (Done)       Topic: Physical Therapy (Done)       Point: Mobility training (Done)       Learning Progress Summary            Patient Acceptance, E,TB, VU by  at 6/30/2025 0604    Acceptance, E,TB, VU by  at 6/24/2025 1421   Family Acceptance, E,TB, VU by  at 6/30/2025 0604                      Point: Home exercise program (Done)       Learning Progress Summary            Patient Acceptance, E,TB, VU by  at  6/30/2025 0604    Acceptance, E,TB, VU by KM at 6/24/2025 1421   Family Acceptance, E,TB, VU by  at 6/30/2025 0604                      Point: Body mechanics (Done)       Learning Progress Summary            Patient Acceptance, E,TB, VU by HG at 6/30/2025 0604    Acceptance, E,TB, VU by KM at 6/24/2025 1421   Family Acceptance, E,TB, VU by HG at 6/30/2025 0604                      Point: Precautions (Done)       Learning Progress Summary            Patient Acceptance, E,TB, VU by HG at 6/30/2025 0604    Acceptance, E,TB, VU by  at 6/24/2025 1421   Family Acceptance, E,TB, VU by  at 6/30/2025 0604                                      User Key       Initials Effective Dates Name Provider Type Discipline     05/24/22 -  Sage Fuentes, PT Physical Therapist PT     01/22/25 -  Christine Lepe RN Registered Nurse Nurse                  PT Recommendation and Plan  Anticipated Discharge Disposition (PT): inpatient rehabilitation facility  Planned Therapy Interventions (PT): balance training, bed mobility training, gait training, home exercise program, patient/family education, postural re-education, ROM (range of motion), strengthening, stretching, transfer training, stair training, wheelchair management/propulsion training  Therapy Frequency (PT): 6 times/wk  Progress Summary (PT)  Daily Progress Summary (PT): Pt. was able to show progress w/ all mobility skills and ambulation. She was able to perform transfers w/ modA. She was able to ambulate increased distance and quality w/ RW. She continues to demonstrate high motivation to work w/ therapy services to get back to functional mobility. Pt. would most benefit from inpatient rehab to provide the more intense rehab services required.  Plan of Care Reviewed With: patient  Outcome Evaluation: Pt. evaluation completed during PT session. She was able to perform functional mobility skills w/ maxA x2. She was unable to ambulate at time of evaluation d/t weakness and  pain. She demonstrates impaired strength and ROM. Pt. would benefit from inpatient rehab to address weakness, pain, impaired mobility, safety precautions, and fall risk.       Time Calculation:    PT Charges       Row Name 25 1039             Time Calculation    PT Received On 25  -KM         Time Calculation- PT    Total Timed Code Minutes- PT 23 minute(s)  -KM                User Key  (r) = Recorded By, (t) = Taken By, (c) = Cosigned By      Initials Name Provider Type    KM Sage Fuentes, PT Physical Therapist                  Therapy Charges for Today       Code Description Service Date Service Provider Modifiers Qty    55370884445 HC PT THERAPEUTIC ACT EA 15 MIN 2025 Sage Fuentes, PT GP 1    79123976621 HC GAIT TRAINING EA 15 MIN 2025 Sage Fuentes, PT GP 1            PT G-Codes  AM-PAC 6 Clicks Score (PT): 11    Sage Fuentes PT  2025      Electronically signed by Sage Fuentes, PT at 25 1044          Occupational Therapy Notes (most recent note)        Izzy Murillo, OT at 25 1039          Patient Name: Lizbet Arzola  : 1945    MRN: [id number]                              Today's Date: 2025       Admit Date: 2025    Visit Dx:     ICD-10-CM ICD-9-CM   1. Closed displaced intertrochanteric fracture of left femur, initial encounter  S72.142A 820.21   2. Acute low back pain with sciatica, sciatica laterality unspecified, unspecified back pain laterality  M54.40 724.2     724.3     Patient Active Problem List   Diagnosis    Closed intertrochanteric fracture of left femur     Past Medical History:   Diagnosis Date    Hyperlipidemia     Hypertension     Hyperthyroidism     Osteoarthritis      Past Surgical History:   Procedure Laterality Date    LIPOMA EXCISION        General Information       Row Name 25 1035          OT Time and Intention    Subjective Information complains of;pain  -KP     Document Type therapy note (daily note)  -     Mode  of Treatment individual therapy;occupational therapy  -     Patient Effort good  -     Symptoms Noted During/After Treatment increased pain  -     Comment Patient agreeable to therapy with no complaints other than pain. She was up in chair with daughter present.  -       Row Name 06/30/25 1035          General Information    Patient Profile Reviewed yes  -     Existing Precautions/Restrictions fall;weight bearing  WBAT  -     Barriers to Rehab none identified  -[facility] Name 06/30/25 1035          Cognition    Orientation Status (Cognition) oriented to;person;place;situation  -[facility] Name 06/30/25 1035          Safety Issues/Impairments Affecting Functional Mobility    Impairments Affecting Function (Mobility) balance;endurance/activity tolerance;pain;range of motion (ROM);strength  -               User Key  (r) = Recorded By, (t) = Taken By, (c) = Cosigned By      Initials Name Provider Type     Izzy Murillo, OT Occupational Therapist                     Mobility/ADL's       Row Name 06/30/25 1036          Bed Mobility    Comment, (Bed Mobility) up in chair upon arrival  -[facility] Name 06/30/25 1036          Sit-Stand Transfer    Sit-Stand [place] (Transfers) moderate assist (50% patient effort);2 person assist  -     Assistive Device (Sit-Stand Transfers) walker, front-wheeled  [facility] Name 06/30/25 1036          Stand-Sit Transfer    Stand-Sit [place] (Transfers) moderate assist (50% patient effort);2 person assist  -     Assistive Device (Stand-Sit Transfers) walker, front-wheeled  [facility] Name 06/30/25 1036          Functional Mobility    Functional Mobility- Ind. Level minimum assist (75% patient effort);2 person assist required  -     Functional Mobility- Device walker, front-wheeled  [facility]     Functional Mobility- Comment extra time, 10 feet  -       Row Name 06/30/25 1036          Activities of Daily Living    BADL Assessment/Intervention toileting   -       Row Name 06/30/25 1036          Toileting Assessment/Training    [place] Level (Toileting) toileting skills;dependent (less than 25% patient effort)  -     Comment, (Toileting) incontinent urine  -               User Key  (r) = Recorded By, (t) = Taken By, (c) = Cosigned By      Initials Name Provider Type    Izzy Motta OT Occupational Therapist                   Obj/Interventions       Row Name 06/30/25 1037          Motor Skills    Motor Skills functional endurance  -     Functional Endurance good minus  -       Row Name 06/30/25 1037          Balance    Balance Assessment sitting static balance;standing static balance  -     Static Sitting Balance standby assist  -     Static Standing Balance minimal assist  -     Position/Device Used, Standing Balance walker, rolling  -               User Key  (r) = Recorded By, (t) = Taken By, (c) = Cosigned By      Initials Name Provider Type    Izzy Motta OT Occupational Therapist                   Goals/Plan    No documentation.                  Clinical Impression       Row Name 06/30/25 1037          Pain Assessment    Pain Location knee  -     Pain Side/Orientation left  -[facility] Name 06/30/25 1037          Pain Scale: FACES Pre/Post-Treatment    Pain: FACES Scale, Pretreatment 2-->hurts little bit  -     Posttreatment Pain Rating 4-->hurts little more  -       Row Name 06/30/25 1037          Plan of Care Review    Plan of Care Reviewed With patient  -     Progress improving  -     Outcome Evaluation Patient seen for OT treatment. She tolerated functional mobility on this date. Per patient and daughter she had been on BSC prior and up in chair upon OT arrival. She is progressing towards goals. Intensive therapy will be most beneficial for patient to return to prior level of function.  -       Row Name 06/30/25 1037          Therapy Assessment/Plan (OT)    Rehab Potential (OT) good  -KP     Criteria for  Skilled Therapeutic Interventions Met (OT) yes;meets criteria;skilled treatment is necessary  -       Row Name 06/30/25 1037          Therapy Plan Review/Discharge Plan (OT)    Anticipated Discharge Disposition (OT) inpatient rehabilitation facility  -[facility] Name 06/30/25 1037          Positioning and Restraints    Pre-Treatment Position sitting in chair/recliner  -     Post Treatment Position chair  -     In Chair sitting;call light within reach;encouraged to call for assist;with family/caregiver  -               User Key  (r) = Recorded By, (t) = Taken By, (c) = Cosigned By      Initials Name Provider Type    Izzy Motta OT Occupational Therapist                   Outcome Measures    No documentation.                     OT Recommendation and Plan  Planned Therapy Interventions (OT): activity tolerance training, BADL retraining, functional balance retraining, patient/caregiver education/training, passive ROM/stretching, occupation/activity based interventions, ROM/therapeutic exercise, strengthening exercise, transfer/mobility retraining  Therapy Frequency (OT): 5 times/wk  Plan of Care Review  Plan of Care Reviewed With: patient  Progress: improving  Outcome Evaluation: Patient seen for OT treatment. She tolerated functional mobility on this date. Per patient and daughter she had been on BSC prior and up in chair upon OT arrival. She is progressing towards goals. Intensive therapy will be most beneficial for patient to return to prior level of function.     Time Calculation:         Time Calculation- OT       Row Name 06/30/25 1039             Time Calculation- OT    OT Received On 06/30/25  -                User Key  (r) = Recorded By, (t) = Taken By, (c) = Cosigned By      Initials Name Provider Type    Izzy Motta OT Occupational Therapist                  Therapy Charges for Today       Code Description Service Date Service Provider Modifiers Qty    54165642858 HC OT THERAPEUTIC  ACT EA 15 MIN 6/30/2025 Izzy Murillo OT GO 1                 Izzy Murillo OT  6/30/2025    Electronically signed by Izzy Murillo OT at 06/30/25 1039       Speech Language Pathology Notes (most recent note)    No notes exist for this encounter.       ADL Documentation (most recent)      Flowsheet Row Most Recent Value   Transferring 3 - assistive equipment and person   Toileting 3 - assistive equipment and person   Bathing 2 - assistive person   Dressing 2 - assistive person   Eating 0 - independent   Communication 0 - understands/communicates without difficulty   Swallowing 0 - swallows foods/liquids without difficulty   Equipment Currently Used at Home rollator, walker, standard, cane, straight, other (see comments)  [Elevated toilet seat]

## 2025-07-01 NOTE — CASE MANAGEMENT/SOCIAL WORK
Discharge Planning Assessment  [facility]     Patient Name: Lizbet Arzola  MRN: [id number]  Today's Date: 7/1/2025    Admit Date: 6/21/2025    Plan: SS at bedside with pt and daughter Latoya.  Expedited Appeal filed this am with reference number [id number] with determination within 72 hours via Tami with insurance at [phone].  SS will follow.       Discharge Plan       Row Name 07/01/25 1113       Plan    Plan SS at bedside with pt and daughter Latoya.  Expedited Appeal filed this am with reference number [id number] with determination within 72 hours via Tami with insurance at [phone].  SS will follow.                  Continued Care and Services - Admitted Since 6/21/2025    No active coordination exists.       Expected Discharge Date and Time       Expected Discharge Date Expected Discharge Time    Jul 2, 2025             NATALIE Woods

## 2025-07-01 NOTE — PLAN OF CARE
Goal Outcome Evaluation:    Pt is resting in bed with no s/s of distress noted. VSS. IV access maintained. Dressings to L hip are C/D/I. Will continue with plan of care.

## 2025-07-01 NOTE — PLAN OF CARE
Goal Outcome Evaluation:         Patient has been resting with no s/s of acute distress att. VSS. Tele and IV access maintained. Plan of care ongoing.

## 2025-07-01 NOTE — THERAPY TREATMENT NOTE
Acute Care - Physical Therapy Treatment Note   [facility]     Patient Name: Lizbet Arzola  : 1945  MRN: [id number]  Today's Date: 2025      Visit Dx:     ICD-10-CM ICD-9-CM   1. Closed displaced intertrochanteric fracture of left femur, initial encounter  S72.142A 820.21   2. Acute low back pain with sciatica, sciatica laterality unspecified, unspecified back pain laterality  M54.40 724.2     724.3     Patient Active Problem List   Diagnosis    Closed intertrochanteric fracture of left femur     Past Medical History:   Diagnosis Date    Hyperlipidemia     Hypertension     Hyperthyroidism     Osteoarthritis      Past Surgical History:   Procedure Laterality Date    LIPOMA EXCISION       PT Assessment (Last 12 Hours)       PT Evaluation and Treatment       Row Name 25 1351          Physical Therapy Time and Intention    Subjective Information complains of;weakness;fatigue;pain  -KM     Document Type therapy note (daily note)  -KM     Mode of Treatment physical therapy  -KM     Patient Effort good  -KM     Symptoms Noted During/After Treatment fatigue;increased pain  -KM       Row Name 25 1351          General Information    Patient Profile Reviewed yes  -KM     Patient Observations alert;cooperative;agree to therapy  -KM     Existing Precautions/Restrictions fall;weight bearing;other (see comments)  WBAT  -KM       Row Name 25 1351          Pain    Pretreatment Pain Rating 1/10  -KM     Posttreatment Pain Rating 2/10  -KM     Pain Side/Orientation lower  -KM       Row Name 25 1351          Cognition    Affect/Mental Status (Cognition) WFL  -KM     Orientation Status (Cognition) oriented to;person;place;situation  -KM     Follows Commands (Cognition) follows one-step commands  -KM       Row Name 25 1351          Bed Mobility    Comment, (Bed Mobility) pt. up in chair upon arrival  -KM       Row Name 25 1351          Transfers    Transfers sit-stand transfer;stand-sit  transfer  -KM       Row Name 07/01/25 1351          Sit-Stand Transfer    Sit-Stand [place] (Transfers) moderate assist (50% patient effort);verbal cues  -KM     Assistive Device (Sit-Stand Transfers) walker, front-wheeled  -KM       Row Name 07/01/25 1351          Stand-Sit Transfer    Stand-Sit [place] (Transfers) moderate assist (50% patient effort);verbal cues  -KM     Assistive Device (Stand-Sit Transfers) walker, front-wheeled  -KM       Row Name 07/01/25 1351          Gait/Stairs (Locomotion)    Gait/Stairs Locomotion gait/ambulation independence;gait/ambulation assistive device;distance ambulated  -KM     [place] Level (Gait) minimum assist (75% patient effort);1 person to manage equipment  -KM     Assistive Device (Gait) walker, front-wheeled  -KM     Patient was able to Ambulate yes  -KM     Distance in Feet (Gait) 20  -KM     Pattern (Gait) step-to  -KM     Deviations/Abnormal Patterns (Gait) antalgic;ataxic;base of support, narrow;gait speed decreased  -KM     Bilateral Gait Deviations forward flexed posture  -KM     Left Sided Gait Deviations weight shift ability decreased  -KM       Row Name 07/01/25 1351          Safety Issues/Impairments Affecting Functional Mobility    Impairments Affecting Function (Mobility) balance;endurance/activity tolerance;pain;strength  -KM       Row Name 07/01/25 1351          Motor Skills    Comments, Therapeutic Exercise seated ther-ex  -KM       Row Name             Wound 06/23/25 1619 Left hip Surgical    Wound - Properties Group Placement Date: 06/23/25  -KB Placement Time: 1619  -KB Present on Original Admission: N  -KB Side: Left  -KB Location: hip  -KB Primary Wound Type: Surgical  -KB, incision sites x4     Retired Wound - Properties Group Placement Date: 06/23/25  -KB Placement Time: 1619  -KB Present on Original Admission: N  -KB Side: Left  -KB Location: hip  -KB    Retired Wound - Properties Group Placement Date: 06/23/25  -KB Placement Time:  1619  -KB Present on Original Admission: N  -KB Side: Left  -KB Location: hip  -KB    Retired Wound - Properties Group Date first assessed: 06/23/25  -KB Time first assessed: 1619  -KB Present on Original Admission: N  -KB Side: Left  -KB Location: hip  -KB      Row Name 07/01/25 1351          Progress Summary (PT)    Daily Progress Summary (PT) Pt. was able to demonstrate progress in all aspects of mobility during PT session. She was able to perform transfers w/ less assistance required. She increased ambulation distance and quality w/ RW. She continues to tolerated increased activity. Pt. is able to tolerate 3 hours of therapy, as required for inpatient rehab. Pt. would most benefit from inpatient rehab d/t increased level of intensity needed to return to PLOF.  -               User Key  (r) = Recorded By, (t) = Taken By, (c) = Cosigned By      Initials Name Provider Type    Cheryl Aponte, RN Registered Nurse    Sage Mccain, YUSUF Physical Therapist                    Physical Therapy Education       Title: PT OT SLP Therapies (Done)       Topic: Physical Therapy (Done)       Point: Mobility training (Done)       Learning Progress Summary            Patient Acceptance, E,TB, VU by HG at 6/30/2025 0604    Acceptance, E,TB, VU by KM at 6/24/2025 1421   Family Acceptance, E,TB, VU by HG at 6/30/2025 0604                      Point: Home exercise program (Done)       Learning Progress Summary            Patient Acceptance, E,TB, VU by HG at 6/30/2025 0604    Acceptance, E,TB, VU by KM at 6/24/2025 1421   Family Acceptance, E,TB, VU by HG at 6/30/2025 0604                      Point: Body mechanics (Done)       Learning Progress Summary            Patient Acceptance, E,TB, VU by HG at 6/30/2025 0604    Acceptance, E,TB, VU by KM at 6/24/2025 1421   Family Acceptance, E,TB, VU by HG at 6/30/2025 0604                      Point: Precautions (Done)       Learning Progress Summary            Patient  Acceptance, E,TB, VU by  at 6/30/2025 0604    Acceptance, E,TB, VU by  at 6/24/2025 1421   Family Acceptance, E,TB, VU by  at 6/30/2025 0604                                      User Key       Initials Effective Dates Name Provider Type Discipline     05/24/22 -  Sage Fuentes, PT Physical Therapist PT     01/22/25 -  Christine Lepe, RN Registered Nurse Nurse                  PT Recommendation and Plan  Anticipated Discharge Disposition (PT): inpatient rehabilitation facility  Planned Therapy Interventions (PT): balance training, bed mobility training, gait training, home exercise program, patient/family education, postural re-education, ROM (range of motion), strengthening, stretching, transfer training, stair training, wheelchair management/propulsion training  Therapy Frequency (PT): 6 times/wk  Progress Summary (PT)  Daily Progress Summary (PT): Pt. was able to demonstrate progress in all aspects of mobility during PT session. She was able to perform transfers w/ less assistance required. She increased ambulation distance and quality w/ RW. She continues to tolerated increased activity. Pt. is able to tolerate 3 hours of therapy, as required for inpatient rehab. Pt. would most benefit from inpatient rehab d/t increased level of intensity needed to return to PLOF.  Plan of Care Reviewed With: patient  Outcome Evaluation: Pt. evaluation completed during PT session. She was able to perform functional mobility skills w/ maxA x2. She was unable to ambulate at time of evaluation d/t weakness and pain. She demonstrates impaired strength and ROM. Pt. would benefit from inpatient rehab to address weakness, pain, impaired mobility, safety precautions, and fall risk.       Time Calculation:    PT Charges       Row Name 07/01/25 1351             Time Calculation    PT Received On 07/01/25  -KM         Time Calculation- PT    Total Timed Code Minutes- PT 35 minute(s)  -KM                User Key  (r) = Recorded By,  (t) = Taken By, (c) = Cosigned By      Initials Name Provider Type    KM Sage Fuentes, PT Physical Therapist                  Therapy Charges for Today       Code Description Service Date Service Provider Modifiers Qty    13416612427 HC PT THERAPEUTIC ACT EA 15 MIN 6/30/2025 Sage Fuentes, PT GP 1    64673281561 HC GAIT TRAINING EA 15 MIN 6/30/2025 Sage Fuentes, PT GP 1    14714469479 HC PT THERAPEUTIC ACT EA 15 MIN 7/1/2025 Sage Fuentes, PT GP 1    49815617944 HC GAIT TRAINING EA 15 MIN 7/1/2025 Sage Fuentes, PT GP 1            PT G-Codes  AM-PAC 6 Clicks Score (PT): 17    Sage Fuentes PT  7/1/2025

## 2025-07-01 NOTE — PROGRESS NOTES
[facility] HOSPITALIST PROGRESS NOTE    Subjective     History:   Lizbet Arzola is a 80 y.o. female admitted on 6/21/2025 secondary to Closed intertrochanteric fracture of left femur     Procedures:   6/23/25: Left subtrochanteric hip fracture repair with long cephalomedullary nail    Transfusions:  6/24/25: 2 units of PRBC's     CC: Follow up hip fx     Patient seen and examined with IRMA Moore. Awake and alert with family present at bedside. Sitting in bedside chair during my encounter. States she is feeling better and feels she is getting stronger. No reported CP, dyspnea or palpitations. No reported vomiting. (+) BM. No acute events reported.     History taken from: patient, chart, and RN.      Objective     Vital Signs  Temp:  [97.5 °F (36.4 °C)-99.5 °F (37.5 °C)] 97.5 °F (36.4 °C)  Heart Rate:  [76-89] 80  Resp:  [16-18] 18  BP: (133-154)/(51-76) 133/51    Intake/Output Summary (Last 24 hours) at 7/1/2025 1438  Last data filed at 7/1/2025 1300  Gross per 24 hour   Intake 1080 ml   Output --   Net 1080 ml         Physical Exam:  General:    Awake, alert, in no acute distress   Heart:      Normal S1 and S2. Regular rate and rhythm. No significant murmur, rubs or gallops appreciated.   Lungs:     Respirations regular, even and unlabored. Lungs clear to auscultation B/L. No wheezes, rales or rhonchi.   Abdomen:   Soft and nontender. No guarding, rebound tenderness or  organomegaly noted. Bowel sounds present x 4.   Extremities:  No clubbing, cyanosis or edema noted. Moves UE and LE equally B/L.     Results Review:    Results from last 7 days   Lab Units 07/01/25  0227 06/29/25  0107 06/28/25  0342 06/26/25  0834 06/25/25  2304 06/25/25  0111   WBC 10*3/mm3 11.46* 11.12* 9.75  --  11.01* 11.62*   HEMOGLOBIN g/dL 8.7* 8.4* 8.7* 9.0* 7.7* 8.5*   PLATELETS 10*3/mm3 436 338 285  --  190 158     Results from last 7 days   Lab Units 07/01/25  0227 06/28/25  0342 06/25/25  0111   SODIUM mmol/L 140 138  139   POTASSIUM mmol/L 3.5 3.5 3.0*   CHLORIDE mmol/L 102 103 101   CO2 mmol/L 26.7 24.8 27.6   BUN mg/dL 23.5* 22.4 23.0   CREATININE mg/dL 1.12* 0.92 1.11*   CALCIUM mg/dL 9.1 8.7 7.8*   GLUCOSE mg/dL 127* 117* 142*         Results from last 7 days   Lab Units 06/25/25  0111   MAGNESIUM mg/dL 1.9     Results from last 7 days   Lab Units 06/25/25  1534   INR  1.15*           Imaging Results (Last 24 Hours)       ** No results found for the last 24 hours. **              Medications:  amLODIPine, 5 mg, Oral, Q24H  aspirin, 81 mg, Oral, Nightly  cholecalciferol, 4,000 Units, Oral, Nightly  diazePAM, 2 mg, Oral, Once  Diclofenac Sodium, 4 g, Topical, BID  donepezil, 10 mg, Oral, Nightly  enoxaparin sodium, 40 mg, Subcutaneous, Q24H  escitalopram, 10 mg, Oral, Daily  [Held by provider] methIMAzole, 5 mg, Oral, Daily  multivitamin, 1 tablet, Oral, Nightly  rosuvastatin, 5 mg, Oral, Daily  sodium chloride, 10 mL, Intravenous, Q12H               Assessment & Plan   Left proximal femur fracture: S/P repair with nailing. Pain control. PT/OT. Ortho input appreciated.     Acute blood loss anemia: Likely 2/2 above. S/P 2 units of PRBC's. Improved and stable today.     New onset paroxysmal Afib: Currently in NSR. Echo reveals an EF of 61-65%, mild LVH and grade I diastolic dysfunction. Brief episode noted. Not currently anticoagulated 2/2 concern for fall risk. Monitor on telemetry.     Left knee pain: No evidence of acute fracture on imaging.     Suspected acute gout flare of left first MTP: Appears clinically improved and indomethacin and colchicine D/C'd.     Mild leukocytosis: Overall improved and stable today. Repeat CBC in the AM.     Essential HTN: BP stable. Cont Norvasc. Cont to monitor.     HLD: Cont statin.     Hyperthyroidism: TSH is elevated with low free T4. Methimazole currently being held.     Suspected CKD IIIa: Cr overall stable today. Monitor UOP and repeat labs in the AM.     DVT PPX: Add SQ  Lovenox    Disposition Expedited appeal for IPR submitted.     Mando Shahid DO  07/01/25  14:38 EDT

## 2025-07-02 LAB
ANION GAP SERPL CALCULATED.3IONS-SCNC: 12.7 MMOL/L (ref 5–15)
BASOPHILS # BLD AUTO: 0.08 10*3/MM3 (ref 0–0.2)
BASOPHILS NFR BLD AUTO: 0.9 % (ref 0–1.5)
BUN SERPL-MCNC: 23.8 MG/DL (ref 8–23)
BUN/CREAT SERPL: 22.5 (ref 7–25)
CALCIUM SPEC-SCNC: 9.3 MG/DL (ref 8.6–10.5)
CHLORIDE SERPL-SCNC: 102 MMOL/L (ref 98–107)
CO2 SERPL-SCNC: 26.3 MMOL/L (ref 22–29)
CREAT SERPL-MCNC: 1.06 MG/DL (ref 0.57–1)
DEPRECATED RDW RBC AUTO: 49.1 FL (ref 37–54)
EGFRCR SERPLBLD CKD-EPI 2021: 52.6 ML/MIN/1.73
EOSINOPHIL # BLD AUTO: 0.43 10*3/MM3 (ref 0–0.4)
EOSINOPHIL NFR BLD AUTO: 4.6 % (ref 0.3–6.2)
ERYTHROCYTE [DISTWIDTH] IN BLOOD BY AUTOMATED COUNT: 13.6 % (ref 12.3–15.4)
GLUCOSE SERPL-MCNC: 115 MG/DL (ref 65–99)
HCT VFR BLD AUTO: 29.2 % (ref 34–46.6)
HGB BLD-MCNC: 9 G/DL (ref 12–15.9)
IMM GRANULOCYTES # BLD AUTO: 0.14 10*3/MM3 (ref 0–0.05)
IMM GRANULOCYTES NFR BLD AUTO: 1.5 % (ref 0–0.5)
LYMPHOCYTES # BLD AUTO: 2.43 10*3/MM3 (ref 0.7–3.1)
LYMPHOCYTES NFR BLD AUTO: 26 % (ref 19.6–45.3)
MAGNESIUM SERPL-MCNC: 1.6 MG/DL (ref 1.6–2.4)
MCH RBC QN AUTO: 31.5 PG (ref 26.6–33)
MCHC RBC AUTO-ENTMCNC: 30.8 G/DL (ref 31.5–35.7)
MCV RBC AUTO: 102.1 FL (ref 79–97)
MONOCYTES # BLD AUTO: 1 10*3/MM3 (ref 0.1–0.9)
MONOCYTES NFR BLD AUTO: 10.7 % (ref 5–12)
NEUTROPHILS NFR BLD AUTO: 5.25 10*3/MM3 (ref 1.7–7)
NEUTROPHILS NFR BLD AUTO: 56.3 % (ref 42.7–76)
NRBC BLD AUTO-RTO: 0.3 /100 WBC (ref 0–0.2)
PLATELET # BLD AUTO: 470 10*3/MM3 (ref 140–450)
PMV BLD AUTO: 9.1 FL (ref 6–12)
POTASSIUM SERPL-SCNC: 3.5 MMOL/L (ref 3.5–5.2)
POTASSIUM SERPL-SCNC: 4.5 MMOL/L (ref 3.5–5.2)
RBC # BLD AUTO: 2.86 10*6/MM3 (ref 3.77–5.28)
SODIUM SERPL-SCNC: 141 MMOL/L (ref 136–145)
WBC NRBC COR # BLD AUTO: 9.33 10*3/MM3 (ref 3.4–10.8)

## 2025-07-02 PROCEDURE — 84132 ASSAY OF SERUM POTASSIUM: CPT | Performed by: INTERNAL MEDICINE

## 2025-07-02 PROCEDURE — 97116 GAIT TRAINING THERAPY: CPT

## 2025-07-02 PROCEDURE — 83735 ASSAY OF MAGNESIUM: CPT | Performed by: INTERNAL MEDICINE

## 2025-07-02 PROCEDURE — 25010000002 ENOXAPARIN PER 10 MG: Performed by: INTERNAL MEDICINE

## 2025-07-02 PROCEDURE — 97530 THERAPEUTIC ACTIVITIES: CPT

## 2025-07-02 PROCEDURE — 99232 SBSQ HOSP IP/OBS MODERATE 35: CPT | Performed by: INTERNAL MEDICINE

## 2025-07-02 PROCEDURE — 97110 THERAPEUTIC EXERCISES: CPT

## 2025-07-02 PROCEDURE — 80048 BASIC METABOLIC PNL TOTAL CA: CPT | Performed by: INTERNAL MEDICINE

## 2025-07-02 PROCEDURE — 85025 COMPLETE CBC W/AUTO DIFF WBC: CPT | Performed by: INTERNAL MEDICINE

## 2025-07-02 RX ORDER — POTASSIUM CHLORIDE 1500 MG/1
40 TABLET, EXTENDED RELEASE ORAL EVERY 4 HOURS
Status: COMPLETED | OUTPATIENT
Start: 2025-07-02 | End: 2025-07-02

## 2025-07-02 RX ADMIN — THERA TABS 1 TABLET: TAB at 22:11

## 2025-07-02 RX ADMIN — POTASSIUM CHLORIDE 40 MEQ: 1500 TABLET, EXTENDED RELEASE ORAL at 12:14

## 2025-07-02 RX ADMIN — DICLOFENAC SODIUM 4 G: 10 GEL TOPICAL at 22:12

## 2025-07-02 RX ADMIN — POTASSIUM CHLORIDE 40 MEQ: 1500 TABLET, EXTENDED RELEASE ORAL at 08:23

## 2025-07-02 RX ADMIN — ROSUVASTATIN CALCIUM 5 MG: 10 TABLET, FILM COATED ORAL at 08:11

## 2025-07-02 RX ADMIN — Medication 4000 UNITS: at 22:11

## 2025-07-02 RX ADMIN — Medication 10 ML: at 08:11

## 2025-07-02 RX ADMIN — AMLODIPINE BESYLATE 5 MG: 5 TABLET ORAL at 08:11

## 2025-07-02 RX ADMIN — DICLOFENAC SODIUM 4 G: 10 GEL TOPICAL at 08:11

## 2025-07-02 RX ADMIN — ENOXAPARIN SODIUM 40 MG: 100 INJECTION SUBCUTANEOUS at 14:30

## 2025-07-02 RX ADMIN — Medication 10 ML: at 22:11

## 2025-07-02 RX ADMIN — ASPIRIN 81 MG CHEWABLE TABLET 81 MG: 81 TABLET CHEWABLE at 22:11

## 2025-07-02 RX ADMIN — ESCITALOPRAM 10 MG: 10 TABLET, FILM COATED ORAL at 08:10

## 2025-07-02 RX ADMIN — DONEPEZIL HYDROCHLORIDE 10 MG: 5 TABLET, FILM COATED ORAL at 22:11

## 2025-07-02 NOTE — PLAN OF CARE
Goal Outcome Evaluation:               Patient has been resting with no s/s of acute distress noted att. VSS. Tele and IV access maintained. Plan of care ongoing.

## 2025-07-02 NOTE — PLAN OF CARE
Goal Outcome Evaluation:      Patient resting in bed with family at bedside. VSS. Patient up to chair x 2 and ambulated with PT well. Plan of care ongoing.

## 2025-07-02 NOTE — PROGRESS NOTES
[facility] HOSPITALIST PROGRESS NOTE    Subjective     History:   Lizbet Arzola is a 80 y.o. female admitted on 6/21/2025 secondary to Closed intertrochanteric fracture of left femur     Procedures:   6/23/25: Left subtrochanteric hip fracture repair with long cephalomedullary nail    Transfusions:  6/24/25: 2 units of PRBC's     CC: Follow up hip fx     Patient seen and examined with IRMA Collado. Awake and alert with her daughter present at bedside. Feels that she is getting stronger.  No reported CP, dyspnea or palpitations. No reported vomiting.  No acute events reported.     History taken from: patient, chart, and RN.      Objective     Vital Signs  Temp:  [98 °F (36.7 °C)-98.9 °F (37.2 °C)] 98.1 °F (36.7 °C)  Heart Rate:  [71-81] 78  Resp:  [18] 18  BP: (128-172)/(49-69) 128/49    Intake/Output Summary (Last 24 hours) at 7/2/2025 1406  Last data filed at 7/2/2025 0900  Gross per 24 hour   Intake 600 ml   Output 0 ml   Net 600 ml         Physical Exam: Unchanged from previous.   General:    Awake, alert, in no acute distress   Heart:      Normal S1 and S2. Regular rate and rhythm. No significant murmur, rubs or gallops appreciated.   Lungs:     Respirations regular, even and unlabored. Lungs clear to auscultation B/L. No wheezes, rales or rhonchi.   Abdomen:   Soft and nontender. No guarding, rebound tenderness or  organomegaly noted. Bowel sounds present x 4.   Extremities:  No clubbing, cyanosis or edema noted. Moves UE and LE equally B/L.     Results Review:    Results from last 7 days   Lab Units 07/02/25  0421 07/01/25  0227 06/29/25  0107 06/28/25  0342 06/26/25  0834 06/25/25  2304   WBC 10*3/mm3 9.33 11.46* 11.12* 9.75  --  11.01*   HEMOGLOBIN g/dL 9.0* 8.7* 8.4* 8.7* 9.0* 7.7*   PLATELETS 10*3/mm3 470* 436 338 285  --  190     Results from last 7 days   Lab Units 07/02/25  0421 07/01/25  0227 06/28/25  0342   SODIUM mmol/L 141 140 138   POTASSIUM mmol/L 3.5 3.5 3.5   CHLORIDE  mmol/L 102 102 103   CO2 mmol/L 26.3 26.7 24.8   BUN mg/dL 23.8* 23.5* 22.4   CREATININE mg/dL 1.06* 1.12* 0.92   CALCIUM mg/dL 9.3 9.1 8.7   GLUCOSE mg/dL 115* 127* 117*         Results from last 7 days   Lab Units 07/02/25  0421   MAGNESIUM mg/dL 1.6     Results from last 7 days   Lab Units 06/25/25  1534   INR  1.15*           Imaging Results (Last 24 Hours)       ** No results found for the last 24 hours. **              Medications:  amLODIPine, 5 mg, Oral, Q24H  aspirin, 81 mg, Oral, Nightly  cholecalciferol, 4,000 Units, Oral, Nightly  diazePAM, 2 mg, Oral, Once  Diclofenac Sodium, 4 g, Topical, BID  donepezil, 10 mg, Oral, Nightly  enoxaparin sodium, 40 mg, Subcutaneous, Q24H  escitalopram, 10 mg, Oral, Daily  [Held by provider] methIMAzole, 5 mg, Oral, Daily  multivitamin, 1 tablet, Oral, Nightly  rosuvastatin, 5 mg, Oral, Daily  sodium chloride, 10 mL, Intravenous, Q12H               Assessment & Plan   Left proximal femur fracture: S/P repair with nailing. Pain control. PT/OT. Ortho input appreciated.     Acute blood loss anemia: Likely 2/2 above. S/P 2 units of PRBC's. Improved and stable today.     New onset paroxysmal Afib: Currently in NSR. Echo reveals an EF of 61-65%, mild LVH and grade I diastolic dysfunction. Brief episode noted. Not currently anticoagulated 2/2 concern for fall risk and confirmed with patient and her daughter this AM. Monitor on telemetry.     Left knee pain: No evidence of acute fracture on imaging.     Suspected acute gout flare of left first MTP: Appears clinically improved and indomethacin and colchicine D/C'd.     Mild leukocytosis: Resolved today. Repeat CBC in the AM.     Essential HTN: BP stable. Cont Norvasc. Cont to monitor.     HLD: Cont statin.     Hyperthyroidism: TSH is elevated with low free T4. Methimazole currently being held.     Suspected CKD IIIa: Cr overall stable today. Monitor UOP and repeat labs in the AM.     Borderline hypokalemia: Replace per protocol.      DVT PPX: SQ Lovenox    Disposition Expedited appeal for IPR pending.     Mando Shahid DO  07/02/25  14:06 EDT

## 2025-07-02 NOTE — THERAPY TREATMENT NOTE
Patient Name: Lizbet Arzola  : 1945    MRN: [id number]                              Today's Date: 2025       Admit Date: 2025    Visit Dx:     ICD-10-CM ICD-9-CM   1. Closed displaced intertrochanteric fracture of left femur, initial encounter  S72.142A 820.21   2. Acute low back pain with sciatica, sciatica laterality unspecified, unspecified back pain laterality  M54.40 724.2     724.3     Patient Active Problem List   Diagnosis    Closed intertrochanteric fracture of left femur     Past Medical History:   Diagnosis Date    Hyperlipidemia     Hypertension     Hyperthyroidism     Osteoarthritis      Past Surgical History:   Procedure Laterality Date    LIPOMA EXCISION        General Information       Row Name 25 1453          OT Time and Intention    Subjective Information pain  -     Document Type therapy note (daily note)  -     Mode of Treatment individual therapy;occupational therapy  -     Patient Effort good  -     Symptoms Noted During/After Treatment increased pain  -     Comment Patient seen for OT treatment.  -       Row Name 25 1453          General Information    Patient Profile Reviewed yes  -     Existing Precautions/Restrictions fall;weight bearing;other (see comments)  -     Barriers to Rehab none identified  -       Row Name 25 1453          Cognition    Orientation Status (Cognition) oriented to;person;place;situation  -       Row Name 25 1453          Safety Issues/Impairments Affecting Functional Mobility    Impairments Affecting Function (Mobility) balance;endurance/activity tolerance;pain;strength  -               User Key  (r) = Recorded By, (t) = Taken By, (c) = Cosigned By      Initials Name Provider Type    Izzy Motta OT Occupational Therapist                     Mobility/ADL's       Row Name 25 1457          Bed Mobility    Bed Mobility supine-sit  -     Supine-Sit [place] (Bed Mobility) moderate  assist (50% patient effort);1 person assist;verbal cues  -     Bed Mobility, Safety Issues decreased use of arms for pushing/pulling;decreased use of legs for bridging/pushing  -     Assistive Device (Bed Mobility) bed rails;head of bed elevated;repositioning sheet  -KP       Row Name 07/02/25 1457          Transfers    Transfers sit-stand transfer;stand-sit transfer  -KP       Row Name 07/02/25 1457          Sit-Stand Transfer    Sit-Stand [place] (Transfers) moderate assist (50% patient effort);verbal cues  -     Assistive Device (Sit-Stand Transfers) walker, front-wheeled  -[facility] Name 07/02/25 1457          Stand-Sit Transfer    Stand-Sit [facility] (Transfers) moderate assist (50% patient effort);verbal cues  -     Assistive Device (Stand-Sit Transfers) walker, front-wheeled  -[facility] Name 07/02/25 1457          Activities of Daily Living    BADL Assessment/Intervention lower body dressing;toileting  -KP       Row Name 07/02/25 1457          Mobility    Extremity Weight-bearing Status left lower extremity  -     Left Lower Extremity (Weight-bearing Status) weight-bearing as tolerated (WBAT)  -KP       Row Name 07/02/25 1457          Lower Body Dressing Assessment/Training    [facility] Level (Lower Body Dressing) lower body dressing skills;maximum assist (25% patient effort)  -KP       Row Name 07/02/25 1457          Toileting Assessment/Training    [place] Level (Toileting) toileting skills;dependent (less than 25% patient effort)  -               User Key  (r) = Recorded By, (t) = Taken By, (c) = Cosigned By      Initials Name Provider Type     Izzy Murillo OT Occupational Therapist                   Obj/Interventions       [facility] Name 07/02/25 1458          Motor Skills    Motor Skills functional endurance  -     Functional Endurance good minus  -               User Key  (r) = Recorded By, (t) = Taken By, (c) = Cosigned By      Initials Name Provider Type      Izzy Murillo OT Occupational Therapist                   Goals/Plan    No documentation.                  Clinical Impression       Row Name 07/02/25 1459          Pain Assessment    Pain Side/Orientation left;lower  -       Row Name 07/02/25 1459          Pain Scale: FACES Pre/Post-Treatment    Pain: FACES Scale, Pretreatment 0-->no hurt  -     Posttreatment Pain Rating 2-->hurts little bit  -       Row Name 07/02/25 1459          Plan of Care Review    Plan of Care Reviewed With patient  -     Progress improving  -     Outcome Evaluation Patient seen for OT treatment. She continues to improve with tolerance for functional mobility/transfers with decreased pain on this date. Continue plan of care.  -       Row Name 07/02/25 1459          Therapy Plan Review/Discharge Plan (OT)    Anticipated Discharge Disposition (OT) inpatient rehabilitation facility  -       Row Name 07/02/25 1459          Positioning and Restraints    Pre-Treatment Position in bed  -     Post Treatment Position chair  -     In Chair sitting;call light within reach;encouraged to call for assist;with family/caregiver  -               User Key  (r) = Recorded By, (t) = Taken By, (c) = Cosigned By      Initials Name Provider Type    Izzy Motta OT Occupational Therapist                   Outcome Measures       Row Name 07/02/25 0811          How much help from another person do you currently need...    Turning from your back to your side while in flat bed without using bedrails? 3  -MP     Moving from lying on back to sitting on the side of a flat bed without bedrails? 3  -MP     Moving to and from a bed to a chair (including a wheelchair)? 3  -MP     Standing up from a chair using your arms (e.g., wheelchair, bedside chair)? 3  -MP     Climbing 3-5 steps with a railing? 2  -MP     To walk in hospital room? 3  -MP     AM-PAC 6 Clicks Score (PT) 17  -MP               User Key  (r) = Recorded By, (t) = Taken By, (c) =  Cosigned By      Initials Name Provider Type    Heaven Glaser, RN Registered Nurse                      OT Recommendation and Plan  Planned Therapy Interventions (OT): activity tolerance training, BADL retraining, functional balance retraining, patient/caregiver education/training, passive ROM/stretching, occupation/activity based interventions, ROM/therapeutic exercise, strengthening exercise, transfer/mobility retraining  Therapy Frequency (OT): 5 times/wk  Plan of Care Review  Plan of Care Reviewed With: patient  Progress: improving  Outcome Evaluation: Patient seen for OT treatment. She continues to improve with tolerance for functional mobility/transfers with decreased pain on this date. Continue plan of care.     Time Calculation:         Time Calculation- OT       Row Name 07/02/25 1500             Time Calculation- OT    OT Received On 07/02/25  -BARBARA                User Key  (r) = Recorded By, (t) = Taken By, (c) = Cosigned By      Initials Name Provider Type    Izzy Motta OT Occupational Therapist                  Therapy Charges for Today       Code Description Service Date Service Provider Modifiers Qty    16091596564 HC OT THERAPEUTIC ACT EA 15 MIN 7/1/2025 Izzy Murillo OT GO 1    30680613093 HC OT SELF CARE/MGMT/TRAIN EA 15 MIN 7/1/2025 Izzy Murillo OT GO 1    22625085924 HC OT THERAPEUTIC ACT EA 15 MIN 7/2/2025 Izzy Murillo OT GO 1                 Izzy Murillo OT  7/2/2025

## 2025-07-02 NOTE — THERAPY TREATMENT NOTE
Acute Care - Physical Therapy Treatment Note   [facility]     Patient Name: Lizbet Arzola  : 1945  MRN: [id number]  Today's Date: 2025      Visit Dx:     ICD-10-CM ICD-9-CM   1. Closed displaced intertrochanteric fracture of left femur, initial encounter  S72.142A 820.21   2. Acute low back pain with sciatica, sciatica laterality unspecified, unspecified back pain laterality  M54.40 724.2     724.3     Patient Active Problem List   Diagnosis    Closed intertrochanteric fracture of left femur     Past Medical History:   Diagnosis Date    Hyperlipidemia     Hypertension     Hyperthyroidism     Osteoarthritis      Past Surgical History:   Procedure Laterality Date    LIPOMA EXCISION       PT Assessment (Last 12 Hours)       PT Evaluation and Treatment       Row Name 25 153          Physical Therapy Time and Intention    Subjective Information complains of;pain  -KM     Document Type therapy note (daily note)  -KM     Mode of Treatment physical therapy  -KM     Patient Effort good  -KM     Symptoms Noted During/After Treatment fatigue;increased pain  -KM       Row Name 25 153          General Information    Patient Profile Reviewed yes  -KM     Patient Observations alert;cooperative;agree to therapy  -KM     Existing Precautions/Restrictions fall;weight bearing;other (see comments)  -KM       Row Name 25 153          Pain Scale: FACES Pre/Post-Treatment    Pain: FACES Scale, Pretreatment 0-->no hurt  -KM     Posttreatment Pain Rating 2-->hurts little bit  -KM       Row Name 25 1537          Cognition    Affect/Mental Status (Cognition) WFL  -KM     Orientation Status (Cognition) oriented to;person;place;situation  -KM     Follows Commands (Cognition) follows one-step commands  -KM       Row Name 25 1537          Bed Mobility    Bed Mobility supine-sit  -KM     Supine-Sit [place] (Bed Mobility) moderate assist (50% patient effort);1 person assist;verbal cues  -KM      Bed Mobility, Safety Issues decreased use of arms for pushing/pulling;decreased use of legs for bridging/pushing  -KM     Assistive Device (Bed Mobility) bed rails;head of bed elevated;repositioning sheet  -KM       Row Name 07/02/25 1537          Transfers    Transfers sit-stand transfer;stand-sit transfer;bed-chair transfer;chair-bed transfer  -KM       Row Name 07/02/25 1537          Bed-Chair Transfer    Bed-Chair [place] (Transfers) moderate assist (50% patient effort);verbal cues  -KM     Assistive Device (Bed-Chair Transfers) walker, front-wheeled  -KM       Row Name 07/02/25 1537          Chair-Bed Transfer    Chair-Bed [place] (Transfers) moderate assist (50% patient effort);verbal cues  -KM     Assistive Device (Chair-Bed Transfers) walker, front-wheeled  -KM       Row Name 07/02/25 1537          Sit-Stand Transfer    Sit-Stand [place] (Transfers) moderate assist (50% patient effort);verbal cues  -KM     Assistive Device (Sit-Stand Transfers) walker, front-wheeled  -KM       Row Name 07/02/25 1537          Stand-Sit Transfer    Stand-Sit [place] (Transfers) moderate assist (50% patient effort);verbal cues  -KM     Assistive Device (Stand-Sit Transfers) walker, front-wheeled  -KM       Row Name 07/02/25 1537          Gait/Stairs (Locomotion)    Gait/Stairs Locomotion gait/ambulation independence;gait/ambulation assistive device;distance ambulated  -KM     [place] Level (Gait) minimum assist (75% patient effort);verbal cues  -KM     Assistive Device (Gait) walker, front-wheeled  -KM     Patient was able to Ambulate yes  -KM     Distance in Feet (Gait) 30  20' 30'  -KM     Pattern (Gait) step-to  -KM     Deviations/Abnormal Patterns (Gait) antalgic;ataxic;base of support, narrow;gait speed decreased  -KM     Bilateral Gait Deviations forward flexed posture;knee buckling bilaterally  -KM     Left Sided Gait Deviations weight shift ability decreased  -KM       Row Name 07/02/25 1537           Safety Issues/Impairments Affecting Functional Mobility    Impairments Affecting Function (Mobility) balance;endurance/activity tolerance;pain;strength  -KM       Row Name 07/02/25 1537          Motor Skills    Comments, Therapeutic Exercise seated ther-ex  -KM       Row Name             Wound 06/23/25 1619 Left hip Surgical    Wound - Properties Group Placement Date: 06/23/25  -KB Placement Time: 1619  -KB Present on Original Admission: N  -KB Side: Left  -KB Location: hip  -KB Primary Wound Type: Surgical  -KB, incision sites x4     Retired Wound - Properties Group Placement Date: 06/23/25  -KB Placement Time: 1619  -KB Present on Original Admission: N  -KB Side: Left  -KB Location: hip  -KB    Retired Wound - Properties Group Placement Date: 06/23/25  -KB Placement Time: 1619  -KB Present on Original Admission: N  -KB Side: Left  -KB Location: hip  -KB    Retired Wound - Properties Group Date first assessed: 06/23/25  -KB Time first assessed: 1619  -KB Present on Original Admission: N  -KB Side: Left  -KB Location: hip  -KB      Row Name 07/02/25 1537          Progress Summary (PT)    Daily Progress Summary (PT) Pt. was able to demonstrate progress in all aspects of mobility. She was able to increase ambulation distance and quality w/ RW. She continues to show that she is able to tolerate 3 hours of therapy. Pt. would still most benefit from higher intensity PT services at inpatient rehab.  -KM               User Key  (r) = Recorded By, (t) = Taken By, (c) = Cosigned By      Initials Name Provider Type    Cheryl Aponte, RN Registered Nurse    Sage Mccain, YUSUF Physical Therapist                    Physical Therapy Education       Title: PT OT SLP Therapies (Done)       Topic: Physical Therapy (Done)       Point: Mobility training (Done)       Learning Progress Summary            Patient Acceptance, E,TB, VU by MP at 7/2/2025 0916    Acceptance, E,TB, VU by RISA at 7/2/2025 0511    Acceptance,  E,TB, VU by HG at 6/30/2025 0604    Acceptance, E,TB, VU by KM at 6/24/2025 1421   Family Acceptance, E,TB, VU by HG at 7/2/2025 0511    Acceptance, E,TB, VU by HG at 6/30/2025 0604                      Point: Home exercise program (Done)       Learning Progress Summary            Patient Acceptance, E,TB, VU by  at 7/2/2025 0916    Acceptance, E,TB, VU by HG at 7/2/2025 0511    Acceptance, E,TB, VU by HG at 6/30/2025 0604    Acceptance, E,TB, VU by KM at 6/24/2025 1421   Family Acceptance, E,TB, VU by HG at 7/2/2025 0511    Acceptance, E,TB, VU by HG at 6/30/2025 0604                      Point: Body mechanics (Done)       Learning Progress Summary            Patient Acceptance, E,TB, VU by  at 7/2/2025 0916    Acceptance, E,TB, VU by HG at 7/2/2025 0511    Acceptance, E,TB, VU by HG at 6/30/2025 0604    Acceptance, E,TB, VU by KM at 6/24/2025 1421   Family Acceptance, E,TB, VU by HG at 7/2/2025 0511    Acceptance, E,TB, VU by HG at 6/30/2025 0604                      Point: Precautions (Done)       Learning Progress Summary            Patient Acceptance, E,TB, VU by  at 7/2/2025 0916    Acceptance, E,TB, VU by  at 7/2/2025 0511    Acceptance, E,TB, VU by  at 6/30/2025 0604    Acceptance, E,TB, VU by KM at 6/24/2025 1421   Family Acceptance, E,TB, VU by HG at 7/2/2025 0511    Acceptance, E,TB, VU by  at 6/30/2025 0604                                      User Key       Initials Effective Dates Name Provider Type Discipline     06/16/21 -  Heaven Burleson, RN Registered Nurse Nurse     05/24/22 -  Sage Fuentes, YUSUF Physical Therapist PT     01/22/25 -  Christine Lepe RN Registered Nurse Nurse                  PT Recommendation and Plan  Anticipated Discharge Disposition (PT): inpatient rehabilitation facility  Planned Therapy Interventions (PT): balance training, bed mobility training, gait training, home exercise program, patient/family education, postural re-education, ROM (range of motion),  strengthening, stretching, transfer training, stair training, wheelchair management/propulsion training  Therapy Frequency (PT): 6 times/wk  Progress Summary (PT)  Daily Progress Summary (PT): Pt. was able to demonstrate progress in all aspects of mobility. She was able to increase ambulation distance and quality w/ RW. She continues to show that she is able to tolerate 3 hours of therapy. Pt. would still most benefit from higher intensity PT services at inpatient rehab.  Plan of Care Reviewed With: patient  Outcome Evaluation: Pt. evaluation completed during PT session. She was able to perform functional mobility skills w/ maxA x2. She was unable to ambulate at time of evaluation d/t weakness and pain. She demonstrates impaired strength and ROM. Pt. would benefit from inpatient rehab to address weakness, pain, impaired mobility, safety precautions, and fall risk.       Time Calculation:    PT Charges       Row Name 07/02/25 1530             Time Calculation    PT Received On 07/02/25  -KM         Time Calculation- PT    Total Timed Code Minutes- PT 45 minute(s)  -KM                User Key  (r) = Recorded By, (t) = Taken By, (c) = Cosigned By      Initials Name Provider Type    Sage Mccain, PT Physical Therapist                  Therapy Charges for Today       Code Description Service Date Service Provider Modifiers Qty    95953354834 HC PT THERAPEUTIC ACT EA 15 MIN 7/1/2025 Sage Fuentes, PT GP 1    02909702488 HC GAIT TRAINING EA 15 MIN 7/1/2025 Sage Fuentes, PT GP 1    49432404877 HC PT THERAPEUTIC ACT EA 15 MIN 7/2/2025 Sage Fuentes, PT GP 1    10024814897 HC PT THER PROC EA 15 MIN 7/2/2025 Sage Fuentes, PT GP 1    10837597092 HC GAIT TRAINING EA 15 MIN 7/2/2025 Sage Fuentes, PT GP 1            PT G-Codes  AM-PAC 6 Clicks Score (PT): 17    Sage Fuentes PT  7/2/2025

## 2025-07-02 NOTE — CASE MANAGEMENT/SOCIAL WORK
Discharge Planning Assessment   [facility]     Patient Name: Lizbet Arzola  MRN: [id number]  Today's Date: 7/2/2025    Admit Date: 6/21/2025    Plan: SS spoke with pt's insurance this am.  Expedited appeal for admit to inpt rehab continues to be pending and currently being reviewed by insurance MD.  SS will continue to follow.     Discharge Plan       Row Name 07/02/25 1053       Plan    Plan SS spoke with pt's insurance this am.  Expedited appeal for admit to inpt rehab continues to be pending and currently being reviewed by insurance MD.  SS will continue to follow.                  Continued Care and Services - Admitted Since 6/21/2025    No active coordination exists.       Expected Discharge Date and Time       Expected Discharge Date Expected Discharge Time    Jul 2, 2025             JOSE LUIS WoodsW

## 2025-07-03 LAB
ANION GAP SERPL CALCULATED.3IONS-SCNC: 10.3 MMOL/L (ref 5–15)
BASOPHILS # BLD AUTO: 0.06 10*3/MM3 (ref 0–0.2)
BASOPHILS NFR BLD AUTO: 0.5 % (ref 0–1.5)
BUN SERPL-MCNC: 24.5 MG/DL (ref 8–23)
BUN/CREAT SERPL: 23.3 (ref 7–25)
CALCIUM SPEC-SCNC: 9.6 MG/DL (ref 8.6–10.5)
CHLORIDE SERPL-SCNC: 106 MMOL/L (ref 98–107)
CO2 SERPL-SCNC: 24.7 MMOL/L (ref 22–29)
CREAT SERPL-MCNC: 1.05 MG/DL (ref 0.57–1)
DEPRECATED RDW RBC AUTO: 50.9 FL (ref 37–54)
EGFRCR SERPLBLD CKD-EPI 2021: 53.2 ML/MIN/1.73
EOSINOPHIL # BLD AUTO: 0.26 10*3/MM3 (ref 0–0.4)
EOSINOPHIL NFR BLD AUTO: 2.3 % (ref 0.3–6.2)
ERYTHROCYTE [DISTWIDTH] IN BLOOD BY AUTOMATED COUNT: 14.3 % (ref 12.3–15.4)
GLUCOSE SERPL-MCNC: 127 MG/DL (ref 65–99)
HCT VFR BLD AUTO: 28.4 % (ref 34–46.6)
HGB BLD-MCNC: 8.7 G/DL (ref 12–15.9)
IMM GRANULOCYTES # BLD AUTO: 0.13 10*3/MM3 (ref 0–0.05)
IMM GRANULOCYTES NFR BLD AUTO: 1.2 % (ref 0–0.5)
LYMPHOCYTES # BLD AUTO: 2.57 10*3/MM3 (ref 0.7–3.1)
LYMPHOCYTES NFR BLD AUTO: 22.9 % (ref 19.6–45.3)
MCH RBC QN AUTO: 31.6 PG (ref 26.6–33)
MCHC RBC AUTO-ENTMCNC: 30.6 G/DL (ref 31.5–35.7)
MCV RBC AUTO: 103.3 FL (ref 79–97)
MONOCYTES # BLD AUTO: 1.01 10*3/MM3 (ref 0.1–0.9)
MONOCYTES NFR BLD AUTO: 9 % (ref 5–12)
NEUTROPHILS NFR BLD AUTO: 64.1 % (ref 42.7–76)
NEUTROPHILS NFR BLD AUTO: 7.21 10*3/MM3 (ref 1.7–7)
NRBC BLD AUTO-RTO: 0 /100 WBC (ref 0–0.2)
PLATELET # BLD AUTO: 450 10*3/MM3 (ref 140–450)
PMV BLD AUTO: 8.9 FL (ref 6–12)
POTASSIUM SERPL-SCNC: 4.3 MMOL/L (ref 3.5–5.2)
RBC # BLD AUTO: 2.75 10*6/MM3 (ref 3.77–5.28)
SODIUM SERPL-SCNC: 141 MMOL/L (ref 136–145)
WBC NRBC COR # BLD AUTO: 11.24 10*3/MM3 (ref 3.4–10.8)

## 2025-07-03 PROCEDURE — 80048 BASIC METABOLIC PNL TOTAL CA: CPT | Performed by: INTERNAL MEDICINE

## 2025-07-03 PROCEDURE — 97116 GAIT TRAINING THERAPY: CPT

## 2025-07-03 PROCEDURE — 25010000002 ENOXAPARIN PER 10 MG: Performed by: INTERNAL MEDICINE

## 2025-07-03 PROCEDURE — 99232 SBSQ HOSP IP/OBS MODERATE 35: CPT | Performed by: INTERNAL MEDICINE

## 2025-07-03 PROCEDURE — 85025 COMPLETE CBC W/AUTO DIFF WBC: CPT | Performed by: INTERNAL MEDICINE

## 2025-07-03 PROCEDURE — 97530 THERAPEUTIC ACTIVITIES: CPT

## 2025-07-03 RX ADMIN — ROSUVASTATIN CALCIUM 5 MG: 10 TABLET, FILM COATED ORAL at 08:49

## 2025-07-03 RX ADMIN — THERA TABS 1 TABLET: TAB at 20:52

## 2025-07-03 RX ADMIN — DICLOFENAC SODIUM 4 G: 10 GEL TOPICAL at 08:50

## 2025-07-03 RX ADMIN — Medication 4000 UNITS: at 20:52

## 2025-07-03 RX ADMIN — Medication 10 ML: at 20:53

## 2025-07-03 RX ADMIN — AMLODIPINE BESYLATE 5 MG: 5 TABLET ORAL at 08:49

## 2025-07-03 RX ADMIN — DICLOFENAC SODIUM 4 G: 10 GEL TOPICAL at 20:53

## 2025-07-03 RX ADMIN — HYDROCODONE BITARTRATE AND ACETAMINOPHEN 1 TABLET: 5; 325 TABLET ORAL at 09:40

## 2025-07-03 RX ADMIN — DONEPEZIL HYDROCHLORIDE 10 MG: 5 TABLET, FILM COATED ORAL at 20:51

## 2025-07-03 RX ADMIN — ENOXAPARIN SODIUM 40 MG: 100 INJECTION SUBCUTANEOUS at 15:12

## 2025-07-03 RX ADMIN — ASPIRIN 81 MG CHEWABLE TABLET 81 MG: 81 TABLET CHEWABLE at 20:52

## 2025-07-03 RX ADMIN — ESCITALOPRAM 10 MG: 10 TABLET, FILM COATED ORAL at 08:49

## 2025-07-03 NOTE — CASE MANAGEMENT/SOCIAL WORK
Discharge Planning Assessment  [facility]     Patient Name: Lizbet Arzola  MRN: [id number]  Today's Date: 7/3/2025    Admit Date: 6/21/2025    Plan: SS notified that pt's denial was upheld in the appeal.  SS discussed with pt's daughter Latoya who is discussing with family.  Pt's family to notify SS if they wish to appeal the expedited appeal determination or if they want SS to submit pt's information to [facility] for review.  SS will follow.     Discharge Plan       Row Name 07/03/25 1052       Plan    Plan SS notified that pt's denial was upheld in the appeal.  SS discussed with pt's daughter Latoya who is discussing with family.  Pt's family to notify SS if they wish to appeal the expedited appeal determination or if they want SS to submit pt's information to [facility] for review.  SS will follow.                  Continued Care and Services - Admitted Since 6/21/2025    No active coordination exists.       Expected Discharge Date and Time       Expected Discharge Date Expected Discharge Time    Jul 2, 2025             Latoya Hendrix, JOSE LUISW

## 2025-07-03 NOTE — PLAN OF CARE
Goal Outcome Evaluation:   Patient resting this shift with family at bedside. Patient working with PT. She complains of back pain PRN medications given as ordered. No distress noted. Plan of care reviewed with the patient and family at bedside.

## 2025-07-03 NOTE — DISCHARGE PLACEMENT REQUEST
"CaseyArLizbet bruce Vanessa \"Vanessa\" (80 y.o. Female)       Date of Birth   1945    Social Security Number       Address   [address] [place] [address] [place] [address]    Home Phone   [phone]    MRN   [id number]       [facility]    Marital Status                               Admission Date   6/21/2025    Admission Type   Emergency    Admitting Provider   Jose Chau MD    Attending Provider   Mando Shahid DO    Department, Room/Bed   [address], 3311/1S       Discharge Date       Discharge Disposition       Discharge Destination                                 Attending Provider: Mando Shahid DO    Allergies: Sulfa Antibiotics    Isolation: None   Infection: None   Code Status: CPR    Ht: 170.2 cm (67\")   Wt: 68.9 kg (152 lb)    Admission Cmt: None   Principal Problem: Closed intertrochanteric fracture of left femur [S72.142A]                   Active Insurance as of 6/21/2025       Primary Coverage       Payor Plan Insurance Group Employer/Plan Group    [employer] MEDICARE REPLACEMENT [employer] Medicare Advantage GROUP PPO [id number]       Payor Plan Address Payor Plan Phone Number Payor Plan Fax Number Effective Dates    [address] [address]   1/1/2025 - None Entered    [facility] [id number]         Subscriber Name Subscriber Birth Date Member ID       LIZBET RIVERA 1945 [id number]                     Emergency Contacts        (Rel.) Home Phone Work Phone Mobile Phone    kyara(POA)carlos (Daughter) -- -- [phone]    jakob(POA),maye (Daughter) [phone] -- [phone]    Dariusz(POA)Parveen (Son) -- -- [phone]              Emergency Contact Information       Name Relation Home Work Mobile    mchargue(POA)carlos Daughter   [phone]    jakob(POA),maye Daughter [phone]    Dariusz(POA),Parveen Son   [phone]          Other Contacts    None on File       Insurance Information          "         [employer] MEDICARE REPLACEMENT/[facility] Medicare Advantage GROUP PPO Phone: --    Subscriber: Lizbet Arzola Subscriber#: [id number]    Group#: [id number] Precert#: --    Authorization#: -- Effective Date: --          Problem List           Codes Noted - Resolved       Hospital    * (Principal) Closed intertrochanteric fracture of left femur ICD-10-CM: S72.142A  ICD-9-CM: 820.21 2025 - Present        History & Physical        Jose Chau MD at 25 0131          Hospitalist History and Physical        Patient Identification  Name: Lizbet Arzola  Age/Sex: 80 y.o. female  :  1945        MRN: [id number]  Visit Number: [id number]  Admit Date: 2025   PCP: Yaakov Riley DO          Chief complaint fall, left hip pain    History of Present Illness:  Patient is a 80 y.o. female with history of HTN, HLD, OA and hyperthyroidism on methimazole, who presents with complaints of left hip pain after falling at home. She reports she was doing laundry when she tripped over her rollator and fell to the floor, hitting her head and landing on her left side. She was unable to get up and laid in the floor for an hour before her  found her. She was brought to the ED and found to have suffered a left intertronchanteric femur fracture. She also suffered a posterior scalp laceration from the fall which was stabled by her ED provider.     Review of Systems  Review of Systems   Constitutional:  Negative for activity change, appetite change, chills, diaphoresis, fatigue, fever and unexpected weight change.   HENT:  Negative for congestion, postnasal drip, rhinorrhea, sinus pressure, sinus pain and sore throat.    Eyes:  Negative for photophobia and visual disturbance.   Respiratory:  Negative for cough, shortness of breath and wheezing.    Cardiovascular:  Negative for chest pain, palpitations and leg swelling.   Gastrointestinal:  Negative for abdominal distention, abdominal pain,  constipation, diarrhea, nausea and vomiting.   Genitourinary:  Negative for difficulty urinating, dysuria, flank pain and frequency.   Musculoskeletal:  Positive for arthralgias (left hip (acute), multiple other joints including left knee (chronic)).   Skin:  Positive for wound (laceration back of head from fall).   Neurological:  Negative for dizziness, tremors, seizures, syncope, weakness, light-headedness, numbness and headaches.   Hematological:  Negative for adenopathy. Does not bruise/bleed easily.   Psychiatric/Behavioral:  Negative for agitation, behavioral problems and confusion.        History  Past Medical History:   Diagnosis Date    Hyperlipidemia     Hypertension     Hyperthyroidism     Osteoarthritis      History reviewed. No pertinent surgical history.  History reviewed. No pertinent family history.     (Not in a hospital admission)    Allergies:  Sulfa antibiotics    Objective    Vital Signs  Temp:  [98.6 °F (37 °C)] 98.6 °F (37 °C)  Heart Rate:  [77-91] 91  Resp:  [16] 16  BP: (150-163)/(63-71) 150/63  Body mass index is 21.93 kg/m².    Physical Exam:  Physical Exam  Constitutional:       General: She is not in acute distress.     Appearance: Normal appearance. She is not ill-appearing.   HENT:      Head: Normocephalic and atraumatic.      Right Ear: External ear normal.      Left Ear: External ear normal.      Nose: Nose normal.      Mouth/Throat:      Mouth: Mucous membranes are moist.      Pharynx: Oropharynx is clear.   Eyes:      Extraocular Movements: Extraocular movements intact.      Conjunctiva/sclera: Conjunctivae normal.      Pupils: Pupils are equal, round, and reactive to light.   Cardiovascular:      Rate and Rhythm: Normal rate and regular rhythm.      Pulses: Normal pulses.      Heart sounds: Normal heart sounds. No murmur heard.  Pulmonary:      Effort: Pulmonary effort is normal. No respiratory distress.      Breath sounds: Normal breath sounds. No wheezing or rales.   Abdominal:       General: Abdomen is flat. Bowel sounds are normal. There is no distension.      Palpations: Abdomen is soft.      Tenderness: There is no abdominal tenderness.   Musculoskeletal:      Cervical back: Normal range of motion and neck supple. No tenderness.      Right lower leg: No edema.      Left lower leg: No edema.      Comments: Left leg appx 1 inch shorter than right.    Lymphadenopathy:      Cervical: No cervical adenopathy.   Skin:     General: Skin is warm and dry.      Capillary Refill: Capillary refill takes less than 2 seconds.      Comments: Laceration posterior scalp which has been stapled   Neurological:      General: No focal deficit present.      Mental Status: She is alert and oriented to person, place, and time.   Psychiatric:         Mood and Affect: Mood normal.         Behavior: Behavior normal.           Results Review:       Lab Results:  Results from last 7 days   Lab Units 06/21/25 2117   WBC 10*3/mm3 17.41*   HEMOGLOBIN g/dL 11.5*   PLATELETS 10*3/mm3 253     Results from last 7 days   Lab Units 06/21/25 2117   CRP mg/dL <0.30     Results from last 7 days   Lab Units 06/21/25 2117   SODIUM mmol/L 141   POTASSIUM mmol/L 4.1   CHLORIDE mmol/L 106   CO2 mmol/L 19.7*   BUN mg/dL 30.3*   CREATININE mg/dL 1.45*   CALCIUM mg/dL 9.7   GLUCOSE mg/dL 148*         Hemoglobin A1C   Date Value Ref Range Status   06/21/2025 5.92 (H) 4.80 - 5.60 % Final     Results from last 7 days   Lab Units 06/21/25 2117   BILIRUBIN mg/dL 0.4   ALK PHOS U/L 78   AST (SGOT) U/L 29   ALT (SGPT) U/L 21     Results from last 7 days   Lab Units 06/21/25 2117   CK TOTAL U/L 326*                   I have reviewed the patient's laboratory results.    Imaging:  Imaging Results (Last 72 Hours)       Procedure Component Value Units Date/Time    CT Lumbar Spine Without Contrast [[id number]] Collected: 06/21/25 2334     Updated: 06/21/25 2338    Narrative:      EXAMINATION: CT lumbar spine without contrast     CLINICAL  HISTORY: Fall     COMPARISON: None     TECHNIQUE: Contiguous 3 mm thick slices were obtained through the lumbar  spine without contrast. Coronal and sagittal reformats were performed.     FINDINGS:  Sagittal alignment is normal. Mild left convex curvature is centered at  the L4/L5 level. Mild right convex curvature centered at the L1/L2  level. There is no acute fracture. No traumatic listhesis. Moderate  multilevel disc related degenerative changes are noted. At the L4/L5  level there is a posterior disc bulge and comminution with facet  arthrosis and ligamentous thickening. The findings result in at least  moderate central canal stenosis. The disc bulges noted at the L5/S1  level as well.       Impression:      1. Multilevel disc related degenerative changes and facet arthrosis.  2. No evidence of recent trauma.     This report was finalized on 6/21/2025 11:36 PM by Juanjose Rocha MD.       CT Pelvis Without Contrast [[id number]] Collected: 06/21/25 2331     Updated: 06/21/25 2336    Narrative:      EXAMINATION: CT pelvis without contrast     CLINICAL HISTORY: Injury.     COMPARISON: None available.     TECHNIQUE: Contiguous 3 mm thick slices were obtained through the pelvis  without contrast. Coronal and sagittal reformats were performed.     FINDINGS:  Alignment at the hips is normal. Mild bilateral hip osteoarthrosis is  noted. There is an acute comminuted fracture through the  intertrochanteric region of the left proximal femur. There is apex  lateral angulation at the fracture site. Additionally, the distal  fragment is displaced medially with respect to the more proximal  fragment. There is apex anterior angulation at the fracture site as  well. Extensive adjacent soft tissue swelling is noted. No definite  acetabular fracture is appreciated.       Impression:      1. Acute comminuted left intertrochanteric proximal femur fracture.     This report was finalized on 6/21/2025 11:34 PM by Juanjose Rocha MD.        "CT Head Without Contrast [[id number]] Collected: 06/21/25 2323     Updated: 06/21/25 2333    Narrative:      EXAMINATION: CT head without contrast     CLINICAL HISTORY: Fall     COMPARISON: None available.     TECHNIQUE: Contiguous 3 mm thick slices were obtained from the skull  base to the vertex without contrast. Coronal and sagittal reformats were  performed.     FINDINGS:  Moderate generalized volume loss is noted with prominence of the sulci  and ventricular system. White matter changes are noted in a pattern  suggesting chronic small vessel ischemic disease. There is no acute  intracranial hemorrhage. No acute territorial infarct. No extra-axial  collection is identified. There is no shift of midline structures. No  acute calvarial fracture is appreciated. Hyperostosis frontalis is  noted.       Impression:      1. No acute intracranial process.     This report was finalized on 6/21/2025 11:31 PM by Juanjose Rocha MD.               I have personally reviewed the patient's radiologic imaging.        EKG: ordered, results pending        Assessment & Plan    - Closed intertrochanteric fracture of left femur: admit to medical surgical unit. IV morphine available PRN. Keep NPO since already after midnight. Orthopedic surgery notified by ED; Dr Jay to see patient in consultation in the morning.   - Mild CK elevation, likely secondary to above fracture and prolonged time down on the floor at home: does not meet criteria for rhabdomyolysis at this time. Hydrate with IV fluids and continue to trend.  - Renal insufficiency, acute vs chronic: no prior labs for comparison. Patient denies history of CKD, but her daughter mentioned that her creatinine does run \"a little high\". Elevated BUN:cr ratio suggests dehydration. Avoid nephrotoxic home meds (losartan, triamterene-HCTZ). Repeat labs in the morning to monitor response to IV fluid hydration.  - Leukocytosis: likely reactive from hip fracture. CRP and procal are normal. " Will check UA to look for evidence of bacteruria pre-op.   - HTN: BP stable thus far. Holding losartan and triamterene-HCTZ as noted above. Plan to continue amlodipine in the morning. Continue same dose since pain medication may help to keep BP controlled, but if not then can increase amlodipine from 5 to 10mg moving forward.  - HLD: hold statin for now in light of CK elevation.   - Hyperthyroidism: TSH mildly elevated at 4.900. Check free T4. Plan to continue methimazole once reconciled by pharmacy.   - Mild hyperglycemia: likely reactive to some extent from hip fracture, but A1c is mildly elevated as well at 5.92 indicating pre-diabetes. Recommend lifestyle modification moving forward.     DVT Prophylaxis: SCD to right leg only    Estimated Length of Stay >2 midnights    I discussed the patient's findings, assessment and plan with the patient, her daughter at bedside, and ED provider Quincy Story PA-C    Patient is high risk due to left intertrochanteric femur fracture requiring parenteral opioids for pain control    Jose Chau MD  06/22/25  01:34 EDT      Electronically signed by Jose Chau MD at 06/22/25 0146       Vital Signs (last day)       Date/Time Temp Temp src Pulse Resp BP Patient Position SpO2    07/03/25 1156 98.1 (36.7) Oral 74 18 130/62 Sitting 96    07/03/25 0939 -- -- 93 -- 148/66 Lying 99    07/03/25 0816 98.2 (36.8) Oral 92 18 157/87 Lying 95    07/03/25 0320 98.5 (36.9) Oral 80 18 153/67 Lying 94    07/02/25 2304 98.8 (37.1) Oral 84 18 158/68 Lying 95    07/02/25 1920 99.2 (37.3) Oral 82 18 146/58 Lying 94    07/02/25 1510 98.3 (36.8) Oral 80 18 128/56 Lying 96    07/02/25 1105 98.1 (36.7) Oral 78 18 128/49 Lying 98    07/02/25 0637 98.8 (37.1) Oral 77 18 166/68 Lying 96    07/02/25 0347 98.9 (37.2) Oral 81 18 172/69 Lying 95          Lines, Drains & Airways       Active LDAs       Name Placement date Placement time Site Days    Peripheral IV 07/03/25 1000 22 G  Anterior;Right Forearm 07/03/25  1000  Forearm  less than 1                  Current Facility-Administered Medications   Medication Dose Route Frequency Provider Last Rate Last Admin    amLODIPine (NORVASC) tablet 5 mg  5 mg Oral Q24H Cosmo Jay MD   5 mg at 07/03/25 0849    aspirin EC tablet 81 mg  81 mg Oral Nightly Cosmo Jay MD   81 mg at 07/02/25 2211    sennosides-docusate (PERICOLACE) 8.6-50 MG per tablet 2 tablet  2 tablet Oral BID PRN Cosmo Jay MD   2 tablet at 06/28/25 0923    And    polyethylene glycol (MIRALAX) packet 17 g  17 g Oral Daily PRN Cosmo Jay MD   17 g at 06/28/25 2126    And    bisacodyl (DULCOLAX) EC tablet 5 mg  5 mg Oral Daily PRN Cosmo Jay MD   5 mg at 06/24/25 1612    And    bisacodyl (DULCOLAX) suppository 10 mg  10 mg Rectal Daily PRN Cosmo Jay MD        Calcium Replacement - Follow Nurse / BPA Driven Protocol   Not Applicable PRN Mando Shahid DO        cholecalciferol (VITAMIN D3) tablet 4,000 Units  4,000 Units Oral Nightly Cosmo Jay MD   4,000 Units at 07/02/25 2211    diazePAM (VALIUM) tablet 2 mg  2 mg Oral Once Chano Romero DO        Diclofenac Sodium (VOLTAREN) 1 % gel 4 g  4 g Topical BID Chano Romero DO   4 g at 07/03/25 0850    donepezil (ARICEPT) tablet 10 mg  10 mg Oral Nightly Cosmo Jay MD   10 mg at 07/02/25 2211    enoxaparin sodium (LOVENOX) syringe 40 mg  40 mg Subcutaneous Q24H Mando Shahid DO   40 mg at 07/02/25 1430    escitalopram (LEXAPRO) tablet 10 mg  10 mg Oral Daily Cosmo Jay MD   10 mg at 07/03/25 0849    HYDROcodone-acetaminophen (NORCO) 5-325 MG per tablet 1 tablet  1 tablet Oral Q6H PRN Chano Romero DO   1 tablet at 07/03/25 0940    Magnesium Standard Dose Replacement - Follow Nurse / BPA Driven Protocol   Not Applicable PRN Mando Shahid DO        [Held by provider] methIMAzole (TAPAZOLE) tablet 5 mg  5 mg Oral Daily Cosmo Jay MD   5 mg at  06/24/25 0830    multivitamin (THERAGRAN) tablet 1 tablet  1 tablet Oral Nightly Cosmo Jay MD   1 tablet at 07/02/25 2211    naloxone (NARCAN) injection 0.4 mg  0.4 mg Intravenous Q5 Min PRN Chano Romero DO        Phosphorus Replacement - Follow Nurse / BPA Driven Protocol   Not Applicable PRN Mando Shahid DO        Potassium Replacement - Follow Nurse / BPA Driven Protocol   Not Applicable PRN Mando Shahid DO        rosuvastatin (CRESTOR) tablet 5 mg  5 mg Oral Daily Cosmo Jay MD   5 mg at 07/03/25 0849    sodium chloride 0.9 % flush 10 mL  10 mL Intravenous Q12H Cosmo Jay MD   10 mL at 07/02/25 2211    sodium chloride 0.9 % flush 10 mL  10 mL Intravenous PRN Cosmo Jay MD   10 mL at 06/30/25 0846    sodium chloride 0.9 % infusion 40 mL  40 mL Intravenous PRN Cosmo Jay MD         Lab Results (most recent)       Procedure Component Value Units Date/Time    Basic Metabolic Panel [[id number]]  (Abnormal) Collected: 07/03/25 0129    Specimen: Blood Updated: 07/03/25 0235     Glucose 127 mg/dL      BUN 24.5 mg/dL      Creatinine 1.05 mg/dL      Sodium 141 mmol/L      Potassium 4.3 mmol/L      Chloride 106 mmol/L      CO2 24.7 mmol/L      Calcium 9.6 mg/dL      BUN/Creatinine Ratio 23.3     Anion Gap 10.3 mmol/L      eGFR 53.2 mL/min/1.73     Narrative:      GFR Categories in Chronic Kidney Disease (CKD)              GFR Category          GFR (mL/min/1.73)    Interpretation  G1                    90 or greater        Normal or high (1)  G2                    60-89                Mild decrease (1)  G3a                   45-59                Mild to moderate decrease  G3b                   30-44                Moderate to severe decrease  G4                    15-29                Severe decrease  G5                    14 or less           Kidney failure    (1)In the absence of evidence of kidney disease, neither GFR category G1 or G2 fulfill the criteria for  CKD.    eGFR calculation 2021 CKD-EPI creatinine equation, which does not include race as a factor    CBC & Differential [[id number]]  (Abnormal) Collected: 07/03/25 0129    Specimen: Blood Updated: 07/03/25 0216    Narrative:      The following orders were created for panel order CBC & Differential.  Procedure                               Abnormality         Status                     ---------                               -----------         ------                     CBC Auto Differential[[id number]]        Abnormal            Final result                 Please view results for these tests on the individual orders.    CBC Auto Differential [[id number]]  (Abnormal) Collected: 07/03/25 0129    Specimen: Blood Updated: 07/03/25 0216     WBC 11.24 10*3/mm3      RBC 2.75 10*6/mm3      Hemoglobin 8.7 g/dL      Hematocrit 28.4 %      .3 fL      MCH 31.6 pg      MCHC 30.6 g/dL      RDW 14.3 %      RDW-SD 50.9 fl      MPV 8.9 fL      Platelets 450 10*3/mm3      Neutrophil % 64.1 %      Lymphocyte % 22.9 %      Monocyte % 9.0 %      Eosinophil % 2.3 %      Basophil % 0.5 %      Immature Grans % 1.2 %      Neutrophils, Absolute 7.21 10*3/mm3      Lymphocytes, Absolute 2.57 10*3/mm3      Monocytes, Absolute 1.01 10*3/mm3      Eosinophils, Absolute 0.26 10*3/mm3      Basophils, Absolute 0.06 10*3/mm3      Immature Grans, Absolute 0.13 10*3/mm3      nRBC 0.0 /100 WBC     Potassium [[id number]]  (Normal) Collected: 07/02/25 1603    Specimen: Blood Updated: 07/02/25 1706     Potassium 4.5 mmol/L     Magnesium [[id number]]  (Normal) Collected: 07/02/25 0421    Specimen: Blood Updated: 07/02/25 0937     Magnesium 1.6 mg/dL     Basic Metabolic Panel [[id number]]  (Abnormal) Collected: 07/02/25 0421    Specimen: Blood Updated: 07/02/25 0630     Glucose 115 mg/dL      BUN 23.8 mg/dL      Creatinine 1.06 mg/dL      Sodium 141 mmol/L      Potassium 3.5 mmol/L      Chloride 102 mmol/L      CO2 26.3 mmol/L      Calcium 9.3 mg/dL       BUN/Creatinine Ratio 22.5     Anion Gap 12.7 mmol/L      eGFR 52.6 mL/min/1.73     Narrative:      GFR Categories in Chronic Kidney Disease (CKD)              GFR Category          GFR (mL/min/1.73)    Interpretation  G1                    90 or greater        Normal or high (1)  G2                    60-89                Mild decrease (1)  G3a                   45-59                Mild to moderate decrease  G3b                   30-44                Moderate to severe decrease  G4                    15-29                Severe decrease  G5                    14 or less           Kidney failure    (1)In the absence of evidence of kidney disease, neither GFR category G1 or G2 fulfill the criteria for CKD.    eGFR calculation 2021 CKD-EPI creatinine equation, which does not include race as a factor    CBC & Differential [[id number]]  (Abnormal) Collected: 07/02/25 0421    Specimen: Blood Updated: 07/02/25 0604    Narrative:      The following orders were created for panel order CBC & Differential.  Procedure                               Abnormality         Status                     ---------                               -----------         ------                     CBC Auto Differential[[id number]]        Abnormal            Final result                 Please view results for these tests on the individual orders.    CBC Auto Differential [[id number]]  (Abnormal) Collected: 07/02/25 0421    Specimen: Blood Updated: 07/02/25 0604     WBC 9.33 10*3/mm3      RBC 2.86 10*6/mm3      Hemoglobin 9.0 g/dL      Hematocrit 29.2 %      .1 fL      MCH 31.5 pg      MCHC 30.8 g/dL      RDW 13.6 %      RDW-SD 49.1 fl      MPV 9.1 fL      Platelets 470 10*3/mm3      Neutrophil % 56.3 %      Lymphocyte % 26.0 %      Monocyte % 10.7 %      Eosinophil % 4.6 %      Basophil % 0.9 %      Immature Grans % 1.5 %      Neutrophils, Absolute 5.25 10*3/mm3      Lymphocytes, Absolute 2.43 10*3/mm3      Monocytes, Absolute 1.00 10*3/mm3       Eosinophils, Absolute 0.43 10*3/mm3      Basophils, Absolute 0.08 10*3/mm3      Immature Grans, Absolute 0.14 10*3/mm3      nRBC 0.3 /100 WBC     CBC (No Diff) [[id number]]  (Abnormal) Collected: 07/01/25 0227    Specimen: Blood Updated: 07/01/25 0324     WBC 11.46 10*3/mm3      RBC 2.69 10*6/mm3      Hemoglobin 8.7 g/dL      Hematocrit 26.8 %      MCV 99.6 fL      MCH 32.3 pg      MCHC 32.5 g/dL      RDW 13.3 %      RDW-SD 46.7 fl      MPV 9.0 fL      Platelets 436 10*3/mm3     CBC (No Diff) [[id number]]  (Abnormal) Collected: 06/29/25 0107    Specimen: Blood Updated: 06/29/25 0154     WBC 11.12 10*3/mm3      RBC 2.62 10*6/mm3      Hemoglobin 8.4 g/dL      Hematocrit 25.6 %      MCV 97.7 fL      MCH 32.1 pg      MCHC 32.8 g/dL      RDW 12.8 %      RDW-SD 45.1 fl      MPV 9.2 fL      Platelets 338 10*3/mm3     Uric Acid [[id number]]  (Normal) Collected: 06/28/25 0342    Specimen: Blood Updated: 06/28/25 1358     Uric Acid 5.3 mg/dL     Heparin Anti-Xa [[id number]]  (Abnormal) Collected: 06/26/25 0834    Specimen: Blood Updated: 06/26/25 0855     Heparin Anti-Xa (UFH) 0.26 IU/ml     Hemoglobin & Hematocrit, Blood [[id number]]  (Abnormal) Collected: 06/26/25 0834    Specimen: Blood Updated: 06/26/25 0844     Hemoglobin 9.0 g/dL      Hematocrit 27.4 %     Heparin Anti-Xa [[id number]]  (Normal) Collected: 06/25/25 2304    Specimen: Blood Updated: 06/25/25 2328     Heparin Anti-Xa (UFH) 0.33 IU/ml     Protime-INR [[id number]]  (Abnormal) Collected: 06/25/25 1534    Specimen: Blood Updated: 06/25/25 1602     Protime 15.5 Seconds      INR 1.15    Narrative:      Suggested INR therapeutic range for stable oral anticoagulant therapy:    Low Intensity therapy:   1.5-2.0  Moderate Intensity therapy:   2.0-3.0  High Intensity therapy:   2.5-4.0    aPTT [[id number]]  (Abnormal) Collected: 06/25/25 1534    Specimen: Blood Updated: 06/25/25 1602     PTT 36.3 seconds     Narrative:      PTT Heparin Therapeutic Range:  45 - 116  seconds      Magnesium [[id number]]  (Normal) Collected: 06/25/25 0111    Specimen: Blood Updated: 06/25/25 1021     Magnesium 1.9 mg/dL     Hemoglobin & Hematocrit, Blood [[id number]]  (Abnormal) Collected: 06/24/25 1414    Specimen: Blood Updated: 06/24/25 1429     Hemoglobin 9.2 g/dL      Hematocrit 28.0 %     Narrative:      Post Transfusion Specimen        Comprehensive Metabolic Panel [[id number]]  (Abnormal) Collected: 06/23/25 0228    Specimen: Blood Updated: 06/23/25 0313     Glucose 132 mg/dL      BUN 27.0 mg/dL      Creatinine 1.20 mg/dL      Sodium 140 mmol/L      Potassium 3.6 mmol/L      Chloride 104 mmol/L      CO2 25.2 mmol/L      Calcium 8.5 mg/dL      Total Protein 5.6 g/dL      Albumin 3.7 g/dL      ALT (SGPT) 18 U/L      AST (SGOT) 36 U/L      Alkaline Phosphatase 52 U/L      Total Bilirubin 0.6 mg/dL      Globulin 1.9 gm/dL      A/G Ratio 1.9 g/dL      BUN/Creatinine Ratio 22.5     Anion Gap 10.8 mmol/L      eGFR 45.3 mL/min/1.73     Narrative:      GFR Categories in Chronic Kidney Disease (CKD)              GFR Category          GFR (mL/min/1.73)    Interpretation  G1                    90 or greater        Normal or high (1)  G2                    60-89                Mild decrease (1)  G3a                   45-59                Mild to moderate decrease  G3b                   30-44                Moderate to severe decrease  G4                    15-29                Severe decrease  G5                    14 or less           Kidney failure    (1)In the absence of evidence of kidney disease, neither GFR category G1 or G2 fulfill the criteria for CKD.    eGFR calculation 2021 CKD-EPI creatinine equation, which does not include race as a factor    CK [[id number]]  (Abnormal) Collected: 06/23/25 0228    Specimen: Blood Updated: 06/23/25 0313     Creatine Kinase 692 U/L     Protime-INR [[id number]]  (Abnormal) Collected: 06/22/25 0835    Specimen: Blood Updated: 06/22/25 0859     Protime 15.2 Seconds       INR 1.13    Narrative:      Suggested INR therapeutic range for stable oral anticoagulant therapy:    Low Intensity therapy:   1.5-2.0  Moderate Intensity therapy:   2.0-3.0  High Intensity therapy:   2.5-4.0    aPTT [[id number]]  (Normal) Collected: 06/22/25 0835    Specimen: Blood Updated: 06/22/25 0859     PTT 33.1 seconds     Narrative:      PTT Heparin Therapeutic Range:  45 - 116 seconds      Urinalysis With Culture If Indicated - Urine, Clean Catch [[id number]]  (Abnormal) Collected: 06/22/25 0657    Specimen: Urine, Clean Catch Updated: 06/22/25 0715     Color, UA Yellow     Appearance, UA Clear     pH, UA <=5.0     Specific Gravity, UA 1.019     Glucose, UA Negative     Ketones, UA Negative     Bilirubin, UA Negative     Blood, UA Negative     Protein, UA 30 mg/dL (1+)     Leuk Esterase, UA Trace     Nitrite, UA Negative     Urobilinogen, UA 0.2 E.U./dL    Narrative:      In absence of clinical symptoms, the presence of pyuria, bacteria, and/or nitrites on the urinalysis result does not correlate with infection.    Urinalysis, Microscopic Only - Urine, Clean Catch [[id number]]  (Abnormal) Collected: 06/22/25 0657    Specimen: Urine, Clean Catch Updated: 06/22/25 0715     RBC, UA 0-2 /HPF      WBC, UA 3-5 /HPF      Comment: Urine culture not indicated.        Bacteria, UA None Seen /HPF      Squamous Epithelial Cells, UA 3-6 /HPF      Hyaline Casts, UA None Seen /LPF      Methodology Automated Microscopy    STAT Lactic Acid, Reflex [[id number]]  (Normal) Collected: 06/22/25 0529    Specimen: Blood Updated: 06/22/25 0703     Lactate 1.6 mmol/L     Lactic Acid, Plasma [[id number]]  (Abnormal) Collected: 06/22/25 0156    Specimen: Blood Updated: 06/22/25 0308     Lactate 2.1 mmol/L     Comprehensive Metabolic Panel [[id number]]  (Abnormal) Collected: 06/22/25 0156    Specimen: Blood Updated: 06/22/25 0304     Glucose 162 mg/dL      BUN 30.4 mg/dL      Creatinine 1.36 mg/dL      Sodium 140 mmol/L      Potassium  4.4 mmol/L      Chloride 108 mmol/L      CO2 17.0 mmol/L      Calcium 9.2 mg/dL      Total Protein 6.5 g/dL      Albumin 4.1 g/dL      ALT (SGPT) 20 U/L      AST (SGOT) 32 U/L      Alkaline Phosphatase 69 U/L      Total Bilirubin 0.5 mg/dL      Globulin 2.4 gm/dL      A/G Ratio 1.7 g/dL      BUN/Creatinine Ratio 22.4     Anion Gap 15.0 mmol/L      eGFR 39.0 mL/min/1.73     Narrative:      GFR Categories in Chronic Kidney Disease (CKD)              GFR Category          GFR (mL/min/1.73)    Interpretation  G1                    90 or greater        Normal or high (1)  G2                    60-89                Mild decrease (1)  G3a                   45-59                Mild to moderate decrease  G3b                   30-44                Moderate to severe decrease  G4                    15-29                Severe decrease  G5                    14 or less           Kidney failure    (1)In the absence of evidence of kidney disease, neither GFR category G1 or G2 fulfill the criteria for CKD.    eGFR calculation 2021 CKD-EPI creatinine equation, which does not include race as a factor    CK [[id number]]  (Abnormal) Collected: 06/22/25 0156    Specimen: Blood Updated: 06/22/25 0256     Creatine Kinase 519 U/L     T4, Free [[id number]]  (Abnormal) Collected: 06/21/25 2117    Specimen: Blood from Arm, Left Updated: 06/22/25 0206     Free T4 0.85 ng/dL     TSH [[id number]]  (Abnormal) Collected: 06/21/25 2117    Specimen: Blood from Arm, Left Updated: 06/22/25 0134     TSH 4.900 uIU/mL     Procalcitonin [[id number]]  (Normal) Collected: 06/21/25 2117    Specimen: Blood from Arm, Left Updated: 06/22/25 0113     Procalcitonin 0.04 ng/mL     Narrative:      As a Marker for Sepsis (Non-Neonates):    1. <0.5 ng/mL represents a low risk of severe sepsis and/or septic shock.  2. >2 ng/mL represents a high risk of severe sepsis and/or septic shock.    As a Marker for Lower Respiratory Tract Infections that require antibiotic  "therapy:    PCT on Admission    Antibiotic Therapy       6-12 Hrs later    >0.5                Strongly Recommended  >0.25 - <0.5        Recommended   0.1 - 0.25          Discouraged              Remeasure/reassess PCT  <0.1                Strongly Discouraged     Remeasure/reassess PCT    As 28 day mortality risk marker: \"Change in Procalcitonin Result\" (>80% or <=80%) if Day 0 (or Day 1) and Day 4 values are available. Refer to http://www.[email or web address]-pct-calculator.com    Change in PCT <=80%  A decrease of PCT levels below or equal to 80% defines a positive change in PCT test result representing a higher risk for 28-day all-cause mortality of patients diagnosed with severe sepsis for septic shock.    Change in PCT >80%  A decrease of PCT levels of more than 80% defines a negative change in PCT result representing a lower risk for 28-day all-cause mortality of patients diagnosed with severe sepsis or septic shock.       Hemoglobin A1c [[id number]]  (Abnormal) Collected: 06/21/25 2117    Specimen: Blood from Arm, Left Updated: 06/22/25 0111     Hemoglobin A1C 5.92 %     Narrative:      Hemoglobin A1C Ranges:    Increased Risk for Diabetes  5.7% to 6.4%  Diabetes                     >= 6.5%  Diabetic Goal                < 7.0%    C-reactive Protein [[id number]]  (Normal) Collected: 06/21/25 2117    Specimen: Blood from Arm, Left Updated: 06/22/25 0106     C-Reactive Protein <0.30 mg/dL     CK Total & CKMB [[id number]]  (Abnormal) Collected: 06/21/25 2117    Specimen: Blood from Arm, Left Updated: 06/21/25 2327     CKMB 6.45 ng/mL      Creatine Kinase 326 U/L     Narrative:      CKMB results may be falsely decreased if patient taking Biotin.    [place] Draw [[id number]] Collected: 06/21/25 2117    Specimen: Blood from Arm, Left Updated: 06/21/25 2132    Narrative:      The following orders were created for panel order [place] Draw.  Procedure                               Abnormality         Status                   "   ---------                               -----------         ------                     Green Top (Gel)[[id number]]                                  Final result               Lavender Top[[id number]]                                     Final result               Gold Top - SST[[id number]]                                   Final result               Light Blue Top[[id number]]                                   Final result                 Please view results for these tests on the individual orders.    Green Top (Gel) [[id number]] Collected: 06/21/25 2117    Specimen: Blood from Arm, Left Updated: 06/21/25 2132     Extra Tube Hold for add-ons.     Comment: Auto resulted.       Lavender Top [[id number]] Collected: 06/21/25 2117    Specimen: Blood from Arm, Left Updated: 06/21/25 2132     Extra Tube hold for add-on     Comment: Auto resulted       Gold Top - SST [[id number]] Collected: 06/21/25 2117    Specimen: Blood from Arm, Left Updated: 06/21/25 2132     Extra Tube Hold for add-ons.     Comment: Auto resulted.       Light Blue Top [[id number]] Collected: 06/21/25 2117    Specimen: Blood from Arm, Left Updated: 06/21/25 2132     Extra Tube Hold for add-ons.     Comment: Auto resulted             Orders (last 24 hrs)        Start     Ordered    07/04/25 0600  CBC & Differential  Morning Draw         07/03/25 0824    07/04/25 0600  Basic Metabolic Panel  Morning Draw         07/03/25 0824    07/03/25 0600  CBC & Differential  Morning Draw         07/02/25 0849    07/03/25 0600  Basic Metabolic Panel  Morning Draw         07/02/25 0849    07/03/25 0600  CBC Auto Differential  PROCEDURE ONCE         07/02/25 2201 07/02/25 2104  Telemetry Scan  Once         07/02/25 2104    07/02/25 1631  Potassium  Timed         07/02/25 0730    07/01/25 1500  enoxaparin sodium (LOVENOX) syringe 40 mg  Every 24 Hours         07/01/25 1434    07/01/25 1439  Potassium Replacement - Follow Nurse / BPA Driven Protocol  As Needed          "07/01/25 1439    07/01/25 1439  Magnesium Standard Dose Replacement - Follow Nurse / BPA Driven Protocol  As Needed         07/01/25 1439    07/01/25 1439  Phosphorus Replacement - Follow Nurse / BPA Driven Protocol  As Needed         07/01/25 1439    07/01/25 1439  Calcium Replacement - Follow Nurse / BPA Driven Protocol  As Needed         07/01/25 1439    06/30/25 1747  HYDROcodone-acetaminophen (NORCO) 5-325 MG per tablet 1 tablet  Every 6 Hours PRN         06/30/25 1747    06/26/25 1145  Diclofenac Sodium (VOLTAREN) 1 % gel 4 g  2 Times Daily         06/26/25 1046    06/26/25 1145  diazePAM (VALIUM) tablet 2 mg  Once         06/26/25 1049    06/26/25 0959  naloxone (NARCAN) injection 0.4 mg  Every 5 Minutes PRN        Placed in \"And\" Linked Group    06/26/25 1000    06/25/25 0236  Silicone Border Dressing to Bony Prominences  Every Shift       06/25/25 0235    06/23/25 1814  Ambulate Patient  Every Shift       06/23/25 1813    06/22/25 2100  multivitamin (THERAGRAN) tablet 1 tablet  Nightly         06/22/25 0906    06/22/25 2100  aspirin EC tablet 81 mg  Nightly         06/22/25 0906    06/22/25 2100  donepezil (ARICEPT) tablet 10 mg  Nightly         06/22/25 0906    06/22/25 2100  cholecalciferol (VITAMIN D3) tablet 4,000 Units  Nightly         06/22/25 0906    06/22/25 1000  escitalopram (LEXAPRO) tablet 10 mg  Daily         06/22/25 0906 06/22/25 1000  [Held by provider]  methIMAzole (TAPAZOLE) tablet 5 mg  Daily        (On hold since Tue 6/24/2025 at 1543 until manually unheld; held by Chano Romero DOHold Reason: Abnormal Labs)    06/22/25 0906 06/22/25 1000  rosuvastatin (CRESTOR) tablet 5 mg  Daily         06/22/25 0906    06/22/25 0900  amLODIPine (NORVASC) tablet 5 mg  Every 24 Hours Scheduled         06/22/25 0142    06/22/25 0800  Oral Care  2 Times Daily       06/22/25 0148    06/22/25 0245  sodium chloride 0.9 % flush 10 mL  Every 12 Hours Scheduled         06/22/25 0148    06/22/25 " "0149  Daily Weights  Daily       06/22/25 0148    06/22/25 0148  sennosides-docusate (PERICOLACE) 8.6-50 MG per tablet 2 tablet  2 Times Daily PRN        Placed in \"And\" Linked Group    06/22/25 0148    06/22/25 0148  polyethylene glycol (MIRALAX) packet 17 g  Daily PRN        Placed in \"And\" Linked Group    06/22/25 0148    06/22/25 0148  bisacodyl (DULCOLAX) EC tablet 5 mg  Daily PRN        Placed in \"And\" Linked Group    06/22/25 0148    06/22/25 0148  bisacodyl (DULCOLAX) suppository 10 mg  Daily PRN        Placed in \"And\" Linked Group    06/22/25 0148    06/22/25 0148  sodium chloride 0.9 % flush 10 mL  As Needed         06/22/25 0148 06/22/25 0148  sodium chloride 0.9 % infusion 40 mL  As Needed         06/22/25 0148    06/13/25 0000  amLODIPine (NORVASC) 5 MG tablet  Daily         06/21/25 2052 06/13/25 0000  escitalopram (LEXAPRO) 10 MG tablet  Daily         06/21/25 2052 06/11/25 0000  Diclofenac Sodium (VOLTAREN) 1 % gel gel  4 Times Daily PRN         06/21/25 2052 05/26/25 0000  methIMAzole (TAPAZOLE) 5 MG tablet  Daily         06/21/25 2052 05/26/25 0000  triamterene-hydrochlorothiazide (DYAZIDE) 37.5-25 MG per capsule  Daily         06/21/25 2052 04/25/25 0000  losartan (COZAAR) 50 MG tablet  Daily         06/21/25 2052 04/25/25 0000  omega-3 acid ethyl esters (LOVAZA) 1 g capsule  2 Times Daily         06/21/25 2052    04/15/25 0000  potassium chloride 10 MEQ CR tablet  Daily         06/21/25 2052 04/04/25 0000  rosuvastatin (CRESTOR) 5 MG tablet  Daily         06/21/25 2052 03/27/25 0000  donepezil (ARICEPT) 10 MG tablet  Nightly         06/21/25 2052    Unscheduled  Wound Care  As Needed       06/25/25 0235    --  acetaminophen (TYLENOL) 500 MG tablet  Nightly         06/22/25 0257    --  multivitamin tablet tablet  Nightly         06/22/25 0257    --  aspirin 81 MG EC tablet  Nightly         06/22/25 0257    --  acetaminophen (TYLENOL) 500 MG tablet  Every Morning         " 06/22/25 0300    --  BIOTIN PO  Daily         06/22/25 0300    --  Cholecalciferol 50 MCG (2000 UT) tablet  Nightly         06/22/25 0300                     Physician Progress Notes (most recent note)        Mando Shahid DO at 07/02/25 1406                [facility]    Subjective     History:   Lizbet Arzola is a 80 y.o. female admitted on 6/21/2025 secondary to Closed intertrochanteric fracture of left femur     Procedures:   6/23/25: Left subtrochanteric hip fracture repair with long cephalomedullary nail    Transfusions:  6/24/25: 2 units of PRBC's     CC: Follow up hip fx     Patient seen and examined with IRMA Collado. Awake and alert with her daughter present at bedside. Feels that she is getting stronger.  No reported CP, dyspnea or palpitations. No reported vomiting.  No acute events reported.     History taken from: patient, chart, and RN.      Objective     Vital Signs  Temp:  [98 °F (36.7 °C)-98.9 °F (37.2 °C)] 98.1 °F (36.7 °C)  Heart Rate:  [71-81] 78  Resp:  [18] 18  BP: (128-172)/(49-69) 128/49    Intake/Output Summary (Last 24 hours) at 7/2/2025 1406  Last data filed at 7/2/2025 0900  Gross per 24 hour   Intake 600 ml   Output 0 ml   Net 600 ml         Physical Exam: Unchanged from previous.   General:    Awake, alert, in no acute distress   Heart:      Normal S1 and S2. Regular rate and rhythm. No significant murmur, rubs or gallops appreciated.   Lungs:     Respirations regular, even and unlabored. Lungs clear to auscultation B/L. No wheezes, rales or rhonchi.   Abdomen:   Soft and nontender. No guarding, rebound tenderness or  organomegaly noted. Bowel sounds present x 4.   Extremities:  No clubbing, cyanosis or edema noted. Moves UE and LE equally B/L.     Results Review:    Results from last 7 days   Lab Units 07/02/25  0421 07/01/25  0227 06/29/25  0107 06/28/25  0342 06/26/25  0834 06/25/25  2304   WBC 10*3/mm3 9.33 11.46* 11.12* 9.75  --   11.01*   HEMOGLOBIN g/dL 9.0* 8.7* 8.4* 8.7* 9.0* 7.7*   PLATELETS 10*3/mm3 470* 436 338 285  --  190     Results from last 7 days   Lab Units 07/02/25  0421 07/01/25  0227 06/28/25  0342   SODIUM mmol/L 141 140 138   POTASSIUM mmol/L 3.5 3.5 3.5   CHLORIDE mmol/L 102 102 103   CO2 mmol/L 26.3 26.7 24.8   BUN mg/dL 23.8* 23.5* 22.4   CREATININE mg/dL 1.06* 1.12* 0.92   CALCIUM mg/dL 9.3 9.1 8.7   GLUCOSE mg/dL 115* 127* 117*         Results from last 7 days   Lab Units 07/02/25  0421   MAGNESIUM mg/dL 1.6     Results from last 7 days   Lab Units 06/25/25  1534   INR  1.15*           Imaging Results (Last 24 Hours)       ** No results found for the last 24 hours. **              Medications:  amLODIPine, 5 mg, Oral, Q24H  aspirin, 81 mg, Oral, Nightly  cholecalciferol, 4,000 Units, Oral, Nightly  diazePAM, 2 mg, Oral, Once  Diclofenac Sodium, 4 g, Topical, BID  donepezil, 10 mg, Oral, Nightly  enoxaparin sodium, 40 mg, Subcutaneous, Q24H  escitalopram, 10 mg, Oral, Daily  [Held by provider] methIMAzole, 5 mg, Oral, Daily  multivitamin, 1 tablet, Oral, Nightly  rosuvastatin, 5 mg, Oral, Daily  sodium chloride, 10 mL, Intravenous, Q12H               Assessment & Plan   Left proximal femur fracture: S/P repair with nailing. Pain control. PT/OT. Ortho input appreciated.     Acute blood loss anemia: Likely 2/2 above. S/P 2 units of PRBC's. Improved and stable today.     New onset paroxysmal Afib: Currently in NSR. Echo reveals an EF of 61-65%, mild LVH and grade I diastolic dysfunction. Brief episode noted. Not currently anticoagulated 2/2 concern for fall risk and confirmed with patient and her daughter this AM. Monitor on telemetry.     Left knee pain: No evidence of acute fracture on imaging.     Suspected acute gout flare of left first MTP: Appears clinically improved and indomethacin and colchicine D/C'd.     Mild leukocytosis: Resolved today. Repeat CBC in the AM.     Essential HTN: BP stable. Cont Norvasc. Cont to  monitor.     HLD: Cont statin.     Hyperthyroidism: TSH is elevated with low free T4. Methimazole currently being held.     Suspected CKD IIIa: Cr overall stable today. Monitor UOP and repeat labs in the AM.     Borderline hypokalemia: Replace per protocol.     DVT PPX: SQ Lovenox    Disposition Expedited appeal for IPR pending.     Mando Shahid DO  07/02/25  14:06 EDT     Electronically signed by Mando Shahid DO at 07/02/25 1415          Consult Notes (most recent note)        Jamilah Rosa APRN at 06/22/25 0821       Attestation signed by Cosmo Jay MD at 06/22/25 1017      I have reviewed this documentation and agree.    Cosmo Jay MD                          Referring Provider:   Reason for Consultation: Left intertrochanteric fracture    Patient Care Team:  Yaakov Riley DO as PCP - General (Family Medicine)    Chief complaint fall    Subjective     History of present illness: Vanessa is an 80-year-old female with past medical history of hypertension, hyperlipidemia, osteoarthritis, and hypothyroidism who presents today with a complaint of left hip pain after falling at home.  Patient reports that she was in the process of doing laundry and utilizing her rollator walker in order to ambulate.  She states that she tripped over her walker causing her to sustain a fall onto the floor.  She states that she landed directly against the left side and did hit her head.  Patient reports after sustaining her injury she was unable to get up and bear weight.  Patient reports that she did have to lie on the floor for roughly an hour before her  found her.  Patient was subsequently taken to the emergency room for further evaluation and treatment.  It was noted upon CT imaging that she had sustained a left hip fracture.  Patient denies any other acute injury, injections, or surgical intervention in regard to the left hip.  She states that she has had corticosteroid injections to the  bilateral knees due to arthritis.  In regard to treatment she has been using over-the-counter medication and mobility modification with limited relief.  Orthopedics was consulted in regard to fracture.  Patient presents today for further evaluation and treatment.    Review of Systems  Review of Systems   Constitutional:  Positive for activity change.   Musculoskeletal:  Positive for arthralgias, gait problem, joint swelling and myalgias.   All other systems reviewed and are negative.       History  Past Medical History:   Diagnosis Date    Hyperlipidemia     Hypertension     Hyperthyroidism     Osteoarthritis    , History reviewed. No pertinent surgical history., History reviewed. No pertinent family history.,   Social History     Tobacco Use    Smoking status: Never     Passive exposure: Never    Smokeless tobacco: Never   Vaping Use    Vaping status: Never Used   Substance Use Topics    Alcohol use: Never    Drug use: Never   ,   Medications Prior to Admission   Medication Sig Dispense Refill Last Dose/Taking    acetaminophen (TYLENOL) 500 MG tablet Take 2 tablets by mouth Every Night.   Past Week    acetaminophen (TYLENOL) 500 MG tablet Take 3 tablets by mouth Every Morning.   6/21/2025    amLODIPine (NORVASC) 5 MG tablet Take 1 tablet by mouth Daily.   6/21/2025    aspirin 81 MG EC tablet Take 1 tablet by mouth Every Night.   Past Week    BIOTIN PO Take 2 tablets by mouth Daily.   6/21/2025    Cholecalciferol 50 MCG (2000 UT) tablet Take 2 tablets by mouth Every Night.   Past Week    donepezil (ARICEPT) 10 MG tablet Take 1 tablet by mouth Every Night.   Past Week    escitalopram (LEXAPRO) 10 MG tablet Take 1 tablet by mouth Daily.   6/21/2025    losartan (COZAAR) 50 MG tablet Take 1 tablet by mouth Daily.   6/21/2025    methIMAzole (TAPAZOLE) 5 MG tablet Take 1 tablet by mouth Daily.   6/21/2025    multivitamin tablet tablet Take 1 tablet by mouth Every Night.   Past Week    omega-3 acid ethyl esters (LOVAZA) 1  g capsule Take 2 capsules by mouth 2 (Two) Times a Day.   6/21/2025 Morning    potassium chloride 10 MEQ CR tablet Take 1 tablet by mouth Daily.   6/21/2025    rosuvastatin (CRESTOR) 5 MG tablet Take 1 tablet by mouth Daily.   6/21/2025    triamterene-hydrochlorothiazide (DYAZIDE) 37.5-25 MG per capsule Take 1 capsule by mouth Daily.   6/21/2025    Diclofenac Sodium (VOLTAREN) 1 % gel gel Apply 4 g topically to the appropriate area as directed 4 (Four) Times a Day As Needed (Knee Pain).   Unknown   , Scheduled Meds:  amLODIPine, 5 mg, Oral, Q24H  sodium chloride, 10 mL, Intravenous, Q12H    , Continuous Infusions:  sodium chloride, 100 mL/hr, Last Rate: 100 mL/hr (06/22/25 0211)    , PRN Meds:    senna-docusate sodium **AND** polyethylene glycol **AND** bisacodyl **AND** bisacodyl    Morphine **AND** naloxone    sodium chloride    sodium chloride and Allergies:  Sulfa antibiotics    Objective     Vital Signs   Temp:  [97.5 °F (36.4 °C)-98.6 °F (37 °C)] 98.4 °F (36.9 °C)  Heart Rate:  [77-91] 80  Resp:  [16-20] 18  BP: (139-163)/(60-75) 155/60    Physical Exam:   Physical Exam  HENT:      Head: Normocephalic.      Nose: Nose normal.   Cardiovascular:      Rate and Rhythm: Normal rate.   Pulmonary:      Effort: Pulmonary effort is normal.   Musculoskeletal:      Cervical back: Normal range of motion.      Comments: Upon examination of the left lower extremity there is no edema, ecchymosis, erythema, wounds, drainage, or signs of an infectious process.  Patient sensation appears to be grossly intact to light touch with brisk capillary refill.  Patient able to plantar and dorsiflex at the ankle without complication.  There is a palpable pulse distally.  There is shortening and external rotation noted to the left lower extremity.   Skin:     General: Skin is warm and dry.      Capillary Refill: Capillary refill takes less than 2 seconds.   Neurological:      General: No focal deficit present.      Mental Status: She is  "alert and oriented to person, place, and time.   Psychiatric:         Mood and Affect: Mood normal.         Results Review:     Results from last 7 days   Lab Units 06/22/25  0529 06/22/25  0156 06/21/25 2117   CRP mg/dL  --   --  <0.30   LACTATE mmol/L 1.6 2.1*  --    WBC 10*3/mm3  --  15.90* 17.41*   HEMOGLOBIN g/dL  --  11.3* 11.5*   HEMATOCRIT %  --  37.7 34.5   MCV fL  --  111.9* 98.6*   MCHC g/dL  --  30.0* 33.3   PLATELETS 10*3/mm3  --  248 253         Results from last 7 days   Lab Units 06/22/25  0156 06/21/25 2117   SODIUM mmol/L 140 141   POTASSIUM mmol/L 4.4 4.1   CHLORIDE mmol/L 108* 106   CO2 mmol/L 17.0* 19.7*   BUN mg/dL 30.4* 30.3*   CREATININE mg/dL 1.36* 1.45*   CALCIUM mg/dL 9.2 9.7   GLUCOSE mg/dL 162* 148*   ALBUMIN g/dL 4.1 4.4   BILIRUBIN mg/dL 0.5 0.4   ALK PHOS U/L 69 78   AST (SGOT) U/L 32 29   ALT (SGPT) U/L 20 21   Estimated Creatinine Clearance: 32.5 mL/min (A) (by C-G formula based on SCr of 1.36 mg/dL (H)).  No results found for: \"AMMONIA\"  Results from last 7 days   Lab Units 06/22/25  0156 06/21/25 2117   CK TOTAL U/L 519* 326*         Lab Results   Component Value Date    HGBA1C 5.92 (H) 06/21/2025     Lab Results   Component Value Date    TSH 4.900 (H) 06/21/2025    FREET4 0.85 (L) 06/21/2025     No results found for: \"PREGTESTUR\", \"PREGSERUM\", \"HCG\", \"HCGQUANT\"  Pain Management Panel           No data to display              No results found for: \"BLOODCX\"  No results found for: \"URINECX\"  No results found for: \"WOUNDCX\"  No results found for: \"STOOLCX\"      ---------------------------------------------------------------------------------------------------------------------   Imaging Results (Last 7 Days)       Procedure Component Value Units Date/Time    XR Hip With or Without Pelvis 2 - 3 View Left - In process [[id number]] Resulted: 06/22/25 0757     Updated: 06/22/25 0821    This result has not been signed. Information might be incomplete.      CT Lumbar Spine Without " Contrast [[id number]] Collected: 06/21/25 2334     Updated: 06/21/25 2338    Narrative:      EXAMINATION: CT lumbar spine without contrast     CLINICAL HISTORY: Fall     COMPARISON: None     TECHNIQUE: Contiguous 3 mm thick slices were obtained through the lumbar  spine without contrast. Coronal and sagittal reformats were performed.     FINDINGS:  Sagittal alignment is normal. Mild left convex curvature is centered at  the L4/L5 level. Mild right convex curvature centered at the L1/L2  level. There is no acute fracture. No traumatic listhesis. Moderate  multilevel disc related degenerative changes are noted. At the L4/L5  level there is a posterior disc bulge and comminution with facet  arthrosis and ligamentous thickening. The findings result in at least  moderate central canal stenosis. The disc bulges noted at the L5/S1  level as well.       Impression:      1. Multilevel disc related degenerative changes and facet arthrosis.  2. No evidence of recent trauma.     This report was finalized on 6/21/2025 11:36 PM by Juanjose Rocha MD.       CT Pelvis Without Contrast [[id number]] Collected: 06/21/25 2331     Updated: 06/21/25 2336    Narrative:      EXAMINATION: CT pelvis without contrast     CLINICAL HISTORY: Injury.     COMPARISON: None available.     TECHNIQUE: Contiguous 3 mm thick slices were obtained through the pelvis  without contrast. Coronal and sagittal reformats were performed.     FINDINGS:  Alignment at the hips is normal. Mild bilateral hip osteoarthrosis is  noted. There is an acute comminuted fracture through the  intertrochanteric region of the left proximal femur. There is apex  lateral angulation at the fracture site. Additionally, the distal  fragment is displaced medially with respect to the more proximal  fragment. There is apex anterior angulation at the fracture site as  well. Extensive adjacent soft tissue swelling is noted. No definite  acetabular fracture is appreciated.       Impression:       1. Acute comminuted left intertrochanteric proximal femur fracture.     This report was finalized on 6/21/2025 11:34 PM by Juanjose Rocha MD.       CT Head Without Contrast [[id number]] Collected: 06/21/25 2323     Updated: 06/21/25 2333    Narrative:      EXAMINATION: CT head without contrast     CLINICAL HISTORY: Fall     COMPARISON: None available.     TECHNIQUE: Contiguous 3 mm thick slices were obtained from the skull  base to the vertex without contrast. Coronal and sagittal reformats were  performed.     FINDINGS:  Moderate generalized volume loss is noted with prominence of the sulci  and ventricular system. White matter changes are noted in a pattern  suggesting chronic small vessel ischemic disease. There is no acute  intracranial hemorrhage. No acute territorial infarct. No extra-axial  collection is identified. There is no shift of midline structures. No  acute calvarial fracture is appreciated. Hyperostosis frontalis is  noted.       Impression:      1. No acute intracranial process.     This report was finalized on 6/21/2025 11:31 PM by Juanjose Rocha MD.               Assessment & Plan       Closed intertrochanteric fracture of left femur      The on-call surgeon Dr. Jay has been made aware of the consult.  Recommendation at this time is for surgical intervention.    Continue with pain control.  Activity as tolerated with pain as the guide.    Patient is to remain nonweightbearing to the left lower extremity.  An order for x-ray imaging will be placed today.    Lengthy discussion was had today with the patient and family in regard to surgical versus conservative intervention.  They were informed that due to the severity of the fracture the recommendation at this time would be for surgical intervention.    The plan will be for a left hip intertrochanteric nailing.  The nature of this procedure, as well as risks, benefits, alternatives, and complications to the above operation were discussed with the  patient.  Risks include but are not limited to: Anesthesia complications, death, injury to nerves/vessels, infection, blood clots, continued pain, and repeat or revision surgery.  Following the discussion the patient verbalized understanding of the risks and benefits of the above procedure and elected to proceed with surgery.    Labs including a PT/INR, PTT, and type/screen will be placed into the system today.    Patient will be n.p.o. after midnight.  An order for a case request and consent will be placed into the system.    Orthopedics will continue to follow.        KELSEY Mccann  25  08:21 EDT            Electronically signed by Cosmo Jay MD at 25 1017          Physical Therapy Notes (most recent note)        Sage Fuentes, PT at 25 1543  Version 1 of 1         Acute Care - Physical Therapy Treatment Note   [facility]     Patient Name: Lizbet Arzola  : 1945  MRN: [id number]  Today's Date: 2025      Visit Dx:     ICD-10-CM ICD-9-CM   1. Closed displaced intertrochanteric fracture of left femur, initial encounter  S72.142A 820.21   2. Acute low back pain with sciatica, sciatica laterality unspecified, unspecified back pain laterality  M54.40 724.2     724.3     Patient Active Problem List   Diagnosis    Closed intertrochanteric fracture of left femur     Past Medical History:   Diagnosis Date    Hyperlipidemia     Hypertension     Hyperthyroidism     Osteoarthritis      Past Surgical History:   Procedure Laterality Date    LIPOMA EXCISION       PT Assessment (Last 12 Hours)       PT Evaluation and Treatment       Row Name 25 1534          Physical Therapy Time and Intention    Subjective Information complains of;pain  -KM     Document Type therapy note (daily note)  -KM     Mode of Treatment physical therapy  -KM     Patient Effort good  -KM     Symptoms Noted During/After Treatment fatigue;increased pain  -KM       Row Name 25 7880          General  Information    Patient Profile Reviewed yes  -KM     Patient Observations alert;cooperative;agree to therapy  -KM     Existing Precautions/Restrictions fall;weight bearing;other (see comments)  -       Row Name 07/02/25 1537          Pain Scale: FACES Pre/Post-Treatment    Pain: FACES Scale, Pretreatment 0-->no hurt  -KM     Posttreatment Pain Rating 2-->hurts little bit  -       Row Name 07/02/25 1537          Cognition    Affect/Mental Status (Cognition) WFL  -KM     Orientation Status (Cognition) oriented to;person;place;situation  -KM     Follows Commands (Cognition) follows one-step commands  -       Row Name 07/02/25 1537          Bed Mobility    Bed Mobility supine-sit  -KM     Supine-Sit [place] (Bed Mobility) moderate assist (50% patient effort);1 person assist;verbal cues  -KM     Bed Mobility, Safety Issues decreased use of arms for pushing/pulling;decreased use of legs for bridging/pushing  -     Assistive Device (Bed Mobility) bed rails;head of bed elevated;repositioning sheet  -       Row Name 07/02/25 1537          Transfers    Transfers sit-stand transfer;stand-sit transfer;bed-chair transfer;chair-bed transfer  -       Row Name 07/02/25 1537          Bed-Chair Transfer    Bed-Chair [place] (Transfers) moderate assist (50% patient effort);verbal cues  -     Assistive Device (Bed-Chair Transfers) walker, front-wheeled  -       Row Name 07/02/25 1537          Chair-Bed Transfer    Chair-Bed [place] (Transfers) moderate assist (50% patient effort);verbal cues  -     Assistive Device (Chair-Bed Transfers) walker, front-wheeled  -       Row Name 07/02/25 1537          Sit-Stand Transfer    Sit-Stand [place] (Transfers) moderate assist (50% patient effort);verbal cues  -     Assistive Device (Sit-Stand Transfers) walker, front-wheeled  -KM       Row Name 07/02/25 1537          Stand-Sit Transfer    Stand-Sit [place] (Transfers) moderate assist (50% patient  effort);verbal cues  -KM     Assistive Device (Stand-Sit Transfers) walker, front-wheeled  -KM       Row Name 07/02/25 1537          Gait/Stairs (Locomotion)    Gait/Stairs Locomotion gait/ambulation independence;gait/ambulation assistive device;distance ambulated  -KM     [place] Level (Gait) minimum assist (75% patient effort);verbal cues  -KM     Assistive Device (Gait) walker, front-wheeled  -KM     Patient was able to Ambulate yes  -KM     Distance in Feet (Gait) 30  20' 30'  -KM     Pattern (Gait) step-to  -KM     Deviations/Abnormal Patterns (Gait) antalgic;ataxic;base of support, narrow;gait speed decreased  -KM     Bilateral Gait Deviations forward flexed posture;knee buckling bilaterally  -KM     Left Sided Gait Deviations weight shift ability decreased  -KM       Row Name 07/02/25 1537          Safety Issues/Impairments Affecting Functional Mobility    Impairments Affecting Function (Mobility) balance;endurance/activity tolerance;pain;strength  -KM       Row Name 07/02/25 1537          Motor Skills    Comments, Therapeutic Exercise seated ther-ex  -KM       Row Name             Wound 06/23/25 1619 Left hip Surgical    Wound - Properties Group Placement Date: 06/23/25  -KB Placement Time: 1619  -KB Present on Original Admission: N  -KB Side: Left  -KB Location: hip  -KB Primary Wound Type: Surgical  -KB, incision sites x4     Retired Wound - Properties Group Placement Date: 06/23/25  -KB Placement Time: 1619  -KB Present on Original Admission: N  -KB Side: Left  -KB Location: hip  -KB    Retired Wound - Properties Group Placement Date: 06/23/25  -KB Placement Time: 1619  -KB Present on Original Admission: N  -KB Side: Left  -KB Location: hip  -KB    Retired Wound - Properties Group Date first assessed: 06/23/25  -KB Time first assessed: 1619  -KB Present on Original Admission: N  -KB Side: Left  -KB Location: hip  -KB      Row Name 07/02/25 1537          Progress Summary (PT)    Daily Progress Summary  (PT) Pt. was able to demonstrate progress in all aspects of mobility. She was able to increase ambulation distance and quality w/ RW. She continues to show that she is able to tolerate 3 hours of therapy. Pt. would still most benefit from higher intensity PT services at inpatient rehab.  -               User Key  (r) = Recorded By, (t) = Taken By, (c) = Cosigned By      Initials Name Provider Type    Cheryl Aponte, RN Registered Nurse    Sage Mccain, PT Physical Therapist                    Physical Therapy Education       Title: PT OT SLP Therapies (Done)       Topic: Physical Therapy (Done)       Point: Mobility training (Done)       Learning Progress Summary            Patient Acceptance, E,TB, VU by MP at 7/2/2025 0916    Acceptance, E,TB, VU by HG at 7/2/2025 0511    Acceptance, E,TB, VU by HG at 6/30/2025 0604    Acceptance, E,TB, VU by KM at 6/24/2025 1421   Family Acceptance, E,TB, VU by HG at 7/2/2025 0511    Acceptance, E,TB, VU by HG at 6/30/2025 0604                      Point: Home exercise program (Done)       Learning Progress Summary            Patient Acceptance, E,TB, VU by MP at 7/2/2025 0916    Acceptance, E,TB, VU by HG at 7/2/2025 0511    Acceptance, E,TB, VU by HG at 6/30/2025 0604    Acceptance, E,TB, VU by KM at 6/24/2025 1421   Family Acceptance, E,TB, VU by HG at 7/2/2025 0511    Acceptance, E,TB, VU by HG at 6/30/2025 0604                      Point: Body mechanics (Done)       Learning Progress Summary            Patient Acceptance, E,TB, VU by MP at 7/2/2025 0916    Acceptance, E,TB, VU by HG at 7/2/2025 0511    Acceptance, E,TB, VU by HG at 6/30/2025 0604    Acceptance, E,TB, VU by KM at 6/24/2025 1421   Family Acceptance, E,TB, VU by HG at 7/2/2025 0511    Acceptance, E,TB, VU by HG at 6/30/2025 0604                      Point: Precautions (Done)       Learning Progress Summary            Patient Acceptance, E,TB, VU by MP at 7/2/2025 0916    Acceptance, E,TB, VU by   at 7/2/2025 0511    Acceptance, E,TB, VU by  at 6/30/2025 0604    Acceptance, E,TB, VU by  at 6/24/2025 1421   Family Acceptance, E,TB, VU by  at 7/2/2025 0511    Acceptance, E,TB, VU by  at 6/30/2025 0604                                      User Key       Initials Effective Dates Name Provider Type Discipline     06/16/21 -  Heaven Burleson, RN Registered Nurse Nurse     05/24/22 -  Sage Fuentes, PT Physical Therapist PT     01/22/25 -  Christine Lepe RN Registered Nurse Nurse                  PT Recommendation and Plan  Anticipated Discharge Disposition (PT): inpatient rehabilitation facility  Planned Therapy Interventions (PT): balance training, bed mobility training, gait training, home exercise program, patient/family education, postural re-education, ROM (range of motion), strengthening, stretching, transfer training, stair training, wheelchair management/propulsion training  Therapy Frequency (PT): 6 times/wk  Progress Summary (PT)  Daily Progress Summary (PT): Pt. was able to demonstrate progress in all aspects of mobility. She was able to increase ambulation distance and quality w/ RW. She continues to show that she is able to tolerate 3 hours of therapy. Pt. would still most benefit from higher intensity PT services at inpatient rehab.  Plan of Care Reviewed With: patient  Outcome Evaluation: Pt. evaluation completed during PT session. She was able to perform functional mobility skills w/ maxA x2. She was unable to ambulate at time of evaluation d/t weakness and pain. She demonstrates impaired strength and ROM. Pt. would benefit from inpatient rehab to address weakness, pain, impaired mobility, safety precautions, and fall risk.       Time Calculation:    PT Charges       Row Name 07/02/25 2180             Time Calculation    PT Received On 07/02/25  -KM         Time Calculation- PT    Total Timed Code Minutes- PT 45 minute(s)  -KM                User Key  (r) = Recorded By, (t) = Taken  By, (c) = Cosigned By      Initials Name Provider Type     Sage Fuentes, PT Physical Therapist                  Therapy Charges for Today       Code Description Service Date Service Provider Modifiers Qty    89055127081 HC PT THERAPEUTIC ACT EA 15 MIN 2025 Sage Fuentes, PT GP 1    27902889991 HC GAIT TRAINING EA 15 MIN 2025 Sage Fuentes, PT GP 1    95148896817 HC PT THERAPEUTIC ACT EA 15 MIN 2025 Sage Fuentes, PT GP 1    41706238471 HC PT THER PROC EA 15 MIN 2025 Sage Fuentes, PT GP 1    80266254827 HC GAIT TRAINING EA 15 MIN 2025 Sage Fuentes, PT GP 1            PT G-Codes  AM-PAC 6 Clicks Score (PT): 17    Sage Fuentes PT  2025      Electronically signed by Sage Fuentes, PT at 25 1543          Occupational Therapy Notes (most recent note)        Izzy Murillo, OT at 25 1501          Patient Name: Lizbet Arzola  : 1945    MRN: [id number]                              Today's Date: 2025       Admit Date: 2025    Visit Dx:     ICD-10-CM ICD-9-CM   1. Closed displaced intertrochanteric fracture of left femur, initial encounter  S72.142A 820.21   2. Acute low back pain with sciatica, sciatica laterality unspecified, unspecified back pain laterality  M54.40 724.2     724.3     Patient Active Problem List   Diagnosis    Closed intertrochanteric fracture of left femur     Past Medical History:   Diagnosis Date    Hyperlipidemia     Hypertension     Hyperthyroidism     Osteoarthritis      Past Surgical History:   Procedure Laterality Date    LIPOMA EXCISION        General Information       Row Name 25 1453          OT Time and Intention    Subjective Information pain  -KP     Document Type therapy note (daily note)  -KP     Mode of Treatment individual therapy;occupational therapy  -KP     Patient Effort good  -KP     Symptoms Noted During/After Treatment increased pain  -KP     Comment Patient seen for OT treatment.  -KP       Row Name 25 1451           General Information    Patient Profile Reviewed yes  -     Existing Precautions/Restrictions fall;weight bearing;other (see comments)  -     Barriers to Rehab none identified  -       Row Name 07/02/25 1453          Cognition    Orientation Status (Cognition) oriented to;person;place;situation  -       Row Name 07/02/25 1453          Safety Issues/Impairments Affecting Functional Mobility    Impairments Affecting Function (Mobility) balance;endurance/activity tolerance;pain;strength  -               User Key  (r) = Recorded By, (t) = Taken By, (c) = Cosigned By      Initials Name Provider Type     Izzy Murillo, OT Occupational Therapist                     Mobility/ADL's       Row Name 07/02/25 1457          Bed Mobility    Bed Mobility supine-sit  -     Supine-Sit [place] (Bed Mobility) moderate assist (50% patient effort);1 person assist;verbal cues  -     Bed Mobility, Safety Issues decreased use of arms for pushing/pulling;decreased use of legs for bridging/pushing  -     Assistive Device (Bed Mobility) bed rails;head of bed elevated;repositioning sheet  -[facility] Name 07/02/25 1457          Transfers    Transfers sit-stand transfer;stand-sit transfer  -[facility] Name 07/02/25 1457          Sit-Stand Transfer    Sit-Stand [place] (Transfers) moderate assist (50% patient effort);verbal cues  -     Assistive Device (Sit-Stand Transfers) walker, front-wheeled  -[facility] Name 07/02/25 1457          Stand-Sit Transfer    Stand-Sit [place] (Transfers) moderate assist (50% patient effort);verbal cues  -     Assistive Device (Stand-Sit Transfers) walker, front-wheeled  -[facility] Name 07/02/25 1457          Activities of Daily Living    BADL Assessment/Intervention lower body dressing;toileting  -[facility] Name 07/02/25 1457          Mobility    Extremity Weight-bearing Status left lower extremity  -     Left Lower Extremity (Weight-bearing Status)  weight-bearing as tolerated (WBAT)  -[facility] Name 07/02/25 1457          Lower Body Dressing Assessment/Training    [place] Level (Lower Body Dressing) lower body dressing skills;maximum assist (25% patient effort)  -[facility] Name 07/02/25 1457          Toileting Assessment/Training    [place] Level (Toileting) toileting skills;dependent (less than 25% patient effort)  -               User Key  (r) = Recorded By, (t) = Taken By, (c) = Cosigned By      Initials Name Provider Type    Izzy Motta OT Occupational Therapist                   Obj/Interventions       [facility] Name 07/02/25 1458          Motor Skills    Motor Skills functional endurance  -     Functional Endurance good minus  -               User Key  (r) = Recorded By, (t) = Taken By, (c) = Cosigned By      Initials Name Provider Type    Izzy Motta OT Occupational Therapist                   Goals/Plan    No documentation.                  Clinical Impression       [facility] Name 07/02/25 1459          Pain Assessment    Pain Side/Orientation left;lower  -[facility] Name 07/02/25 1459          Pain Scale: FACES Pre/Post-Treatment    Pain: FACES Scale, Pretreatment 0-->no hurt  -     Posttreatment Pain Rating 2-->hurts little bit  -[facility] Name 07/02/25 1459          Plan of Care Review    Plan of Care Reviewed With patient  -     Progress improving  -     Outcome Evaluation Patient seen for OT treatment. She continues to improve with tolerance for functional mobility/transfers with decreased pain on this date. Continue plan of care.  -[facility] Name 07/02/25 1459          Therapy Plan Review/Discharge Plan (OT)    Anticipated Discharge Disposition (OT) inpatient rehabilitation facility  -[facility] Name 07/02/25 1459          Positioning and Restraints    Pre-Treatment Position in bed  -     Post Treatment Position chair  -     In Chair sitting;call light within reach;encouraged to call for assist;with  family/caregiver  -               User Key  (r) = Recorded By, (t) = Taken By, (c) = Cosigned By      Initials Name Provider Type    Izzy Motta OT Occupational Therapist                   Outcome Measures       Row Name 07/02/25 0811          How much help from another person do you currently need...    Turning from your back to your side while in flat bed without using bedrails? 3  -MP     Moving from lying on back to sitting on the side of a flat bed without bedrails? 3  -MP     Moving to and from a bed to a chair (including a wheelchair)? 3  -MP     Standing up from a chair using your arms (e.g., wheelchair, bedside chair)? 3  -MP     Climbing 3-5 steps with a railing? 2  -MP     To walk in hospital room? 3  -MP     AM-PAC 6 Clicks Score (PT) 17  -MP               User Key  (r) = Recorded By, (t) = Taken By, (c) = Cosigned By      Initials Name Provider Type    Heaven Glaser RN Registered Nurse                      OT Recommendation and Plan  Planned Therapy Interventions (OT): activity tolerance training, BADL retraining, functional balance retraining, patient/caregiver education/training, passive ROM/stretching, occupation/activity based interventions, ROM/therapeutic exercise, strengthening exercise, transfer/mobility retraining  Therapy Frequency (OT): 5 times/wk  Plan of Care Review  Plan of Care Reviewed With: patient  Progress: improving  Outcome Evaluation: Patient seen for OT treatment. She continues to improve with tolerance for functional mobility/transfers with decreased pain on this date. Continue plan of care.     Time Calculation:         Time Calculation- OT       Row Name 07/02/25 1500             Time Calculation- OT    OT Received On 07/02/25  -                User Key  (r) = Recorded By, (t) = Taken By, (c) = Cosigned By      Initials Name Provider Type    Izzy Motta OT Occupational Therapist                  Therapy Charges for Today       Code Description Service  Date Service Provider Modifiers Qty    02476807137  OT THERAPEUTIC ACT EA 15 MIN 7/1/2025 Izzy Murillo OT GO 1    68610542302 HC OT SELF CARE/MGMT/TRAIN EA 15 MIN 7/1/2025 Izzy Murillo OT GO 1    [id number]  OT THERAPEUTIC ACT EA 15 MIN 7/2/2025 Izzy Murillo OT GO 1                 Izzy Murillo OT  7/2/2025    Electronically signed by Izzy Murillo OT at 07/02/25 1501       Speech Language Pathology Notes (most recent note)    No notes exist for this encounter.       ADL Documentation (most recent)      Flowsheet Row Most Recent Value   Transferring 3 - assistive equipment and person   Toileting 3 - assistive equipment and person   Bathing 2 - assistive person   Dressing 2 - assistive person   Eating 0 - independent   Communication 0 - understands/communicates without difficulty   Swallowing 0 - swallows foods/liquids without difficulty   Equipment Currently Used at Home rollator, walker, standard, cane, straight, other (see comments)  [Elevated toilet seat]

## 2025-07-03 NOTE — PAYOR COMM NOTE
"Kathrine Wyatt RN CM/Swing Bed  [email or web address]  Phone: [phone] Fax: [phone]  -NPI [id number]  -NPI [id number]  Authorization No:     Patient needs post acute skilled nursing facility for PT/OT strength, mobility, ADL and transfer training. Lives at home with spouse and plans to return home when she can do so safely.     ICD 10  S72.142D    Lizbet Rivera \"Vanessa\" (80 y.o. Female)       Date of Birth   1945    Social Security Number       Address   [address]  [place] [id number]    Home Phone   [phone]    MRN   [id number]       [facility]    Marital Status                               Admission Date   6/21/2025    Admission Type   Emergency    Admitting Provider   Jose Chau MD    Attending Provider   Mando Shahid DO    Department, Room/Bed   [address], [facility]/       Discharge Date       Discharge Disposition       Discharge Destination                                 Attending Provider: Mando Shahid DO    Allergies: Sulfa Antibiotics    Isolation: None   Infection: None   Code Status: CPR    Ht: 170.2 cm (67\")   Wt: 68.9 kg (152 lb)    Admission Cmt: None   Principal Problem: Closed intertrochanteric fracture of left femur [S72.142A]                   Active Insurance as of 6/21/2025       Primary Coverage       Payor Plan Insurance Group Employer/Plan Group    [employer] MEDICARE REPLACEMENT [employer] Medicare Advantage GROUP PPO [id number]       Payor Plan Address Payor Plan Phone Number Payor Plan Fax Number Effective Dates    [address] [address]   1/1/2025 - None Entered    [facility] [id number]         Subscriber Name Subscriber Birth Date Member ID       LIZBET RIVERA 1945 [id number]                     Emergency Contacts        (Rel.) Home Phone Work Phone Mobile Phone    kyara(POA)carlos (Daughter) -- -- [phone]    jakob(POA)maye (Daughter) [phone] -- " [phone]    Dariusz(POA)Parveen (Son) -- -- [phone]                 History & Physical        Jose Chau MD at 25 0131          Hospitalist History and Physical        Patient Identification  Name: Lizbet Arzola  Age/Sex: 80 y.o. female  :  1945        MRN: [id number]  Visit Number: [id number]  Admit Date: 2025   PCP: Yaakov Riley DO          Chief complaint fall, left hip pain    History of Present Illness:  Patient is a 80 y.o. female with history of HTN, HLD, OA and hyperthyroidism on methimazole, who presents with complaints of left hip pain after falling at home. She reports she was doing laundry when she tripped over her rollator and fell to the floor, hitting her head and landing on her left side. She was unable to get up and laid in the floor for an hour before her  found her. She was brought to the ED and found to have suffered a left intertronchanteric femur fracture. She also suffered a posterior scalp laceration from the fall which was stabled by her ED provider.     Review of Systems  Review of Systems   Constitutional:  Negative for activity change, appetite change, chills, diaphoresis, fatigue, fever and unexpected weight change.   HENT:  Negative for congestion, postnasal drip, rhinorrhea, sinus pressure, sinus pain and sore throat.    Eyes:  Negative for photophobia and visual disturbance.   Respiratory:  Negative for cough, shortness of breath and wheezing.    Cardiovascular:  Negative for chest pain, palpitations and leg swelling.   Gastrointestinal:  Negative for abdominal distention, abdominal pain, constipation, diarrhea, nausea and vomiting.   Genitourinary:  Negative for difficulty urinating, dysuria, flank pain and frequency.   Musculoskeletal:  Positive for arthralgias (left hip (acute), multiple other joints including left knee (chronic)).   Skin:  Positive for wound (laceration back of head from fall).   Neurological:  Negative for  dizziness, tremors, seizures, syncope, weakness, light-headedness, numbness and headaches.   Hematological:  Negative for adenopathy. Does not bruise/bleed easily.   Psychiatric/Behavioral:  Negative for agitation, behavioral problems and confusion.        History  Past Medical History:   Diagnosis Date    Hyperlipidemia     Hypertension     Hyperthyroidism     Osteoarthritis      History reviewed. No pertinent surgical history.  History reviewed. No pertinent family history.     (Not in a hospital admission)    Allergies:  Sulfa antibiotics    Objective    Vital Signs  Temp:  [98.6 °F (37 °C)] 98.6 °F (37 °C)  Heart Rate:  [77-91] 91  Resp:  [16] 16  BP: (150-163)/(63-71) 150/63  Body mass index is 21.93 kg/m².    Physical Exam:  Physical Exam  Constitutional:       General: She is not in acute distress.     Appearance: Normal appearance. She is not ill-appearing.   HENT:      Head: Normocephalic and atraumatic.      Right Ear: External ear normal.      Left Ear: External ear normal.      Nose: Nose normal.      Mouth/Throat:      Mouth: Mucous membranes are moist.      Pharynx: Oropharynx is clear.   Eyes:      Extraocular Movements: Extraocular movements intact.      Conjunctiva/sclera: Conjunctivae normal.      Pupils: Pupils are equal, round, and reactive to light.   Cardiovascular:      Rate and Rhythm: Normal rate and regular rhythm.      Pulses: Normal pulses.      Heart sounds: Normal heart sounds. No murmur heard.  Pulmonary:      Effort: Pulmonary effort is normal. No respiratory distress.      Breath sounds: Normal breath sounds. No wheezing or rales.   Abdominal:      General: Abdomen is flat. Bowel sounds are normal. There is no distension.      Palpations: Abdomen is soft.      Tenderness: There is no abdominal tenderness.   Musculoskeletal:      Cervical back: Normal range of motion and neck supple. No tenderness.      Right lower leg: No edema.      Left lower leg: No edema.      Comments: Left leg  appx 1 inch shorter than right.    Lymphadenopathy:      Cervical: No cervical adenopathy.   Skin:     General: Skin is warm and dry.      Capillary Refill: Capillary refill takes less than 2 seconds.      Comments: Laceration posterior scalp which has been stapled   Neurological:      General: No focal deficit present.      Mental Status: She is alert and oriented to person, place, and time.   Psychiatric:         Mood and Affect: Mood normal.         Behavior: Behavior normal.           Results Review:       Lab Results:  Results from last 7 days   Lab Units 06/21/25 2117   WBC 10*3/mm3 17.41*   HEMOGLOBIN g/dL 11.5*   PLATELETS 10*3/mm3 253     Results from last 7 days   Lab Units 06/21/25 2117   CRP mg/dL <0.30     Results from last 7 days   Lab Units 06/21/25 2117   SODIUM mmol/L 141   POTASSIUM mmol/L 4.1   CHLORIDE mmol/L 106   CO2 mmol/L 19.7*   BUN mg/dL 30.3*   CREATININE mg/dL 1.45*   CALCIUM mg/dL 9.7   GLUCOSE mg/dL 148*         Hemoglobin A1C   Date Value Ref Range Status   06/21/2025 5.92 (H) 4.80 - 5.60 % Final     Results from last 7 days   Lab Units 06/21/25 2117   BILIRUBIN mg/dL 0.4   ALK PHOS U/L 78   AST (SGOT) U/L 29   ALT (SGPT) U/L 21     Results from last 7 days   Lab Units 06/21/25 2117   CK TOTAL U/L 326*                   I have reviewed the patient's laboratory results.    Imaging:  Imaging Results (Last 72 Hours)       Procedure Component Value Units Date/Time    CT Lumbar Spine Without Contrast [[id number]] Collected: 06/21/25 2334     Updated: 06/21/25 2338    Narrative:      EXAMINATION: CT lumbar spine without contrast     CLINICAL HISTORY: Fall     COMPARISON: None     TECHNIQUE: Contiguous 3 mm thick slices were obtained through the lumbar  spine without contrast. Coronal and sagittal reformats were performed.     FINDINGS:  Sagittal alignment is normal. Mild left convex curvature is centered at  the L4/L5 level. Mild right convex curvature centered at the L1/L2  level. There  is no acute fracture. No traumatic listhesis. Moderate  multilevel disc related degenerative changes are noted. At the L4/L5  level there is a posterior disc bulge and comminution with facet  arthrosis and ligamentous thickening. The findings result in at least  moderate central canal stenosis. The disc bulges noted at the L5/S1  level as well.       Impression:      1. Multilevel disc related degenerative changes and facet arthrosis.  2. No evidence of recent trauma.     This report was finalized on 6/21/2025 11:36 PM by Juanjose Rocha MD.       CT Pelvis Without Contrast [[id number]] Collected: 06/21/25 2331     Updated: 06/21/25 2336    Narrative:      EXAMINATION: CT pelvis without contrast     CLINICAL HISTORY: Injury.     COMPARISON: None available.     TECHNIQUE: Contiguous 3 mm thick slices were obtained through the pelvis  without contrast. Coronal and sagittal reformats were performed.     FINDINGS:  Alignment at the hips is normal. Mild bilateral hip osteoarthrosis is  noted. There is an acute comminuted fracture through the  intertrochanteric region of the left proximal femur. There is apex  lateral angulation at the fracture site. Additionally, the distal  fragment is displaced medially with respect to the more proximal  fragment. There is apex anterior angulation at the fracture site as  well. Extensive adjacent soft tissue swelling is noted. No definite  acetabular fracture is appreciated.       Impression:      1. Acute comminuted left intertrochanteric proximal femur fracture.     This report was finalized on 6/21/2025 11:34 PM by Juanjose Rocha MD.       CT Head Without Contrast [[id number]] Collected: 06/21/25 2323     Updated: 06/21/25 2333    Narrative:      EXAMINATION: CT head without contrast     CLINICAL HISTORY: Fall     COMPARISON: None available.     TECHNIQUE: Contiguous 3 mm thick slices were obtained from the skull  base to the vertex without contrast. Coronal and sagittal reformats  "were  performed.     FINDINGS:  Moderate generalized volume loss is noted with prominence of the sulci  and ventricular system. White matter changes are noted in a pattern  suggesting chronic small vessel ischemic disease. There is no acute  intracranial hemorrhage. No acute territorial infarct. No extra-axial  collection is identified. There is no shift of midline structures. No  acute calvarial fracture is appreciated. Hyperostosis frontalis is  noted.       Impression:      1. No acute intracranial process.     This report was finalized on 6/21/2025 11:31 PM by Juanjose Rocha MD.               I have personally reviewed the patient's radiologic imaging.        EKG: ordered, results pending        Assessment & Plan    - Closed intertrochanteric fracture of left femur: admit to medical surgical unit. IV morphine available PRN. Keep NPO since already after midnight. Orthopedic surgery notified by ED; Dr Jay to see patient in consultation in the morning.   - Mild CK elevation, likely secondary to above fracture and prolonged time down on the floor at home: does not meet criteria for rhabdomyolysis at this time. Hydrate with IV fluids and continue to trend.  - Renal insufficiency, acute vs chronic: no prior labs for comparison. Patient denies history of CKD, but her daughter mentioned that her creatinine does run \"a little high\". Elevated BUN:cr ratio suggests dehydration. Avoid nephrotoxic home meds (losartan, triamterene-HCTZ). Repeat labs in the morning to monitor response to IV fluid hydration.  - Leukocytosis: likely reactive from hip fracture. CRP and procal are normal. Will check UA to look for evidence of bacteruria pre-op.   - HTN: BP stable thus far. Holding losartan and triamterene-HCTZ as noted above. Plan to continue amlodipine in the morning. Continue same dose since pain medication may help to keep BP controlled, but if not then can increase amlodipine from 5 to 10mg moving forward.  - HLD: hold " statin for now in light of CK elevation.   - Hyperthyroidism: TSH mildly elevated at 4.900. Check free T4. Plan to continue methimazole once reconciled by pharmacy.   - Mild hyperglycemia: likely reactive to some extent from hip fracture, but A1c is mildly elevated as well at 5.92 indicating pre-diabetes. Recommend lifestyle modification moving forward.     DVT Prophylaxis: SCD to right leg only    Estimated Length of Stay >2 midnights    I discussed the patient's findings, assessment and plan with the patient, her daughter at bedside, and ED provider Quincy Story PA-C    Patient is high risk due to left intertrochanteric femur fracture requiring parenteral opioids for pain control    Jose Chau MD  06/22/25  01:34 EDT      Electronically signed by Jose Chau MD at 06/22/25 0146       Current Facility-Administered Medications   Medication Dose Route Frequency Provider Last Rate Last Admin    amLODIPine (NORVASC) tablet 5 mg  5 mg Oral Q24H Cosmo Jay MD   5 mg at 07/03/25 0849    aspirin EC tablet 81 mg  81 mg Oral Nightly Cosmo Jay MD   81 mg at 07/02/25 2211    sennosides-docusate (PERICOLACE) 8.6-50 MG per tablet 2 tablet  2 tablet Oral BID PRN Cosmo Jay MD   2 tablet at 06/28/25 0923    And    polyethylene glycol (MIRALAX) packet 17 g  17 g Oral Daily PRN Cosmo Jay MD   17 g at 06/28/25 2126    And    bisacodyl (DULCOLAX) EC tablet 5 mg  5 mg Oral Daily PRN Cosmo Jay MD   5 mg at 06/24/25 1612    And    bisacodyl (DULCOLAX) suppository 10 mg  10 mg Rectal Daily PRN Cosmo Jay MD        Calcium Replacement - Follow Nurse / BPA Driven Protocol   Not Applicable PRN Mando Shahid DO        cholecalciferol (VITAMIN D3) tablet 4,000 Units  4,000 Units Oral Nightly Cosmo Jay MD   4,000 Units at 07/02/25 2211    diazePAM (VALIUM) tablet 2 mg  2 mg Oral Once Chano Romero DO        Diclofenac Sodium (VOLTAREN) 1 % gel 4 g  4 g Topical BID  Chano Romero DO   4 g at 07/03/25 0850    donepezil (ARICEPT) tablet 10 mg  10 mg Oral Nightly Cosmo Jay MD   10 mg at 07/02/25 2211    enoxaparin sodium (LOVENOX) syringe 40 mg  40 mg Subcutaneous Q24H Mando Shahid DO   40 mg at 07/03/25 1512    escitalopram (LEXAPRO) tablet 10 mg  10 mg Oral Daily Cosmo Jay MD   10 mg at 07/03/25 0849    HYDROcodone-acetaminophen (NORCO) 5-325 MG per tablet 1 tablet  1 tablet Oral Q6H PRN Chano Romero DO   1 tablet at 07/03/25 0940    Magnesium Standard Dose Replacement - Follow Nurse / BPA Driven Protocol   Not Applicable PRN Mando Shahid DO        [Held by provider] methIMAzole (TAPAZOLE) tablet 5 mg  5 mg Oral Daily Cosmo Jay MD   5 mg at 06/24/25 0830    multivitamin (THERAGRAN) tablet 1 tablet  1 tablet Oral Nightly Cosmo Jay MD   1 tablet at 07/02/25 2211    naloxone (NARCAN) injection 0.4 mg  0.4 mg Intravenous Q5 Min PRN Chano Romero DO        Phosphorus Replacement - Follow Nurse / BPA Driven Protocol   Not Applicable PRN Mando Shahid DO        Potassium Replacement - Follow Nurse / BPA Driven Protocol   Not Applicable PRN Mando Shahid DO        rosuvastatin (CRESTOR) tablet 5 mg  5 mg Oral Daily oCsmo Jay MD   5 mg at 07/03/25 0849    sodium chloride 0.9 % flush 10 mL  10 mL Intravenous Q12H Cosmo Jay MD   10 mL at 07/02/25 2211    sodium chloride 0.9 % flush 10 mL  10 mL Intravenous PRN Cosmo Jay MD   10 mL at 06/30/25 0846    sodium chloride 0.9 % infusion 40 mL  40 mL Intravenous PRN Cosmo Jay MD         Orders (active)        Start     Ordered    07/04/25 0600  CBC & Differential  Morning Draw         07/03/25 0824    07/04/25 0600  Basic Metabolic Panel  Morning Draw         07/03/25 0824    07/01/25 1500  enoxaparin sodium (LOVENOX) syringe 40 mg  Every 24 Hours         07/01/25 1434    07/01/25 1439  Potassium Replacement - Follow Nurse / BPA  "Driven Protocol  As Needed         07/01/25 1439    07/01/25 1439  Magnesium Standard Dose Replacement - Follow Nurse / BPA Driven Protocol  As Needed         07/01/25 1439    07/01/25 1439  Phosphorus Replacement - Follow Nurse / BPA Driven Protocol  As Needed         07/01/25 1439    07/01/25 1439  Calcium Replacement - Follow Nurse / BPA Driven Protocol  As Needed         07/01/25 1439    06/30/25 1747  HYDROcodone-acetaminophen (NORCO) 5-325 MG per tablet 1 tablet  Every 6 Hours PRN         06/30/25 1747    06/30/25 1702  Dietary Nutrition Supplements Boost Plus (Ensure Enlive, Ensure Plus)  Once         06/30/25 1701    06/27/25 1316  Change Dressing  Once        Comments: Change saturated dressing to the left hip    06/27/25 1315    06/26/25 1145  Diclofenac Sodium (VOLTAREN) 1 % gel 4 g  2 Times Daily         06/26/25 1046    06/26/25 1145  diazePAM (VALIUM) tablet 2 mg  Once         06/26/25 1049    06/26/25 0959  naloxone (NARCAN) injection 0.4 mg  Every 5 Minutes PRN        Placed in \"And\" Linked Group    06/26/25 1000    06/25/25 1524  Initiate & Follow Heparin Protocol  Continuous         06/25/25 1524    06/25/25 1524  Notify Provider Platelet Count Less Than 51299  Continuous        Comments: Open Order Report to View Parameters Requiring Provider Notification    06/25/25 1524    06/25/25 1524  Stop Infusion & Notify Provider if Bleeding Occurs  Continuous        Comments: Open Order Report to View Parameters Requiring Provider Notification    06/25/25 1524    06/25/25 1003  HYDROcodone-acetaminophen (NORCO) 5-325 MG per tablet 1 tablet  Every 6 Hours PRN         06/25/25 1003    06/25/25 0236  Follow Pressure Ulcer Prevention Measures Policy  Continuous        Comments: Implement Appropriate Pressure Injury Prevention Measures  - Open Order Report to View Full Instructions  Enter Wound LDA & Document Assessment  Add Wound Care Plan  Add Patient Education Per Policy    06/25/25 0235    06/25/25 0236  " Turn Patient  Now Then Every 2 Hours         06/25/25 0235    06/25/25 0236  Elevate Heels Off of Bed  Until Discontinued         06/25/25 0235    06/25/25 0236  Use Seat Cushion When Up In Chair  Continuous         06/25/25 0235    06/25/25 0236  Silicone Border Dressing to Bony Prominences  Every Shift       06/25/25 0235    06/25/25 0236  Do NOT Rub or Massage Any Bony Prominence  Continuous         06/25/25 0235    06/24/25 0000  ceFAZolin 1000 mg IVPB in 100 mL NS (VTB)  Every 8 Hours         06/23/25 1813    06/23/25 1814  PT Consult: Eval & Treat As Tolerated; Post Surgery Care  Once         06/23/25 1813 06/23/25 1814  OT Consult: Eval & Treat Post-Surgery Care  Once         06/23/25 1813 06/23/25 1814  Ambulate Patient  Every Shift       06/23/25 1813 06/23/25 1814  Diet: Regular/House; Fluid Consistency: Thin (IDDSI 0)  Diet Effective Now         06/23/25 1813    06/23/25 1737  Oxygen Therapy- Nasal Cannula; Titrate 1-6 LPM Per SpO2; 90 - 95%  Continuous         06/23/25 1736    06/23/25 1737  Continuous Pulse Oximetry  Continuous         06/23/25 1736    06/23/25 1737  Call Anesthesiologist for additional IV Fluid bolus for Hypotension/Tachycardia  Until Discontinued         06/23/25 1736    06/23/25 1737  Notify Anesthesia of Any Acute Changes in Patient Condition  Continuous        Comments: Open Order Report to View Parameters Requiring Provider Notification    06/23/25 1736    06/23/25 1737  Notify Anesthesia for Unrelieved Pain  Continuous        Comments: Open Order Report to View Parameters Requiring Provider Notification    06/23/25 1736    06/23/25 1737  Once DC criteria to floor met, follow surgeon's orders.  Until Discontinued         06/23/25 1736    06/23/25 1737  Discharge patient from PACU when discharge criteria is met.  Until Discontinued         06/23/25 1736 06/23/25 1716  PT Consult: Eval & Treat Functional Mobility Below Baseline  Once         06/23/25 1716    06/23/25  "1716  OT Consult: Eval & Treat ADL Performance Below Baseline, Post-Surgery Care, Discharge Placement Assessment  Once         06/23/25 1716    06/23/25 1417  Saline Lock & Maintain IV Access  Continuous         06/23/25 1416    06/22/25 2100  multivitamin (THERAGRAN) tablet 1 tablet  Nightly         06/22/25 0906    06/22/25 2100  aspirin EC tablet 81 mg  Nightly         06/22/25 0906    06/22/25 2100  donepezil (ARICEPT) tablet 10 mg  Nightly         06/22/25 0906    06/22/25 2100  cholecalciferol (VITAMIN D3) tablet 4,000 Units  Nightly         06/22/25 0906 06/22/25 1000  escitalopram (LEXAPRO) tablet 10 mg  Daily         06/22/25 0906 06/22/25 1000  [Held by provider]  methIMAzole (TAPAZOLE) tablet 5 mg  Daily        (On hold since Tue 6/24/2025 at 1543 until manually unheld; held by Chano Romero DOHold Reason: Abnormal Labs)    06/22/25 0906 06/22/25 1000  rosuvastatin (CRESTOR) tablet 5 mg  Daily         06/22/25 0906 06/22/25 0900  amLODIPine (NORVASC) tablet 5 mg  Every 24 Hours Scheduled         06/22/25 0142    06/22/25 0800  Vital Signs  Every 8 Hours        Comments: Per per hospital policy    06/22/25 0148    06/22/25 0800  Oral Care  2 Times Daily       06/22/25 0148    06/22/25 0600  Strict Intake & Output  Every 6 Hours         06/22/25 0148    06/22/25 0245  sodium chloride 0.9 % flush 10 mL  Every 12 Hours Scheduled         06/22/25 0148    06/22/25 0149  Notify Physician (with Parameters)  Until Discontinued         06/22/25 0148    06/22/25 0149  Maintain Sequential Compression Device  Continuous        Comments: Right leg only    06/22/25 0148    06/22/25 0149  Daily Weights  Daily       06/22/25 0148    06/22/25 0149  Inpatient Orthopedic Surgery Consult  Once        Specialty:  Orthopedic Surgery  Provider:  Cosmo Jay MD    06/22/25 0148    06/22/25 0148  sennosides-docusate (PERICOLACE) 8.6-50 MG per tablet 2 tablet  2 Times Daily PRN        Placed in \"And\" " "Linked Group    06/22/25 0148    06/22/25 0148  polyethylene glycol (MIRALAX) packet 17 g  Daily PRN        Placed in \"And\" Linked Group    06/22/25 0148    06/22/25 0148  bisacodyl (DULCOLAX) EC tablet 5 mg  Daily PRN        Placed in \"And\" Linked Group    06/22/25 0148    06/22/25 0148  bisacodyl (DULCOLAX) suppository 10 mg  Daily PRN        Placed in \"And\" Linked Group    06/22/25 0148    06/22/25 0148  sodium chloride 0.9 % flush 10 mL  As Needed         06/22/25 0148    06/22/25 0148  sodium chloride 0.9 % infusion 40 mL  As Needed         06/22/25 0148    06/22/25 0054  Code Status and Medical Interventions: CPR (Attempt to Resuscitate); Full Support  Continuous         06/22/25 0055    06/22/25 0051  ECG 12 Lead Other; baseline for surgery  STAT         06/22/25 0050    Unscheduled  Wound Care  As Needed       06/25/25 0235                     Physician Progress Notes (most recent note)        Mando Shahid DO at 07/03/25 1447                [facility]    Subjective     History:   Lizbet Arzola is a 80 y.o. female admitted on 6/21/2025 secondary to Closed intertrochanteric fracture of left femur     Procedures:   6/23/25: Left subtrochanteric hip fracture repair with long cephalomedullary nail    Transfusions:  6/24/25: 2 units of PRBC's     CC: Follow up hip fx     Patient seen and examined with IRMA Marrufo. Awake and alert with her daughter present at bedside. Reports some back pain. No reported CP, dyspnea or palpitations. No reported vomiting.  No acute events reported.     History taken from: patient, chart, and RN.      Objective     Vital Signs  Temp:  [98.1 °F (36.7 °C)-99.2 °F (37.3 °C)] 98.1 °F (36.7 °C)  Heart Rate:  [74-93] 74  Resp:  [18] 18  BP: (128-158)/(56-87) 130/62    Intake/Output Summary (Last 24 hours) at 7/3/2025 1447  Last data filed at 7/3/2025 0900  Gross per 24 hour   Intake 300 ml   Output --   Net 300 ml         Physical Exam: " Unchanged from previous.   General:    Awake, alert, in no acute distress   Heart:      Normal S1 and S2. Regular rate and rhythm. No significant murmur, rubs or gallops appreciated.   Lungs:     Respirations regular, even and unlabored. Lungs clear to auscultation B/L. No wheezes, rales or rhonchi.   Abdomen:   Soft and nontender. No guarding, rebound tenderness or  organomegaly noted. Bowel sounds present x 4.   Extremities:  No clubbing, cyanosis or edema noted. Moves UE and LE equally B/L.     Results Review:    Results from last 7 days   Lab Units 07/03/25  0129 07/02/25  0421 07/01/25  0227 06/29/25  0107 06/28/25  0342   WBC 10*3/mm3 11.24* 9.33 11.46* 11.12* 9.75   HEMOGLOBIN g/dL 8.7* 9.0* 8.7* 8.4* 8.7*   PLATELETS 10*3/mm3 450 470* 436 338 285     Results from last 7 days   Lab Units 07/03/25  0129 07/02/25  1603 07/02/25  0421 07/01/25  0227 06/28/25  0342   SODIUM mmol/L 141  --  141 140 138   POTASSIUM mmol/L 4.3 4.5 3.5 3.5 3.5   CHLORIDE mmol/L 106  --  102 102 103   CO2 mmol/L 24.7  --  26.3 26.7 24.8   BUN mg/dL 24.5*  --  23.8* 23.5* 22.4   CREATININE mg/dL 1.05*  --  1.06* 1.12* 0.92   CALCIUM mg/dL 9.6  --  9.3 9.1 8.7   GLUCOSE mg/dL 127*  --  115* 127* 117*         Results from last 7 days   Lab Units 07/02/25  0421   MAGNESIUM mg/dL 1.6                 Imaging Results (Last 24 Hours)       ** No results found for the last 24 hours. **              Medications:  amLODIPine, 5 mg, Oral, Q24H  aspirin, 81 mg, Oral, Nightly  cholecalciferol, 4,000 Units, Oral, Nightly  diazePAM, 2 mg, Oral, Once  Diclofenac Sodium, 4 g, Topical, BID  donepezil, 10 mg, Oral, Nightly  enoxaparin sodium, 40 mg, Subcutaneous, Q24H  escitalopram, 10 mg, Oral, Daily  [Held by provider] methIMAzole, 5 mg, Oral, Daily  multivitamin, 1 tablet, Oral, Nightly  rosuvastatin, 5 mg, Oral, Daily  sodium chloride, 10 mL, Intravenous, Q12H               Assessment & Plan   Left proximal femur fracture: S/P repair with nailing.  Pain control. PT/OT. Ortho input appreciated.     Acute blood loss anemia: Likely 2/2 above. S/P 2 units of PRBC's. Improved and stable today.     New onset paroxysmal Afib: Currently in NSR. Echo reveals an EF of 61-65%, mild LVH and grade I diastolic dysfunction. Brief episode noted. Not currently anticoagulated 2/2 concern for fall risk after discussion with patient and her daughter. Monitor on telemetry.     Left knee pain: No evidence of acute fracture on imaging.     Suspected acute gout flare of left first MTP: Appears clinically improved and indomethacin and colchicine previously D/C'd.     Mild leukocytosis: Mildly elevated today. Repeat CBC in the AM.     Essential HTN: BP stable. Cont Norvasc. Cont to monitor.     HLD: Cont statin.     Hyperthyroidism: TSH is elevated with low free T4. Methimazole currently being held.     Suspected CKD IIIa: Cr overall stable today. Monitor UOP and repeat labs in the AM.     Borderline hypokalemia: Improved with supplementation.     DVT PPX: SQ Lovenox    Disposition Expedited appeal for IPR upheld. Family considering additional appeal vs pursuing SNF placement.     Mando Shahid DO  07/03/25  14:47 EDT     Electronically signed by Mando Shahid DO at 07/03/25 1450          Consult Notes (most recent note)        Jamilah Rosa APRN at 06/22/25 0821       Attestation signed by Cosmo Jay MD at 06/22/25 1017      I have reviewed this documentation and agree.    Cosmo Jay MD                          Referring Provider:   Reason for Consultation: Left intertrochanteric fracture    Patient Care Team:  Yaakov Riley DO as PCP - General (Family Medicine)    Chief complaint fall    Subjective     History of present illness: Vanessa is an 80-year-old female with past medical history of hypertension, hyperlipidemia, osteoarthritis, and hypothyroidism who presents today with a complaint of left hip pain after falling at home.  Patient reports that  she was in the process of doing laundry and utilizing her rollator walker in order to ambulate.  She states that she tripped over her walker causing her to sustain a fall onto the floor.  She states that she landed directly against the left side and did hit her head.  Patient reports after sustaining her injury she was unable to get up and bear weight.  Patient reports that she did have to lie on the floor for roughly an hour before her  found her.  Patient was subsequently taken to the emergency room for further evaluation and treatment.  It was noted upon CT imaging that she had sustained a left hip fracture.  Patient denies any other acute injury, injections, or surgical intervention in regard to the left hip.  She states that she has had corticosteroid injections to the bilateral knees due to arthritis.  In regard to treatment she has been using over-the-counter medication and mobility modification with limited relief.  Orthopedics was consulted in regard to fracture.  Patient presents today for further evaluation and treatment.    Review of Systems  Review of Systems   Constitutional:  Positive for activity change.   Musculoskeletal:  Positive for arthralgias, gait problem, joint swelling and myalgias.   All other systems reviewed and are negative.       History  Past Medical History:   Diagnosis Date    Hyperlipidemia     Hypertension     Hyperthyroidism     Osteoarthritis    , History reviewed. No pertinent surgical history., History reviewed. No pertinent family history.,   Social History     Tobacco Use    Smoking status: Never     Passive exposure: Never    Smokeless tobacco: Never   Vaping Use    Vaping status: Never Used   Substance Use Topics    Alcohol use: Never    Drug use: Never   ,   Medications Prior to Admission   Medication Sig Dispense Refill Last Dose/Taking    acetaminophen (TYLENOL) 500 MG tablet Take 2 tablets by mouth Every Night.   Past Week    acetaminophen (TYLENOL) 500 MG tablet  Take 3 tablets by mouth Every Morning.   6/21/2025    amLODIPine (NORVASC) 5 MG tablet Take 1 tablet by mouth Daily.   6/21/2025    aspirin 81 MG EC tablet Take 1 tablet by mouth Every Night.   Past Week    BIOTIN PO Take 2 tablets by mouth Daily.   6/21/2025    Cholecalciferol 50 MCG (2000 UT) tablet Take 2 tablets by mouth Every Night.   Past Week    donepezil (ARICEPT) 10 MG tablet Take 1 tablet by mouth Every Night.   Past Week    escitalopram (LEXAPRO) 10 MG tablet Take 1 tablet by mouth Daily.   6/21/2025    losartan (COZAAR) 50 MG tablet Take 1 tablet by mouth Daily.   6/21/2025    methIMAzole (TAPAZOLE) 5 MG tablet Take 1 tablet by mouth Daily.   6/21/2025    multivitamin tablet tablet Take 1 tablet by mouth Every Night.   Past Week    omega-3 acid ethyl esters (LOVAZA) 1 g capsule Take 2 capsules by mouth 2 (Two) Times a Day.   6/21/2025 Morning    potassium chloride 10 MEQ CR tablet Take 1 tablet by mouth Daily.   6/21/2025    rosuvastatin (CRESTOR) 5 MG tablet Take 1 tablet by mouth Daily.   6/21/2025    triamterene-hydrochlorothiazide (DYAZIDE) 37.5-25 MG per capsule Take 1 capsule by mouth Daily.   6/21/2025    Diclofenac Sodium (VOLTAREN) 1 % gel gel Apply 4 g topically to the appropriate area as directed 4 (Four) Times a Day As Needed (Knee Pain).   Unknown   , Scheduled Meds:  amLODIPine, 5 mg, Oral, Q24H  sodium chloride, 10 mL, Intravenous, Q12H    , Continuous Infusions:  sodium chloride, 100 mL/hr, Last Rate: 100 mL/hr (06/22/25 0211)    , PRN Meds:    senna-docusate sodium **AND** polyethylene glycol **AND** bisacodyl **AND** bisacodyl    Morphine **AND** naloxone    sodium chloride    sodium chloride and Allergies:  Sulfa antibiotics    Objective     Vital Signs   Temp:  [97.5 °F (36.4 °C)-98.6 °F (37 °C)] 98.4 °F (36.9 °C)  Heart Rate:  [77-91] 80  Resp:  [16-20] 18  BP: (139-163)/(60-75) 155/60    Physical Exam:   Physical Exam  HENT:      Head: Normocephalic.      Nose: Nose normal.  "  Cardiovascular:      Rate and Rhythm: Normal rate.   Pulmonary:      Effort: Pulmonary effort is normal.   Musculoskeletal:      Cervical back: Normal range of motion.      Comments: Upon examination of the left lower extremity there is no edema, ecchymosis, erythema, wounds, drainage, or signs of an infectious process.  Patient sensation appears to be grossly intact to light touch with brisk capillary refill.  Patient able to plantar and dorsiflex at the ankle without complication.  There is a palpable pulse distally.  There is shortening and external rotation noted to the left lower extremity.   Skin:     General: Skin is warm and dry.      Capillary Refill: Capillary refill takes less than 2 seconds.   Neurological:      General: No focal deficit present.      Mental Status: She is alert and oriented to person, place, and time.   Psychiatric:         Mood and Affect: Mood normal.         Results Review:     Results from last 7 days   Lab Units 06/22/25  0529 06/22/25  0156 06/21/25  2117   CRP mg/dL  --   --  <0.30   LACTATE mmol/L 1.6 2.1*  --    WBC 10*3/mm3  --  15.90* 17.41*   HEMOGLOBIN g/dL  --  11.3* 11.5*   HEMATOCRIT %  --  37.7 34.5   MCV fL  --  111.9* 98.6*   MCHC g/dL  --  30.0* 33.3   PLATELETS 10*3/mm3  --  248 253         Results from last 7 days   Lab Units 06/22/25  0156 06/21/25  2117   SODIUM mmol/L 140 141   POTASSIUM mmol/L 4.4 4.1   CHLORIDE mmol/L 108* 106   CO2 mmol/L 17.0* 19.7*   BUN mg/dL 30.4* 30.3*   CREATININE mg/dL 1.36* 1.45*   CALCIUM mg/dL 9.2 9.7   GLUCOSE mg/dL 162* 148*   ALBUMIN g/dL 4.1 4.4   BILIRUBIN mg/dL 0.5 0.4   ALK PHOS U/L 69 78   AST (SGOT) U/L 32 29   ALT (SGPT) U/L 20 21   Estimated Creatinine Clearance: 32.5 mL/min (A) (by C-G formula based on SCr of 1.36 mg/dL (H)).  No results found for: \"AMMONIA\"  Results from last 7 days   Lab Units 06/22/25  0156 06/21/25  2117   CK TOTAL U/L 519* 326*         Lab Results   Component Value Date    HGBA1C 5.92 (H) " "06/21/2025     Lab Results   Component Value Date    TSH 4.900 (H) 06/21/2025    FREET4 0.85 (L) 06/21/2025     No results found for: \"PREGTESTUR\", \"PREGSERUM\", \"HCG\", \"HCGQUANT\"  Pain Management Panel           No data to display              No results found for: \"BLOODCX\"  No results found for: \"URINECX\"  No results found for: \"WOUNDCX\"  No results found for: \"STOOLCX\"      ---------------------------------------------------------------------------------------------------------------------   Imaging Results (Last 7 Days)       Procedure Component Value Units Date/Time    XR Hip With or Without Pelvis 2 - 3 View Left - In process [[id number]] Resulted: 06/22/25 0757     Updated: 06/22/25 0821    This result has not been signed. Information might be incomplete.      CT Lumbar Spine Without Contrast [[id number]] Collected: 06/21/25 2334     Updated: 06/21/25 2338    Narrative:      EXAMINATION: CT lumbar spine without contrast     CLINICAL HISTORY: Fall     COMPARISON: None     TECHNIQUE: Contiguous 3 mm thick slices were obtained through the lumbar  spine without contrast. Coronal and sagittal reformats were performed.     FINDINGS:  Sagittal alignment is normal. Mild left convex curvature is centered at  the L4/L5 level. Mild right convex curvature centered at the L1/L2  level. There is no acute fracture. No traumatic listhesis. Moderate  multilevel disc related degenerative changes are noted. At the L4/L5  level there is a posterior disc bulge and comminution with facet  arthrosis and ligamentous thickening. The findings result in at least  moderate central canal stenosis. The disc bulges noted at the L5/S1  level as well.       Impression:      1. Multilevel disc related degenerative changes and facet arthrosis.  2. No evidence of recent trauma.     This report was finalized on 6/21/2025 11:36 PM by Juanjose Rocha MD.       CT Pelvis Without Contrast [[id number]] Collected: 06/21/25 2331     Updated: 06/21/25 2336 "    Narrative:      EXAMINATION: CT pelvis without contrast     CLINICAL HISTORY: Injury.     COMPARISON: None available.     TECHNIQUE: Contiguous 3 mm thick slices were obtained through the pelvis  without contrast. Coronal and sagittal reformats were performed.     FINDINGS:  Alignment at the hips is normal. Mild bilateral hip osteoarthrosis is  noted. There is an acute comminuted fracture through the  intertrochanteric region of the left proximal femur. There is apex  lateral angulation at the fracture site. Additionally, the distal  fragment is displaced medially with respect to the more proximal  fragment. There is apex anterior angulation at the fracture site as  well. Extensive adjacent soft tissue swelling is noted. No definite  acetabular fracture is appreciated.       Impression:      1. Acute comminuted left intertrochanteric proximal femur fracture.     This report was finalized on 6/21/2025 11:34 PM by Juanjose Rocha MD.       CT Head Without Contrast [[id number]] Collected: 06/21/25 2323     Updated: 06/21/25 2333    Narrative:      EXAMINATION: CT head without contrast     CLINICAL HISTORY: Fall     COMPARISON: None available.     TECHNIQUE: Contiguous 3 mm thick slices were obtained from the skull  base to the vertex without contrast. Coronal and sagittal reformats were  performed.     FINDINGS:  Moderate generalized volume loss is noted with prominence of the sulci  and ventricular system. White matter changes are noted in a pattern  suggesting chronic small vessel ischemic disease. There is no acute  intracranial hemorrhage. No acute territorial infarct. No extra-axial  collection is identified. There is no shift of midline structures. No  acute calvarial fracture is appreciated. Hyperostosis frontalis is  noted.       Impression:      1. No acute intracranial process.     This report was finalized on 6/21/2025 11:31 PM by Juanjose Rocha MD.               Assessment & Plan       Closed  intertrochanteric fracture of left femur      The on-call surgeon Dr. Jay has been made aware of the consult.  Recommendation at this time is for surgical intervention.    Continue with pain control.  Activity as tolerated with pain as the guide.    Patient is to remain nonweightbearing to the left lower extremity.  An order for x-ray imaging will be placed today.    Lengthy discussion was had today with the patient and family in regard to surgical versus conservative intervention.  They were informed that due to the severity of the fracture the recommendation at this time would be for surgical intervention.    The plan will be for a left hip intertrochanteric nailing.  The nature of this procedure, as well as risks, benefits, alternatives, and complications to the above operation were discussed with the patient.  Risks include but are not limited to: Anesthesia complications, death, injury to nerves/vessels, infection, blood clots, continued pain, and repeat or revision surgery.  Following the discussion the patient verbalized understanding of the risks and benefits of the above procedure and elected to proceed with surgery.    Labs including a PT/INR, PTT, and type/screen will be placed into the system today.    Patient will be n.p.o. after midnight.  An order for a case request and consent will be placed into the system.    Orthopedics will continue to follow.        KELSEY Mccann  25  08:21 EDT            Electronically signed by Cosmo Jay MD at 25 1017       Nutrition Notes (most recent note)    No notes exist for this encounter.          Physical Therapy Notes (most recent note)        Sage Fuentes, YUSUF at 25 1347  Version 1 of 1         Acute Care - Physical Therapy Treatment Note  [facility]     Patient Name: Lizbet Arzola  : 1945  MRN: [id number]  Today's Date: 7/3/2025      Visit Dx:     ICD-10-CM ICD-9-CM   1. Closed displaced intertrochanteric fracture of  left femur, initial encounter  S72.142A 820.21   2. Acute low back pain with sciatica, sciatica laterality unspecified, unspecified back pain laterality  M54.40 724.2     724.3     Patient Active Problem List   Diagnosis    Closed intertrochanteric fracture of left femur     Past Medical History:   Diagnosis Date    Hyperlipidemia     Hypertension     Hyperthyroidism     Osteoarthritis      Past Surgical History:   Procedure Laterality Date    LIPOMA EXCISION       PT Assessment (Last 12 Hours)       PT Evaluation and Treatment       Row Name 07/03/25 1341          Physical Therapy Time and Intention    Subjective Information complains of;weakness;pain  -KM     Document Type therapy note (daily note)  -KM     Mode of Treatment individual therapy;physical therapy  -KM     Patient Effort good  -KM     Symptoms Noted During/After Treatment fatigue;increased pain  -KM       Row Name 07/03/25 1341          General Information    Patient Profile Reviewed yes  -KM     Patient Observations alert;cooperative;agree to therapy  -KM     Existing Precautions/Restrictions fall;weight bearing;other (see comments)  -KM       Row Name 07/03/25 1341          Pain Scale: FACES Pre/Post-Treatment    Pain: FACES Scale, Pretreatment 0-->no hurt  -KM     Posttreatment Pain Rating 2-->hurts little bit  -KM       Row Name 07/03/25 1341          Cognition    Affect/Mental Status (Cognition) WFL  -KM     Orientation Status (Cognition) oriented to;person;place;situation  -KM     Follows Commands (Cognition) follows one-step commands  -KM       Row Name 07/03/25 1341          Bed Mobility    Bed Mobility supine-sit  -KM     Supine-Sit [place] (Bed Mobility) moderate assist (50% patient effort);1 person assist;verbal cues  -KM     Bed Mobility, Safety Issues decreased use of arms for pushing/pulling;decreased use of legs for bridging/pushing  -KM     Assistive Device (Bed Mobility) bed rails;head of bed elevated;repositioning sheet  -KM        Row Name 07/03/25 1341          Transfers    Transfers sit-stand transfer;stand-sit transfer;bed-chair transfer  -KM       Row Name 07/03/25 1341          Bed-Chair Transfer    Bed-Chair [place] (Transfers) moderate assist (50% patient effort);verbal cues  -KM     Assistive Device (Bed-Chair Transfers) walker, front-wheeled  -KM       Row Name 07/03/25 1341          Sit-Stand Transfer    Sit-Stand [place] (Transfers) moderate assist (50% patient effort);verbal cues  -KM     Assistive Device (Sit-Stand Transfers) walker, front-wheeled  -KM       Row Name 07/03/25 1341          Stand-Sit Transfer    Stand-Sit [place] (Transfers) moderate assist (50% patient effort);verbal cues  -KM     Assistive Device (Stand-Sit Transfers) walker, front-wheeled  -KM       Row Name 07/03/25 1341          Gait/Stairs (Locomotion)    Gait/Stairs Locomotion gait/ambulation independence;gait/ambulation assistive device;distance ambulated  -KM     [place] Level (Gait) minimum assist (75% patient effort)  -KM     Assistive Device (Gait) walker, front-wheeled  -KM     Patient was able to Ambulate yes  -KM     Distance in Feet (Gait) 40  -KM     Pattern (Gait) step-to  -KM     Deviations/Abnormal Patterns (Gait) antalgic;ataxic;base of support, narrow;gait speed decreased  -KM     Bilateral Gait Deviations forward flexed posture;knee buckling bilaterally  -KM     Left Sided Gait Deviations weight shift ability decreased  -KM       Row Name 07/03/25 1341          Safety Issues/Impairments Affecting Functional Mobility    Impairments Affecting Function (Mobility) balance;endurance/activity tolerance;pain;strength  -KM       Row Name 07/03/25 1341          Motor Skills    Comments, Therapeutic Exercise seated ther-ex  -KM       Row Name             Wound 06/23/25 1619 Left hip Surgical    Wound - Properties Group Placement Date: 06/23/25  -KB Placement Time: 1619  -KB Present on Original Admission: N  -KB Side: Left  -KB  Location: hip  -KB Primary Wound Type: Surgical  -KB, incision sites x4     Retired Wound - Properties Group Placement Date: 06/23/25  -KB Placement Time: 1619  -KB Present on Original Admission: N  -KB Side: Left  -KB Location: hip  -KB    Retired Wound - Properties Group Placement Date: 06/23/25  -KB Placement Time: 1619  -KB Present on Original Admission: N  -KB Side: Left  -KB Location: hip  -KB    Retired Wound - Properties Group Date first assessed: 06/23/25  -KB Time first assessed: 1619  -KB Present on Original Admission: N  -KB Side: Left  -KB Location: hip  -KB      Row Name 07/03/25 1341          Progress Summary (PT)    Daily Progress Summary (PT) Pt. was able to perform functional mobility skills w/ modA. She was able to improve ambulation distance and quality w/ RW. She continues to tolerate increased activity well. Pt. would continue to benefit from PT services.  -               User Key  (r) = Recorded By, (t) = Taken By, (c) = Cosigned By      Initials Name Provider Type    Cheryl Aponte, RN Registered Nurse    Sage Mccain, PT Physical Therapist                    Physical Therapy Education       Title: PT OT SLP Therapies (Done)       Topic: Physical Therapy (Done)       Point: Mobility training (Done)       Learning Progress Summary            Patient Acceptance, E,TB, VU by MP at 7/2/2025 0916    Acceptance, E,TB, VU by HG at 7/2/2025 0511    Acceptance, E,TB, VU by  at 6/30/2025 0604    Acceptance, E,TB, VU by KM at 6/24/2025 1421   Family Acceptance, E,TB, VU by HG at 7/2/2025 0511    Acceptance, E,TB, VU by  at 6/30/2025 0604                      Point: Home exercise program (Done)       Learning Progress Summary            Patient Acceptance, E,TB, VU by MP at 7/2/2025 0916    Acceptance, E,TB, VU by HG at 7/2/2025 0511    Acceptance, E,TB, VU by  at 6/30/2025 0604    Acceptance, E,TB, VU by KM at 6/24/2025 1421   Family Acceptance, E,TB, VU by HG at 7/2/2025 0511     Acceptance, E,TB, VU by HG at 6/30/2025 0604                      Point: Body mechanics (Done)       Learning Progress Summary            Patient Acceptance, E,TB, VU by MP at 7/2/2025 0916    Acceptance, E,TB, VU by HG at 7/2/2025 0511    Acceptance, E,TB, VU by HG at 6/30/2025 0604    Acceptance, E,TB, VU by KM at 6/24/2025 1421   Family Acceptance, E,TB, VU by HG at 7/2/2025 0511    Acceptance, E,TB, VU by HG at 6/30/2025 0604                      Point: Precautions (Done)       Learning Progress Summary            Patient Acceptance, E,TB, VU by MP at 7/2/2025 0916    Acceptance, E,TB, VU by HG at 7/2/2025 0511    Acceptance, E,TB, VU by HG at 6/30/2025 0604    Acceptance, E,TB, VU by KM at 6/24/2025 1421   Family Acceptance, E,TB, VU by HG at 7/2/2025 0511    Acceptance, E,TB, VU by HG at 6/30/2025 0604                                      User Key       Initials Effective Dates Name Provider Type Discipline     06/16/21 -  Heaven Burleson, RN Registered Nurse Nurse     05/24/22 -  Sage Fuentes PT Physical Therapist PT     01/22/25 -  Christine Lepe RN Registered Nurse Nurse                  PT Recommendation and Plan  Anticipated Discharge Disposition (PT): inpatient rehabilitation facility  Planned Therapy Interventions (PT): balance training, bed mobility training, gait training, home exercise program, patient/family education, postural re-education, ROM (range of motion), strengthening, stretching, transfer training, stair training, wheelchair management/propulsion training  Therapy Frequency (PT): 6 times/wk  Progress Summary (PT)  Daily Progress Summary (PT): Pt. was able to perform functional mobility skills w/ modA. She was able to improve ambulation distance and quality w/ RW. She continues to tolerate increased activity well. Pt. would continue to benefit from PT services.  Plan of Care Reviewed With: patient  Outcome Evaluation: Pt. evaluation completed during PT session. She was able to  perform functional mobility skills w/ maxA x2. She was unable to ambulate at time of evaluation d/t weakness and pain. She demonstrates impaired strength and ROM. Pt. would benefit from inpatient rehab to address weakness, pain, impaired mobility, safety precautions, and fall risk.       Time Calculation:    PT Charges       Row Name 25 1340             Time Calculation    PT Received On 25  -KM         Time Calculation- PT    Total Timed Code Minutes- PT 30 minute(s)  -KM                User Key  (r) = Recorded By, (t) = Taken By, (c) = Cosigned By      Initials Name Provider Type    KM Sage Fuentes, PT Physical Therapist                  Therapy Charges for Today       Code Description Service Date Service Provider Modifiers Qty    34079395061 HC PT THERAPEUTIC ACT EA 15 MIN 2025 Sage Fuentes, PT GP 1    84117023454 HC PT THER PROC EA 15 MIN 2025 Sage Fuentes, PT GP 1    53631604471 HC GAIT TRAINING EA 15 MIN 2025 Sage Fuentes, PT GP 1    18354624765 HC GAIT TRAINING EA 15 MIN 7/3/2025 Sage Fuentes, PT GP 1    34257017597 HC PT THERAPEUTIC ACT EA 15 MIN 7/3/2025 Sage Fuentes, PT GP 1            PT G-Codes  AM-PAC 6 Clicks Score (PT): 17    Sage Fuentes PT  7/3/2025      Electronically signed by Sage uFentes, PT at 25 1347          Occupational Therapy Notes (most recent note)        Izzy Murillo, OT at 25 1501          Patient Name: Lizbet Arzola  : 1945    MRN: [id number]                              Today's Date: 2025       Admit Date: 2025    Visit Dx:     ICD-10-CM ICD-9-CM   1. Closed displaced intertrochanteric fracture of left femur, initial encounter  S72.142A 820.21   2. Acute low back pain with sciatica, sciatica laterality unspecified, unspecified back pain laterality  M54.40 724.2     724.3     Patient Active Problem List   Diagnosis    Closed intertrochanteric fracture of left femur     Past Medical History:   Diagnosis Date     Hyperlipidemia     Hypertension     Hyperthyroidism     Osteoarthritis      Past Surgical History:   Procedure Laterality Date    LIPOMA EXCISION        General Information       Row Name 07/02/25 1453          OT Time and Intention    Subjective Information pain  -     Document Type therapy note (daily note)  -     Mode of Treatment individual therapy;occupational therapy  -     Patient Effort good  -     Symptoms Noted During/After Treatment increased pain  -     Comment Patient seen for OT treatment.  -       Row Name 07/02/25 1453          General Information    Patient Profile Reviewed yes  -     Existing Precautions/Restrictions fall;weight bearing;other (see comments)  -     Barriers to Rehab none identified  -       Row Name 07/02/25 1453          Cognition    Orientation Status (Cognition) oriented to;person;place;situation  -       Row Name 07/02/25 1453          Safety Issues/Impairments Affecting Functional Mobility    Impairments Affecting Function (Mobility) balance;endurance/activity tolerance;pain;strength  -               User Key  (r) = Recorded By, (t) = Taken By, (c) = Cosigned By      Initials Name Provider Type     Izzy Murillo, OT Occupational Therapist                     Mobility/ADL's       Row Name 07/02/25 1457          Bed Mobility    Bed Mobility supine-sit  -     Supine-Sit [place] (Bed Mobility) moderate assist (50% patient effort);1 person assist;verbal cues  -     Bed Mobility, Safety Issues decreased use of arms for pushing/pulling;decreased use of legs for bridging/pushing  -     Assistive Device (Bed Mobility) bed rails;head of bed elevated;repositioning sheet  -[facility] Name 07/02/25 1457          Transfers    Transfers sit-stand transfer;stand-sit transfer  -[facility] Name 07/02/25 1457          Sit-Stand Transfer    Sit-Stand [place] (Transfers) moderate assist (50% patient effort);verbal cues  -     Assistive Device  (Sit-Stand Transfers) walker, front-wheeled  -[facility] Name 07/02/25 1457          Stand-Sit Transfer    Stand-Sit [place] (Transfers) moderate assist (50% patient effort);verbal cues  -     Assistive Device (Stand-Sit Transfers) walker, front-wheeled  -KP       Row Name 07/02/25 1457          Activities of Daily Living    BADL Assessment/Intervention lower body dressing;toileting  -KP       Row Name 07/02/25 1457          Mobility    Extremity Weight-bearing Status left lower extremity  -     Left Lower Extremity (Weight-bearing Status) weight-bearing as tolerated (WBAT)  -KP       Row Name 07/02/25 1457          Lower Body Dressing Assessment/Training    [place] Level (Lower Body Dressing) lower body dressing skills;maximum assist (25% patient effort)  -KP       Row Name 07/02/25 1457          Toileting Assessment/Training    [place] Level (Toileting) toileting skills;dependent (less than 25% patient effort)  -               User Key  (r) = Recorded By, (t) = Taken By, (c) = Cosigned By      Initials Name Provider Type    Izzy Motta OT Occupational Therapist                   Obj/Interventions       Row Name 07/02/25 1458          Motor Skills    Motor Skills functional endurance  -     Functional Endurance good minus  -               User Key  (r) = Recorded By, (t) = Taken By, (c) = Cosigned By      Initials Name Provider Type    Izzy Motta OT Occupational Therapist                   Goals/Plan    No documentation.                  Clinical Impression       Row Name 07/02/25 1459          Pain Assessment    Pain Side/Orientation left;lower  -KP       Row Name 07/02/25 1459          Pain Scale: FACES Pre/Post-Treatment    Pain: FACES Scale, Pretreatment 0-->no hurt  -     Posttreatment Pain Rating 2-->hurts little bit  -KP       Row Name 07/02/25 1459          Plan of Care Review    Plan of Care Reviewed With patient  -     Progress improving  -     Outcome  Evaluation Patient seen for OT treatment. She continues to improve with tolerance for functional mobility/transfers with decreased pain on this date. Continue plan of care.  -       Row Name 07/02/25 1459          Therapy Plan Review/Discharge Plan (OT)    Anticipated Discharge Disposition (OT) inpatient rehabilitation facility  -       Row Name 07/02/25 1459          Positioning and Restraints    Pre-Treatment Position in bed  -     Post Treatment Position chair  -     In Chair sitting;call light within reach;encouraged to call for assist;with family/caregiver  -               User Key  (r) = Recorded By, (t) = Taken By, (c) = Cosigned By      Initials Name Provider Type    Izzy Motta OT Occupational Therapist                   Outcome Measures       Row Name 07/02/25 0811          How much help from another person do you currently need...    Turning from your back to your side while in flat bed without using bedrails? 3  -MP     Moving from lying on back to sitting on the side of a flat bed without bedrails? 3  -MP     Moving to and from a bed to a chair (including a wheelchair)? 3  -MP     Standing up from a chair using your arms (e.g., wheelchair, bedside chair)? 3  -MP     Climbing 3-5 steps with a railing? 2  -MP     To walk in hospital room? 3  -MP     AM-PAC 6 Clicks Score (PT) 17  -MP               User Key  (r) = Recorded By, (t) = Taken By, (c) = Cosigned By      Initials Name Provider Type    Heaven Glaser, RN Registered Nurse                      OT Recommendation and Plan  Planned Therapy Interventions (OT): activity tolerance training, BADL retraining, functional balance retraining, patient/caregiver education/training, passive ROM/stretching, occupation/activity based interventions, ROM/therapeutic exercise, strengthening exercise, transfer/mobility retraining  Therapy Frequency (OT): 5 times/wk  Plan of Care Review  Plan of Care Reviewed With: patient  Progress:  improving  Outcome Evaluation: Patient seen for OT treatment. She continues to improve with tolerance for functional mobility/transfers with decreased pain on this date. Continue plan of care.     Time Calculation:         Time Calculation- OT       Row Name 25 1500             Time Calculation- OT    OT Received On 25  -                User Key  (r) = Recorded By, (t) = Taken By, (c) = Cosigned By      Initials Name Provider Type     Izzy Murillo OT Occupational Therapist                  Therapy Charges for Today       Code Description Service Date Service Provider Modifiers Qty    56884946741 HC OT THERAPEUTIC ACT EA 15 MIN 2025 Izzy Murillo OT GO 1    65788979041 HC OT SELF CARE/MGMT/TRAIN EA 15 MIN 2025 Izzy Murillo OT GO 1    03272487222 HC OT THERAPEUTIC ACT EA 15 MIN 2025 Izzy Murillo OT GO 1                 Izzy Murillo OT  2025    Electronically signed by Izzy Murillo OT at 25 1501       Sage Fuentes, PT   Physical Therapist  Physical Therapy     Therapy Evaluation     Signed     Date of Service: 25  Creation Time: 25     Signed       Expand All Collapse All[]Expand All by Default    Acute Care - Physical Therapy Initial Evaluation   [place]     Patient Name: Lizbet Arzola                   : 1945                        MRN: [id number]  Today's Date: 2025                                  Visit Dx:   Visit Diagnosis       ICD-10-CM ICD-9-CM   1. Closed displaced intertrochanteric fracture of left femur, initial encounter  S72.142A 820.21   2. Acute low back pain with sciatica, sciatica laterality unspecified, unspecified back pain laterality  M54.40 724.2       724.3         Problem List       Patient Active Problem List   Diagnosis    Closed intertrochanteric fracture of left femur         Medical History        Past Medical History:   Diagnosis Date    Hyperlipidemia      Hypertension      Hyperthyroidism       Osteoarthritis           Surgical History         Past Surgical History:   Procedure Laterality Date    LIPOMA EXCISION             PT Assessment (Last 12 Hours)            PT Evaluation and Treatment         Row Name 06/24/25 1408                 Physical Therapy Time and Intention     Subjective Information complains of;weakness;fatigue;pain  -KM       Document Type evaluation  -KM       Mode of Treatment physical therapy  -KM       Patient Effort good  -KM       Symptoms Noted During/After Treatment fatigue;increased pain  -KM          Row Name 06/24/25 1408                 General Information     Patient Profile Reviewed yes  -KM       Patient Observations alert;cooperative;agree to therapy  -KM       Prior Level of Function --  Pt. modified independent w/ rollator for mobility and ADLs.  -KM       Existing Precautions/Restrictions fall  L hip nailing; LLE WBAT  -KM       Risks Reviewed patient:;LOB;nausea/vomiting;dizziness;increased discomfort  -KM       Benefits Reviewed patient:;improve function;increase independence;increase strength;increase balance  -KM       Barriers to Rehab none identified  -KM          Row Name 06/24/25 1408                 Living Environment     Current Living Arrangements home  -KM       People in Home spouse  -KM       Primary Care Provided by self  -KM          Row Name 06/24/25 1408                 Home Use of Assistive/Adaptive Equipment     Equipment Currently Used at Home rollator  -KM          [facility] Name 06/24/25 1408                 Pain     Additional Documentation Pain Scale: FACES Pre/Post-Treatment (Group)  -KM          [facility] Name 06/24/25 1408                 Pain Scale: FACES Pre/Post-Treatment     Pain: FACES Scale, Pretreatment 2-->hurts little bit  -KM       Posttreatment Pain Rating 4-->hurts little more  -KM          Row Name 06/24/25 1408                 Cognition     Affect/Mental Status (Cognition) WF  -KM       Orientation Status (Cognition) oriented to;person   -KM       Follows Commands (Cognition) follows one-step commands  -          Row Name 06/24/25 1408                 Range of Motion (ROM)     Range of Motion --  BLE ROM limited  -          Row Name 06/24/25 1408                 Strength (Manual Muscle Testing)     Strength (Manual Muscle Testing) --  BLE strength grossly 2/5  -KM          Row Name 06/24/25 1408                 Mobility     Extremity Weight-bearing Status left lower extremity  -       Left Lower Extremity (Weight-bearing Status) weight-bearing as tolerated (WBAT)  -          Row Name 06/24/25 1408                 Bed Mobility     Bed Mobility supine-sit  -KM       Supine-Sit [place] (Bed Mobility) maximum assist (25% patient effort);2 person assist;verbal cues  -       Bed Mobility, Safety Issues decreased use of arms for pushing/pulling;decreased use of legs for bridging/pushing  -       Assistive Device (Bed Mobility) bed rails;head of bed elevated  -          Row Name 06/24/25 1408                 Transfers     Transfers sit-stand transfer;stand-sit transfer  -          Row Name 06/24/25 1408                 Sit-Stand Transfer     Sit-Stand [place] (Transfers) dependent (less than 25% patient effort);2 person assist;verbal cues  -       Assistive Device (Sit-Stand Transfers) walker, front-wheeled  -       Comment, (Sit-Stand Transfer) unable to complete stand to full upright; attempted 2x  -          Row Name 06/24/25 1408                 Stand-Sit Transfer     Stand-Sit [place] (Transfers) maximum assist (25% patient effort);2 person assist;verbal cues  -       Assistive Device (Stand-Sit Transfers) walker, front-wheeled  -          Row Name 06/24/25 1408                 Gait/Stairs (Locomotion)     Patient was able to Ambulate no, other medical factors prevent ambulation  -       Reason Patient was unable to Ambulate Excessive Weakness;Uncontrolled Pain  -          Row Name 06/24/25 1408                  Safety Issues/Impairments Affecting Functional Mobility     Safety Issues Affecting Function (Mobility) awareness of need for assistance;insight into deficits/self-awareness;safety precaution awareness  -KM       Impairments Affecting Function (Mobility) balance;endurance/activity tolerance;pain;range of motion (ROM);strength  -KM          Row Name 06/24/25 1408                 Balance     Balance Assessment sitting static balance;standing static balance  -KM       Static Sitting Balance contact guard  -KM       Position, Sitting Balance sitting edge of bed  -KM       Static Standing Balance maximum assist;2-person assist  -KM       Position/Device Used, Standing Balance walker, front-wheeled  -KM          Row Name                      Wound 06/23/25 1619 Left hip Surgical     Wound - Properties Group Placement Date: 06/23/25  -KB Placement Time: 1619  -KB Present on Original Admission: N  -KB Side: Left  -KB Location: hip  -KB Primary Wound Type: Surgical  -KB, incision sites x4      Retired Wound - Properties Group Placement Date: 06/23/25  -KB Placement Time: 1619  -KB Present on Original Admission: N  -KB Side: Left  -KB Location: hip  -KB     Retired Wound - Properties Group Placement Date: 06/23/25  -KB Placement Time: 1619  -KB Present on Original Admission: N  -KB Side: Left  -KB Location: hip  -KB     Retired Wound - Properties Group Date first assessed: 06/23/25  -KB Time first assessed: 1619  -KB Present on Original Admission: N  -KB Side: Left  -KB Location: hip  -KB        Row Name 06/24/25 1408                 Plan of Care Review     Plan of Care Reviewed With patient  -       Outcome Evaluation Pt. evaluation completed during PT session. She was able to perform functional mobility skills w/ maxA x2. She was unable to ambulate at time of evaluation d/t weakness and pain. She demonstrates impaired strength and ROM. Pt. would benefit from inpatient rehab to address weakness, pain, impaired  mobility, safety precautions, and fall risk.  -KM          Row Name 06/24/25 1408                 Therapy Assessment/Plan (PT)     Patient/Family Therapy Goals Statement (PT) return to PLOF  -KM       Functional Level at Time of Evaluation (PT) maxA  -KM       PT Diagnosis (PT) decreased mobility secondary to hip fx  -KM       Rehab Potential (PT) good  -KM       Criteria for Skilled Interventions Met (PT) yes;skilled treatment is necessary  -KM       Therapy Frequency (PT) 6 times/wk  -KM       Predicted Duration of Therapy Intervention (PT) until discharge  -KM       Problem List (PT) problems related to;balance;mobility;range of motion (ROM);strength;pain  -KM       Activity Limitations Related to Problem List (PT) unable to ambulate safely;unable to transfer safely  -KM          Row Name 06/24/25 1408                 Therapy Plan Review/Discharge Plan (PT)     Therapy Plan Review (PT) evaluation/treatment results reviewed;care plan/treatment goals reviewed;risks/benefits reviewed;patient;daughter  -KM          Row Name 06/24/25 1408                 Physical Therapy Goals     Bed Mobility Goal Selection (PT) bed mobility, PT goal 1  -KM       Transfer Goal Selection (PT) transfer, PT goal 1  -KM       Gait Training Goal Selection (PT) gait training, PT goal 1  -KM          Row Name 06/24/25 1409                 Bed Mobility Goal 1 (PT)     Activity/Assistive Device (Bed Mobility Goal 1, PT) bed mobility activities, all  -KM       [place] Level/Cues Needed (Bed Mobility Goal 1, PT) minimum assist (75% or more patient effort)  -KM       Time Frame (Bed Mobility Goal 1, PT) by discharge  -          Row Name 06/24/25 1400                 Transfer Goal 1 (PT)     Activity/Assistive Device (Transfer Goal 1, PT) transfers, all;walker, rolling  -KM       [place] Level/Cues Needed (Transfer Goal 1, PT) minimum assist (75% or more patient effort)  -KM       Time Frame (Transfer Goal 1, PT) by discharge  -           Row Name 06/24/25 1408                 Gait Training Goal 1 (PT)     Activity/Assistive Device (Gait Training Goal 1, PT) gait (walking locomotion);assistive device use;walker, rolling  -KM       [place] Level (Gait Training Goal 1, PT) minimum assist (75% or more patient effort)  -KM       Distance (Gait Training Goal 1, PT) 20'  -KM       Time Frame (Gait Training Goal 1, PT) by discharge  -KM                    User Key  (r) = Recorded By, (t) = Taken By, (c) = Cosigned By        Initials Name Provider Type     Cheryl Aponte, RN Registered Nurse      Sage Fuentes, PT Physical Therapist                             Physical Therapy Education            Title: PT OT SLP Therapies (Done)         Topic: Physical Therapy (Done)         Point: Mobility training (Done)         Learning Progress Summary             Patient Acceptance, E,TB, VU by  at 6/24/2025 1421                            Point: Home exercise program (Done)         Learning Progress Summary             Patient Acceptance, E,TB, VU by  at 6/24/2025 1421                            Point: Body mechanics (Done)         Learning Progress Summary             Patient Acceptance, E,TB, VU by  at 6/24/2025 1421                            Point: Precautions (Done)         Learning Progress Summary             Patient Acceptance, E,TB, VU by  at 6/24/2025 1421                                                User Key         Initials Effective Dates Name Provider Type Discipline      05/24/22 -  Sage Fuentes, YUSUF Physical Therapist PT                          PT Recommendation and Plan  Anticipated Discharge Disposition (PT): inpatient rehabilitation facility  Planned Therapy Interventions (PT): balance training, bed mobility training, gait training, home exercise program, patient/family education, postural re-education, ROM (range of motion), strengthening, stretching, transfer training, stair training, wheelchair  management/propulsion training  Therapy Frequency (PT): 6 times/wk  Plan of Care Reviewed With: patient  Outcome Evaluation: Pt. evaluation completed during PT session. She was able to perform functional mobility skills w/ maxA x2. She was unable to ambulate at time of evaluation d/t weakness and pain. She demonstrates impaired strength and ROM. Pt. would benefit from inpatient rehab to address weakness, pain, impaired mobility, safety precautions, and fall risk.               Time Calculation:     PT Charges         Row Name 25 1407                       Time Calculation     PT Received On 25  -KM         PT Goal Re-Cert Due Date 25  -                      User Key  (r) = Recorded By, (t) = Taken By, (c) = Cosigned By        Initials Name Provider Type     Sage Mccain, PT Physical Therapist                       Therapy Charges for Today         Code Description Service Date Service Provider Modifiers Qty     40833034996 HC PT EVAL MOD COMPLEXITY 4 2025 Sage Fuentes, PT GP 1                PT G-Codes  AM-PAC 6 Clicks Score (PT): 8     Sage Fuentes PT                   2025                                   Izzy Murillo, MICHAEL   Occupational Therapist  Specialty: Occupational Therapy     Therapy Evaluation     Signed     Date of Service: 25  Creation Time: 25     Signed       Expand All Collapse All[]Expand All by Default    Patient Name: Lizbet Arzola                   : 1945                          MRN: [id number]                              Today's Date: 2025                                   Admit Date: 2025                        Visit Dx:   Visit Diagnosis       ICD-10-CM ICD-9-CM   1. Closed displaced intertrochanteric fracture of left femur, initial encounter  S72.142A 820.21   2. Acute low back pain with sciatica, sciatica laterality unspecified, unspecified back pain laterality  M54.40 724.2       724.3         Problem List        Patient Active Problem List   Diagnosis    Closed intertrochanteric fracture of left femur         Medical History        Past Medical History:   Diagnosis Date    Hyperlipidemia      Hypertension      Hyperthyroidism      Osteoarthritis           Surgical History         Past Surgical History:   Procedure Laterality Date    LIPOMA EXCISION               General Information         Row Name 06/24/25 1519                 OT Time and Intention     Subjective Information complains of;weakness;fatigue;pain  -       Document Type evaluation  -       Mode of Treatment individual therapy;occupational therapy  -       Patient Effort good  -       Symptoms Noted During/After Treatment fatigue  -       Comment Patient agreeable to therapy. Seen for OT evaluation. Patient's daughter present throughout.  -          Row Name 06/24/25 1519                 General Information     Patient Profile Reviewed yes  -       Prior Level of Function independent:  modified independent; use of rollator  -       Existing Precautions/Restrictions fall  -       Barriers to Rehab none identified  -          Row Name 06/24/25 1519                 Occupational Profile     Reason for Services/Referral (Occupational Profile) Patient admitted to [facility] on 6/21/2025. She underwent LLE ORIF on 6/23/2025. Patient was referred for OT evaluation due to change in functional performance with ADLs, functional mobility, and/or tramsfers.  -          Row Name 06/24/25 1519                 Living Environment     Current Living Arrangements home  -       People in Home spouse  -          Row Name 06/24/25 1519                 Home Main Entrance     Number of Stairs, Main Entrance none  -          Row Name 06/24/25 1519                 Cognition     Orientation Status (Cognition) oriented to;person  -          Row Name 06/24/25 1519                 Safety Issues/Impairments Affecting Functional Mobility      Safety Issues Affecting Function (Mobility) awareness of need for assistance;insight into deficits/self-awareness  -       Impairments Affecting Function (Mobility) balance;endurance/activity tolerance;pain;range of motion (ROM);strength  -                       User Key  (r) = Recorded By, (t) = Taken By, (c) = Cosigned By        Initials Name Provider Type      Izzy Murillo OT Occupational Therapist                                     Mobility/ADL's         Row Name 06/24/25 1521                 Bed Mobility     Bed Mobility supine-sit  -       Supine-Sit [place] (Bed Mobility) maximum assist (25% patient effort);2 person assist;verbal cues  -       Bed Mobility, Safety Issues decreased use of arms for pushing/pulling;decreased use of legs for bridging/pushing  -       Assistive Device (Bed Mobility) bed rails;head of bed elevated  -[facility] Name 06/24/25 1521                 Transfers     Transfers sit-stand transfer;stand-sit transfer  -KP          Row Name 06/24/25 1521                 Sit-Stand Transfer     Sit-Stand [place] (Transfers) dependent (less than 25% patient effort);2 person assist;verbal cues  -KP          Row Name 06/24/25 1521                 Stand-Sit Transfer     Stand-Sit [place] (Transfers) maximum assist (25% patient effort);2 person assist;verbal cues  [facility]       Assistive Device (Stand-Sit Transfers) walker, front-wheeled  -[facility] Name 06/24/25 1521                 Activities of Daily Living     BADL Assessment/Intervention bathing;upper body dressing;lower body dressing;grooming;feeding;toileting  -KP          Row Name 06/24/25 1521                 Mobility     Extremity Weight-bearing Status left lower extremity  -       Left Lower Extremity (Weight-bearing Status) weight-bearing as tolerated (WBAT)  -KP          Row Name 06/24/25 1521                 Bathing Assessment/Intervention     [place] Level (Bathing) bathing skills;maximum  assist (25% patient effort)  -[facility] Name 06/24/25 1521                 Upper Body Dressing Assessment/Training     [place] Level (Upper Body Dressing) upper body dressing skills;moderate assist (50% patient effort)  -[facility] Name 06/24/25 1521                 Lower Body Dressing Assessment/Training     [place] Level (Lower Body Dressing) lower body dressing skills;dependent (less than 25% patient effort)  -[facility] Name 06/24/25 1521                 Grooming Assessment/Training     [place] Level (Grooming) grooming skills;minimum assist (75% patient effort)  -[facility] Name 06/24/25 1521                 Self-Feeding Assessment/Training     [place] Level (Feeding) feeding skills;set up  -[facility] Name 06/24/25 1521                 Toileting Assessment/Training     [place] Level (Toileting) toileting skills;dependent (less than 25% patient effort)  -                       User Key  (r) = Recorded By, (t) = Taken By, (c) = Cosigned By        Initials Name Provider Type      Izzy Murillo OT Occupational Therapist                            Obj/Interventions         [facility] Name 06/24/25 1522                 Sensory Assessment (Somatosensory)     Sensory Assessment (Somatosensory) UE sensation intact  -KP          Row Name 06/24/25 1522                 Vision Assessment/Intervention     Visual Impairment/Limitations WFL  -KP          Row Name 06/24/25 1522                 Range of Motion Comprehensive     General Range of Motion bilateral upper extremity ROM WFL  -KP          Row Name 06/24/25 1522                 Strength Comprehensive (MMT)     Comment, General Manual Muscle Testing (MMT) Assessment 4-/5 MMT in BUEs  -[facility] Name 06/24/25 1522                 Motor Skills     Motor Skills coordination;functional endurance  -       Coordination WFL;bilateral;upper extremity  -KP       Functional Endurance fair  -[facility] Name 06/24/25  1522                 Balance     Balance Assessment sitting static balance;standing static balance  -       Static Sitting Balance contact guard  -KP       Static Standing Balance maximum assist  -                       User Key  (r) = Recorded By, (t) = Taken By, (c) = Cosigned By        Initials Name Provider Type     Izzy Motta OT Occupational Therapist                            Goals/Plan         Row Name 06/24/25 1524                 Transfer Goal 1 (OT)     Activity/Assistive Device (Transfer Goal 1, OT) toilet;commode, 3-in-1  -       [place] Level/Cues Needed (Transfer Goal 1, OT) minimum assist (75% or more patient effort)  -       Time Frame (Transfer Goal 1, OT) by discharge  -          Row Name 06/24/25 1524                 Problem Specific Goal 1 (OT)     Problem Specific Goal 1 (OT) Patient will perform sustained activity X12 minutes to promote functional endurance/activity tolerance needed for daily routine.  -       Time Frame (Problem Specific Goal 1, OT) by discharge  -          Row Name 06/24/25 1524                 Therapy Assessment/Plan (OT)     Planned Therapy Interventions (OT) activity tolerance training;BADL retraining;functional balance retraining;patient/caregiver education/training;passive ROM/stretching;occupation/activity based interventions;ROM/therapeutic exercise;strengthening exercise;transfer/mobility retraining  -                    User Key  (r) = Recorded By, (t) = Taken By, (c) = Cosigned By        Initials Name Provider Type     Izzy Motta OT Occupational Therapist                         Clinical Impression         Row Name 06/24/25 1523                 Pain Scale: FACES Pre/Post-Treatment     Pain: FACES Scale, Pretreatment 2-->hurts little bit  -       Posttreatment Pain Rating 4-->hurts little more  -          Row Name 06/24/25 1523                 Plan of Care Review     Plan of Care Reviewed With patient  -       Progress no  change  -       Outcome Evaluation Patient seen for OT evaluation. She presents with functional limitations including generalized weakness, pain, limited ROM, impaired balance, decreased safety, and limited functional endurance/activity tolerance. Patient would benefit from ongoing OT services to promote highest level of independence and safety prior to discharge. Patient is a good candidate for intensive therapy to return to prior level of function.  -          Row Name 06/24/25 1523                 Therapy Assessment/Plan (OT)     Patient/Family Therapy Goal Statement (OT) be able to return home  -       Rehab Potential (OT) good  -       Criteria for Skilled Therapeutic Interventions Met (OT) yes;meets criteria;skilled treatment is necessary  -       Therapy Frequency (OT) 5 times/wk  -       Predicted Duration of Therapy Intervention (OT) discharge  -          Row Name 06/24/25 1523                 Therapy Plan Review/Discharge Plan (OT)     Anticipated Discharge Disposition (OT) inpatient rehabilitation facility  -          Row Name 06/24/25 1523                 Positioning and Restraints     Pre-Treatment Position in bed  -       Post Treatment Position bed  -       In Bed sitting EOB;notified nsg;call light within reach;encouraged to call for assist;with family/caregiver  -                       User Key  (r) = Recorded By, (t) = Taken By, (c) = Cosigned By        Initials Name Provider Type      Izzy Murillo, MICHAEL Occupational Therapist                            Outcome Measures         Row Name 06/24/25 0710                 How much help from another person do you currently need...     Turning from your back to your side while in flat bed without using bedrails? 2  -WB       Moving from lying on back to sitting on the side of a flat bed without bedrails? 2  -WB       Moving to and from a bed to a chair (including a wheelchair)? 1  -WB       Standing up from a chair using your arms  (e.g., wheelchair, bedside chair)? 1  -WB       Climbing 3-5 steps with a railing? 1  -WB       To walk in hospital room? 1  -WB       AM-PAC 6 Clicks Score (PT) 8  -WB                       User Key  (r) = Recorded By, (t) = Taken By, (c) = Cosigned By        Initials Name Provider Type     WB Winter Johnson RN Registered Nurse                                      OT Recommendation and Plan  Planned Therapy Interventions (OT): activity tolerance training, BADL retraining, functional balance retraining, patient/caregiver education/training, passive ROM/stretching, occupation/activity based interventions, ROM/therapeutic exercise, strengthening exercise, transfer/mobility retraining  Therapy Frequency (OT): 5 times/wk  Plan of Care Review  Plan of Care Reviewed With: patient  Progress: no change  Outcome Evaluation: Patient seen for OT evaluation. She presents with functional limitations including generalized weakness, pain, limited ROM, impaired balance, decreased safety, and limited functional endurance/activity tolerance. Patient would benefit from ongoing OT services to promote highest level of independence and safety prior to discharge. Patient is a good candidate for intensive therapy to return to prior level of function.      Time Calculation:        Time Calculation- OT         Row Name 06/24/25 1525                       Time Calculation- OT     OT Received On 06/24/25  -                      User Key  (r) = Recorded By, (t) = Taken By, (c) = Cosigned By        Initials Name Provider Type     Izzy Motta OT Occupational Therapist                       Therapy Charges for Today         Code Description Service Date Service Provider Modifiers Qty     76567995556  OT EVAL MOD COMPLEXITY 4 6/24/2025 Izzy Murillo OT GO 1                   Izzy Murillo OT                   6/24/2025

## 2025-07-03 NOTE — THERAPY TREATMENT NOTE
Acute Care - Physical Therapy Treatment Note   [place]     Patient Name: Lizbet Arzola  : 1945  MRN: [id number]  Today's Date: 7/3/2025      Visit Dx:     ICD-10-CM ICD-9-CM   1. Closed displaced intertrochanteric fracture of left femur, initial encounter  S72.142A 820.21   2. Acute low back pain with sciatica, sciatica laterality unspecified, unspecified back pain laterality  M54.40 724.2     724.3     Patient Active Problem List   Diagnosis    Closed intertrochanteric fracture of left femur     Past Medical History:   Diagnosis Date    Hyperlipidemia     Hypertension     Hyperthyroidism     Osteoarthritis      Past Surgical History:   Procedure Laterality Date    LIPOMA EXCISION       PT Assessment (Last 12 Hours)       PT Evaluation and Treatment       Row Name 25 1341          Physical Therapy Time and Intention    Subjective Information complains of;weakness;pain  -KM     Document Type therapy note (daily note)  -KM     Mode of Treatment individual therapy;physical therapy  -KM     Patient Effort good  -KM     Symptoms Noted During/After Treatment fatigue;increased pain  -KM       Row Name 25 1341          General Information    Patient Profile Reviewed yes  -KM     Patient Observations alert;cooperative;agree to therapy  -KM     Existing Precautions/Restrictions fall;weight bearing;other (see comments)  -KM       Row Name 25 1341          Pain Scale: FACES Pre/Post-Treatment    Pain: FACES Scale, Pretreatment 0-->no hurt  -KM     Posttreatment Pain Rating 2-->hurts little bit  -KM       Row Name 25 1341          Cognition    Affect/Mental Status (Cognition) WFL  -KM     Orientation Status (Cognition) oriented to;person;place;situation  -KM     Follows Commands (Cognition) follows one-step commands  -KM       Row Name 25 1341          Bed Mobility    Bed Mobility supine-sit  -KM     Supine-Sit [place] (Bed Mobility) moderate assist (50% patient effort);1 person  assist;verbal cues  -KM     Bed Mobility, Safety Issues decreased use of arms for pushing/pulling;decreased use of legs for bridging/pushing  -KM     Assistive Device (Bed Mobility) bed rails;head of bed elevated;repositioning sheet  -KM       Row Name 07/03/25 1341          Transfers    Transfers sit-stand transfer;stand-sit transfer;bed-chair transfer  -KM       Row Name 07/03/25 1341          Bed-Chair Transfer    Bed-Chair [place] (Transfers) moderate assist (50% patient effort);verbal cues  -KM     Assistive Device (Bed-Chair Transfers) walker, front-wheeled  -KM       Row Name 07/03/25 1341          Sit-Stand Transfer    Sit-Stand [place] (Transfers) moderate assist (50% patient effort);verbal cues  -KM     Assistive Device (Sit-Stand Transfers) walker, front-wheeled  -KM       Row Name 07/03/25 1341          Stand-Sit Transfer    Stand-Sit [place] (Transfers) moderate assist (50% patient effort);verbal cues  -     Assistive Device (Stand-Sit Transfers) walker, front-wheeled  -KM       Row Name 07/03/25 1341          Gait/Stairs (Locomotion)    Gait/Stairs Locomotion gait/ambulation independence;gait/ambulation assistive device;distance ambulated  -KM     [place] Level (Gait) minimum assist (75% patient effort)  -KM     Assistive Device (Gait) walker, front-wheeled  -KM     Patient was able to Ambulate yes  -KM     Distance in Feet (Gait) 40  -KM     Pattern (Gait) step-to  -KM     Deviations/Abnormal Patterns (Gait) antalgic;ataxic;base of support, narrow;gait speed decreased  -KM     Bilateral Gait Deviations forward flexed posture;knee buckling bilaterally  -KM     Left Sided Gait Deviations weight shift ability decreased  -KM       Row Name 07/03/25 1341          Safety Issues/Impairments Affecting Functional Mobility    Impairments Affecting Function (Mobility) balance;endurance/activity tolerance;pain;strength  -KM       Row Name 07/03/25 1341          Motor Skills    Comments,  Therapeutic Exercise seated ther-ex  -KM       Row Name             Wound 06/23/25 1619 Left hip Surgical    Wound - Properties Group Placement Date: 06/23/25  -KB Placement Time: 1619  -KB Present on Original Admission: N  -KB Side: Left  -KB Location: hip  -KB Primary Wound Type: Surgical  -KB, incision sites x4     Retired Wound - Properties Group Placement Date: 06/23/25  -KB Placement Time: 1619  -KB Present on Original Admission: N  -KB Side: Left  -KB Location: hip  -KB    Retired Wound - Properties Group Placement Date: 06/23/25  -KB Placement Time: 1619  -KB Present on Original Admission: N  -KB Side: Left  -KB Location: hip  -KB    Retired Wound - Properties Group Date first assessed: 06/23/25  -KB Time first assessed: 1619  -KB Present on Original Admission: N  -KB Side: Left  -KB Location: hip  -KB      Row Name 07/03/25 1341          Progress Summary (PT)    Daily Progress Summary (PT) Pt. was able to perform functional mobility skills w/ modA. She was able to improve ambulation distance and quality w/ RW. She continues to tolerate increased activity well. Pt. would continue to benefit from PT services.  -KM               User Key  (r) = Recorded By, (t) = Taken By, (c) = Cosigned By      Initials Name Provider Type    Cheryl Aponte, RN Registered Nurse    Sage Mccain, PT Physical Therapist                    Physical Therapy Education       Title: PT OT SLP Therapies (Done)       Topic: Physical Therapy (Done)       Point: Mobility training (Done)       Learning Progress Summary            Patient Acceptance, E,TB, VU by MP at 7/2/2025 0916    Acceptance, E,TB, VU by HG at 7/2/2025 0511    Acceptance, E,TB, VU by HG at 6/30/2025 0604    Acceptance, E,TB, VU by ANGEL LUIS at 6/24/2025 1421   Family Acceptance, E,TB, VU by HG at 7/2/2025 0511    Acceptance, E,TB, VU by  at 6/30/2025 0604                      Point: Home exercise program (Done)       Learning Progress Summary            Patient  Acceptance, E,TB, VU by MP at 7/2/2025 0916    Acceptance, E,TB, VU by HG at 7/2/2025 0511    Acceptance, E,TB, VU by HG at 6/30/2025 0604    Acceptance, E,TB, VU by KM at 6/24/2025 1421   Family Acceptance, E,TB, VU by HG at 7/2/2025 0511    Acceptance, E,TB, VU by HG at 6/30/2025 0604                      Point: Body mechanics (Done)       Learning Progress Summary            Patient Acceptance, E,TB, VU by MP at 7/2/2025 0916    Acceptance, E,TB, VU by HG at 7/2/2025 0511    Acceptance, E,TB, VU by HG at 6/30/2025 0604    Acceptance, E,TB, VU by KM at 6/24/2025 1421   Family Acceptance, E,TB, VU by HG at 7/2/2025 0511    Acceptance, E,TB, VU by HG at 6/30/2025 0604                      Point: Precautions (Done)       Learning Progress Summary            Patient Acceptance, E,TB, VU by MP at 7/2/2025 0916    Acceptance, E,TB, VU by HG at 7/2/2025 0511    Acceptance, E,TB, VU by HG at 6/30/2025 0604    Acceptance, E,TB, VU by KM at 6/24/2025 1421   Family Acceptance, E,TB, VU by HG at 7/2/2025 0511    Acceptance, E,TB, VU by HG at 6/30/2025 0604                                      User Key       Initials Effective Dates Name Provider Type Discipline     06/16/21 -  Heaven Burleson, RN Registered Nurse Nurse     05/24/22 -  Sage Fuentes PT Physical Therapist PT     01/22/25 -  Christine Lepe RN Registered Nurse Nurse                  PT Recommendation and Plan  Anticipated Discharge Disposition (PT): inpatient rehabilitation facility  Planned Therapy Interventions (PT): balance training, bed mobility training, gait training, home exercise program, patient/family education, postural re-education, ROM (range of motion), strengthening, stretching, transfer training, stair training, wheelchair management/propulsion training  Therapy Frequency (PT): 6 times/wk  Progress Summary (PT)  Daily Progress Summary (PT): Pt. was able to perform functional mobility skills w/ modA. She was able to improve ambulation  distance and quality w/ RW. She continues to tolerate increased activity well. Pt. would continue to benefit from PT services.  Plan of Care Reviewed With: patient  Outcome Evaluation: Pt. evaluation completed during PT session. She was able to perform functional mobility skills w/ maxA x2. She was unable to ambulate at time of evaluation d/t weakness and pain. She demonstrates impaired strength and ROM. Pt. would benefit from inpatient rehab to address weakness, pain, impaired mobility, safety precautions, and fall risk.       Time Calculation:    PT Charges       Row Name 07/03/25 1340             Time Calculation    PT Received On 07/03/25  -KM         Time Calculation- PT    Total Timed Code Minutes- PT 30 minute(s)  -KM                User Key  (r) = Recorded By, (t) = Taken By, (c) = Cosigned By      Initials Name Provider Type    Sage Mccain, PT Physical Therapist                  Therapy Charges for Today       Code Description Service Date Service Provider Modifiers Qty    48784686383 HC PT THERAPEUTIC ACT EA 15 MIN 7/2/2025 Sage Fuentes, PT GP 1    21843193682 HC PT THER PROC EA 15 MIN 7/2/2025 Sage Fuentes, PT GP 1    95422951301 HC GAIT TRAINING EA 15 MIN 7/2/2025 Sage Fuentes, PT GP 1    86941466272 HC GAIT TRAINING EA 15 MIN 7/3/2025 Sage Fuentes, PT GP 1    18383536390 HC PT THERAPEUTIC ACT EA 15 MIN 7/3/2025 Sage Fuentes, PT GP 1            PT G-Codes  AM-PAC 6 Clicks Score (PT): 17    Sage Fuentes PT  7/3/2025

## 2025-07-03 NOTE — CASE MANAGEMENT/SOCIAL WORK
Discharge Planning Assessment   [place]     Patient Name: Lizbet Arzola  MRN: [id number]  Today's Date: 7/3/2025    Admit Date: 6/21/2025    Plan: SS spoke with pt's daughter Mary on this date.  Pt's family agreeable for SS to submit pt's information to [employer] for review.  SS submitted pt's information to [employer] and notified Michelle.  Pt can be accepted and SS requested team begin pre auth on this date.  SS will follow.       Discharge Plan       Row Name 07/03/25 1447       Plan    Plan SS spoke with pt's daughter Mary on this date.  Pt's family agreeable for SS to submit pt's information to [employer] for review.  SS submitted pt's information to [employer] and notified Michelle.  Pt can be accepted and SS requested team begin pre auth on this date.  SS will follow.                  Continued Care and Services - Admitted Since 6/21/2025       Destination       Service Provider Request Status Services Address Phone Fax Patient Preferred    [facility] - Request Sent -- [facility] [address], [place] [address] [phone] --                  Expected Discharge Date and Time       Expected Discharge Date Expected Discharge Time    Jul 4, 2025           NATALIE Woods

## 2025-07-03 NOTE — NURSING NOTE
Received report from Yaron SOLIS and I agree with the previous assessment charted. No s/s of acute distress noted at this time. Plan of care ongoing.

## 2025-07-03 NOTE — PLAN OF CARE
Goal Outcome Evaluation:   Patient is in bed resting. No acute S/S of distress noted at this time. Plan of care ongoing.

## 2025-07-03 NOTE — PROGRESS NOTES
[facility] HOSPITALIST PROGRESS NOTE    Subjective     History:   Lizbet Arzola is a 80 y.o. female admitted on 6/21/2025 secondary to Closed intertrochanteric fracture of left femur     Procedures:   6/23/25: Left subtrochanteric hip fracture repair with long cephalomedullary nail    Transfusions:  6/24/25: 2 units of PRBC's     CC: Follow up hip fx     Patient seen and examined with IRMA Marrufo. Awake and alert with her daughter present at bedside. Reports some back pain. No reported CP, dyspnea or palpitations. No reported vomiting.  No acute events reported.     History taken from: patient, chart, and RN.      Objective     Vital Signs  Temp:  [98.1 °F (36.7 °C)-99.2 °F (37.3 °C)] 98.1 °F (36.7 °C)  Heart Rate:  [74-93] 74  Resp:  [18] 18  BP: (128-158)/(56-87) 130/62    Intake/Output Summary (Last 24 hours) at 7/3/2025 1447  Last data filed at 7/3/2025 0900  Gross per 24 hour   Intake 300 ml   Output --   Net 300 ml         Physical Exam: Unchanged from previous.   General:    Awake, alert, in no acute distress   Heart:      Normal S1 and S2. Regular rate and rhythm. No significant murmur, rubs or gallops appreciated.   Lungs:     Respirations regular, even and unlabored. Lungs clear to auscultation B/L. No wheezes, rales or rhonchi.   Abdomen:   Soft and nontender. No guarding, rebound tenderness or  organomegaly noted. Bowel sounds present x 4.   Extremities:  No clubbing, cyanosis or edema noted. Moves UE and LE equally B/L.     Results Review:    Results from last 7 days   Lab Units 07/03/25  0129 07/02/25  0421 07/01/25  0227 06/29/25  0107 06/28/25  0342   WBC 10*3/mm3 11.24* 9.33 11.46* 11.12* 9.75   HEMOGLOBIN g/dL 8.7* 9.0* 8.7* 8.4* 8.7*   PLATELETS 10*3/mm3 450 470* 436 338 285     Results from last 7 days   Lab Units 07/03/25  0129 07/02/25  1603 07/02/25  0421 07/01/25  0227 06/28/25  0342   SODIUM mmol/L 141  --  141 140 138   POTASSIUM mmol/L 4.3 4.5 3.5 3.5 3.5   CHLORIDE  mmol/L 106  --  102 102 103   CO2 mmol/L 24.7  --  26.3 26.7 24.8   BUN mg/dL 24.5*  --  23.8* 23.5* 22.4   CREATININE mg/dL 1.05*  --  1.06* 1.12* 0.92   CALCIUM mg/dL 9.6  --  9.3 9.1 8.7   GLUCOSE mg/dL 127*  --  115* 127* 117*         Results from last 7 days   Lab Units 07/02/25  0421   MAGNESIUM mg/dL 1.6                 Imaging Results (Last 24 Hours)       ** No results found for the last 24 hours. **              Medications:  amLODIPine, 5 mg, Oral, Q24H  aspirin, 81 mg, Oral, Nightly  cholecalciferol, 4,000 Units, Oral, Nightly  diazePAM, 2 mg, Oral, Once  Diclofenac Sodium, 4 g, Topical, BID  donepezil, 10 mg, Oral, Nightly  enoxaparin sodium, 40 mg, Subcutaneous, Q24H  escitalopram, 10 mg, Oral, Daily  [Held by provider] methIMAzole, 5 mg, Oral, Daily  multivitamin, 1 tablet, Oral, Nightly  rosuvastatin, 5 mg, Oral, Daily  sodium chloride, 10 mL, Intravenous, Q12H               Assessment & Plan   Left proximal femur fracture: S/P repair with nailing. Pain control. PT/OT. Ortho input appreciated.     Acute blood loss anemia: Likely 2/2 above. S/P 2 units of PRBC's. Improved and stable today.     New onset paroxysmal Afib: Currently in NSR. Echo reveals an EF of 61-65%, mild LVH and grade I diastolic dysfunction. Brief episode noted. Not currently anticoagulated 2/2 concern for fall risk after discussion with patient and her daughter. Monitor on telemetry.     Left knee pain: No evidence of acute fracture on imaging.     Suspected acute gout flare of left first MTP: Appears clinically improved and indomethacin and colchicine previously D/C'd.     Mild leukocytosis: Mildly elevated today. Repeat CBC in the AM.     Essential HTN: BP stable. Cont Norvasc. Cont to monitor.     HLD: Cont statin.     Hyperthyroidism: TSH is elevated with low free T4. Methimazole currently being held.     Suspected CKD IIIa: Cr overall stable today. Monitor UOP and repeat labs in the AM.     Borderline hypokalemia: Improved  with supplementation.     DVT PPX: SQ Lovenox    Disposition Expedited appeal for IPR upheld. Family considering additional appeal vs pursuing SNF placement.     Mando Shahid DO  07/03/25  14:47 EDT

## 2025-07-04 LAB
ANION GAP SERPL CALCULATED.3IONS-SCNC: 11.8 MMOL/L (ref 5–15)
BASOPHILS # BLD AUTO: 0.07 10*3/MM3 (ref 0–0.2)
BASOPHILS NFR BLD AUTO: 0.6 % (ref 0–1.5)
BILIRUB UR QL STRIP: NEGATIVE
BUN SERPL-MCNC: 24.1 MG/DL (ref 8–23)
BUN/CREAT SERPL: 21.1 (ref 7–25)
CALCIUM SPEC-SCNC: 9.4 MG/DL (ref 8.6–10.5)
CHLORIDE SERPL-SCNC: 104 MMOL/L (ref 98–107)
CLARITY UR: CLEAR
CO2 SERPL-SCNC: 23.2 MMOL/L (ref 22–29)
COLOR UR: YELLOW
CREAT SERPL-MCNC: 1.14 MG/DL (ref 0.57–1)
DEPRECATED RDW RBC AUTO: 51.1 FL (ref 37–54)
EGFRCR SERPLBLD CKD-EPI 2021: 48.2 ML/MIN/1.73
EOSINOPHIL # BLD AUTO: 0.36 10*3/MM3 (ref 0–0.4)
EOSINOPHIL NFR BLD AUTO: 2.9 % (ref 0.3–6.2)
ERYTHROCYTE [DISTWIDTH] IN BLOOD BY AUTOMATED COUNT: 14.3 % (ref 12.3–15.4)
GLUCOSE SERPL-MCNC: 116 MG/DL (ref 65–99)
GLUCOSE UR STRIP-MCNC: NEGATIVE MG/DL
HCT VFR BLD AUTO: 27.2 % (ref 34–46.6)
HGB BLD-MCNC: 8.3 G/DL (ref 12–15.9)
HGB UR QL STRIP.AUTO: NEGATIVE
IMM GRANULOCYTES # BLD AUTO: 0.11 10*3/MM3 (ref 0–0.05)
IMM GRANULOCYTES NFR BLD AUTO: 0.9 % (ref 0–0.5)
KETONES UR QL STRIP: NEGATIVE
LEUKOCYTE ESTERASE UR QL STRIP.AUTO: NEGATIVE
LYMPHOCYTES # BLD AUTO: 3.05 10*3/MM3 (ref 0.7–3.1)
LYMPHOCYTES NFR BLD AUTO: 24.9 % (ref 19.6–45.3)
MCH RBC QN AUTO: 31.2 PG (ref 26.6–33)
MCHC RBC AUTO-ENTMCNC: 30.5 G/DL (ref 31.5–35.7)
MCV RBC AUTO: 102.3 FL (ref 79–97)
MONOCYTES # BLD AUTO: 1.27 10*3/MM3 (ref 0.1–0.9)
MONOCYTES NFR BLD AUTO: 10.4 % (ref 5–12)
NEUTROPHILS NFR BLD AUTO: 60.3 % (ref 42.7–76)
NEUTROPHILS NFR BLD AUTO: 7.4 10*3/MM3 (ref 1.7–7)
NITRITE UR QL STRIP: NEGATIVE
NRBC BLD AUTO-RTO: 0.2 /100 WBC (ref 0–0.2)
PH UR STRIP.AUTO: 6.5 [PH] (ref 5–8)
PLATELET # BLD AUTO: 412 10*3/MM3 (ref 140–450)
PMV BLD AUTO: 8.9 FL (ref 6–12)
POTASSIUM SERPL-SCNC: 3.9 MMOL/L (ref 3.5–5.2)
PROT UR QL STRIP: ABNORMAL
RBC # BLD AUTO: 2.66 10*6/MM3 (ref 3.77–5.28)
SODIUM SERPL-SCNC: 139 MMOL/L (ref 136–145)
SP GR UR STRIP: 1.01 (ref 1–1.03)
UROBILINOGEN UR QL STRIP: ABNORMAL
WBC NRBC COR # BLD AUTO: 12.26 10*3/MM3 (ref 3.4–10.8)

## 2025-07-04 PROCEDURE — 97116 GAIT TRAINING THERAPY: CPT

## 2025-07-04 PROCEDURE — 81003 URINALYSIS AUTO W/O SCOPE: CPT | Performed by: INTERNAL MEDICINE

## 2025-07-04 PROCEDURE — 85025 COMPLETE CBC W/AUTO DIFF WBC: CPT | Performed by: INTERNAL MEDICINE

## 2025-07-04 PROCEDURE — 80048 BASIC METABOLIC PNL TOTAL CA: CPT | Performed by: INTERNAL MEDICINE

## 2025-07-04 PROCEDURE — 97530 THERAPEUTIC ACTIVITIES: CPT

## 2025-07-04 PROCEDURE — 99232 SBSQ HOSP IP/OBS MODERATE 35: CPT | Performed by: INTERNAL MEDICINE

## 2025-07-04 PROCEDURE — 25010000002 ENOXAPARIN PER 10 MG: Performed by: INTERNAL MEDICINE

## 2025-07-04 RX ADMIN — DONEPEZIL HYDROCHLORIDE 10 MG: 5 TABLET, FILM COATED ORAL at 20:28

## 2025-07-04 RX ADMIN — Medication 4000 UNITS: at 20:28

## 2025-07-04 RX ADMIN — Medication 10 ML: at 08:59

## 2025-07-04 RX ADMIN — DICLOFENAC SODIUM 4 G: 10 GEL TOPICAL at 08:59

## 2025-07-04 RX ADMIN — THERA TABS 1 TABLET: TAB at 20:28

## 2025-07-04 RX ADMIN — DICLOFENAC SODIUM 4 G: 10 GEL TOPICAL at 20:29

## 2025-07-04 RX ADMIN — ESCITALOPRAM 10 MG: 10 TABLET, FILM COATED ORAL at 08:59

## 2025-07-04 RX ADMIN — ENOXAPARIN SODIUM 40 MG: 100 INJECTION SUBCUTANEOUS at 14:21

## 2025-07-04 RX ADMIN — AMLODIPINE BESYLATE 5 MG: 5 TABLET ORAL at 08:59

## 2025-07-04 RX ADMIN — ROSUVASTATIN CALCIUM 5 MG: 10 TABLET, FILM COATED ORAL at 08:58

## 2025-07-04 RX ADMIN — HYDROCODONE BITARTRATE AND ACETAMINOPHEN 1 TABLET: 5; 325 TABLET ORAL at 08:58

## 2025-07-04 RX ADMIN — ASPIRIN 81 MG CHEWABLE TABLET 81 MG: 81 TABLET CHEWABLE at 20:28

## 2025-07-04 NOTE — PLAN OF CARE
Goal Outcome Evaluation:   Pt has rested this shift. Pt on RA with sats maintaining above 90%. No s/s of acute distress noted at this time. Plan of care ongoing.                                             no

## 2025-07-04 NOTE — PLAN OF CARE
Goal Outcome Evaluation:        Patient resting in bed. No visible indicators of acute distress noted at this time. Plan of care ongoing.

## 2025-07-04 NOTE — PROGRESS NOTES
[facility] HOSPITALIST PROGRESS NOTE    Subjective     History:   Lizbet Arzola is a 80 y.o. female admitted on 6/21/2025 secondary to Closed intertrochanteric fracture of left femur     Procedures:   6/23/25: Left subtrochanteric hip fracture repair with long cephalomedullary nail    Transfusions:  6/24/25: 2 units of PRBC's     CC: Follow up hip fx     Patient seen and examined with IRMA Bah. Awake and alert with her daughter present at bedside. Reports some back pain and urinary urgency. No reported CP, dyspnea or palpitations. No reported vomiting.  No acute events reported.     History taken from: patient, chart, and RN.      Objective     Vital Signs  Temp:  [98 °F (36.7 °C)-98.8 °F (37.1 °C)] 98 °F (36.7 °C)  Heart Rate:  [50-88] 84  Resp:  [16-18] 18  BP: (112-159)/(53-99) 146/58    Intake/Output Summary (Last 24 hours) at 7/4/2025 1451  Last data filed at 7/4/2025 1359  Gross per 24 hour   Intake 480 ml   Output 350 ml   Net 130 ml         Physical Exam: Unchanged from previous.   General:    Awake, alert, in no acute distress   Heart:      Normal S1 and S2. Regular rate and rhythm. No significant murmur, rubs or gallops appreciated.   Lungs:     Respirations regular, even and unlabored. Lungs clear to auscultation B/L. No wheezes, rales or rhonchi.   Abdomen:   Soft and nontender. No guarding, rebound tenderness or  organomegaly noted. Bowel sounds present x 4.   Extremities:  No clubbing, cyanosis or edema noted. Moves UE and LE equally B/L.     Results Review:    Results from last 7 days   Lab Units 07/04/25  0146 07/03/25  0129 07/02/25  0421 07/01/25  0227 06/29/25  0107 06/28/25  0342   WBC 10*3/mm3 12.26* 11.24* 9.33 11.46* 11.12* 9.75   HEMOGLOBIN g/dL 8.3* 8.7* 9.0* 8.7* 8.4* 8.7*   PLATELETS 10*3/mm3 412 450 470* 436 338 285     Results from last 7 days   Lab Units 07/04/25  0146 07/03/25  0129 07/02/25  1603 07/02/25  0421 07/01/25  0227 06/28/25  0342   SODIUM mmol/L 139  141  --  141 140 138   POTASSIUM mmol/L 3.9 4.3 4.5 3.5 3.5 3.5   CHLORIDE mmol/L 104 106  --  102 102 103   CO2 mmol/L 23.2 24.7  --  26.3 26.7 24.8   BUN mg/dL 24.1* 24.5*  --  23.8* 23.5* 22.4   CREATININE mg/dL 1.14* 1.05*  --  1.06* 1.12* 0.92   CALCIUM mg/dL 9.4 9.6  --  9.3 9.1 8.7   GLUCOSE mg/dL 116* 127*  --  115* 127* 117*         Results from last 7 days   Lab Units 07/02/25  0421   MAGNESIUM mg/dL 1.6                 Imaging Results (Last 24 Hours)       ** No results found for the last 24 hours. **              Medications:  amLODIPine, 5 mg, Oral, Q24H  aspirin, 81 mg, Oral, Nightly  cholecalciferol, 4,000 Units, Oral, Nightly  diazePAM, 2 mg, Oral, Once  Diclofenac Sodium, 4 g, Topical, BID  donepezil, 10 mg, Oral, Nightly  enoxaparin sodium, 40 mg, Subcutaneous, Q24H  escitalopram, 10 mg, Oral, Daily  [Held by provider] methIMAzole, 5 mg, Oral, Daily  multivitamin, 1 tablet, Oral, Nightly  rosuvastatin, 5 mg, Oral, Daily  sodium chloride, 10 mL, Intravenous, Q12H               Assessment & Plan   Left proximal femur fracture: S/P repair with nailing. Pain control. PT/OT. Ortho input appreciated.     Acute blood loss anemia: Likely 2/2 above. S/P 2 units of PRBC's. Improved and stable today.     New onset paroxysmal Afib: Currently in NSR. Echo reveals an EF of 61-65%, mild LVH and grade I diastolic dysfunction. Brief episode noted. Not currently anticoagulated 2/2 concern for fall risk after discussion with patient and her daughter. Monitor on telemetry.     Left knee pain: No evidence of acute fracture on imaging.     Suspected acute gout flare of left first MTP: Appears clinically improved and indomethacin and colchicine previously D/C'd.     Mild leukocytosis: Mildly elevated today. Repeat UA obtained 2/2 reported urinary urgency with no evidence of UTI. Repeat CBC in the AM.     Essential HTN: BP stable. Cont Norvasc. Cont to monitor.     HLD: Cont statin.     Hyperthyroidism: TSH is elevated  with low free T4. Methimazole currently being held.     Suspected CKD IIIa: Cr has trended up slightly but remains overall stable today. Encourage PO intake. Monitor UOP and repeat labs in the AM.     Borderline hypokalemia: Improved with supplementation and stable today.     DVT PPX: SQ Lovenox    Disposition Expedited appeal for IPR upheld. SS working to arrange SNF placement.     Mando Shahid DO  07/04/25  14:51 EDT

## 2025-07-04 NOTE — THERAPY TREATMENT NOTE
Acute Care - Physical Therapy Treatment Note   [facility]     Patient Name: Lizbet Arzola  : 1945  MRN: [id number]  Today's Date: 2025      Visit Dx:     ICD-10-CM ICD-9-CM   1. Closed displaced intertrochanteric fracture of left femur, initial encounter  S72.142A 820.21   2. Acute low back pain with sciatica, sciatica laterality unspecified, unspecified back pain laterality  M54.40 724.2     724.3     Patient Active Problem List   Diagnosis    Closed intertrochanteric fracture of left femur     Past Medical History:   Diagnosis Date    Hyperlipidemia     Hypertension     Hyperthyroidism     Osteoarthritis      Past Surgical History:   Procedure Laterality Date    LIPOMA EXCISION       PT Assessment (Last 12 Hours)       PT Evaluation and Treatment       Row Name 25 1021          Physical Therapy Time and Intention    Subjective Information complains of;weakness;pain  -KM     Document Type therapy note (daily note)  -KM     Mode of Treatment individual therapy;physical therapy  -KM     Patient Effort good  -KM     Symptoms Noted During/After Treatment fatigue;increased pain  -KM       Row Name 25 1021          General Information    Patient Profile Reviewed yes  -KM     Patient Observations alert;cooperative;agree to therapy  -KM     Existing Precautions/Restrictions fall;weight bearing;other (see comments)  -KM       Row Name 25 1021          Pain Scale: FACES Pre/Post-Treatment    Pain: FACES Scale, Pretreatment 0-->no hurt  -KM     Posttreatment Pain Rating 2-->hurts little bit  -KM       Row Name 25 1021          Cognition    Affect/Mental Status (Cognition) WFL  -KM     Orientation Status (Cognition) oriented to;person;place;situation  -KM     Follows Commands (Cognition) follows one-step commands  -KM       Row Name 25 1021          Bed Mobility    Bed Mobility sit-supine  -KM     Sit-Supine [place] (Bed Mobility) minimum assist (75% patient effort);moderate  assist (50% patient effort);verbal cues  -KM     Bed Mobility, Safety Issues decreased use of arms for pushing/pulling;decreased use of legs for bridging/pushing  -KM     Assistive Device (Bed Mobility) bed rails;head of bed elevated;repositioning sheet  -KM       Row Name 07/04/25 1021          Transfers    Transfers sit-stand transfer;stand-sit transfer;chair-bed transfer  -KM       Row Name 07/04/25 1021          Chair-Bed Transfer    Chair-Bed [place] (Transfers) moderate assist (50% patient effort)  -KM     Assistive Device (Chair-Bed Transfers) walker, front-wheeled  -KM       Row Name 07/04/25 1021          Sit-Stand Transfer    Sit-Stand [place] (Transfers) moderate assist (50% patient effort);verbal cues  -     Assistive Device (Sit-Stand Transfers) walker, front-wheeled  -KM       Row Name 07/04/25 1021          Stand-Sit Transfer    Stand-Sit [place] (Transfers) moderate assist (50% patient effort);verbal cues  -     Assistive Device (Stand-Sit Transfers) walker, front-wheeled  -KM       Row Name 07/04/25 1021          Gait/Stairs (Locomotion)    Gait/Stairs Locomotion gait/ambulation independence;gait/ambulation assistive device;distance ambulated  -KM     [place] Level (Gait) minimum assist (75% patient effort)  -KM     Assistive Device (Gait) walker, front-wheeled  -KM     Patient was able to Ambulate yes  -KM     Distance in Feet (Gait) 55  -KM     Pattern (Gait) step-to  -KM     Deviations/Abnormal Patterns (Gait) antalgic;ataxic;base of support, narrow;gait speed decreased  -KM     Bilateral Gait Deviations forward flexed posture;knee buckling bilaterally  -KM     Left Sided Gait Deviations weight shift ability decreased  -KM       Row Name 07/04/25 1021          Safety Issues/Impairments Affecting Functional Mobility    Impairments Affecting Function (Mobility) balance;endurance/activity tolerance;pain;strength  -KM       Row Name 07/04/25 1021          Motor Skills     Comments, Therapeutic Exercise seated ther-ex  -KM       Row Name             Wound 06/23/25 1619 Left hip Surgical    Wound - Properties Group Placement Date: 06/23/25  -KB Placement Time: 1619  -KB Present on Original Admission: N  -KB Side: Left  -KB Location: hip  -KB Primary Wound Type: Surgical  -KB, incision sites x4     Retired Wound - Properties Group Placement Date: 06/23/25  -KB Placement Time: 1619  -KB Present on Original Admission: N  -KB Side: Left  -KB Location: hip  -KB    Retired Wound - Properties Group Placement Date: 06/23/25  -KB Placement Time: 1619  -KB Present on Original Admission: N  -KB Side: Left  -KB Location: hip  -KB    Retired Wound - Properties Group Date first assessed: 06/23/25  -KB Time first assessed: 1619  -KB Present on Original Admission: N  -KB Side: Left  -KB Location: hip  -KB      Row Name 07/04/25 1021          Progress Summary (PT)    Daily Progress Summary (PT) Pt. was able to perform functional mobility skills w/ modA. She was able to improve ambulation distance w/ RW. She continues to tolerate activity well. Pt. would continue to benefit from PT services.  -KM               User Key  (r) = Recorded By, (t) = Taken By, (c) = Cosigned By      Initials Name Provider Type    Cheryl Aponte, RN Registered Nurse    Sage Mccain, PT Physical Therapist                    Physical Therapy Education       Title: PT OT SLP Therapies (Done)       Topic: Physical Therapy (Done)       Point: Mobility training (Done)       Learning Progress Summary            Patient Acceptance, E, VU by  at 7/3/2025 1445    Acceptance, E,TB, VU by  at 7/2/2025 0916    Acceptance, E,TB, VU by HG at 7/2/2025 0511    Acceptance, E,TB, VU by HG at 6/30/2025 0604    Acceptance, E,TB, VU by ANGEL LUIS at 6/24/2025 1421   Family Acceptance, E,TB, VU by  at 7/2/2025 0511    Acceptance, E,TB, VU by HG at 6/30/2025 0604                      Point: Home exercise program (Done)       Learning  Progress Summary            Patient Acceptance, E, VU by  at 7/3/2025 1445    Acceptance, E,TB, VU by MP at 7/2/2025 0916    Acceptance, E,TB, VU by HG at 7/2/2025 0511    Acceptance, E,TB, VU by HG at 6/30/2025 0604    Acceptance, E,TB, VU by KM at 6/24/2025 1421   Family Acceptance, E,TB, VU by HG at 7/2/2025 0511    Acceptance, E,TB, VU by HG at 6/30/2025 0604                      Point: Body mechanics (Done)       Learning Progress Summary            Patient Acceptance, E, VU by  at 7/3/2025 1445    Acceptance, E,TB, VU by MP at 7/2/2025 0916    Acceptance, E,TB, VU by HG at 7/2/2025 0511    Acceptance, E,TB, VU by HG at 6/30/2025 0604    Acceptance, E,TB, VU by KM at 6/24/2025 1421   Family Acceptance, E,TB, VU by HG at 7/2/2025 0511    Acceptance, E,TB, VU by HG at 6/30/2025 0604                      Point: Precautions (Done)       Learning Progress Summary            Patient Acceptance, E, VU by  at 7/3/2025 1445    Acceptance, E,TB, VU by  at 7/2/2025 0916    Acceptance, E,TB, VU by HG at 7/2/2025 0511    Acceptance, E,TB, VU by HG at 6/30/2025 0604    Acceptance, E,TB, VU by KM at 6/24/2025 1421   Family Acceptance, E,TB, VU by HG at 7/2/2025 0511    Acceptance, E,TB, VU by HG at 6/30/2025 0604                                      User Key       Initials Effective Dates Name Provider Type Discipline     06/16/21 -  Heaven Burleson, RN Registered Nurse Nurse     04/24/23 -  Collett, Morgan, RN Registered Nurse Nurse     05/24/22 -  Sage Fuentes PT Physical Therapist PT     01/22/25 -  Christine Lepe RN Registered Nurse Nurse                  PT Recommendation and Plan  Anticipated Discharge Disposition (PT): inpatient rehabilitation facility  Planned Therapy Interventions (PT): balance training, bed mobility training, gait training, home exercise program, patient/family education, postural re-education, ROM (range of motion), strengthening, stretching, transfer training, stair training,  wheelchair management/propulsion training  Therapy Frequency (PT): 6 times/wk  Progress Summary (PT)  Daily Progress Summary (PT): Pt. was able to perform functional mobility skills w/ modA. She was able to improve ambulation distance w/ RW. She continues to tolerate activity well. Pt. would continue to benefit from PT services.  Plan of Care Reviewed With: patient  Outcome Evaluation: Pt. evaluation completed during PT session. She was able to perform functional mobility skills w/ maxA x2. She was unable to ambulate at time of evaluation d/t weakness and pain. She demonstrates impaired strength and ROM. Pt. would benefit from inpatient rehab to address weakness, pain, impaired mobility, safety precautions, and fall risk.       Time Calculation:    PT Charges       Row Name 07/04/25 1021             Time Calculation    PT Received On 07/04/25  -KM         Time Calculation- PT    Total Timed Code Minutes- PT 30 minute(s)  -KM                User Key  (r) = Recorded By, (t) = Taken By, (c) = Cosigned By      Initials Name Provider Type    Sage Mccain, PT Physical Therapist                  Therapy Charges for Today       Code Description Service Date Service Provider Modifiers Qty    99904761032 HC GAIT TRAINING EA 15 MIN 7/3/2025 Sage Fuentes, PT GP 1    61542749386 HC PT THERAPEUTIC ACT EA 15 MIN 7/3/2025 Sage Fuentes, PT GP 1    39597366544 HC PT THERAPEUTIC ACT EA 15 MIN 7/4/2025 Sage Fuentes, PT GP 1    72710693818 HC GAIT TRAINING EA 15 MIN 7/4/2025 Sage Fuentes, PT GP 1            PT G-Codes  AM-PAC 6 Clicks Score (PT): 14    Sage Fuentes PT  7/4/2025

## 2025-07-05 LAB
ANION GAP SERPL CALCULATED.3IONS-SCNC: 12.5 MMOL/L (ref 5–15)
BASOPHILS # BLD AUTO: 0.07 10*3/MM3 (ref 0–0.2)
BASOPHILS NFR BLD AUTO: 0.6 % (ref 0–1.5)
BUN SERPL-MCNC: 22.2 MG/DL (ref 8–23)
BUN/CREAT SERPL: 22.9 (ref 7–25)
CALCIUM SPEC-SCNC: 9.4 MG/DL (ref 8.6–10.5)
CHLORIDE SERPL-SCNC: 104 MMOL/L (ref 98–107)
CO2 SERPL-SCNC: 23.5 MMOL/L (ref 22–29)
CREAT SERPL-MCNC: 0.97 MG/DL (ref 0.57–1)
DEPRECATED RDW RBC AUTO: 51.1 FL (ref 37–54)
EGFRCR SERPLBLD CKD-EPI 2021: 58.5 ML/MIN/1.73
EOSINOPHIL # BLD AUTO: 0.35 10*3/MM3 (ref 0–0.4)
EOSINOPHIL NFR BLD AUTO: 3.2 % (ref 0.3–6.2)
ERYTHROCYTE [DISTWIDTH] IN BLOOD BY AUTOMATED COUNT: 14.4 % (ref 12.3–15.4)
GLUCOSE SERPL-MCNC: 128 MG/DL (ref 65–99)
HCT VFR BLD AUTO: 29.2 % (ref 34–46.6)
HGB BLD-MCNC: 9 G/DL (ref 12–15.9)
IMM GRANULOCYTES # BLD AUTO: 0.09 10*3/MM3 (ref 0–0.05)
IMM GRANULOCYTES NFR BLD AUTO: 0.8 % (ref 0–0.5)
LYMPHOCYTES # BLD AUTO: 2.52 10*3/MM3 (ref 0.7–3.1)
LYMPHOCYTES NFR BLD AUTO: 23.4 % (ref 19.6–45.3)
MCH RBC QN AUTO: 31.5 PG (ref 26.6–33)
MCHC RBC AUTO-ENTMCNC: 30.8 G/DL (ref 31.5–35.7)
MCV RBC AUTO: 102.1 FL (ref 79–97)
MONOCYTES # BLD AUTO: 0.95 10*3/MM3 (ref 0.1–0.9)
MONOCYTES NFR BLD AUTO: 8.8 % (ref 5–12)
NEUTROPHILS NFR BLD AUTO: 6.8 10*3/MM3 (ref 1.7–7)
NEUTROPHILS NFR BLD AUTO: 63.2 % (ref 42.7–76)
NRBC BLD AUTO-RTO: 0 /100 WBC (ref 0–0.2)
PLATELET # BLD AUTO: 432 10*3/MM3 (ref 140–450)
PMV BLD AUTO: 9 FL (ref 6–12)
POTASSIUM SERPL-SCNC: 3.7 MMOL/L (ref 3.5–5.2)
RBC # BLD AUTO: 2.86 10*6/MM3 (ref 3.77–5.28)
SODIUM SERPL-SCNC: 140 MMOL/L (ref 136–145)
WBC NRBC COR # BLD AUTO: 10.78 10*3/MM3 (ref 3.4–10.8)

## 2025-07-05 PROCEDURE — 97116 GAIT TRAINING THERAPY: CPT

## 2025-07-05 PROCEDURE — 25010000002 ENOXAPARIN PER 10 MG: Performed by: INTERNAL MEDICINE

## 2025-07-05 PROCEDURE — 80048 BASIC METABOLIC PNL TOTAL CA: CPT | Performed by: INTERNAL MEDICINE

## 2025-07-05 PROCEDURE — 97530 THERAPEUTIC ACTIVITIES: CPT

## 2025-07-05 PROCEDURE — 85025 COMPLETE CBC W/AUTO DIFF WBC: CPT | Performed by: INTERNAL MEDICINE

## 2025-07-05 PROCEDURE — 99232 SBSQ HOSP IP/OBS MODERATE 35: CPT | Performed by: INTERNAL MEDICINE

## 2025-07-05 RX ORDER — METOPROLOL SUCCINATE 25 MG/1
25 TABLET, EXTENDED RELEASE ORAL
Status: DISCONTINUED | OUTPATIENT
Start: 2025-07-05 | End: 2025-07-09 | Stop reason: HOSPADM

## 2025-07-05 RX ADMIN — ASPIRIN 81 MG CHEWABLE TABLET 81 MG: 81 TABLET CHEWABLE at 21:08

## 2025-07-05 RX ADMIN — DICLOFENAC SODIUM 4 G: 10 GEL TOPICAL at 09:02

## 2025-07-05 RX ADMIN — THERA TABS 1 TABLET: TAB at 21:08

## 2025-07-05 RX ADMIN — ROSUVASTATIN CALCIUM 5 MG: 10 TABLET, FILM COATED ORAL at 09:00

## 2025-07-05 RX ADMIN — DONEPEZIL HYDROCHLORIDE 10 MG: 5 TABLET, FILM COATED ORAL at 21:08

## 2025-07-05 RX ADMIN — Medication 4000 UNITS: at 21:08

## 2025-07-05 RX ADMIN — METOPROLOL SUCCINATE 25 MG: 25 TABLET, EXTENDED RELEASE ORAL at 09:00

## 2025-07-05 RX ADMIN — ESCITALOPRAM 10 MG: 10 TABLET, FILM COATED ORAL at 09:00

## 2025-07-05 RX ADMIN — HYDROCODONE BITARTRATE AND ACETAMINOPHEN 1 TABLET: 5; 325 TABLET ORAL at 09:00

## 2025-07-05 RX ADMIN — ENOXAPARIN SODIUM 40 MG: 100 INJECTION SUBCUTANEOUS at 16:42

## 2025-07-05 RX ADMIN — AMLODIPINE BESYLATE 5 MG: 5 TABLET ORAL at 09:00

## 2025-07-05 NOTE — PLAN OF CARE
Goal Outcome Evaluation:   Pt has rested this shift. Pt on RA with sats maintaining above 90%. No s/s of acute distress noted at this time. Plan of care ongoing.

## 2025-07-05 NOTE — PROGRESS NOTES
[facility] HOSPITALIST PROGRESS NOTE    Subjective     History:   Lizbet Arzola is a 80 y.o. female admitted on 6/21/2025 secondary to Closed intertrochanteric fracture of left femur     Procedures:   6/23/25: Left subtrochanteric hip fracture repair with long cephalomedullary nail    Transfusions:  6/24/25: 2 units of PRBC's     CC: Follow up hip fx     Patient seen and examined with IRMA Bah. Awake and alert with her son present at bedside. Reports some back pain again today. No reported CP, dyspnea or palpitations. No reported vomiting.  No acute events reported.     History taken from: patient, chart, and RN.      Objective     Vital Signs  Temp:  [97.7 °F (36.5 °C)-98.5 °F (36.9 °C)] 97.8 °F (36.6 °C)  Heart Rate:  [] 69  Resp:  [18-20] 20  BP: (122-173)/(56-94) 122/61    Intake/Output Summary (Last 24 hours) at 7/5/2025 1455  Last data filed at 7/5/2025 0800  Gross per 24 hour   Intake 600 ml   Output --   Net 600 ml         Physical Exam: Unchanged from previous.   General:    Awake, alert, in no acute distress   Heart:      Normal S1 and S2. Regular rate and rhythm. No significant murmur, rubs or gallops appreciated.   Lungs:     Respirations regular, even and unlabored. Lungs clear to auscultation B/L. No wheezes, rales or rhonchi.   Abdomen:   Soft and nontender. No guarding, rebound tenderness or  organomegaly noted. Bowel sounds present x 4.   Extremities:  No clubbing, cyanosis or edema noted. Moves UE and LE equally B/L.     Results Review:    Results from last 7 days   Lab Units 07/05/25  0159 07/04/25  0146 07/03/25  0129 07/02/25  0421 07/01/25  0227 06/29/25  0107   WBC 10*3/mm3 10.78 12.26* 11.24* 9.33 11.46* 11.12*   HEMOGLOBIN g/dL 9.0* 8.3* 8.7* 9.0* 8.7* 8.4*   PLATELETS 10*3/mm3 432 412 450 470* 436 338     Results from last 7 days   Lab Units 07/05/25  0159 07/04/25  0146 07/03/25  0129 07/02/25  1603 07/02/25  0421 07/01/25  0227   SODIUM mmol/L 140 139 141  --   141 140   POTASSIUM mmol/L 3.7 3.9 4.3 4.5 3.5 3.5   CHLORIDE mmol/L 104 104 106  --  102 102   CO2 mmol/L 23.5 23.2 24.7  --  26.3 26.7   BUN mg/dL 22.2 24.1* 24.5*  --  23.8* 23.5*   CREATININE mg/dL 0.97 1.14* 1.05*  --  1.06* 1.12*   CALCIUM mg/dL 9.4 9.4 9.6  --  9.3 9.1   GLUCOSE mg/dL 128* 116* 127*  --  115* 127*         Results from last 7 days   Lab Units 07/02/25  0421   MAGNESIUM mg/dL 1.6                 Imaging Results (Last 24 Hours)       ** No results found for the last 24 hours. **              Medications:  amLODIPine, 5 mg, Oral, Q24H  aspirin, 81 mg, Oral, Nightly  cholecalciferol, 4,000 Units, Oral, Nightly  diazePAM, 2 mg, Oral, Once  Diclofenac Sodium, 4 g, Topical, BID  donepezil, 10 mg, Oral, Nightly  enoxaparin sodium, 40 mg, Subcutaneous, Q24H  escitalopram, 10 mg, Oral, Daily  [Held by provider] methIMAzole, 5 mg, Oral, Daily  metoprolol succinate XL, 25 mg, Oral, Q24H  multivitamin, 1 tablet, Oral, Nightly  rosuvastatin, 5 mg, Oral, Daily  sodium chloride, 10 mL, Intravenous, Q12H               Assessment & Plan   Left proximal femur fracture: S/P repair with nailing. Pain control. PT/OT. Ortho input appreciated.     Acute blood loss anemia: Likely 2/2 above. S/P 2 units of PRBC's. Improved and stable today.     New onset paroxysmal Afib: Currently in NSR. Echo reveals an EF of 61-65%, mild LVH and grade I diastolic dysfunction. Brief episode noted. Not currently anticoagulated 2/2 concern for fall risk after discussion with patient and her daughter. Monitor on telemetry.     Left knee pain: No evidence of acute fracture on imaging.     Suspected acute gout flare of left first MTP: Appears clinically improved and indomethacin and colchicine previously D/C'd.     Mild leukocytosis: Improved today. Repeat UA obtained 2/2 reported urinary urgency with no evidence of UTI. Repeat CBC in the AM.     Essential HTN: BP intermittently elevated. Start Toprol-XL. Cont Norvasc. Cont to monitor.      HLD: Cont statin.     Hyperthyroidism: TSH is elevated with low free T4. Methimazole currently being held.     Suspected CKD IIIa: Cr improved today. Encourage PO intake. Monitor UOP and repeat labs in the AM.     Borderline hypokalemia: Improved with supplementation and stable today.     DVT PPX: SQ Lovenox    Disposition Expedited appeal for IPR upheld. SS working to arrange SNF placement.     Mando Shahid DO  07/05/25  14:55 EDT

## 2025-07-05 NOTE — THERAPY TREATMENT NOTE
Acute Care - Physical Therapy Treatment Note   [facility]     Patient Name: Lizbet Arzola  : 1945  MRN: [id number]  Today's Date: 2025      Visit Dx:     ICD-10-CM ICD-9-CM   1. Closed displaced intertrochanteric fracture of left femur, initial encounter  S72.142A 820.21   2. Acute low back pain with sciatica, sciatica laterality unspecified, unspecified back pain laterality  M54.40 724.2     724.3     Patient Active Problem List   Diagnosis    Closed intertrochanteric fracture of left femur     Past Medical History:   Diagnosis Date    Hyperlipidemia     Hypertension     Hyperthyroidism     Osteoarthritis      Past Surgical History:   Procedure Laterality Date    LIPOMA EXCISION       PT Assessment (Last 12 Hours)       PT Evaluation and Treatment       Row Name 25 1135          Physical Therapy Time and Intention    Subjective Information complains of;weakness;fatigue;pain  -KM     Document Type therapy note (daily note)  -KM     Mode of Treatment individual therapy;physical therapy  -KM     Patient Effort good  -KM     Symptoms Noted During/After Treatment fatigue;increased pain  -KM       Row Name 25 1135          General Information    Patient Profile Reviewed yes  -KM     Patient Observations alert;cooperative;agree to therapy  -KM     Existing Precautions/Restrictions fall;weight bearing;other (see comments)  -KM       Row Name 25 1135          Pain Scale: FACES Pre/Post-Treatment    Pain: FACES Scale, Pretreatment 0-->no hurt  -KM     Posttreatment Pain Rating 2-->hurts little bit  -KM       Row Name 25 1135          Cognition    Affect/Mental Status (Cognition) WFL  -KM     Orientation Status (Cognition) oriented to;person;place;situation  -KM     Follows Commands (Cognition) follows one-step commands  -KM       Row Name 25 1135          Bed Mobility    Bed Mobility sit-supine;supine-sit  -KM     Supine-Sit [place] (Bed Mobility) minimum assist (75% patient  effort);verbal cues  -KM     Sit-Supine [place] (Bed Mobility) minimum assist (75% patient effort);moderate assist (50% patient effort);verbal cues  -KM     Bed Mobility, Safety Issues decreased use of arms for pushing/pulling;decreased use of legs for bridging/pushing  -KM     Assistive Device (Bed Mobility) bed rails;head of bed elevated;repositioning sheet  -KM       Row Name 07/05/25 1135          Transfers    Transfers sit-stand transfer;stand-sit transfer;chair-bed transfer;bed-chair transfer  -KM       Row Name 07/05/25 1135          Bed-Chair Transfer    Bed-Chair [place] (Transfers) moderate assist (50% patient effort);verbal cues  -KM     Assistive Device (Bed-Chair Transfers) walker, front-wheeled  -KM       Row Name 07/05/25 1135          Chair-Bed Transfer    Chair-Bed [place] (Transfers) moderate assist (50% patient effort)  -KM     Assistive Device (Chair-Bed Transfers) walker, front-wheeled  -KM       Row Name 07/05/25 1135          Sit-Stand Transfer    Sit-Stand [place] (Transfers) moderate assist (50% patient effort);verbal cues  -KM     Assistive Device (Sit-Stand Transfers) walker, front-wheeled  -KM       Row Name 07/05/25 1135          Stand-Sit Transfer    Stand-Sit [place] (Transfers) moderate assist (50% patient effort);verbal cues  -KM     Assistive Device (Stand-Sit Transfers) walker, front-wheeled  -KM       Row Name 07/05/25 1135          Gait/Stairs (Locomotion)    Gait/Stairs Locomotion gait/ambulation independence;gait/ambulation assistive device;distance ambulated  -KM     [place] Level (Gait) minimum assist (75% patient effort)  -KM     Assistive Device (Gait) walker, front-wheeled  -KM     Patient was able to Ambulate yes  -KM     Distance in Feet (Gait) 65  -KM     Pattern (Gait) step-to  -KM     Deviations/Abnormal Patterns (Gait) antalgic;ataxic;base of support, narrow;gait speed decreased  -KM     Bilateral Gait Deviations forward flexed  posture;knee buckling bilaterally  -KM     Left Sided Gait Deviations weight shift ability decreased  -KM       Row Name 07/05/25 1135          Safety Issues/Impairments Affecting Functional Mobility    Impairments Affecting Function (Mobility) balance;endurance/activity tolerance;pain;strength  -KM       Row Name             Wound 06/23/25 1619 Left hip Surgical    Wound - Properties Group Placement Date: 06/23/25  -KB Placement Time: 1619  -KB Present on Original Admission: N  -KB Side: Left  -KB Location: hip  -KB Primary Wound Type: Surgical  -KB, incision sites x4     Retired Wound - Properties Group Placement Date: 06/23/25  -KB Placement Time: 1619  -KB Present on Original Admission: N  -KB Side: Left  -KB Location: hip  -KB    Retired Wound - Properties Group Placement Date: 06/23/25  -KB Placement Time: 1619  -KB Present on Original Admission: N  -KB Side: Left  -KB Location: hip  -KB    Retired Wound - Properties Group Date first assessed: 06/23/25  -KB Time first assessed: 1619  -KB Present on Original Admission: N  -KB Side: Left  -KB Location: hip  -KB      Row Name 07/05/25 1135          Progress Summary (PT)    Daily Progress Summary (PT) Pt. was able to demonstrate functional mobility skills w/ Amy-modA. She was able to ambulate increased distance w/ RW. She tolerated session well. Pt. would continue to benefit from PT services.  -KM               User Key  (r) = Recorded By, (t) = Taken By, (c) = Cosigned By      Initials Name Provider Type    KB Cheryl Panda, RN Registered Nurse    Sage Mccain, PT Physical Therapist                    Physical Therapy Education       Title: PT OT SLP Therapies (Done)       Topic: Physical Therapy (Done)       Point: Mobility training (Done)       Learning Progress Summary            Patient Acceptance, E, VU by MC at 7/3/2025 1445    Acceptance, E,TB, VU by ELIZABETH at 7/2/2025 0916    Acceptance, E,TB, VU by RISA at 7/2/2025 0511    Acceptance, E,TB, VU by  HG at 6/30/2025 0604    Acceptance, E,TB, VU by KM at 6/24/2025 1421   Family Acceptance, E,TB, VU by HG at 7/2/2025 0511    Acceptance, E,TB, VU by HG at 6/30/2025 0604                      Point: Home exercise program (Done)       Learning Progress Summary            Patient Acceptance, E, VU by  at 7/3/2025 1445    Acceptance, E,TB, VU by MP at 7/2/2025 0916    Acceptance, E,TB, VU by HG at 7/2/2025 0511    Acceptance, E,TB, VU by HG at 6/30/2025 0604    Acceptance, E,TB, VU by KM at 6/24/2025 1421   Family Acceptance, E,TB, VU by HG at 7/2/2025 0511    Acceptance, E,TB, VU by HG at 6/30/2025 0604                      Point: Body mechanics (Done)       Learning Progress Summary            Patient Acceptance, E, VU by  at 7/3/2025 1445    Acceptance, E,TB, VU by  at 7/2/2025 0916    Acceptance, E,TB, VU by HG at 7/2/2025 0511    Acceptance, E,TB, VU by HG at 6/30/2025 0604    Acceptance, E,TB, VU by KM at 6/24/2025 1421   Family Acceptance, E,TB, VU by HG at 7/2/2025 0511    Acceptance, E,TB, VU by HG at 6/30/2025 0604                      Point: Precautions (Done)       Learning Progress Summary            Patient Acceptance, E, VU by  at 7/3/2025 1445    Acceptance, E,TB, VU by  at 7/2/2025 0916    Acceptance, E,TB, VU by HG at 7/2/2025 0511    Acceptance, E,TB, VU by HG at 6/30/2025 0604    Acceptance, E,TB, VU by KM at 6/24/2025 1421   Family Acceptance, E,TB, VU by HG at 7/2/2025 0511    Acceptance, E,TB, VU by HG at 6/30/2025 0604                                      User Key       Initials Effective Dates Name Provider Type Discipline     06/16/21 -  Heaven Burleson RN Registered Nurse Nurse     04/24/23 -  Collett, Morgan, RN Registered Nurse Nurse    KM 05/24/22 -  Sage Fuentes, PT Physical Therapist PT     01/22/25 -  Christine Lepe RN Registered Nurse Nurse                  PT Recommendation and Plan  Anticipated Discharge Disposition (PT): inpatient rehabilitation facility  Planned  Therapy Interventions (PT): balance training, bed mobility training, gait training, home exercise program, patient/family education, postural re-education, ROM (range of motion), strengthening, stretching, transfer training, stair training, wheelchair management/propulsion training  Therapy Frequency (PT): 6 times/wk  Progress Summary (PT)  Daily Progress Summary (PT): Pt. was able to demonstrate functional mobility skills w/ Amy-modA. She was able to ambulate increased distance w/ RW. She tolerated session well. Pt. would continue to benefit from PT services.  Plan of Care Reviewed With: patient  Outcome Evaluation: Pt. evaluation completed during PT session. She was able to perform functional mobility skills w/ maxA x2. She was unable to ambulate at time of evaluation d/t weakness and pain. She demonstrates impaired strength and ROM. Pt. would benefit from inpatient rehab to address weakness, pain, impaired mobility, safety precautions, and fall risk.       Time Calculation:    PT Charges       Row Name 07/05/25 1134             Time Calculation    PT Received On 07/05/25  -KM         Time Calculation- PT    Total Timed Code Minutes- PT 23 minute(s)  -KM                User Key  (r) = Recorded By, (t) = Taken By, (c) = Cosigned By      Initials Name Provider Type    KM Sage Fuentes, PT Physical Therapist                  Therapy Charges for Today       Code Description Service Date Service Provider Modifiers Qty    58288710707 HC PT THERAPEUTIC ACT EA 15 MIN 7/4/2025 Sage Fuentes, PT GP 1    10209544774 HC GAIT TRAINING EA 15 MIN 7/4/2025 Sage Fuentes, PT GP 1    71092984455 HC PT THERAPEUTIC ACT EA 15 MIN 7/5/2025 Sage Fuentes, PT GP 1    84019231355 HC GAIT TRAINING EA 15 MIN 7/5/2025 Sage Fuentes, PT GP 1            PT G-Codes  AM-PAC 6 Clicks Score (PT): 14    Sage Fuentes PT  7/5/2025

## 2025-07-06 LAB
ANION GAP SERPL CALCULATED.3IONS-SCNC: 11.3 MMOL/L (ref 5–15)
BASOPHILS # BLD AUTO: 0.08 10*3/MM3 (ref 0–0.2)
BASOPHILS NFR BLD AUTO: 0.8 % (ref 0–1.5)
BUN SERPL-MCNC: 22.5 MG/DL (ref 8–23)
BUN/CREAT SERPL: 23.4 (ref 7–25)
CALCIUM SPEC-SCNC: 9.3 MG/DL (ref 8.6–10.5)
CHLORIDE SERPL-SCNC: 105 MMOL/L (ref 98–107)
CO2 SERPL-SCNC: 24.7 MMOL/L (ref 22–29)
CREAT SERPL-MCNC: 0.96 MG/DL (ref 0.57–1)
DEPRECATED RDW RBC AUTO: 51.3 FL (ref 37–54)
EGFRCR SERPLBLD CKD-EPI 2021: 59.2 ML/MIN/1.73
EOSINOPHIL # BLD AUTO: 0.36 10*3/MM3 (ref 0–0.4)
EOSINOPHIL NFR BLD AUTO: 3.5 % (ref 0.3–6.2)
ERYTHROCYTE [DISTWIDTH] IN BLOOD BY AUTOMATED COUNT: 14.4 % (ref 12.3–15.4)
GLUCOSE SERPL-MCNC: 111 MG/DL (ref 65–99)
HCT VFR BLD AUTO: 28.3 % (ref 34–46.6)
HGB BLD-MCNC: 8.8 G/DL (ref 12–15.9)
IMM GRANULOCYTES # BLD AUTO: 0.08 10*3/MM3 (ref 0–0.05)
IMM GRANULOCYTES NFR BLD AUTO: 0.8 % (ref 0–0.5)
LYMPHOCYTES # BLD AUTO: 2.73 10*3/MM3 (ref 0.7–3.1)
LYMPHOCYTES NFR BLD AUTO: 26.2 % (ref 19.6–45.3)
MCH RBC QN AUTO: 31.5 PG (ref 26.6–33)
MCHC RBC AUTO-ENTMCNC: 31.1 G/DL (ref 31.5–35.7)
MCV RBC AUTO: 101.4 FL (ref 79–97)
MONOCYTES # BLD AUTO: 1.02 10*3/MM3 (ref 0.1–0.9)
MONOCYTES NFR BLD AUTO: 9.8 % (ref 5–12)
NEUTROPHILS NFR BLD AUTO: 58.9 % (ref 42.7–76)
NEUTROPHILS NFR BLD AUTO: 6.15 10*3/MM3 (ref 1.7–7)
NRBC BLD AUTO-RTO: 0 /100 WBC (ref 0–0.2)
PLATELET # BLD AUTO: 439 10*3/MM3 (ref 140–450)
PMV BLD AUTO: 9.4 FL (ref 6–12)
POTASSIUM SERPL-SCNC: 3.6 MMOL/L (ref 3.5–5.2)
POTASSIUM SERPL-SCNC: 4.2 MMOL/L (ref 3.5–5.2)
RBC # BLD AUTO: 2.79 10*6/MM3 (ref 3.77–5.28)
SODIUM SERPL-SCNC: 141 MMOL/L (ref 136–145)
WBC NRBC COR # BLD AUTO: 10.42 10*3/MM3 (ref 3.4–10.8)

## 2025-07-06 PROCEDURE — 99231 SBSQ HOSP IP/OBS SF/LOW 25: CPT | Performed by: INTERNAL MEDICINE

## 2025-07-06 PROCEDURE — 25010000002 ENOXAPARIN PER 10 MG: Performed by: INTERNAL MEDICINE

## 2025-07-06 PROCEDURE — 84132 ASSAY OF SERUM POTASSIUM: CPT | Performed by: INTERNAL MEDICINE

## 2025-07-06 PROCEDURE — 80048 BASIC METABOLIC PNL TOTAL CA: CPT | Performed by: INTERNAL MEDICINE

## 2025-07-06 PROCEDURE — 85025 COMPLETE CBC W/AUTO DIFF WBC: CPT | Performed by: INTERNAL MEDICINE

## 2025-07-06 RX ORDER — POTASSIUM CHLORIDE 1500 MG/1
40 TABLET, EXTENDED RELEASE ORAL EVERY 4 HOURS
Status: COMPLETED | OUTPATIENT
Start: 2025-07-06 | End: 2025-07-06

## 2025-07-06 RX ADMIN — DONEPEZIL HYDROCHLORIDE 10 MG: 5 TABLET, FILM COATED ORAL at 20:24

## 2025-07-06 RX ADMIN — POTASSIUM CHLORIDE 40 MEQ: 1500 TABLET, EXTENDED RELEASE ORAL at 11:47

## 2025-07-06 RX ADMIN — Medication 4000 UNITS: at 20:25

## 2025-07-06 RX ADMIN — ROSUVASTATIN CALCIUM 5 MG: 10 TABLET, FILM COATED ORAL at 08:47

## 2025-07-06 RX ADMIN — METOPROLOL SUCCINATE 25 MG: 25 TABLET, EXTENDED RELEASE ORAL at 08:46

## 2025-07-06 RX ADMIN — ESCITALOPRAM 10 MG: 10 TABLET, FILM COATED ORAL at 08:46

## 2025-07-06 RX ADMIN — AMLODIPINE BESYLATE 5 MG: 5 TABLET ORAL at 08:46

## 2025-07-06 RX ADMIN — ENOXAPARIN SODIUM 40 MG: 100 INJECTION SUBCUTANEOUS at 15:48

## 2025-07-06 RX ADMIN — POTASSIUM CHLORIDE 40 MEQ: 1500 TABLET, EXTENDED RELEASE ORAL at 08:47

## 2025-07-06 RX ADMIN — DICLOFENAC SODIUM 4 G: 10 GEL TOPICAL at 01:01

## 2025-07-06 RX ADMIN — DICLOFENAC SODIUM 4 G: 10 GEL TOPICAL at 20:24

## 2025-07-06 RX ADMIN — ASPIRIN 81 MG CHEWABLE TABLET 81 MG: 81 TABLET CHEWABLE at 20:25

## 2025-07-06 RX ADMIN — DICLOFENAC SODIUM 4 G: 10 GEL TOPICAL at 08:51

## 2025-07-06 RX ADMIN — THERA TABS 1 TABLET: TAB at 20:25

## 2025-07-06 NOTE — PLAN OF CARE
Goal Outcome Evaluation:           Progress: improving        Patient has been resting this shift. No signs or symptoms of acute distress noted at this time. Plan of care ongoing.

## 2025-07-06 NOTE — PLAN OF CARE
Goal Outcome Evaluation:   Pt has rested well,vss,nadn,wctm

## 2025-07-06 NOTE — PROGRESS NOTES
[facility] HOSPITALIST PROGRESS NOTE    Subjective     History:   Lizbet Arzola is a 80 y.o. female admitted on 6/21/2025 secondary to Closed intertrochanteric fracture of left femur     Procedures:   6/23/25: Left subtrochanteric hip fracture repair with long cephalomedullary nail    Transfusions:  6/24/25: 2 units of PRBC's     CC: Follow up hip fx     Patient seen and examined with Imani RN. Awake and alert with her son present at bedside. Sitting in bedside chair during my encounter. No reported CP, dyspnea or palpitations. No reported vomiting. Urinary urgency improved. (+) BM reported. No acute events reported.     History taken from: patient, chart, and RN.      Objective     Vital Signs  Temp:  [98.1 °F (36.7 °C)-98.7 °F (37.1 °C)] 98.4 °F (36.9 °C)  Heart Rate:  [72-88] 72  Resp:  [18-20] 18  BP: (134-165)/(57-65) 146/63    Intake/Output Summary (Last 24 hours) at 7/6/2025 1342  Last data filed at 7/6/2025 0418  Gross per 24 hour   Intake 360 ml   Output 0 ml   Net 360 ml         Physical Exam: Unchanged from previous.   General:    Awake, alert, in no acute distress   Heart:      Normal S1 and S2. Regular rate and rhythm. No significant murmur, rubs or gallops appreciated.   Lungs:     Respirations regular, even and unlabored. Lungs clear to auscultation B/L. No wheezes, rales or rhonchi.   Abdomen:   Soft and nontender. No guarding, rebound tenderness or  organomegaly noted. Bowel sounds present x 4.   Extremities:  No clubbing, cyanosis or edema noted. Moves UE and LE equally B/L.     Results Review:    Results from last 7 days   Lab Units 07/06/25  0258 07/05/25  0159 07/04/25  0146 07/03/25  0129 07/02/25  0421 07/01/25  0227   WBC 10*3/mm3 10.42 10.78 12.26* 11.24* 9.33 11.46*   HEMOGLOBIN g/dL 8.8* 9.0* 8.3* 8.7* 9.0* 8.7*   PLATELETS 10*3/mm3 439 432 412 450 470* 436     Results from last 7 days   Lab Units 07/06/25  0258 07/05/25  0159 07/04/25  0146 07/03/25  0129  07/02/25  1603 07/02/25  0421 07/01/25  0227   SODIUM mmol/L 141 140 139 141  --  141 140   POTASSIUM mmol/L 3.6 3.7 3.9 4.3 4.5 3.5 3.5   CHLORIDE mmol/L 105 104 104 106  --  102 102   CO2 mmol/L 24.7 23.5 23.2 24.7  --  26.3 26.7   BUN mg/dL 22.5 22.2 24.1* 24.5*  --  23.8* 23.5*   CREATININE mg/dL 0.96 0.97 1.14* 1.05*  --  1.06* 1.12*   CALCIUM mg/dL 9.3 9.4 9.4 9.6  --  9.3 9.1   GLUCOSE mg/dL 111* 128* 116* 127*  --  115* 127*         Results from last 7 days   Lab Units 07/02/25  0421   MAGNESIUM mg/dL 1.6                 Imaging Results (Last 24 Hours)       ** No results found for the last 24 hours. **              Medications:  amLODIPine, 5 mg, Oral, Q24H  aspirin, 81 mg, Oral, Nightly  cholecalciferol, 4,000 Units, Oral, Nightly  diazePAM, 2 mg, Oral, Once  Diclofenac Sodium, 4 g, Topical, BID  donepezil, 10 mg, Oral, Nightly  enoxaparin sodium, 40 mg, Subcutaneous, Q24H  escitalopram, 10 mg, Oral, Daily  [Held by provider] methIMAzole, 5 mg, Oral, Daily  metoprolol succinate XL, 25 mg, Oral, Q24H  multivitamin, 1 tablet, Oral, Nightly  rosuvastatin, 5 mg, Oral, Daily  sodium chloride, 10 mL, Intravenous, Q12H               Assessment & Plan   Left proximal femur fracture: S/P repair with nailing. Pain control. PT/OT. Ortho input appreciated.     Acute blood loss anemia: Likely 2/2 above. S/P 2 units of PRBC's. Improved and stable today.     New onset paroxysmal Afib: Currently in NSR. Echo reveals an EF of 61-65%, mild LVH and grade I diastolic dysfunction. Brief episode noted. Not currently anticoagulated 2/2 concern for fall risk after discussion with patient and her daughter. Monitor on telemetry.     Left knee pain: No evidence of acute fracture on imaging.     Suspected acute gout flare of left first MTP: Appears clinically improved and indomethacin and colchicine previously D/C'd.     Mild leukocytosis: Resolved and stable today. Repeat UA obtained 2/2 reported urinary urgency with no evidence of  UTI. Repeat CBC in the AM.     Essential HTN: BP intermittently elevated. Recently started Toprol-XL. Cont Norvasc. Cont to monitor.     HLD: Cont statin.     Hyperthyroidism: TSH is elevated with low free T4. Methimazole currently being held.     Suspected CKD IIIa: Cr improved and stable today. Encourage PO intake. Monitor UOP and repeat labs in the AM.     Borderline hypokalemia: Replaced. Cont to monitor.     DVT PPX: SQ Lovenox    Disposition Expedited appeal for IPR upheld. SS working to arrange SNF placement.     Mando Shahid DO  07/06/25  13:42 EDT

## 2025-07-07 PROBLEM — S72.142A CLOSED INTERTROCHANTERIC FRACTURE OF LEFT FEMUR: Status: RESOLVED | Noted: 2025-06-22 | Resolved: 2025-07-07

## 2025-07-07 LAB
ANION GAP SERPL CALCULATED.3IONS-SCNC: 11.6 MMOL/L (ref 5–15)
BASOPHILS # BLD AUTO: 0.07 10*3/MM3 (ref 0–0.2)
BASOPHILS NFR BLD AUTO: 0.7 % (ref 0–1.5)
BUN SERPL-MCNC: 24.9 MG/DL (ref 8–23)
BUN/CREAT SERPL: 24.9 (ref 7–25)
CALCIUM SPEC-SCNC: 9.9 MG/DL (ref 8.6–10.5)
CHLORIDE SERPL-SCNC: 107 MMOL/L (ref 98–107)
CO2 SERPL-SCNC: 23.4 MMOL/L (ref 22–29)
CREAT SERPL-MCNC: 1 MG/DL (ref 0.57–1)
DEPRECATED RDW RBC AUTO: 53.4 FL (ref 37–54)
EGFRCR SERPLBLD CKD-EPI 2021: 56.4 ML/MIN/1.73
EOSINOPHIL # BLD AUTO: 0.26 10*3/MM3 (ref 0–0.4)
EOSINOPHIL NFR BLD AUTO: 2.5 % (ref 0.3–6.2)
ERYTHROCYTE [DISTWIDTH] IN BLOOD BY AUTOMATED COUNT: 14.5 % (ref 12.3–15.4)
GLUCOSE SERPL-MCNC: 103 MG/DL (ref 65–99)
HCT VFR BLD AUTO: 29.1 % (ref 34–46.6)
HGB BLD-MCNC: 9.1 G/DL (ref 12–15.9)
IMM GRANULOCYTES # BLD AUTO: 0.09 10*3/MM3 (ref 0–0.05)
IMM GRANULOCYTES NFR BLD AUTO: 0.9 % (ref 0–0.5)
LYMPHOCYTES # BLD AUTO: 2.75 10*3/MM3 (ref 0.7–3.1)
LYMPHOCYTES NFR BLD AUTO: 26 % (ref 19.6–45.3)
MAGNESIUM SERPL-MCNC: 1.8 MG/DL (ref 1.6–2.4)
MCH RBC QN AUTO: 32.2 PG (ref 26.6–33)
MCHC RBC AUTO-ENTMCNC: 31.3 G/DL (ref 31.5–35.7)
MCV RBC AUTO: 102.8 FL (ref 79–97)
MONOCYTES # BLD AUTO: 1.05 10*3/MM3 (ref 0.1–0.9)
MONOCYTES NFR BLD AUTO: 9.9 % (ref 5–12)
NEUTROPHILS NFR BLD AUTO: 6.35 10*3/MM3 (ref 1.7–7)
NEUTROPHILS NFR BLD AUTO: 60 % (ref 42.7–76)
NRBC BLD AUTO-RTO: 0 /100 WBC (ref 0–0.2)
PLATELET # BLD AUTO: 461 10*3/MM3 (ref 140–450)
PMV BLD AUTO: 9.5 FL (ref 6–12)
POTASSIUM SERPL-SCNC: 4.1 MMOL/L (ref 3.5–5.2)
RBC # BLD AUTO: 2.83 10*6/MM3 (ref 3.77–5.28)
SODIUM SERPL-SCNC: 142 MMOL/L (ref 136–145)
WBC NRBC COR # BLD AUTO: 10.57 10*3/MM3 (ref 3.4–10.8)

## 2025-07-07 PROCEDURE — 99232 SBSQ HOSP IP/OBS MODERATE 35: CPT | Performed by: INTERNAL MEDICINE

## 2025-07-07 PROCEDURE — 83735 ASSAY OF MAGNESIUM: CPT | Performed by: INTERNAL MEDICINE

## 2025-07-07 PROCEDURE — 97530 THERAPEUTIC ACTIVITIES: CPT

## 2025-07-07 PROCEDURE — 97164 PT RE-EVAL EST PLAN CARE: CPT

## 2025-07-07 PROCEDURE — 97116 GAIT TRAINING THERAPY: CPT

## 2025-07-07 PROCEDURE — 85025 COMPLETE CBC W/AUTO DIFF WBC: CPT | Performed by: INTERNAL MEDICINE

## 2025-07-07 PROCEDURE — 80048 BASIC METABOLIC PNL TOTAL CA: CPT | Performed by: INTERNAL MEDICINE

## 2025-07-07 PROCEDURE — 25010000002 ENOXAPARIN PER 10 MG: Performed by: INTERNAL MEDICINE

## 2025-07-07 RX ORDER — HYDROCODONE BITARTRATE AND ACETAMINOPHEN 7.5; 325 MG/1; MG/1
1 TABLET ORAL EVERY 6 HOURS PRN
Refills: 0 | Status: DISCONTINUED | OUTPATIENT
Start: 2025-07-07 | End: 2025-07-07

## 2025-07-07 RX ORDER — ACETAMINOPHEN 325 MG/1
650 TABLET ORAL EVERY 6 HOURS PRN
Status: DISCONTINUED | OUTPATIENT
Start: 2025-07-07 | End: 2025-07-09 | Stop reason: HOSPADM

## 2025-07-07 RX ORDER — METHIMAZOLE 5 MG/1
2.5 TABLET ORAL DAILY
Qty: 15 TABLET | Refills: 0 | Status: SHIPPED | OUTPATIENT
Start: 2025-07-07

## 2025-07-07 RX ORDER — ACETAMINOPHEN 325 MG/1
650 TABLET ORAL EVERY 6 HOURS PRN
Start: 2025-07-07

## 2025-07-07 RX ADMIN — DICLOFENAC SODIUM 4 G: 10 GEL TOPICAL at 09:53

## 2025-07-07 RX ADMIN — Medication 4000 UNITS: at 20:31

## 2025-07-07 RX ADMIN — ROSUVASTATIN CALCIUM 5 MG: 10 TABLET, FILM COATED ORAL at 09:53

## 2025-07-07 RX ADMIN — THERA TABS 1 TABLET: TAB at 20:31

## 2025-07-07 RX ADMIN — ACETAMINOPHEN 650 MG: 325 TABLET, FILM COATED ORAL at 10:58

## 2025-07-07 RX ADMIN — AMLODIPINE BESYLATE 5 MG: 5 TABLET ORAL at 09:53

## 2025-07-07 RX ADMIN — ESCITALOPRAM 10 MG: 10 TABLET, FILM COATED ORAL at 09:53

## 2025-07-07 RX ADMIN — METOPROLOL SUCCINATE 25 MG: 25 TABLET, EXTENDED RELEASE ORAL at 09:53

## 2025-07-07 RX ADMIN — ASPIRIN 81 MG CHEWABLE TABLET 81 MG: 81 TABLET CHEWABLE at 20:31

## 2025-07-07 RX ADMIN — DICLOFENAC SODIUM 4 G: 10 GEL TOPICAL at 20:31

## 2025-07-07 RX ADMIN — ENOXAPARIN SODIUM 40 MG: 100 INJECTION SUBCUTANEOUS at 15:13

## 2025-07-07 RX ADMIN — DONEPEZIL HYDROCHLORIDE 10 MG: 5 TABLET, FILM COATED ORAL at 20:31

## 2025-07-07 NOTE — PROGRESS NOTES
[facility]     Patient Identification:  Name:  Lizbet Arzola  Age:  80 y.o.  Sex:  female  :  1945  MRN:  [id number]  Visit Number:  [id number]  Primary Care Provider:  Yaakov Riley DO    Length of stay:  15    Chief complaint: None    Subjective:    Patient seen and examined while ambulating in the [place] with physical therapy, with patient's family walking by her side.  Patient states she is feeling well, and did appear to be working diligently with physical therapy with the assistance of a walker.  ----------------------------------------------------------------------------------------------------------------------  [facility] Meds:  amLODIPine, 5 mg, Oral, Q24H  aspirin, 81 mg, Oral, Nightly  cholecalciferol, 4,000 Units, Oral, Nightly  diazePAM, 2 mg, Oral, Once  Diclofenac Sodium, 4 g, Topical, BID  donepezil, 10 mg, Oral, Nightly  enoxaparin sodium, 40 mg, Subcutaneous, Q24H  escitalopram, 10 mg, Oral, Daily  [Held by provider] methIMAzole, 5 mg, Oral, Daily  metoprolol succinate XL, 25 mg, Oral, Q24H  multivitamin, 1 tablet, Oral, Nightly  rosuvastatin, 5 mg, Oral, Daily  sodium chloride, 10 mL, Intravenous, Q12H           ----------------------------------------------------------------------------------------------------------------------  Vital Signs:  Temp:  [98.3 °F (36.8 °C)-98.6 °F (37 °C)] 98.3 °F (36.8 °C)  Heart Rate:  [68-88] 72  Resp:  [18-20] 20  BP: (145-171)/(60-87) 145/61      25  0500 25  0500 25  0500   Weight: 67.9 kg (149 lb 9.6 oz) 69.4 kg (153 lb) 69 kg (152 lb 1.6 oz)     Body mass index is 23.82 kg/m².    Intake/Output Summary (Last 24 hours) at 2025 1512  Last data filed at 2025 1128  Gross per 24 hour   Intake 360 ml   Output --   Net 360 ml     Diet: Regular/House; Fluid Consistency: Thin (IDDSI  0)  ----------------------------------------------------------------------------------------------------------------------  Physical exam:  Constitutional: Well-nourished  female in no apparent distress.     HENT:  Head:  Normocephalic and atraumatic.  Mouth:  Moist mucous membranes.    Eyes:  Conjunctivae and EOM are normal.  Pupils are equal, round, and reactive to light.  No scleral icterus.    Neck:  Neck supple. No thyromegaly.  No JVD present.    Cardiovascular:  Regular rate and rhythm with no murmurs, rubs, clicks or gallops appreciated.  Pulmonary/Chest:  Clear to auscultation bilaterally with no crackles, wheezes or rhonchi appreciated.  Abdominal:  Soft. Nontender. Nondistended  Bowel sounds are normal in all four quadrants. No organomegally appreciated.   Musculoskeletal:  No edema, surgical dressing applied to left hip joint  Neurological: Awake, Cranial nerves II-XII intact with no focal deficits.  No facial droop.  No slurred speech.   Skin:  Warm and dry to palpation with no rashes or lesions appreciated.  Peripheral vascular:  2+ radial and pedal pulses in bilateral upper and lower extremities.  Psychiatric: Awake and oriented x3, demonstrates appropriate judgment and insight.    No significant change in physical exam in comparison to 6/29/2025  ---------------------------------------------------------------------------------------  ----------------------------------------------------------------------------------------------------------------------        Results from last 7 days   Lab Units 07/07/25 0055 07/06/25  0258 07/05/25  0159   WBC 10*3/mm3 10.57 10.42 10.78   HEMOGLOBIN g/dL 9.1* 8.8* 9.0*   HEMATOCRIT % 29.1* 28.3* 29.2*   MCV fL 102.8* 101.4* 102.1*   MCHC g/dL 31.3* 31.1* 30.8*   PLATELETS 10*3/mm3 461* 439 432         Results from last 7 days   Lab Units 07/07/25 0055 07/06/25  1543 07/06/25  0258 07/05/25  0159 07/02/25  1603 07/02/25  0421   SODIUM mmol/L 142  --  141 140  "  < > 141   POTASSIUM mmol/L 4.1 4.2 3.6 3.7   < > 3.5   MAGNESIUM mg/dL 1.8  --   --   --   --  1.6   CHLORIDE mmol/L 107  --  105 104   < > 102   CO2 mmol/L 23.4  --  24.7 23.5   < > 26.3   BUN mg/dL 24.9*  --  22.5 22.2   < > 23.8*   CREATININE mg/dL 1.00  --  0.96 0.97   < > 1.06*   CALCIUM mg/dL 9.9  --  9.3 9.4   < > 9.3   GLUCOSE mg/dL 103*  --  111* 128*   < > 115*    < > = values in this interval not displayed.   Estimated Creatinine Clearance: 47.2 mL/min (by C-G formula based on SCr of 1 mg/dL).    No results found for: \"AMMONIA\"      No results found for: \"BLOODCX\"  No results found for: \"URINECX\"  No results found for: \"WOUNDCX\"  No results found for: \"STOOLCX\"    I have personally looked at the labs and they are summarized above.  ----------------------------------------------------------------------------------------------------------------------  Imaging Results (Last 24 Hours)       ** No results found for the last 24 hours. **          ----------------------------------------------------------------------------------------------------------------------  Assessment and Plan:    Left femur fracture - patient underwent left hip intertrochanteric nailing on 6/23/2025, patient tolerated the procedure well with no complications.    2. Acute Blood Loss Anemia -patient has required 1 unit PRBC transfusion during this hospital stay.  Hemoglobin and hematocrit are stable.    3.  New onset paroxysmal A-fib -patient remains in normal sinus rhythm, continue Lopressor 25 mg p.o. twice daily.  Transthoracic echo demonstrates normal left ventricular systolic function.  Continue to defer anticoagulation at this time as patient is considered a high fall risk.    4.  Essential hypertension - well-controlled, continue to monitor closely and make anti-hypertensive medication adjustments as necessary    5.  Hyperlipidemia -statin    6.  Hyperthyroidism - continue to hold methimazole as TSH is elevated and free T4 is " below lower limits of normal    7.  Suspect CKD stage IIIa -serum creatinine essentially unchanged today at 0.9.  Continue to monitor closely with BMP in the morning.        Disposition currently being evaluated for possible inpatient rehab placement.    Chano Romero DO   07/07/25   15:12 EDT

## 2025-07-07 NOTE — THERAPY RE-EVALUATION
Acute Care - Physical Therapy Re-Evaluation   [facility]     Patient Name: Lizbet Arzola  : 1945  MRN: [id number]  Today's Date: 2025      Visit Dx:     ICD-10-CM ICD-9-CM   1. Closed displaced intertrochanteric fracture of left femur, initial encounter  S72.142A 820.21   2. Acute low back pain with sciatica, sciatica laterality unspecified, unspecified back pain laterality  M54.40 724.2     724.3     Patient Active Problem List   Diagnosis   (none) - all problems resolved or deleted     Past Medical History:   Diagnosis Date    Hyperlipidemia     Hypertension     Hyperthyroidism     Osteoarthritis      Past Surgical History:   Procedure Laterality Date    LIPOMA EXCISION       PT Assessment (Last 12 Hours)       PT Evaluation and Treatment       Row Name 25 1252          Physical Therapy Time and Intention    Subjective Information no complaints  -KM     Document Type therapy note (daily note)  -KM     Mode of Treatment individual therapy;physical therapy  -KM     Patient Effort excellent  -KM     Symptoms Noted During/After Treatment fatigue;increased pain  -KM       Row Name 25 1252          General Information    Patient Profile Reviewed yes  -KM     Patient Observations alert;cooperative;agree to therapy  -KM     Existing Precautions/Restrictions fall;weight bearing;other (see comments)  -KM       Row Name 25 1252          Pain    Pretreatment Pain Rating 0/10 - no pain  -KM     Posttreatment Pain Rating 2/10  -KM     Pain Location knee  -KM     Pain Side/Orientation left  -KM       Row Name 25 1252          Cognition    Affect/Mental Status (Cognition) WFL  -KM     Orientation Status (Cognition) oriented to;person;place;situation  -KM       Row Name 25 1252          Mobility    Extremity Weight-bearing Status left lower extremity  -KM     Left Lower Extremity (Weight-bearing Status) weight-bearing as tolerated (WBAT)  -KM       Row Name 25 1252          Bed  Mobility    Bed Mobility supine-sit  -KM     Supine-Sit [place] (Bed Mobility) minimum assist (75% patient effort);verbal cues  -KM     Bed Mobility, Safety Issues decreased use of arms for pushing/pulling;decreased use of legs for bridging/pushing  -KM     Assistive Device (Bed Mobility) bed rails;head of bed elevated;repositioning sheet  -KM       Row Name 07/07/25 1252          Transfers    Transfers sit-stand transfer;stand-sit transfer;bed-chair transfer  -KM       Row Name 07/07/25 1252          Bed-Chair Transfer    Bed-Chair [place] (Transfers) moderate assist (50% patient effort);verbal cues  -KM     Assistive Device (Bed-Chair Transfers) walker, front-wheeled  -KM       Row Name 07/07/25 1252          Sit-Stand Transfer    Sit-Stand [place] (Transfers) moderate assist (50% patient effort);verbal cues  -KM     Assistive Device (Sit-Stand Transfers) walker, front-wheeled  -KM       Row Name 07/07/25 1252          Stand-Sit Transfer    Stand-Sit [place] (Transfers) minimum assist (75% patient effort);verbal cues  -KM     Assistive Device (Stand-Sit Transfers) walker, front-wheeled  -KM       Row Name 07/07/25 1252          Gait/Stairs (Locomotion)    Gait/Stairs Locomotion gait/ambulation independence;gait/ambulation assistive device;distance ambulated  -KM     [place] Level (Gait) minimum assist (75% patient effort)  -KM     Assistive Device (Gait) walker, front-wheeled  -KM     Patient was able to Ambulate yes  -KM     Distance in Feet (Gait) 80  -KM     Pattern (Gait) step-to  -KM     Deviations/Abnormal Patterns (Gait) antalgic;ataxic;base of support, narrow;gait speed decreased  -KM     Bilateral Gait Deviations forward flexed posture;knee buckling bilaterally  -KM     Left Sided Gait Deviations weight shift ability decreased  -KM     Comment, (Gait/Stairs) compression sleeve donned on L knee for ambulation  -KM       Row Name 07/07/25 1252          Safety Issues/Impairments  Affecting Functional Mobility    Impairments Affecting Function (Mobility) balance;endurance/activity tolerance;pain;strength  -KM       Row Name 07/07/25 1252          Balance    Balance Assessment sitting static balance;standing dynamic balance  -KM     Static Sitting Balance supervision  -KM     Dynamic Standing Balance minimal assist  -KM     Position/Device Used, Standing Balance walker, front-wheeled  -KM       Row Name             Wound 06/23/25 1619 Left hip Surgical    Wound - Properties Group Placement Date: 06/23/25  -KB Placement Time: 1619  -KB Present on Original Admission: N  -KB Side: Left  -KB Location: hip  -KB Primary Wound Type: Surgical  -KB, incision sites x4     Retired Wound - Properties Group Placement Date: 06/23/25  -KB Placement Time: 1619  -KB Present on Original Admission: N  -KB Side: Left  -KB Location: hip  -KB    Retired Wound - Properties Group Placement Date: 06/23/25  -KB Placement Time: 1619  -KB Present on Original Admission: N  -KB Side: Left  -KB Location: hip  -KB    Retired Wound - Properties Group Date first assessed: 06/23/25  -KB Time first assessed: 1619  -KB Present on Original Admission: N  -KB Side: Left  -KB Location: hip  -KB      Row Name 07/07/25 1252          Progress Summary (PT)    Daily Progress Summary (PT) Pt. was able to demonstrate her most improved functional mobility skills since admission. She was able to improve ambulation distance and quality w/ RW. She has continued to show great motivation and effort w/ therapy. Pt. demonstrates ability to tolerate over 3 hours of activity. Pt. would continue to benefit from PT services.  -KM       Row Name 07/07/25 1252          Physical Therapy Goals    Bed Mobility Goal Selection (PT) bed mobility, PT goal 1  -KM     Transfer Goal Selection (PT) transfer, PT goal 1  -KM     Gait Training Goal Selection (PT) gait training, PT goal 1  -KM       Row Name 07/07/25 1252          Bed Mobility Goal 1 (PT)     Activity/Assistive Device (Bed Mobility Goal 1, PT) bed mobility activities, all  -KM     [place] Level/Cues Needed (Bed Mobility Goal 1, PT) standby assist  -KM     Time Frame (Bed Mobility Goal 1, PT) by discharge  -KM     Progress/Outcomes (Bed Mobility Goal 1, PT) goal revised this date  -KM       Row Name 07/07/25 1252          Transfer Goal 1 (PT)    Activity/Assistive Device (Transfer Goal 1, PT) transfers, all;walker, rolling  -KM     [place] Level/Cues Needed (Transfer Goal 1, PT) contact guard required  -KM     Time Frame (Transfer Goal 1, PT) by discharge  -KM     Progress/Outcome (Transfer Goal 1, PT) goal revised this date  -KM       Row Name 07/07/25 1252          Gait Training Goal 1 (PT)    Activity/Assistive Device (Gait Training Goal 1, PT) gait (walking locomotion);assistive device use;walker, rolling  -KM     [place] Level (Gait Training Goal 1, PT) standby assist  -KM     Distance (Gait Training Goal 1, PT) 120'  -KM     Time Frame (Gait Training Goal 1, PT) by discharge  -KM     Progress/Outcome (Gait Training Goal 1, PT) goal revised this date  -KM               User Key  (r) = Recorded By, (t) = Taken By, (c) = Cosigned By      Initials Name Provider Type    Cheryl Aponte, RN Registered Nurse    Sage Mccain, YUSUF Physical Therapist                    Physical Therapy Education       Title: PT OT SLP Therapies (In Progress)       Topic: Physical Therapy (In Progress)       Point: Mobility training (In Progress)       Learning Progress Summary            Patient Acceptance, E, NR by MF at 7/6/2025 2319    Acceptance, E, VU by SC at 7/6/2025 0301    Acceptance, E, VU by MC at 7/3/2025 1445    Acceptance, E,TB, VU by MP at 7/2/2025 0916    Acceptance, E,TB, VU by  at 7/2/2025 0511    Acceptance, E,TB, VU by  at 6/30/2025 0604    Acceptance, E,TB, VU by  at 6/24/2025 1421   Family Acceptance, E,TB, VU by  at 7/2/2025 0511    Acceptance, E,TB, VU by  at  6/30/2025 0604                      Point: Home exercise program (In Progress)       Learning Progress Summary            Patient Acceptance, E, NR by  at 7/6/2025 2319    Acceptance, E, VU by SC at 7/6/2025 0301    Acceptance, E, VU by MC at 7/3/2025 1445    Acceptance, E,TB, VU by MP at 7/2/2025 0916    Acceptance, E,TB, VU by HG at 7/2/2025 0511    Acceptance, E,TB, VU by HG at 6/30/2025 0604    Acceptance, E,TB, VU by KM at 6/24/2025 1421   Family Acceptance, E,TB, VU by HG at 7/2/2025 0511    Acceptance, E,TB, VU by HG at 6/30/2025 0604                      Point: Body mechanics (In Progress)       Learning Progress Summary            Patient Acceptance, E, NR by  at 7/6/2025 2319    Acceptance, E, VU by SC at 7/6/2025 0301    Acceptance, E, VU by MC at 7/3/2025 1445    Acceptance, E,TB, VU by MP at 7/2/2025 0916    Acceptance, E,TB, VU by HG at 7/2/2025 0511    Acceptance, E,TB, VU by HG at 6/30/2025 0604    Acceptance, E,TB, VU by KM at 6/24/2025 1421   Family Acceptance, E,TB, VU by HG at 7/2/2025 0511    Acceptance, E,TB, VU by HG at 6/30/2025 0604                      Point: Precautions (In Progress)       Learning Progress Summary            Patient Acceptance, E, NR by  at 7/6/2025 2319    Acceptance, E, VU by SC at 7/6/2025 0301    Acceptance, E, VU by MC at 7/3/2025 1445    Acceptance, E,TB, VU by MP at 7/2/2025 0916    Acceptance, E,TB, VU by HG at 7/2/2025 0511    Acceptance, E,TB, VU by HG at 6/30/2025 0604    Acceptance, E,TB, VU by KM at 6/24/2025 1421   Family Acceptance, E,TB, VU by HG at 7/2/2025 0511    Acceptance, E,TB, VU by HG at 6/30/2025 0604                                      User Key       Initials Effective Dates Name Provider Type Discipline    MP 06/16/21 -  Heaven Burleson, RN Registered Nurse Nurse     04/24/23 -  Collett, Morgan, RN Registered Nurse Nurse     05/24/22 -  Sage Fuentes, PT Physical Therapist PT     06/21/23 -  Brittany Parrish, RN Registered Nurse Nurse     HG 01/22/25 -  Christine Lepe, RN Registered Nurse Nurse    SC 09/11/24 -  Arielle Macias, RN Registered Nurse Nurse                  PT Recommendation and Plan  Anticipated Discharge Disposition (PT): inpatient rehabilitation facility  Planned Therapy Interventions (PT): balance training, bed mobility training, gait training, home exercise program, patient/family education, postural re-education, ROM (range of motion), strengthening, stretching, transfer training, stair training, wheelchair management/propulsion training  Therapy Frequency (PT): 6 times/wk  Progress Summary (PT)  Daily Progress Summary (PT): Pt. was able to demonstrate her most improved functional mobility skills since admission. She was able to improve ambulation distance and quality w/ RW. She has continued to show great motivation and effort w/ therapy. Pt. demonstrates ability to tolerate over 3 hours of activity. Pt. would continue to benefit from PT services.  Plan of Care Reviewed With: patient  Outcome Evaluation: Pt. evaluation completed during PT session. She was able to perform functional mobility skills w/ maxA x2. She was unable to ambulate at time of evaluation d/t weakness and pain. She demonstrates impaired strength and ROM. Pt. would benefit from inpatient rehab to address weakness, pain, impaired mobility, safety precautions, and fall risk.       Time Calculation:    PT Charges       Row Name 07/07/25 1248             Time Calculation    PT Received On 07/07/25  -KM      PT Goal Re-Cert Due Date 07/21/25  -KM         Time Calculation- PT    Total Timed Code Minutes- PT 25 minute(s)  -KM                User Key  (r) = Recorded By, (t) = Taken By, (c) = Cosigned By      Initials Name Provider Type    Sage Mccain, PT Physical Therapist                  Therapy Charges for Today       Code Description Service Date Service Provider Modifiers Qty    31011275003  PT THERAPEUTIC ACT EA 15 MIN 7/7/2025 Sage Fuentes, PT GP 1     [id number]  GAIT TRAINING EA 15 MIN 7/7/2025 Sage Fuentes, PT GP 1    48936165006 HC PT RE-EVAL ESTABLISHED PLAN 2 7/7/2025 Sage Fuentes, PT GP 1            PT G-Codes  AM-PAC 6 Clicks Score (PT): 17    Sage Fuentes, PT  7/7/2025

## 2025-07-07 NOTE — CASE MANAGEMENT/SOCIAL WORK
Discharge Planning Assessment   [facility]     Patient Name: Lizbet Arzola  MRN: [id number]  Today's Date: 7/7/2025    Admit Date: 6/21/2025    Plan: Physician placed a new inpt rehab consult and inpt rehab submitted to insurance on this date for possible admit to inpt rehab.  SS will follow.       Discharge Plan       Row Name 07/07/25 1613       Plan    Plan Physician placed a new inpt rehab consult and inpt rehab submitted to insurance on this date for possible admit to inpt rehab.  SS will follow.      Row Name 07/07/25 1433       Plan    Plan SS received call this am from Daughter Mary asking if the expedited appeal decision can be appealed for possible admit to inpt rehab.  SS discussed with Supervisor and Physician.  SS will follow.                  Continued Care and Services - Admitted Since 6/21/2025       Destination Coordination complete.      Service Provider Request Status Services Address Phone Fax Patient Preferred    [facility]  Selected Skilled Nursing [facility] [address] [place] [address] [phone] --                  Expected Discharge Date and Time       Expected Discharge Date Expected Discharge Time    Jul 7, 2025             JOSE LUIS WoodsW

## 2025-07-07 NOTE — SIGNIFICANT NOTE
07/07/25 1445   Post Acute Pre-Cert Documentation   Request Submitted by Facility - Type: Post Acute   Post-Acute Authorization Type Submitted: IRF   Date Post Acute Pre-Cert Inititated per Facility 07/07/25   Verification from Payer Yes   Accepting Facility [facility]   All Clinicals Submitted? Yes   Had Accepting Facility at Time of Submission Yes   Response Communicated to:    Authorization Number: Auth ID# [id number]   Post Acute Pre-Cert Initiated Comment Prior auth request for Rehab has been submitted to [employer] via [employer] portal with auth ID# [id number].

## 2025-07-07 NOTE — PLAN OF CARE
Goal Outcome Evaluation:   Patient has been pleasant this shift, currently resting in bed. No acute s/s of distress noted at this time. VSS. Plan of care ongoing.

## 2025-07-07 NOTE — CASE MANAGEMENT/SOCIAL WORK
Discharge Planning Assessment   [facility]     Patient Name: Lizbet Arzola  MRN: [id number]  Today's Date: 7/7/2025    Admit Date: 6/21/2025    Plan: SS received call this am from Daughter Mary asking if the expedited appeal decision can be appealed for possible admit to inpt rehab.  SS discussed with Supervisor and Physician.  SS will follow.       Discharge Plan       Row Name 07/07/25 1433       Plan    Plan SS received call this am from Daughter Mary asking if the expedited appeal decision can be appealed for possible admit to inpt rehab.  SS discussed with Supervisor and Physician.  SS will follow.                  Continued Care and Services - Admitted Since 6/21/2025       Destination Coordination complete.      Service Provider Request Status Services Address Phone Fax Patient Preferred    [facility]  Selected Skilled Nursing [facility] [address] [place] [address] [phone] --                  Expected Discharge Date and Time       Expected Discharge Date Expected Discharge Time    Jul 7, 2025           NATALIE Woods

## 2025-07-07 NOTE — THERAPY TREATMENT NOTE
Patient Name: Lizbet Arzola  : 1945    MRN: [id number]                              Today's Date: 2025       Admit Date: 2025    Visit Dx:     ICD-10-CM ICD-9-CM   1. Closed displaced intertrochanteric fracture of left femur, initial encounter  S72.142A 820.21   2. Acute low back pain with sciatica, sciatica laterality unspecified, unspecified back pain laterality  M54.40 724.2     724.3     Patient Active Problem List   Diagnosis   (none) - all problems resolved or deleted     Past Medical History:   Diagnosis Date    Hyperlipidemia     Hypertension     Hyperthyroidism     Osteoarthritis      Past Surgical History:   Procedure Laterality Date    LIPOMA EXCISION        General Information       Row Name 25 1341          OT Time and Intention    Subjective Information complains of;fatigue  -     Document Type progress note/recertification  -     Mode of Treatment individual therapy;occupational therapy  -     Patient Effort excellent  -     Symptoms Noted During/After Treatment fatigue  -     Comment Patient agreeable to therapy. She continues to be highly motivated, but limited in functional performance below prior level of function with safety risk to return home.  -       Row Name 25 1341          General Information    Patient Profile Reviewed yes  -     Existing Precautions/Restrictions fall;weight bearing  -     Barriers to Rehab none identified  -       Row Name 25 1341          Cognition    Orientation Status (Cognition) oriented to;person;place;situation  -       Row Name 25 1341          Safety Issues/Impairments Affecting Functional Mobility    Impairments Affecting Function (Mobility) balance;endurance/activity tolerance;pain;strength  -               User Key  (r) = Recorded By, (t) = Taken By, (c) = Cosigned By      Initials Name Provider Type    Izzy Motta OT Occupational Therapist                     Mobility/ADL's       Row  Name 07/07/25 1342          Bed Mobility    Bed Mobility supine-sit  -     Supine-Sit Foard (Bed Mobility) minimum assist (75% patient effort);verbal cues  -     Bed Mobility, Safety Issues decreased use of arms for pushing/pulling;decreased use of legs for bridging/pushing  -     Assistive Device (Bed Mobility) bed rails;head of bed elevated;repositioning sheet  -       Row Name 07/07/25 1342          Transfers    Transfers sit-stand transfer;stand-sit transfer;bed-chair transfer  -       Row Name 07/07/25 1342          Bed-Chair Transfer    Bed-Chair Foard (Transfers) moderate assist (50% patient effort);verbal cues  -     Assistive Device (Bed-Chair Transfers) walker, front-wheeled  -       Row Name 07/07/25 1342          Sit-Stand Transfer    Sit-Stand Foard (Transfers) moderate assist (50% patient effort);verbal cues  -     Assistive Device (Sit-Stand Transfers) walker, front-wheeled  -       Row Name 07/07/25 [facility]2          Stand-Sit Transfer    Stand-Sit Foard (Transfers) minimum assist (75% patient effort);verbal cues  -     Assistive Device (Stand-Sit Transfers) walker, front-wheeled  -       Row Name 07/07/25 1342          Functional Mobility    Functional Mobility- Ind. Level minimum assist (75% patient effort);2 person assist required  -     Functional Mobility- Device walker, front-wheeled  -       Row Name 07/07/25 1342          Activities of Daily Living    BADL Assessment/Intervention bathing;upper body dressing;lower body dressing;grooming;feeding;toileting  -       Row Name 07/07/25 [facility]2          Mobility    Extremity Weight-bearing Status left lower extremity  -     Left Lower Extremity (Weight-bearing Status) weight-bearing as tolerated (WBAT)  -       Row Name 07/07/25 1342          Bathing Assessment/Intervention    [place] Level (Bathing) bathing skills;maximum assist (25% patient effort)  -     Position (Bathing) edge of bed  sitting;supine  -[facility] Name 07/07/25 1342          Upper Body Dressing Assessment/Training    [place] Level (Upper Body Dressing) upper body dressing skills;minimum assist (75% patient effort)  -[facility] Name 07/07/25 1342          Lower Body Dressing Assessment/Training    [place] Level (Lower Body Dressing) lower body dressing skills;maximum assist (25% patient effort)  -[facility] Name 07/07/25 1342          Grooming Assessment/Training    [place] Level (Grooming) grooming skills;set up;standby assist  -     Position (Grooming) sitting up in bed  -KP       Row Name 07/07/25 1342          Self-Feeding Assessment/Training    [place] Level (Feeding) feeding skills;set up  -     Position (Feeding) sitting up in bed;supported sitting  -KP       Row Name 07/07/25 1342          Toileting Assessment/Training    [place] Level (Toileting) toileting skills;dependent (less than 25% patient effort)  -               User Key  (r) = Recorded By, (t) = Taken By, (c) = Cosigned By      Initials Name Provider Type     Izzy Murillo OT Occupational Therapist                   Obj/Interventions       Row Name 07/07/25 1344          Strength Comprehensive (MMT)    Comment, General Manual Muscle Testing (MMT) Assessment 4/5  -KP       Row Name 07/07/25 1344          Motor Skills    Motor Skills functional endurance  -     Functional Endurance good minus  -               User Key  (r) = Recorded By, (t) = Taken By, (c) = Cosigned By      Initials Name Provider Type    Izzy Motta OT Occupational Therapist                   Goals/Plan       Row Name 07/07/25 1347          Transfer Goal 1 (OT)    Activity/Assistive Device (Transfer Goal 1, OT) toilet;commode, 3-in-1  -KP     [place] Level/Cues Needed (Transfer Goal 1, OT) minimum assist (75% or more patient effort)  -     Time Frame (Transfer Goal 1, OT) by discharge  -     Progress/Outcome (Transfer Goal 1, OT) goal  ongoing  -       Row Name 07/07/25 1341          Problem Specific Goal 1 (OT)    Problem Specific Goal 1 (OT) Patient will perform sustained activity X12 minutes to promote functional endurance/activity tolerance needed for daily routine.  -     Time Frame (Problem Specific Goal 1, OT) by discharge  -     Progress/Outcome (Problem Specific Goal 1, OT) goal partially met  -       Row Name 07/07/25 1344          Therapy Assessment/Plan (OT)    Planned Therapy Interventions (OT) activity tolerance training;adaptive equipment training;BADL retraining;functional balance retraining;patient/caregiver education/training;passive ROM/stretching;occupation/activity based interventions;transfer/mobility retraining;strengthening exercise;ROM/therapeutic exercise  -               User Key  (r) = Recorded By, (t) = Taken By, (c) = Cosigned By      Initials Name Provider Type    Izzy Motta, OT Occupational Therapist                   Clinical Impression       Row Name 07/07/25 2281          Pain Scale: FACES Pre/Post-Treatment    Pain: FACES Scale, Pretreatment 0-->no hurt  -     Posttreatment Pain Rating 2-->hurts little bit  -       Row Name 07/07/25 1441          Plan of Care Review    Plan of Care Reviewed With patient;daughter  -     Progress improving  -     Outcome Evaluation Patient seen for progress note. She continues to present with functional limitations secondary to generalized weakness, decreased functional endurance, pain, and impaired balance. She is progressing towards goals with improved tolerance and independence with self care from seated position. Self care tasks requiring standing require 2 person assist due to reliance on BUEs with rolling walker. She is highly motivated and showing consistent functional progress, intensive therapy will be most benefical to patient to promote highest level of independence and safety prior to discharge home.  -       Row Name 07/07/25 134           Therapy Assessment/Plan (OT)    Therapy Frequency (OT) 5 times/wk  -     Predicted Duration of Therapy Intervention (OT) discharge  -       Row Name 07/07/25 1344          Therapy Plan Review/Discharge Plan (OT)    Anticipated Discharge Disposition (OT) inpatient rehabilitation facility  -       Row Name 07/07/25 1344          Positioning and Restraints    Pre-Treatment Position in bed  -     Post Treatment Position bed  -     In Bed fowlers;call light within reach;encouraged to call for assist;exit alarm on;with family/caregiver  -               User Key  (r) = Recorded By, (t) = Taken By, (c) = Cosigned By      Initials Name Provider Type    KP Izzy Murillo, OT Occupational Therapist                   Outcome Measures       Row Name 07/07/25 0840          How much help from another person do you currently need...    Turning from your back to your side while in flat bed without using bedrails? 3  -SR     Moving from lying on back to sitting on the side of a flat bed without bedrails? 3  -SR     Moving to and from a bed to a chair (including a wheelchair)? 3  -SR     Standing up from a chair using your arms (e.g., wheelchair, bedside chair)? 3  -SR     Climbing 3-5 steps with a railing? 2  -SR     To walk in hospital room? 3  -SR     AM-PAC 6 Clicks Score (PT) 17  -SR               User Key  (r) = Recorded By, (t) = Taken By, (c) = Cosigned By      Initials Name Provider Type    SR Emelia Pulido, RN Registered Nurse                      OT Recommendation and Plan  Planned Therapy Interventions (OT): activity tolerance training, adaptive equipment training, BADL retraining, functional balance retraining, patient/caregiver education/training, passive ROM/stretching, occupation/activity based interventions, transfer/mobility retraining, strengthening exercise, ROM/therapeutic exercise  Therapy Frequency (OT): 5 times/wk  Plan of Care Review  Plan of Care Reviewed With: patient, daughter  Progress:  improving  Outcome Evaluation: Patient seen for progress note. She continues to present with functional limitations secondary to generalized weakness, decreased functional endurance, pain, and impaired balance. She is progressing towards goals with improved tolerance and independence with self care from seated position. Self care tasks requiring standing require 2 person assist due to reliance on BUEs with rolling walker. She is highly motivated and showing consistent functional progress, intensive therapy will be most benefical to patient to promote highest level of independence and safety prior to discharge home.     Time Calculation:         Time Calculation- OT       Row Name 07/07/25 1347             Time Calculation- OT    OT Received On 07/07/25  -                User Key  (r) = Recorded By, (t) = Taken By, (c) = Cosigned By      Initials Name Provider Type    Izzy Motta OT Occupational Therapist                  Therapy Charges for Today       Code Description Service Date Service Provider Modifiers Qty    [id number]  OT THERAPEUTIC ACT EA 15 MIN 7/7/2025 Izzy Murillo OT GO 1                 Izzy Murillo OT  7/7/2025

## 2025-07-08 LAB
027 TOXIN: NORMAL
ADV 40+41 DNA STL QL NAA+NON-PROBE: NOT DETECTED
ASTRO TYP 1-8 RNA STL QL NAA+NON-PROBE: NOT DETECTED
C CAYETANENSIS DNA STL QL NAA+NON-PROBE: NOT DETECTED
C COLI+JEJ+UPSA DNA STL QL NAA+NON-PROBE: NOT DETECTED
C DIFF TOX GENS STL QL NAA+PROBE: NEGATIVE
CRYPTOSP DNA STL QL NAA+NON-PROBE: NOT DETECTED
E HISTOLYT DNA STL QL NAA+NON-PROBE: NOT DETECTED
EAEC PAA PLAS AGGR+AATA ST NAA+NON-PRB: NOT DETECTED
EC STX1+STX2 GENES STL QL NAA+NON-PROBE: NOT DETECTED
EPEC EAE GENE STL QL NAA+NON-PROBE: NOT DETECTED
ETEC LTA+ST1A+ST1B TOX ST NAA+NON-PROBE: NOT DETECTED
G LAMBLIA DNA STL QL NAA+NON-PROBE: NOT DETECTED
NOROVIRUS GI+II RNA STL QL NAA+NON-PROBE: NOT DETECTED
P SHIGELLOIDES DNA STL QL NAA+NON-PROBE: NOT DETECTED
RVA RNA STL QL NAA+NON-PROBE: NOT DETECTED
S ENT+BONG DNA STL QL NAA+NON-PROBE: NOT DETECTED
SAPO I+II+IV+V RNA STL QL NAA+NON-PROBE: NOT DETECTED
SHIGELLA SP+EIEC IPAH ST NAA+NON-PROBE: NOT DETECTED
V CHOL+PARA+VUL DNA STL QL NAA+NON-PROBE: NOT DETECTED
V CHOLERAE DNA STL QL NAA+NON-PROBE: NOT DETECTED
Y ENTEROCOL DNA STL QL NAA+NON-PROBE: NOT DETECTED

## 2025-07-08 PROCEDURE — 97530 THERAPEUTIC ACTIVITIES: CPT

## 2025-07-08 PROCEDURE — 99232 SBSQ HOSP IP/OBS MODERATE 35: CPT | Performed by: INTERNAL MEDICINE

## 2025-07-08 PROCEDURE — 25010000002 ENOXAPARIN PER 10 MG: Performed by: INTERNAL MEDICINE

## 2025-07-08 PROCEDURE — 87507 IADNA-DNA/RNA PROBE TQ 12-25: CPT | Performed by: INTERNAL MEDICINE

## 2025-07-08 PROCEDURE — 97110 THERAPEUTIC EXERCISES: CPT

## 2025-07-08 PROCEDURE — 97116 GAIT TRAINING THERAPY: CPT

## 2025-07-08 PROCEDURE — 87493 C DIFF AMPLIFIED PROBE: CPT | Performed by: INTERNAL MEDICINE

## 2025-07-08 RX ORDER — CALCIUM CARBONATE 500 MG/1
2 TABLET, CHEWABLE ORAL 3 TIMES DAILY PRN
Status: DISCONTINUED | OUTPATIENT
Start: 2025-07-08 | End: 2025-07-09 | Stop reason: HOSPADM

## 2025-07-08 RX ORDER — LIDOCAINE 4 G/G
1 PATCH TOPICAL
Status: DISCONTINUED | OUTPATIENT
Start: 2025-07-08 | End: 2025-07-09 | Stop reason: HOSPADM

## 2025-07-08 RX ORDER — AMLODIPINE BESYLATE 10 MG/1
10 TABLET ORAL
Status: DISCONTINUED | OUTPATIENT
Start: 2025-07-09 | End: 2025-07-09 | Stop reason: HOSPADM

## 2025-07-08 RX ADMIN — ACETAMINOPHEN 650 MG: 325 TABLET, FILM COATED ORAL at 08:14

## 2025-07-08 RX ADMIN — ENOXAPARIN SODIUM 40 MG: 100 INJECTION SUBCUTANEOUS at 14:01

## 2025-07-08 RX ADMIN — AMLODIPINE BESYLATE 5 MG: 5 TABLET ORAL at 08:17

## 2025-07-08 RX ADMIN — CALCIUM CARBONATE 2 TABLET: 500 TABLET, CHEWABLE ORAL at 14:01

## 2025-07-08 RX ADMIN — DICLOFENAC SODIUM 4 G: 10 GEL TOPICAL at 08:22

## 2025-07-08 RX ADMIN — DONEPEZIL HYDROCHLORIDE 10 MG: 5 TABLET, FILM COATED ORAL at 20:13

## 2025-07-08 RX ADMIN — ESCITALOPRAM 10 MG: 10 TABLET, FILM COATED ORAL at 08:14

## 2025-07-08 RX ADMIN — Medication 4000 UNITS: at 20:13

## 2025-07-08 RX ADMIN — ROSUVASTATIN CALCIUM 5 MG: 10 TABLET, FILM COATED ORAL at 08:17

## 2025-07-08 RX ADMIN — DICLOFENAC SODIUM 4 G: 10 GEL TOPICAL at 20:15

## 2025-07-08 RX ADMIN — METOPROLOL SUCCINATE 25 MG: 25 TABLET, EXTENDED RELEASE ORAL at 08:17

## 2025-07-08 RX ADMIN — ASPIRIN 81 MG CHEWABLE TABLET 81 MG: 81 TABLET CHEWABLE at 20:13

## 2025-07-08 RX ADMIN — THERA TABS 1 TABLET: TAB at 20:13

## 2025-07-08 NOTE — PLAN OF CARE
Goal Outcome Evaluation:      Patient resting in bed at this time voicing no complaints or concerns, bed alarm on with call light in reach. Pt pleasant and cooperative with care this shift taking p.o. meds whole. No s/s of acute distress noted at this time, plan of care ongoing.

## 2025-07-08 NOTE — PROGRESS NOTES
[facility]     Patient Identification:  Name:  Lizbet Arzola  Age:  80 y.o.  Sex:  female  :  1945  MRN:  [id number]  Visit Number:  [id number]  Primary Care Provider:  Yaakov Riley DO    Length of stay:  16    Chief complaint: Back pain    Subjective:    Patient seen and examined at bedside with patient's family present.  Patient states she is doing well today and has minimal complaints with the exception of mild to moderate low back pain.  She attributes this to being in bed for a prolonged period of time.  She denies any other symptoms at this time.  ----------------------------------------------------------------------------------------------------------------------  Current [facility] Meds:  amLODIPine, 5 mg, Oral, Q24H  aspirin, 81 mg, Oral, Nightly  cholecalciferol, 4,000 Units, Oral, Nightly  diazePAM, 2 mg, Oral, Once  Diclofenac Sodium, 4 g, Topical, BID  donepezil, 10 mg, Oral, Nightly  enoxaparin sodium, 40 mg, Subcutaneous, Q24H  escitalopram, 10 mg, Oral, Daily  [Held by provider] methIMAzole, 5 mg, Oral, Daily  metoprolol succinate XL, 25 mg, Oral, Q24H  multivitamin, 1 tablet, Oral, Nightly  rosuvastatin, 5 mg, Oral, Daily  sodium chloride, 10 mL, Intravenous, Q12H           ----------------------------------------------------------------------------------------------------------------------  Vital Signs:  Temp:  [97.7 °F (36.5 °C)-98.4 °F (36.9 °C)] 98.4 °F (36.9 °C)  Heart Rate:  [69-89] 69  Resp:  [18] 18  BP: (143-178)/(54-71) 152/61      25  0500 25  0500 25  0500   Weight: 69.4 kg (153 lb) 69 kg (152 lb 1.6 oz) 64.5 kg (142 lb 4.8 oz) (IRMA shay)     Body mass index is 22.29 kg/m².    Intake/Output Summary (Last 24 hours) at 2025 1501  Last data filed at 2025 1341  Gross per 24 hour   Intake 420 ml   Output 250 ml   Net 170 ml     Diet: Regular/House; Fluid Consistency: Thin (IDDSI  0)  ----------------------------------------------------------------------------------------------------------------------  Physical exam:  Constitutional: Well-nourished  female in no apparent distress.     HENT:  Head:  Normocephalic and atraumatic.  Mouth:  Moist mucous membranes.    Eyes:  Conjunctivae and EOM are normal.  Pupils are equal, round, and reactive to light.  No scleral icterus.    Neck:  Neck supple. No thyromegaly.  No JVD present.    Cardiovascular:  Regular rate and rhythm with no murmurs, rubs, clicks or gallops appreciated.  Pulmonary/Chest:  Clear to auscultation bilaterally with no crackles, wheezes or rhonchi appreciated.  Abdominal:  Soft. Nontender. Nondistended  Bowel sounds are normal in all four quadrants. No organomegally appreciated.   Musculoskeletal:  No edema, surgical dressing applied to left hip joint  Neurological: Awake, Cranial nerves II-XII intact with no focal deficits.  No facial droop.  No slurred speech.   Skin:  Warm and dry to palpation with no rashes or lesions appreciated.  Peripheral vascular:  2+ radial and pedal pulses in bilateral upper and lower extremities.  Psychiatric: Awake and oriented x3, demonstrates appropriate judgment and insight.    No significant change in physical exam in comparison to 7/7/2025  ---------------------------------------------------------------------------------------  ----------------------------------------------------------------------------------------------------------------------        Results from last 7 days   Lab Units 07/07/25 0055 07/06/25  0258 07/05/25  0159   WBC 10*3/mm3 10.57 10.42 10.78   HEMOGLOBIN g/dL 9.1* 8.8* 9.0*   HEMATOCRIT % 29.1* 28.3* 29.2*   MCV fL 102.8* 101.4* 102.1*   MCHC g/dL 31.3* 31.1* 30.8*   PLATELETS 10*3/mm3 461* 439 432         Results from last 7 days   Lab Units 07/07/25 0055 07/06/25  1543 07/06/25  0258 07/05/25  0159 07/02/25  1603 07/02/25  0421   SODIUM mmol/L 142  --  141 140   " < > 141   POTASSIUM mmol/L 4.1 4.2 3.6 3.7   < > 3.5   MAGNESIUM mg/dL 1.8  --   --   --   --  1.6   CHLORIDE mmol/L 107  --  105 104   < > 102   CO2 mmol/L 23.4  --  24.7 23.5   < > 26.3   BUN mg/dL 24.9*  --  22.5 22.2   < > 23.8*   CREATININE mg/dL 1.00  --  0.96 0.97   < > 1.06*   CALCIUM mg/dL 9.9  --  9.3 9.4   < > 9.3   GLUCOSE mg/dL 103*  --  111* 128*   < > 115*    < > = values in this interval not displayed.   Estimated Creatinine Clearance: 44.2 mL/min (by C-G formula based on SCr of 1 mg/dL).    No results found for: \"AMMONIA\"      No results found for: \"BLOODCX\"  No results found for: \"URINECX\"  No results found for: \"WOUNDCX\"  No results found for: \"STOOLCX\"    I have personally looked at the labs and they are summarized above.  ----------------------------------------------------------------------------------------------------------------------  Imaging Results (Last 24 Hours)       ** No results found for the last 24 hours. **          ----------------------------------------------------------------------------------------------------------------------  Assessment and Plan:    Left femur fracture - patient underwent left hip intertrochanteric nailing on 6/23/2025, patient tolerated the procedure well with no complications.    2. Acute Blood Loss Anemia -patient has required 1 unit PRBC transfusion during this hospital stay.  Hemoglobin and hematocrit are stable.    3.  New onset paroxysmal A-fib -patient remains in normal sinus rhythm, continue Lopressor 25 mg p.o. twice daily.  Transthoracic echo demonstrates normal left ventricular systolic function.  Continue to defer anticoagulation at this time as patient is considered a high fall risk.    4.  Essential hypertension -patient with persistently elevated blood pressures over the past 48 hours.  Will increase Norvasc to 10 mg p.o. daily and monitor for improvement    5.  Hyperlipidemia -statin    6.  Hyperthyroidism - continue to hold methimazole " as TSH is elevated and free T4 is below lower limits of normal    7.  Suspect CKD stage IIIa -serum creatinine essentially unchanged today at 1.  Continue to monitor closely with BMP in the morning.        Disposition currently being evaluated for possible inpatient rehab placement.    Chano oRmero,    07/08/25   15:01 EDT

## 2025-07-08 NOTE — CASE MANAGEMENT/SOCIAL WORK
Discharge Planning Assessment   [place]     Patient Name: Lizbet Arzola  MRN: [id number]  Today's Date: 7/8/2025    Admit Date: 6/21/2025    Plan: SS notified that pt's insurance has offered a peer to peer for admit to inpt rehab.  SS discussed with Physician, Supervisor and Exec Director.  SS submitted peer to peer to Physician Advisor.  SS will follow.       Discharge Plan       Row Name 07/08/25 1434       Plan    Plan SS notified that pt's insurance has offered a peer to peer for admit to inpt rehab.  SS discussed with Physician, Supervisor and Exec Director.  SS submitted peer to peer to Physician Advisor.  SS will follow.                  Continued Care and Services - Admitted Since 6/21/2025       Destination Coordination complete.      Service Provider Request Status Services Address Phone Fax Patient Preferred    [facility]  Selected Skilled Nursing [facility] [address], [place] [address] [phone] --                  Expected Discharge Date and Time       Expected Discharge Date Expected Discharge Time    Jul 9, 2025             Latoya Hendrix, JOSE LUISW

## 2025-07-08 NOTE — THERAPY TREATMENT NOTE
Acute Care - Physical Therapy Treatment Note  [facility]     Patient Name: Lizbet Arzola  : 1945  MRN: [id number]  Today's Date: 2025      Visit Dx:     ICD-10-CM ICD-9-CM   1. Closed displaced intertrochanteric fracture of left femur, initial encounter  S72.142A 820.21   2. Acute low back pain with sciatica, sciatica laterality unspecified, unspecified back pain laterality  M54.40 724.2     724.3     Patient Active Problem List   Diagnosis   (none) - all problems resolved or deleted     Past Medical History:   Diagnosis Date    Hyperlipidemia     Hypertension     Hyperthyroidism     Osteoarthritis      Past Surgical History:   Procedure Laterality Date    HIP INTERTROCHANTERIC NAILING Left 2025    Procedure: HIP INTERTROCHANTERIC NAILING LONG WITH INTRAMEDULLARY HIP SCREW;  Surgeon: Cosmo Jay MD;  Location: [facility];  Service: Orthopedics;  Laterality: Left;    LIPOMA EXCISION       PT Assessment (Last 12 Hours)       PT Evaluation and Treatment       Row Name 25 1335          Physical Therapy Time and Intention    Subjective Information complains of;weakness;fatigue  -KM     Document Type therapy note (daily note)  -KM     Mode of Treatment individual therapy;physical therapy  -KM     Patient Effort excellent  -KM     Symptoms Noted During/After Treatment fatigue  -KM       Row Name 25 0783          General Information    Patient Profile Reviewed yes  -KM     Patient Observations alert;cooperative;agree to therapy  -KM     Existing Precautions/Restrictions fall;weight bearing;other (see comments)  -KM       Row Name 25 002          Pain    Pretreatment Pain Rating 0/10 - no pain  -KM     Posttreatment Pain Rating 0/10 - no pain  -KM     Pain Location knee  -KM     Pain Side/Orientation left  -KM       Row Name 25 4500          Cognition    Affect/Mental Status (Cognition) WFL  -KM     Orientation Status (Cognition) oriented to;person;place;situation  -KM      Follows Commands (Cognition) WFL  -       Row Name 07/08/25 1335          Mobility    Extremity Weight-bearing Status left lower extremity  -     Left Lower Extremity (Weight-bearing Status) weight-bearing as tolerated (WBAT)  -       Row Name 07/08/25 1335          Bed Mobility    Comment, (Bed Mobility) up in chair upon arrival  -       Row Name 07/08/25 1335          Transfers    Transfers sit-stand transfer;stand-sit transfer  -       Row Name 07/08/25 1335          Sit-Stand Transfer    Sit-Stand [place] (Transfers) verbal cues;minimum assist (75% patient effort)  -     Assistive Device (Sit-Stand Transfers) walker, front-wheeled  -       Row Name 07/08/25 1335          Stand-Sit Transfer    Stand-Sit [place] (Transfers) minimum assist (75% patient effort);verbal cues  -     Assistive Device (Stand-Sit Transfers) walker, front-wheeled  -KM       Row Name 07/08/25 1335          Gait/Stairs (Locomotion)    Gait/Stairs Locomotion gait/ambulation independence;gait/ambulation assistive device;distance ambulated  -KM     [place] Level (Gait) minimum assist (75% patient effort)  -     Assistive Device (Gait) walker, front-wheeled  -     Patient was able to Ambulate yes  -KM     Distance in Feet (Gait) 75  -KM     Pattern (Gait) step-to  -KM     Deviations/Abnormal Patterns (Gait) antalgic;ataxic;base of support, narrow;gait speed decreased  -KM     Bilateral Gait Deviations forward flexed posture;knee buckling bilaterally  -KM     Left Sided Gait Deviations weight shift ability decreased  -KM       Row Name 07/08/25 1335          Safety Issues/Impairments Affecting Functional Mobility    Impairments Affecting Function (Mobility) balance;endurance/activity tolerance;pain;strength  -       Row Name 07/08/25 1335          Motor Skills    Comments, Therapeutic Exercise supine ther-ex  -       Row Name             Wound 06/23/25 1619 Left hip Surgical    Wound - Properties Group  Placement Date: 06/23/25  -KB Placement Time: 1619  -KB Present on Original Admission: N  -KB Side: Left  -KB Location: hip  -KB Primary Wound Type: Surgical  -KB, incision sites x4     Retired Wound - Properties Group Placement Date: 06/23/25  -KB Placement Time: 1619  -KB Present on Original Admission: N  -KB Side: Left  -KB Location: hip  -KB    Retired Wound - Properties Group Placement Date: 06/23/25  -KB Placement Time: 1619  -KB Present on Original Admission: N  -KB Side: Left  -KB Location: hip  -KB    Retired Wound - Properties Group Date first assessed: 06/23/25  -KB Time first assessed: 1619  -KB Present on Original Admission: N  -KB Side: Left  -KB Location: hip  -KB      Row Name 07/08/25 6195          Progress Summary (PT)    Daily Progress Summary (PT) Pt. was able to perform functional mobility skills w/ Amy. She was able to continue ambulating in hallway w/ RW. She continues to tolerate increased activity. She continues showing progress w/ all mobility skills. Pt. is able to tolerate greater than 3 hours of therapy. Pt. would continue to benefit from more intense PT services.  -KM       Row Name 07/08/25 1339          Physical Therapy Goals    Bed Mobility Goal Selection (PT) bed mobility, PT goal 1  -KM     Transfer Goal Selection (PT) transfer, PT goal 1  -KM     Gait Training Goal Selection (PT) gait training, PT goal 1  -KM       Row Name 07/08/25 4967          Bed Mobility Goal 1 (PT)    Activity/Assistive Device (Bed Mobility Goal 1, PT) bed mobility activities, all  -KM     [place] Level/Cues Needed (Bed Mobility Goal 1, PT) standby assist  -KM     Time Frame (Bed Mobility Goal 1, PT) by discharge  -KM     Progress/Outcomes (Bed Mobility Goal 1, PT) goal revised this date  -KM       Row Name 07/08/25 9069          Transfer Goal 1 (PT)    Activity/Assistive Device (Transfer Goal 1, PT) transfers, all;walker, rolling  -KM     [place] Level/Cues Needed (Transfer Goal 1, PT) contact  guard required  -KM     Time Frame (Transfer Goal 1, PT) by discharge  -KM     Progress/Outcome (Transfer Goal 1, PT) goal revised this date  -KM       Row Name 07/08/25 1335          Gait Training Goal 1 (PT)    Activity/Assistive Device (Gait Training Goal 1, PT) gait (walking locomotion);assistive device use;walker, rolling  -KM     [place] Level (Gait Training Goal 1, PT) standby assist  -KM     Distance (Gait Training Goal 1, PT) 120'  -KM     Time Frame (Gait Training Goal 1, PT) by discharge  -KM     Progress/Outcome (Gait Training Goal 1, PT) goal revised this date  -KM               User Key  (r) = Recorded By, (t) = Taken By, (c) = Cosigned By      Initials Name Provider Type    Cheryl Aponte, RN Registered Nurse    Sage Mccain, YUSUF Physical Therapist                    Physical Therapy Education       Title: PT OT SLP Therapies (Done)       Topic: Physical Therapy (Done)       Point: Mobility training (Done)       Learning Progress Summary            Patient Acceptance, E,TB, VU,NR by AV at 7/8/2025 1242    Acceptance, E, NR by  at 7/6/2025 2319    Acceptance, E, VU by SC at 7/6/2025 0301    Acceptance, E, VU by MC at 7/3/2025 1445    Acceptance, E,TB, VU by MP at 7/2/2025 0916    Acceptance, E,TB, VU by  at 7/2/2025 0511    Acceptance, E,TB, VU by  at 6/30/2025 0604    Acceptance, E,TB, VU by ANGEL LUIS at 6/24/2025 1421   Family Acceptance, E,TB, VU by HG at 7/2/2025 0511    Acceptance, E,TB, VU by HG at 6/30/2025 0604                      Point: Home exercise program (Done)       Learning Progress Summary            Patient Acceptance, E,TB, VU,NR by AV at 7/8/2025 1242    Acceptance, E, NR by MF at 7/6/2025 2319    Acceptance, E, VU by SC at 7/6/2025 0301    Acceptance, E, VU by MC at 7/3/2025 1445    Acceptance, E,TB, VU by MP at 7/2/2025 0916    Acceptance, E,TB, VU by HG at 7/2/2025 0511    Acceptance, E,TB, VU by HG at 6/30/2025 0604    Acceptance, E,TB, VU by KM at 6/24/2025  1421   Family Acceptance, E,TB, VU by  at 7/2/2025 0511    Acceptance, E,TB, VU by HG at 6/30/2025 0604                      Point: Body mechanics (Done)       Learning Progress Summary            Patient Acceptance, E,TB, VU,NR by AV at 7/8/2025 1242    Acceptance, E, NR by  at 7/6/2025 2319    Acceptance, E, VU by SC at 7/6/2025 0301    Acceptance, E, VU by  at 7/3/2025 1445    Acceptance, E,TB, VU by  at 7/2/2025 0916    Acceptance, E,TB, VU by  at 7/2/2025 0511    Acceptance, E,TB, VU by  at 6/30/2025 0604    Acceptance, E,TB, VU by  at 6/24/2025 1421   Family Acceptance, E,TB, VU by  at 7/2/2025 0511    Acceptance, E,TB, VU by  at 6/30/2025 0604                      Point: Precautions (Done)       Learning Progress Summary            Patient Acceptance, E,TB, VU,NR by AV at 7/8/2025 1242    Acceptance, E, NR by  at 7/6/2025 2319    Acceptance, E, VU by SC at 7/6/2025 0301    Acceptance, E, VU by  at 7/3/2025 1445    Acceptance, E,TB, VU by  at 7/2/2025 0916    Acceptance, E,TB, VU by  at 7/2/2025 0511    Acceptance, E,TB, VU by  at 6/30/2025 0604    Acceptance, E,TB, VU by  at 6/24/2025 1421   Family Acceptance, E,TB, VU by  at 7/2/2025 0511    Acceptance, E,TB, VU by  at 6/30/2025 0604                                      User Key       Initials Effective Dates Name Provider Type Discipline     06/16/21 -  Heaven Burleson, RN Registered Nurse Nurse     04/24/23 -  Collett, Morgan, RN Registered Nurse Nurse     05/24/22 -  Sage Fuentes PT Physical Therapist PT     06/21/23 -  Brittany Parrish, RN Registered Nurse Nurse     01/22/25 -  Christine Lepe RN Registered Nurse Nurse    SC 09/11/24 -  Arielle Macias RN Registered Nurse Nurse     06/03/25 -  Dawson Montaño RNA Registered Nurse Nurse                  PT Recommendation and Plan  Anticipated Discharge Disposition (PT): inpatient rehabilitation facility  Planned Therapy Interventions (PT): balance training, bed  mobility training, gait training, home exercise program, patient/family education, postural re-education, ROM (range of motion), strengthening, stretching, transfer training, stair training, wheelchair management/propulsion training  Therapy Frequency (PT): 6 times/wk  Progress Summary (PT)  Daily Progress Summary (PT): Pt. was able to perform functional mobility skills w/ Amy. She was able to continue ambulating in hallway w/ RW. She continues to tolerate increased activity. She continues showing progress w/ all mobility skills. Pt. is able to tolerate greater than 3 hours of therapy. Pt. would continue to benefit from more intense PT services.  Plan of Care Reviewed With: patient  Outcome Evaluation: Pt. evaluation completed during PT session. She was able to perform functional mobility skills w/ maxA x2. She was unable to ambulate at time of evaluation d/t weakness and pain. She demonstrates impaired strength and ROM. Pt. would benefit from inpatient rehab to address weakness, pain, impaired mobility, safety precautions, and fall risk.       Time Calculation:    PT Charges       Row Name 07/08/25 1335             Time Calculation    PT Received On 07/08/25  -KM         Time Calculation- PT    Total Timed Code Minutes- PT 40 minute(s)  -KM                User Key  (r) = Recorded By, (t) = Taken By, (c) = Cosigned By      Initials Name Provider Type    Sage Mccain, PT Physical Therapist                  Therapy Charges for Today       Code Description Service Date Service Provider Modifiers Qty    90381150232  PT THERAPEUTIC ACT EA 15 MIN 7/7/2025 Sage Fuentes, PT GP 1    33839725747 HC GAIT TRAINING EA 15 MIN 7/7/2025 Sage Fuentes, PT GP 1    [id number]  PT RE-EVAL ESTABLISHED PLAN 2 7/7/2025 Sage Fuentes, PT GP 1    [id number]  PT THERAPEUTIC ACT EA 15 MIN 7/8/2025 Sage Fuentes, PT GP 1    [id number]  PT THER PROC EA 15 MIN 7/8/2025 Sage Fuentes, PT GP 1    [id number]  GAIT TRAINING EA 15  MIN 7/8/2025 Sage Fuentes, PT GP 1            PT G-Codes  AM-PAC 6 Clicks Score (PT): 16    Sage Fuentes, PT  7/8/2025

## 2025-07-08 NOTE — PLAN OF CARE
Goal Outcome Evaluation:           Progress: improving  Outcome Evaluation: PT alert with intermittent confusion. Pt c/o lower back pain this morning. PT received PRN tylenol. PT stated tylenol was not effective when reassessed for pain. PT then received Voltaren cream topically on lower back which was effective when reassessed for pain. PT also had 3 watery bowel movements throughout the shift. MD aware, pending order for stool sample. PT c/o indigestion, MD aware. PT received Calcium Carbonate per MD order. No further complaints at this time. Surgical dressings changed today. No drainage noted, no signs of infection noted. Plan of care ongoing.

## 2025-07-08 NOTE — SIGNIFICANT NOTE
25 1141   Post Acute Pre-Cert Documentation   Request Submitted by Facility - Type: Post Acute   Post-Acute Authorization Type Submitted: IRF   Date Post Acute Pre-Cert Completed 25   Response Received from Insurance? P2P Requested   Response Communicated to:    Authorization Number: Auth ID# [id number]; Plan Auth ID# [id number]   Post Acute Pre-Cert Initiated Comment [employer] called to offer a peer to peer.  The provider can call directly to do the peer to peer by calling [phone] option 5 with deadline. 25 at 4:00pm.  The provider will need pt's first & last name, , and member ID# [id number] when calling.

## 2025-07-08 NOTE — THERAPY TREATMENT NOTE
Patient Name: Lizbet Arzola  : 1945    MRN: [id number]                              Today's Date: 2025       Admit Date: 2025    Visit Dx:     ICD-10-CM ICD-9-CM   1. Closed displaced intertrochanteric fracture of left femur, initial encounter  S72.142A 820.21   2. Acute low back pain with sciatica, sciatica laterality unspecified, unspecified back pain laterality  M54.40 724.2     724.3     Patient Active Problem List   Diagnosis   (none) - all problems resolved or deleted     Past Medical History:   Diagnosis Date    Hyperlipidemia     Hypertension     Hyperthyroidism     Osteoarthritis      Past Surgical History:   Procedure Laterality Date    HIP INTERTROCHANTERIC NAILING Left 2025    Procedure: HIP INTERTROCHANTERIC NAILING LONG WITH INTRAMEDULLARY HIP SCREW;  Surgeon: Cosmo Jay MD;  Location: [facility];  Service: Orthopedics;  Laterality: Left;    LIPOMA EXCISION        General Information       Row Name 25 1436          OT Time and Intention    Subjective Information complains of;weakness;fatigue  -     Document Type therapy note (daily note)  -     Mode of Treatment individual therapy;occupational therapy  -     Patient Effort excellent  -     Symptoms Noted During/After Treatment fatigue  -     Comment Patient agreeable to therapy. Patient's daughter present.  -       Row Name 25 1436          General Information    Patient Profile Reviewed yes  -     Existing Precautions/Restrictions fall;weight bearing;other (see comments)  -       Row Name 25 1436          Cognition    Orientation Status (Cognition) oriented to;person;place;situation  -       Row Name 25 1436          Safety Issues/Impairments Affecting Functional Mobility    Impairments Affecting Function (Mobility) balance;endurance/activity tolerance;pain;strength  -               User Key  (r) = Recorded By, (t) = Taken By, (c) = Cosigned By      Initials Name Provider Type      Izzy Murillo OT Occupational Therapist                     Mobility/ADL's       Row Name 07/08/25 1437          Bed Mobility    Comment, (Bed Mobility) up in chair upon arrival  -[facility] Name 07/08/25 1437          Transfers    Transfers sit-stand transfer;stand-sit transfer  -[facility] Name 07/08/25 1437          Sit-Stand Transfer    Sit-Stand [place] (Transfers) verbal cues;minimum assist (75% patient effort)  -     Assistive Device (Sit-Stand Transfers) walker, front-wheeled  -[facility] Name 07/08/25 1437          Stand-Sit Transfer    Stand-Sit [place] (Transfers) minimum assist (75% patient effort);verbal cues  -     Assistive Device (Stand-Sit Transfers) walker, front-wheeled  -[facility] Name 07/08/25 1437          Mobility    Extremity Weight-bearing Status left lower extremity  -     Left Lower Extremity (Weight-bearing Status) weight-bearing as tolerated (WBAT)  -[facility] Name 07/08/25 1437          Toileting Assessment/Training    [place] Level (Toileting) toileting skills;maximum assist (25% patient effort)  -     Comment, (Toileting) discussed hand placement and alternating sides with use of rolling walker during hygiene and clothing management  -               User Key  (r) = Recorded By, (t) = Taken By, (c) = Cosigned By      Initials Name Provider Type    Izzy Motta OT Occupational Therapist                   Obj/Interventions       [facility] Name 07/08/25 1438          Motor Skills    Motor Skills functional endurance  -     Functional Endurance good minus  -               User Key  (r) = Recorded By, (t) = Taken By, (c) = Cosigned By      Initials Name Provider Type    Izzy Motta OT Occupational Therapist                   Goals/Plan    No documentation.                  Clinical Impression       [facility] Name 07/08/25 1438          Pain Assessment    Pretreatment Pain Rating 0/10 - no pain  -     Posttreatment Pain Rating 0/10 - no  pain  -       Row Name 07/08/25 1438          Plan of Care Review    Plan of Care Reviewed With patient;daughter  -     Progress improving  -     Outcome Evaluation Patient seen for OT treatment. She completed transfer from chair level and educated on safety with toileting using rolling walker. Patient and daughter receptive. She would benefit from ongoing OT services to promote highest level of independence and safety. She is progressing well, but will require ongoing services to return home safely.  -       Row Name 07/08/25 1438          Therapy Plan Review/Discharge Plan (OT)    Anticipated Discharge Disposition (OT) inpatient rehabilitation facility  -       Row Name 07/08/25 1438          Positioning and Restraints    Pre-Treatment Position sitting in chair/recliner  -     Post Treatment Position chair  -     In Chair sitting;call light within reach;encouraged to call for assist;with family/caregiver  -               User Key  (r) = Recorded By, (t) = Taken By, (c) = Cosigned By      Initials Name Provider Type     Izzy Murillo, OT Occupational Therapist                   Outcome Measures       Row Name 07/08/25 0851          How much help from another person do you currently need...    Turning from your back to your side while in flat bed without using bedrails? 3  -EB (r) AV (t) EB (c)     Moving from lying on back to sitting on the side of a flat bed without bedrails? 3  -EB (r) AV (t) EB (c)     Moving to and from a bed to a chair (including a wheelchair)? 2  -EB (r) AV (t) EB (c)     Standing up from a chair using your arms (e.g., wheelchair, bedside chair)? 3  -EB (r) AV (t) EB (c)     Climbing 3-5 steps with a railing? 2  -EB (r) AV (t) EB (c)     To walk in hospital room? 3  -EB (r) AV (t) EB (c)     AM-PAC 6 Clicks Score (PT) 16  -AV               User Key  (r) = Recorded By, (t) = Taken By, (c) = Cosigned By      Initials Name Provider Type    Nela Anand RN Registered  Nurse    Dawson Hernandez RNA Registered Nurse                      OT Recommendation and Plan  Planned Therapy Interventions (OT): activity tolerance training, adaptive equipment training, BADL retraining, functional balance retraining, patient/caregiver education/training, passive ROM/stretching, occupation/activity based interventions, transfer/mobility retraining, strengthening exercise, ROM/therapeutic exercise  Therapy Frequency (OT): 5 times/wk  Plan of Care Review  Plan of Care Reviewed With: patient, daughter  Progress: improving  Outcome Evaluation: Patient seen for OT treatment. She completed transfer from chair level and educated on safety with toileting using rolling walker. Patient and daughter receptive. She would benefit from ongoing OT services to promote highest level of independence and safety. She is progressing well, but will require ongoing services to return home safely.     Time Calculation:         Time Calculation- OT       Row Name 07/08/25 1440             Time Calculation- OT    OT Received On 07/08/25  -                User Key  (r) = Recorded By, (t) = Taken By, (c) = Cosigned By      Initials Name Provider Type     Izzy Murillo OT Occupational Therapist                  Therapy Charges for Today       Code Description Service Date Service Provider Modifiers Qty    94776725965 HC OT THERAPEUTIC ACT EA 15 MIN 7/7/2025 Izzy Murillo OT GO 1    21615056116 HC OT THERAPEUTIC ACT EA 15 MIN 7/8/2025 Izzy Murillo OT GO 1                 Izzy Murillo OT  7/8/2025

## 2025-07-09 VITALS
OXYGEN SATURATION: 95 % | RESPIRATION RATE: 18 BRPM | BODY MASS INDEX: 21.55 KG/M2 | WEIGHT: 137.3 LBS | HEART RATE: 73 BPM | DIASTOLIC BLOOD PRESSURE: 61 MMHG | TEMPERATURE: 98.2 F | SYSTOLIC BLOOD PRESSURE: 142 MMHG | HEIGHT: 67 IN

## 2025-07-09 PROCEDURE — 97535 SELF CARE MNGMENT TRAINING: CPT

## 2025-07-09 PROCEDURE — 99239 HOSP IP/OBS DSCHRG MGMT >30: CPT | Performed by: INTERNAL MEDICINE

## 2025-07-09 PROCEDURE — 97530 THERAPEUTIC ACTIVITIES: CPT

## 2025-07-09 PROCEDURE — 25010000002 ENOXAPARIN PER 10 MG: Performed by: INTERNAL MEDICINE

## 2025-07-09 RX ORDER — AMLODIPINE BESYLATE 10 MG/1
10 TABLET ORAL
Qty: 30 TABLET | Refills: 1 | Status: SHIPPED | OUTPATIENT
Start: 2025-07-10

## 2025-07-09 RX ORDER — METOPROLOL SUCCINATE 25 MG/1
25 TABLET, EXTENDED RELEASE ORAL
Qty: 30 TABLET | Refills: 1 | Status: SHIPPED | OUTPATIENT
Start: 2025-07-10

## 2025-07-09 RX ADMIN — DICLOFENAC SODIUM 4 G: 10 GEL TOPICAL at 08:17

## 2025-07-09 RX ADMIN — ESCITALOPRAM 10 MG: 10 TABLET, FILM COATED ORAL at 08:17

## 2025-07-09 RX ADMIN — AMLODIPINE BESYLATE 10 MG: 10 TABLET ORAL at 08:17

## 2025-07-09 RX ADMIN — ENOXAPARIN SODIUM 40 MG: 100 INJECTION SUBCUTANEOUS at 15:03

## 2025-07-09 RX ADMIN — ROSUVASTATIN CALCIUM 5 MG: 10 TABLET, FILM COATED ORAL at 08:17

## 2025-07-09 RX ADMIN — METOPROLOL SUCCINATE 25 MG: 25 TABLET, EXTENDED RELEASE ORAL at 08:18

## 2025-07-09 NOTE — CASE MANAGEMENT/SOCIAL WORK
Discharge Planning Assessment   [place]     Patient Name: Lizbet Arzola  MRN: [id number]  Today's Date: 7/9/2025    Admit Date: 6/21/2025    Plan: Peer to peer was completed with PA and Physician and denial was upheld.  SS and Supervisor spoke with daughter/PAM Klein on this date.  Daughter agreeable for SS to send pt's referral to [facility] for possible admit.  SS spoke with Michelle at [facility].  Pre authorization will begin on this date for SNF placement.  SS will follow.       Discharge Plan       Row Name 07/09/25 0955       Plan    Plan Peer to peer was completed with PA and Physician and denial was upheld.  SS and Supervisor spoke with daughter/PAM Klein on this date.  Daughter agreeable for SS to send pt's referral to [facility] for possible admit.  SS spoke with Michelle at [facility].  Pre authorization will begin on this date for SNF placement.  SS will follow.      Row Name 07/09/25 0828                  Continued Care and Services - Admitted Since 6/21/2025       Destination Coordination complete.      Service Provider Request Status Services Address Phone Fax Patient Preferred    [facility]  [facility] Skilled [facility] [address] [place] [address] [phone] --                  Expected Discharge Date and Time       Expected Discharge Date Expected Discharge Time    Jul 10, 2025           NATALIE Woods

## 2025-07-09 NOTE — CASE MANAGEMENT/SOCIAL WORK
Discharge Planning Assessment   [place]     Patient Name: Lizbet Arzola  MRN: [id number]  Today's Date: 7/9/2025    Admit Date: 6/21/2025    Plan: [facility].       Discharge Plan       Row Name 07/09/25 1524       Plan    Plan AVS has been sent to [facility] ER and awaiting facility to accept report.                  Continued Care and Services - Admitted Since 6/21/2025       Destination Coordination complete.      Service Provider Request Status Services Address Phone Fax Patient Preferred    [facility]  Selected Skilled Nursing [facility] [address], [place] [address] [phone] --                  Expected Discharge Date and Time       Expected Discharge Date Expected Discharge Time    Jul 9, 2025             Latoya Hendrix, JOSE LUISW

## 2025-07-09 NOTE — CASE MANAGEMENT/SOCIAL WORK
Discharge Planning Assessment   [place]     Patient Name: Lizbet Arzola  MRN: [id number]  Today's Date: 7/9/2025    Admit Date: 6/21/2025    Plan: Pt has been approved by insurance for admit to [facility] on this date.  Michelle at [facility] has a bed available and can accept pt on this date.  SS notified Physician.  Pt family aware and agreeable.  SS will follow.       Discharge Plan       Row Name 07/09/25 1058       Plan    Plan Pt has been approved by insurance for admit to [facility] on this date.  Michelle at [facility] has a bed available and can accept pt on this date.  SS notified Physician.  Pt family aware and agreeable.  SS will follow.      Row Name 07/09/25 0955       Plan    Plan Peer to peer was completed with PA and Physician and denial was upheld.  SS and Supervisor spoke with daughter/PAM Klein on this date.  Daughter agreeable for SS to send pt's referral to [facility] for possible admit.  SS spoke with Michelle [facility].  Pre authorization will begin on this date for SNF placement.  SS will follow.      Row Name 07/09/25 0828                  Continued Care and Services - Admitted Since 6/21/2025       Destination Coordination complete.      Service Provider Request Status Services Address Phone Fax Patient Preferred    [facility]  Selected Skilled Nursing [facility] [address] [address] [phone] --                  Expected Discharge Date and Time       Expected Discharge Date Expected Discharge Time    Jul 10, 2025                   NATALIE Woods

## 2025-07-09 NOTE — PLAN OF CARE
Goal Outcome Evaluation:      Patient had a non eventful night. VSS this shift. No acute signs of distress at this time. Patient resting in bed, call light within reach, bed alarm on. Plan of care ongoing.

## 2025-07-09 NOTE — DISCHARGE SUMMARY
[facility] HOSPITALIST MEDICINE DISCHARGE SUMMARY    Patient Identification:  Name:  Lizbet Arzola  Age:  80 y.o.  Sex:  female  :  1945  MRN:  [id number]  Visit Number:  [id number]    Date of Admission: 2025  Date of Discharge: 2025    PCP: Yaakov Riley, DO    DISCHARGE DIAGNOSIS   Left femur fracture  Acute blood loss anemia  New onset paroxysmal A-fib  Essential hypertension  Hyperlipidemia  Hyperthyroidism      CONSULTS  Dr. Jay, Orthopedic Surgery      PROCEDURES PERFORMED   Patient underwent left subtrochanteric hip fracture repair with long cephalomedullary nail on 2025.  Please see procedure note for specific details.  Patient tolerated the procedure well with no postprocedural complications.      HOSPITAL COURSE  Ms. Arzola is a 80 y.o. female who presented to [facility] ED on 2025 with a chief complaint of left hip pain.  Patient has a past medical history remarkable for essential hypertension, hyperlipidemia, hyperthyroidism and osteoarthritis.  Patient reports having a mechanical fall at home where she was doing laundry, tripped over her rollator and fell to the floor.  She reported immediate pain in her left hip and for this reason was brought to the emergency department for further treatment and evaluation.  Initial evaluation the emergency department did consist of basic laboratory work as well as physical exam and vital signs.  Please see initial H&P for specific details.  After imaging in the emergency department, patient was diagnosed with closed intertrochanteric fracture of left femur and admitted for further treatment and evaluation.    Orthopedic surgeries was consulted who evaluated the patient.  After thorough evaluation, recommendation was made to proceed with placement of long cephalomedullary nail to left hip joint.  Please see procedure note for specific details.  Patient tolerated the procedure well with no postprocedural  complications.  Patient did require 1 unit PRBC transfusion postoperatively and hemoglobin/hematocrit have been stable since that time.  It should be noted patient did develop new onset A-fib with spontaneous conversion to normal sinus rhythm during this hospitalization.  Did discuss with patient starting Eliquis and metoprolol.  However, patient is still considered high fall risk and as such decision was made to hold anticoagulation at this time.  Transthoracic echo was obtained during this hospitalization which demonstrated normal left ventricular systolic function with estimated EF 60 to 65% with grade 1 diastolic dysfunction.  Patient did have a lengthy hospitalization as multiple attempts were made at getting patient approved for inpatient rehab.  Unfortunately, her insurance did not agree with physical therapist assessment or her primary attending assessment of need for inpatient rehab.  As such, patient was denied access to inpatient rehab by her insurance company.  With this in mind, it was felt patient would still benefit from physical therapy and case management did assist with placement in a local skilled nursing facility.  With this in mind, it is felt patient has reached maximum medical benefit of current hospitalization and she will be discharged to the [facility] in stable condition today.  The beforementioned plan was thoroughly discussed with the patient and her family and they all expressed their understanding and willingness to proceed with the beforementioned plan.    VITAL SIGNS:      07/07/25  0500 07/08/25  0500 07/09/25  0500   Weight: 69 kg (152 lb 1.6 oz) 64.5 kg (142 lb 4.8 oz) (RN Lin shay) 62.3 kg (137 lb 4.8 oz)     Body mass index is 21.5 kg/m².    PHYSICAL EXAM:  Constitutional: Well-nourished  female in no apparent distress.     HENT:  Head:  Normocephalic and atraumatic.  Mouth:  Moist mucous membranes.    Eyes:  Conjunctivae and EOM are normal.  Pupils are equal, round,  and reactive to light.  No scleral icterus.    Neck:  Neck supple. No thyromegaly.  No JVD present.    Cardiovascular:  Regular rate and rhythm with no murmurs, rubs, clicks or gallops appreciated.  Pulmonary/Chest:  Clear to auscultation bilaterally with no crackles, wheezes or rhonchi appreciated.  Abdominal:  Soft. Nontender. Nondistended  Bowel sounds are normal in all four quadrants. No organomegally appreciated.   Musculoskeletal:  No edema, surgical dressing applied to left hip joint  Neurological: Awake, Cranial nerves II-XII intact with no focal deficits.  No facial droop.  No slurred speech.   Skin:  Warm and dry to palpation with no rashes or lesions appreciated.  Peripheral vascular:  2+ radial and pedal pulses in bilateral upper and lower extremities.  Psychiatric: Awake and oriented x3, demonstrates appropriate judgment and insight.    DISCHARGE DISPOSITION   Stable    DISCHARGE MEDICATIONS:     Discharge Medications        Changes to Medications        Instructions Start Date   acetaminophen 325 MG tablet  Commonly known as: TYLENOL  What changed:   medication strength  how much to take  when to take this  reasons to take this  Another medication with the same name was removed. Continue taking this medication, and follow the directions you see here.   650 mg, Oral, Every 6 Hours PRN      amLODIPine 10 MG tablet  Commonly known as: NORVASC  What changed:   medication strength  how much to take  when to take this   10 mg, Oral, Every 24 Hours Scheduled   Start Date: July 10, 2025     methIMAzole 5 MG tablet  Commonly known as: TAPAZOLE  What changed: how much to take   2.5 mg, Oral, Daily             Continue These Medications        Instructions Start Date   aspirin 81 MG EC tablet   81 mg, Oral, Nightly      BIOTIN PO   2 tablets, Daily      Cholecalciferol 50 MCG (2000 UT) tablet   2 tablets, Nightly      Diclofenac Sodium 1 % gel gel  Commonly known as: VOLTAREN   4 g, Topical, 4 Times Daily PRN       donepezil 10 MG tablet  Commonly known as: ARICEPT   10 mg, Oral, Nightly      escitalopram 10 MG tablet  Commonly known as: LEXAPRO   10 mg, Oral, Daily      losartan 50 MG tablet  Commonly known as: COZAAR   50 mg, Oral, Daily      multivitamin tablet tablet   1 tablet, Oral, Nightly      omega-3 acid ethyl esters 1 g capsule  Commonly known as: LOVAZA   2 g, Oral, 2 Times Daily      potassium chloride 10 MEQ CR tablet   10 mEq, Oral, Daily      rosuvastatin 5 MG tablet  Commonly known as: CRESTOR   5 mg, Oral, Daily      triamterene-hydrochlorothiazide 37.5-25 MG per capsule  Commonly known as: DYAZIDE   1 capsule, Oral, Daily               Diet Instructions    Resume diet as tolerated.            Activity Instructions    Resume activity as tolerated.          Additional Instructions for the Follow-ups that You Need to Schedule       Discharge Follow-up with PCP   As directed       Currently Documented PCP:    Yaakov Riley DO    PCP Phone Number:    [phone]     Follow Up Details: please follow up with pcp in 1 week        Discharge Follow-up with Specialty: Orthopedic Surgery; 1 Week   As directed      Specialty: Orthopedic Surgery   Follow Up: 1 Week   Follow Up Details: follow up left hip nailing               Contact information for follow-up providers       Yaakov Riley DO .    Specialty: Family Medicine  Why: please follow up with pcp in 1 week  Contact information:  [address] [place] [address] Dr Persaud [address] [place] [address] [phone]               Cosmo Jay MD Follow up in 2 week(s).    Specialty: Orthopedic Surgery  Why: JULY 21@ 1:45PM  Contact information:  [address] [place] [address] [phone]                       Contact information for after-discharge care       Destination       [facility] .    Service: Skilled Nursing  Contact information:  [address] [facility] [address]  [phone]                                   TEST  RESULTS PENDING AT DISCHARGE        The ASCVD Risk score (Jb MARIE, et al., 2019) failed to calculate for the following reasons:    The 2019 ASCVD risk score is only valid for ages 40 to 79   Chano Romero DO  07/09/25  14:43 EDT    Please note that this discharge summary required more than 30 minutes to complete.    Please send a copy of this dictation to the following providers:  Yaakov Riley DO

## 2025-07-09 NOTE — THERAPY TREATMENT NOTE
Acute Care - Physical Therapy Treatment Note  [facility]     Patient Name: Lizbet Arzola  : 1945  MRN: [id number]  Today's Date: 2025      Visit Dx:     ICD-10-CM ICD-9-CM   1. Closed displaced intertrochanteric fracture of left femur, initial encounter  S72.142A 820.21   2. Acute low back pain with sciatica, sciatica laterality unspecified, unspecified back pain laterality  M54.40 724.2     724.3     Patient Active Problem List   Diagnosis   (none) - all problems resolved or deleted     Past Medical History:   Diagnosis Date    Hyperlipidemia     Hypertension     Hyperthyroidism     Osteoarthritis      Past Surgical History:   Procedure Laterality Date    HIP INTERTROCHANTERIC NAILING Left 2025    Procedure: HIP INTERTROCHANTERIC NAILING LONG WITH INTRAMEDULLARY HIP SCREW;  Surgeon: Cosmo Jay MD;  Location: [facility];  Service: Orthopedics;  Laterality: Left;    LIPOMA EXCISION       PT Assessment (Last 12 Hours)       PT Evaluation and Treatment       Row Name 25 1409          Physical Therapy Time and Intention    Subjective Information complains of;weakness;fatigue  -KM     Document Type therapy note (daily note)  -KM     Mode of Treatment physical therapy  -KM     Patient Effort good  -KM     Symptoms Noted During/After Treatment fatigue  -KM       Row Name 25 1409          General Information    Patient Profile Reviewed yes  -KM     Patient Observations alert;cooperative;agree to therapy  -KM     Existing Precautions/Restrictions fall;weight bearing;other (see comments)  -KM       Row Name 25 1409          Pain Scale: FACES Pre/Post-Treatment    Pain: FACES Scale, Pretreatment 0-->no hurt  -KM     Posttreatment Pain Rating 2-->hurts little bit  -KM       Row Name 25 1409          Cognition    Affect/Mental Status (Cognition) WFL  -KM     Orientation Status (Cognition) oriented to;person;place;situation  -KM     Follows Commands (Cognition) WFL  -KM       Row  Name 07/09/25 1409          Mobility    Extremity Weight-bearing Status left lower extremity  -KM     Left Lower Extremity (Weight-bearing Status) weight-bearing as tolerated (WBAT)  -KM       Row Name 07/09/25 1409          Bed Mobility    Bed Mobility sit-supine  -KM     Sit-Supine Martin (Bed Mobility) contact guard  -KM     Bed Mobility, Safety Issues decreased use of arms for pushing/pulling;decreased use of legs for bridging/pushing  -KM     Assistive Device (Bed Mobility) bed rails;head of bed elevated;repositioning sheet  -KM       Row Name 07/09/25 1409          Transfers    Transfers sit-stand transfer;stand-sit transfer  -KM       Row Name 07/09/25 1409          Bed-Chair Transfer    Bed-Chair Martin (Transfers) minimum assist (75% patient effort);verbal cues  -KM     Assistive Device (Bed-Chair Transfers) walker, front-wheeled  -KM       Row Name 07/09/25 1409          Chair-Bed Transfer    Chair-Bed Martin (Transfers) minimum assist (75% patient effort)  -KM     Assistive Device (Chair-Bed Transfers) walker, front-wheeled  -KM       Row Name 07/09/25 1409          Sit-Stand Transfer    Sit-Stand Martin (Transfers) minimum assist (75% patient effort);verbal cues  -KM     Assistive Device (Sit-Stand Transfers) walker, front-wheeled  -KM       Row Name 07/09/25 1409          Stand-Sit Transfer    Stand-Sit Martin (Transfers) minimum assist (75% patient effort);verbal cues  -KM     Assistive Device (Stand-Sit Transfers) walker, front-wheeled  -KM       Row Name 07/09/25 1409          Gait/Stairs (Locomotion)    Comment, (Gait/Stairs) 3-4 pivot steps to/from BSC  -KM       Row Name 07/09/25 1409          Safety Issues/Impairments Affecting Functional Mobility    Impairments Affecting Function (Mobility) balance;endurance/activity tolerance;pain;strength  -KM       Row Name             Wound 06/23/25 1619 Left hip Surgical    Wound - Properties Group Placement Date: 06/23/25  -KB  Placement Time: 1619  -KB Present on Original Admission: N  -KB Side: Left  -KB Location: hip  -KB Primary Wound Type: Surgical  -KB, incision sites x4     Retired Wound - Properties Group Placement Date: 06/23/25  -KB Placement Time: 1619  -KB Present on Original Admission: N  -KB Side: Left  -KB Location: hip  -KB    Retired Wound - Properties Group Placement Date: 06/23/25  -KB Placement Time: 1619  -KB Present on Original Admission: N  -KB Side: Left  -KB Location: hip  -KB    Retired Wound - Properties Group Date first assessed: 06/23/25  -KB Time first assessed: 1619  -KB Present on Original Admission: N  -KB Side: Left  -KB Location: hip  -KB      Row Name 07/09/25 1409          Progress Summary (PT)    Daily Progress Summary (PT) Pt. was able to perform functional mobility skills w/ Amy. She was able to take multiple steps to pivot to/from BSC. Pt. would continue to benefit from PT services.  -               User Key  (r) = Recorded By, (t) = Taken By, (c) = Cosigned By      Initials Name Provider Type    Cheryl Aponte, RN Registered Nurse    Sage Mccain, YUSUF Physical Therapist                    Physical Therapy Education       Title: PT OT SLP Therapies (Resolved)       Topic: Physical Therapy (Resolved)       Point: Mobility training (Resolved)       Learning Progress Summary            Patient Acceptance, E,TB, VU by MP at 7/9/2025 0928    Acceptance, E,TB, VU,NR by AV at 7/8/2025 1242    Acceptance, E, NR by MF at 7/6/2025 2319    Acceptance, E, VU by SC at 7/6/2025 0301    Acceptance, E, VU by MC at 7/3/2025 1445    Acceptance, E,TB, VU by MP at 7/2/2025 0916    Acceptance, E,TB, VU by  at 7/2/2025 0511    Acceptance, E,TB, VU by  at 6/30/2025 0604    Acceptance, E,TB, VU by KM at 6/24/2025 1421   Family Acceptance, E,TB, VU by  at 7/2/2025 0511    Acceptance, E,TB, VU by  at 6/30/2025 0604                      Point: Home exercise program (Resolved)       Learning Progress  Summary            Patient Acceptance, E,TB, VU by MP at 7/9/2025 0928    Acceptance, E,TB, VU,NR by AV at 7/8/2025 1242    Acceptance, E, NR by MF at 7/6/2025 2319    Acceptance, E, VU by SC at 7/6/2025 0301    Acceptance, E, VU by MC at 7/3/2025 1445    Acceptance, E,TB, VU by MP at 7/2/2025 0916    Acceptance, E,TB, VU by HG at 7/2/2025 0511    Acceptance, E,TB, VU by HG at 6/30/2025 0604    Acceptance, E,TB, VU by KM at 6/24/2025 1421   Family Acceptance, E,TB, VU by HG at 7/2/2025 0511    Acceptance, E,TB, VU by HG at 6/30/2025 0604                      Point: Body mechanics (Resolved)       Learning Progress Summary            Patient Acceptance, E,TB, VU by MP at 7/9/2025 0928    Acceptance, E,TB, VU,NR by AV at 7/8/2025 1242    Acceptance, E, NR by MF at 7/6/2025 2319    Acceptance, E, VU by SC at 7/6/2025 0301    Acceptance, E, VU by MC at 7/3/2025 1445    Acceptance, E,TB, VU by MP at 7/2/2025 0916    Acceptance, E,TB, VU by HG at 7/2/2025 0511    Acceptance, E,TB, VU by HG at 6/30/2025 0604    Acceptance, E,TB, VU by KM at 6/24/2025 1421   Family Acceptance, E,TB, VU by HG at 7/2/2025 0511    Acceptance, E,TB, VU by HG at 6/30/2025 0604                      Point: Precautions (Resolved)       Learning Progress Summary            Patient Acceptance, E,TB, VU by MP at 7/9/2025 0928    Acceptance, E,TB, VU,NR by AV at 7/8/2025 1242    Acceptance, E, NR by MF at 7/6/2025 2319    Acceptance, E, VU by SC at 7/6/2025 0301    Acceptance, E, VU by MC at 7/3/2025 1445    Acceptance, E,TB, VU by MP at 7/2/2025 0916    Acceptance, E,TB, VU by HG at 7/2/2025 0511    Acceptance, E,TB, VU by HG at 6/30/2025 0604    Acceptance, E,TB, VU by  at 6/24/2025 1421   Family Acceptance, E,TB, VU by HG at 7/2/2025 0511    Acceptance, E,TB, VU by  at 6/30/2025 0604                                      User Key       Initials Effective Dates Name Provider Type Discipline     06/16/21 -  Heaven Burleson, RN Registered Nurse  Nurse    MC 04/24/23 -  Collett, Morgan, RN Registered Nurse Nurse    ANGEL LUIS 05/24/22 -  Sage Fuentes PT Physical Therapist PT     06/21/23 -  Brittany Parrish, IRMA Registered Nurse Nurse    HG 01/22/25 -  Christine Lepe RN Registered Nurse Nurse    SC 09/11/24 -  Arielle Macias, RN Registered Nurse Nurse    AV 06/03/25 -  Dawson Montaño RNA Registered Nurse Nurse                  PT Recommendation and Plan  Anticipated Discharge Disposition (PT): inpatient rehabilitation facility  Planned Therapy Interventions (PT): balance training, bed mobility training, gait training, home exercise program, patient/family education, postural re-education, ROM (range of motion), strengthening, stretching, transfer training, stair training, wheelchair management/propulsion training  Therapy Frequency (PT): 6 times/wk  Progress Summary (PT)  Daily Progress Summary (PT): Pt. was able to perform functional mobility skills w/ Amy. She was able to take multiple steps to pivot to/from BSC. Pt. would continue to benefit from PT services.  Plan of Care Reviewed With: patient  Outcome Evaluation: Pt. evaluation completed during PT session. She was able to perform functional mobility skills w/ maxA x2. She was unable to ambulate at time of evaluation d/t weakness and pain. She demonstrates impaired strength and ROM. Pt. would benefit from inpatient rehab to address weakness, pain, impaired mobility, safety precautions, and fall risk.       Time Calculation:    PT Charges       Row Name 07/09/25 1408             Time Calculation    PT Received On 07/09/25  -KM         Time Calculation- PT    Total Timed Code Minutes- PT 23 minute(s)  -KM                User Key  (r) = Recorded By, (t) = Taken By, (c) = Cosigned By      Initials Name Provider Type    Sage Mccain, PT Physical Therapist                  Therapy Charges for Today       Code Description Service Date Service Provider Modifiers Qty    06159859243  PT THERAPEUTIC ACT EA 15 MIN  7/8/2025 Sage Fuentes, PT GP 1    35387376029 HC PT THER PROC EA 15 MIN 7/8/2025 Sage Fuentes, PT GP 1    13379080094 HC GAIT TRAINING EA 15 MIN 7/8/2025 Sage Fuentes, PT GP 1    67129925744 HC PT THERAPEUTIC ACT EA 15 MIN 7/9/2025 Sage Fuentes, PT GP 2            PT G-Codes  AM-PAC 6 Clicks Score (PT): 16    Sage Fuentes, PT  7/9/2025

## 2025-07-09 NOTE — PAYOR COMM NOTE
"Kathrine Wyatt RN CM/Swing Bed  [email or web address]  Phone: [phone] Fax: [phone]  -NPI [id number]  -NPI [id number]  Authorization No:     Patient needs post acute skilled nursing facility for PT/OT strength, mobility, ADL and transfer training. Lives at home with spouse and plans to return home when she can do so safely. PLOF modified independent with rollator for mobility and ADLs.      ICD 10  S72.142D    Lizbet Rivera \"Vanessa\" (80 y.o. Female)       Date of Birth   1945    Social Security Number       Address   [address] [place] [address]  [place] [id number]    Home Phone       MRN   [id number]       [facility]    Marital Status                               Admission Date   6/21/2025    Admission Type   Emergency    Admitting Provider   Jose Chau MD    Attending Provider   Chano Romero DO    Department, Room/Bed   [address], [facility]1/       Discharge Date       Discharge Disposition       Discharge Destination                                 Attending Provider: Chano Romero DO    Allergies: Sulfa Antibiotics    Isolation: None   Infection: None   Code Status: CPR    Ht: 170.2 cm (67\")   Wt: 62.3 kg (137 lb 4.8 oz)    Admission Cmt: None   Principal Problem: Closed intertrochanteric fracture of left femur [S72.142A]                   Active Insurance as of 6/21/2025       Primary Coverage       Payor Plan Insurance Group Employer/Plan Group    [employer] MEDICARE REPLACEMENT [employer] Medicare Advantage GROUP PPO [id number]       Payor Plan Address Payor Plan Phone Number Payor Plan Fax Number Effective Dates    [address] [address]   1/1/2025 - None Entered    [facility] [id number]         Subscriber Name Subscriber Birth Date Member ID       LIZBET RIVERA 1945 [id number]                     Emergency Contacts        (Rel.) Home Phone Work Phone Mobile Phone    kyara(PAM)carlos (Daughter) -- -- " [phone]    jakob(POA)maye (Daughter) [phone] -- [phone]    Dariusz(POA)Parveen (Son) -- -- [phone]                 History & Physical        Jose Chau MD at 25 0131          Hospitalist History and Physical        Patient Identification  Name: Lizbet Arzola  Age/Sex: 80 y.o. female  :  1945        MRN: [id number]  Visit Number: [id number]  Admit Date: 2025   PCP: Yaakov Riley DO          Chief complaint fall, left hip pain    History of Present Illness:  Patient is a 80 y.o. female with history of HTN, HLD, OA and hyperthyroidism on methimazole, who presents with complaints of left hip pain after falling at home. She reports she was doing laundry when she tripped over her rollator and fell to the floor, hitting her head and landing on her left side. She was unable to get up and laid in the floor for an hour before her  found her. She was brought to the ED and found to have suffered a left intertronchanteric femur fracture. She also suffered a posterior scalp laceration from the fall which was stabled by her ED provider.     Review of Systems  Review of Systems   Constitutional:  Negative for activity change, appetite change, chills, diaphoresis, fatigue, fever and unexpected weight change.   HENT:  Negative for congestion, postnasal drip, rhinorrhea, sinus pressure, sinus pain and sore throat.    Eyes:  Negative for photophobia and visual disturbance.   Respiratory:  Negative for cough, shortness of breath and wheezing.    Cardiovascular:  Negative for chest pain, palpitations and leg swelling.   Gastrointestinal:  Negative for abdominal distention, abdominal pain, constipation, diarrhea, nausea and vomiting.   Genitourinary:  Negative for difficulty urinating, dysuria, flank pain and frequency.   Musculoskeletal:  Positive for arthralgias (left hip (acute), multiple other joints including left knee (chronic)).   Skin:  Positive for wound  (laceration back of head from fall).   Neurological:  Negative for dizziness, tremors, seizures, syncope, weakness, light-headedness, numbness and headaches.   Hematological:  Negative for adenopathy. Does not bruise/bleed easily.   Psychiatric/Behavioral:  Negative for agitation, behavioral problems and confusion.        History  Past Medical History:   Diagnosis Date    Hyperlipidemia     Hypertension     Hyperthyroidism     Osteoarthritis      History reviewed. No pertinent surgical history.  History reviewed. No pertinent family history.     (Not in a hospital admission)    Allergies:  Sulfa antibiotics    Objective    Vital Signs  Temp:  [98.6 °F (37 °C)] 98.6 °F (37 °C)  Heart Rate:  [77-91] 91  Resp:  [16] 16  BP: (150-163)/(63-71) 150/63  Body mass index is 21.93 kg/m².    Physical Exam:  Physical Exam  Constitutional:       General: She is not in acute distress.     Appearance: Normal appearance. She is not ill-appearing.   HENT:      Head: Normocephalic and atraumatic.      Right Ear: External ear normal.      Left Ear: External ear normal.      Nose: Nose normal.      Mouth/Throat:      Mouth: Mucous membranes are moist.      Pharynx: Oropharynx is clear.   Eyes:      Extraocular Movements: Extraocular movements intact.      Conjunctiva/sclera: Conjunctivae normal.      Pupils: Pupils are equal, round, and reactive to light.   Cardiovascular:      Rate and Rhythm: Normal rate and regular rhythm.      Pulses: Normal pulses.      Heart sounds: Normal heart sounds. No murmur heard.  Pulmonary:      Effort: Pulmonary effort is normal. No respiratory distress.      Breath sounds: Normal breath sounds. No wheezing or rales.   Abdominal:      General: Abdomen is flat. Bowel sounds are normal. There is no distension.      Palpations: Abdomen is soft.      Tenderness: There is no abdominal tenderness.   Musculoskeletal:      Cervical back: Normal range of motion and neck supple. No tenderness.      Right lower  leg: No edema.      Left lower leg: No edema.      Comments: Left leg appx 1 inch shorter than right.    Lymphadenopathy:      Cervical: No cervical adenopathy.   Skin:     General: Skin is warm and dry.      Capillary Refill: Capillary refill takes less than 2 seconds.      Comments: Laceration posterior scalp which has been stapled   Neurological:      General: No focal deficit present.      Mental Status: She is alert and oriented to person, place, and time.   Psychiatric:         Mood and Affect: Mood normal.         Behavior: Behavior normal.           Results Review:       Lab Results:  Results from last 7 days   Lab Units 06/21/25 2117   WBC 10*3/mm3 17.41*   HEMOGLOBIN g/dL 11.5*   PLATELETS 10*3/mm3 253     Results from last 7 days   Lab Units 06/21/25 2117   CRP mg/dL <0.30     Results from last 7 days   Lab Units 06/21/25 2117   SODIUM mmol/L 141   POTASSIUM mmol/L 4.1   CHLORIDE mmol/L 106   CO2 mmol/L 19.7*   BUN mg/dL 30.3*   CREATININE mg/dL 1.45*   CALCIUM mg/dL 9.7   GLUCOSE mg/dL 148*         Hemoglobin A1C   Date Value Ref Range Status   06/21/2025 5.92 (H) 4.80 - 5.60 % Final     Results from last 7 days   Lab Units 06/21/25 2117   BILIRUBIN mg/dL 0.4   ALK PHOS U/L 78   AST (SGOT) U/L 29   ALT (SGPT) U/L 21     Results from last 7 days   Lab Units 06/21/25 2117   CK TOTAL U/L 326*                   I have reviewed the patient's laboratory results.    Imaging:  Imaging Results (Last 72 Hours)       Procedure Component Value Units Date/Time    CT Lumbar Spine Without Contrast [[id number]] Collected: 06/21/25 2334     Updated: 06/21/25 2338    Narrative:      EXAMINATION: CT lumbar spine without contrast     CLINICAL HISTORY: Fall     COMPARISON: None     TECHNIQUE: Contiguous 3 mm thick slices were obtained through the lumbar  spine without contrast. Coronal and sagittal reformats were performed.     FINDINGS:  Sagittal alignment is normal. Mild left convex curvature is centered at  the L4/L5  level. Mild right convex curvature centered at the L1/L2  level. There is no acute fracture. No traumatic listhesis. Moderate  multilevel disc related degenerative changes are noted. At the L4/L5  level there is a posterior disc bulge and comminution with facet  arthrosis and ligamentous thickening. The findings result in at least  moderate central canal stenosis. The disc bulges noted at the L5/S1  level as well.       Impression:      1. Multilevel disc related degenerative changes and facet arthrosis.  2. No evidence of recent trauma.     This report was finalized on 6/21/2025 11:36 PM by Juanjose Rocha MD.       CT Pelvis Without Contrast [[id number]] Collected: 06/21/25 2331     Updated: 06/21/25 2336    Narrative:      EXAMINATION: CT pelvis without contrast     CLINICAL HISTORY: Injury.     COMPARISON: None available.     TECHNIQUE: Contiguous 3 mm thick slices were obtained through the pelvis  without contrast. Coronal and sagittal reformats were performed.     FINDINGS:  Alignment at the hips is normal. Mild bilateral hip osteoarthrosis is  noted. There is an acute comminuted fracture through the  intertrochanteric region of the left proximal femur. There is apex  lateral angulation at the fracture site. Additionally, the distal  fragment is displaced medially with respect to the more proximal  fragment. There is apex anterior angulation at the fracture site as  well. Extensive adjacent soft tissue swelling is noted. No definite  acetabular fracture is appreciated.       Impression:      1. Acute comminuted left intertrochanteric proximal femur fracture.     This report was finalized on 6/21/2025 11:34 PM by Juanjose Rocha MD.       CT Head Without Contrast [[id number]] Collected: 06/21/25 2323     Updated: 06/21/25 2333    Narrative:      EXAMINATION: CT head without contrast     CLINICAL HISTORY: Fall     COMPARISON: None available.     TECHNIQUE: Contiguous 3 mm thick slices were obtained from the  "skull  base to the vertex without contrast. Coronal and sagittal reformats were  performed.     FINDINGS:  Moderate generalized volume loss is noted with prominence of the sulci  and ventricular system. White matter changes are noted in a pattern  suggesting chronic small vessel ischemic disease. There is no acute  intracranial hemorrhage. No acute territorial infarct. No extra-axial  collection is identified. There is no shift of midline structures. No  acute calvarial fracture is appreciated. Hyperostosis frontalis is  noted.       Impression:      1. No acute intracranial process.     This report was finalized on 6/21/2025 11:31 PM by Juanjose Rocha MD.               I have personally reviewed the patient's radiologic imaging.        EKG: ordered, results pending        Assessment & Plan    - Closed intertrochanteric fracture of left femur: admit to medical surgical unit. IV morphine available PRN. Keep NPO since already after midnight. Orthopedic surgery notified by ED; Dr Jay to see patient in consultation in the morning.   - Mild CK elevation, likely secondary to above fracture and prolonged time down on the floor at home: does not meet criteria for rhabdomyolysis at this time. Hydrate with IV fluids and continue to trend.  - Renal insufficiency, acute vs chronic: no prior labs for comparison. Patient denies history of CKD, but her daughter mentioned that her creatinine does run \"a little high\". Elevated BUN:cr ratio suggests dehydration. Avoid nephrotoxic home meds (losartan, triamterene-HCTZ). Repeat labs in the morning to monitor response to IV fluid hydration.  - Leukocytosis: likely reactive from hip fracture. CRP and procal are normal. Will check UA to look for evidence of bacteruria pre-op.   - HTN: BP stable thus far. Holding losartan and triamterene-HCTZ as noted above. Plan to continue amlodipine in the morning. Continue same dose since pain medication may help to keep BP controlled, but if not " then can increase amlodipine from 5 to 10mg moving forward.  - HLD: hold statin for now in light of CK elevation.   - Hyperthyroidism: TSH mildly elevated at 4.900. Check free T4. Plan to continue methimazole once reconciled by pharmacy.   - Mild hyperglycemia: likely reactive to some extent from hip fracture, but A1c is mildly elevated as well at 5.92 indicating pre-diabetes. Recommend lifestyle modification moving forward.     DVT Prophylaxis: SCD to right leg only    Estimated Length of Stay >2 midnights    I discussed the patient's findings, assessment and plan with the patient, her daughter at bedside, and ED provider Quincy Story PA-C    Patient is high risk due to left intertrochanteric femur fracture requiring parenteral opioids for pain control    Jose Chau MD  06/22/25  01:34 EDT      Electronically signed by Jose Chau MD at 06/22/25 0146       Current Facility-Administered Medications   Medication Dose Route Frequency Provider Last Rate Last Admin    acetaminophen (TYLENOL) tablet 650 mg  650 mg Oral Q6H PRN Chano Romero DO   650 mg at 07/08/25 0814    amLODIPine (NORVASC) tablet 10 mg  10 mg Oral Q24H Chano Romero DO   10 mg at 07/09/25 0817    aspirin EC tablet 81 mg  81 mg Oral Nightly Cosmo Jay MD   81 mg at 07/08/25 2013    sennosides-docusate (PERICOLACE) 8.6-50 MG per tablet 2 tablet  2 tablet Oral BID PRN Cosmo Jay MD   2 tablet at 06/28/25 0923    And    polyethylene glycol (MIRALAX) packet 17 g  17 g Oral Daily PRN Cosmo Jay MD   17 g at 06/28/25 2126    And    bisacodyl (DULCOLAX) EC tablet 5 mg  5 mg Oral Daily PRN Cosmo Jay MD   5 mg at 06/24/25 1612    And    bisacodyl (DULCOLAX) suppository 10 mg  10 mg Rectal Daily PRN Cosmo Jay MD        calcium carbonate (TUMS) chewable tablet 500 mg (200 mg elemental)  2 tablet Oral TID PRN Chano Romero DO   2 tablet at 07/08/25 1401    Calcium Replacement - Follow  Nurse / BPA Driven Protocol   Not Applicable PRN Mando Shahid DO        cholecalciferol (VITAMIN D3) tablet 4,000 Units  4,000 Units Oral Nightly Cosmo Jay MD   4,000 Units at 07/08/25 2013    diazePAM (VALIUM) tablet 2 mg  2 mg Oral Once Chano Romero DO        Diclofenac Sodium (VOLTAREN) 1 % gel 4 g  4 g Topical BID Chano Romero DO   4 g at 07/09/25 0817    donepezil (ARICEPT) tablet 10 mg  10 mg Oral Nightly Cosmo Jay MD   10 mg at 07/08/25 2013    enoxaparin sodium (LOVENOX) syringe 40 mg  40 mg Subcutaneous Q24H Mando Shahid DO   40 mg at 07/08/25 1401    escitalopram (LEXAPRO) tablet 10 mg  10 mg Oral Daily Cosmo Jay MD   10 mg at 07/09/25 0817    Lidocaine 4 % 1 patch  1 patch Transdermal Q24H Chano Romero DO        Magnesium Standard Dose Replacement - Follow Nurse / BPA Driven Protocol   Not Applicable PRN Mando Shahid DO        [Held by provider] methIMAzole (TAPAZOLE) tablet 5 mg  5 mg Oral Daily Cosmo Jay MD   5 mg at 06/24/25 0830    metoprolol succinate XL (TOPROL-XL) 24 hr tablet 25 mg  25 mg Oral Q24H Mando Shahid DO   25 mg at 07/09/25 0818    multivitamin (THERAGRAN) tablet 1 tablet  1 tablet Oral Nightly Cosmo Jay MD   1 tablet at 07/08/25 2013    naloxone (NARCAN) injection 0.4 mg  0.4 mg Intravenous Q5 Min PRN Chano Romero DO        Phosphorus Replacement - Follow Nurse / BPA Driven Protocol   Not Applicable PRN Mando Shahid DO        Potassium Replacement - Follow Nurse / BPA Driven Protocol   Not Applicable PRN Mando Shahid DO        rosuvastatin (CRESTOR) tablet 5 mg  5 mg Oral Daily Cosmo Jay MD   5 mg at 07/09/25 0817    sodium chloride 0.9 % flush 10 mL  10 mL Intravenous Q12H Cosmo Jay MD   10 mL at 07/04/25 0859    sodium chloride 0.9 % flush 10 mL  10 mL Intravenous PRN Cosmo Jay MD   10 mL at 06/30/25 0846    sodium chloride 0.9 %  infusion 40 mL  40 mL Intravenous PRN Cosmo Jay MD         Orders (active)        Start     Ordered    07/10/25 1859  Auto Discontinue GI Panel in 48 Hours if not Collected  ONCE GI PANEL         07/08/25 1858    07/09/25 0900  amLODIPine (NORVASC) tablet 10 mg  Every 24 Hours Scheduled         07/08/25 1503    07/08/25 1600  Lidocaine 4 % 1 patch  Every 24 Hours Scheduled         07/08/25 1503    07/08/25 1351  calcium carbonate (TUMS) chewable tablet 500 mg (200 mg elemental)  3 Times Daily PRN         07/08/25 1351    07/07/25 1800  Dietary Nutrition Supplements Magic Cup  Daily With Lunch & Dinner       07/07/25 1623    07/07/25 1040  acetaminophen (TYLENOL) tablet 650 mg  Every 6 Hours PRN         07/07/25 1040    07/07/25 1021  Discontinue IV  Once         07/07/25 1021    07/07/25 0000  Discharge Follow-up with PCP         07/07/25 1021    07/07/25 0000  acetaminophen (TYLENOL) 325 MG tablet  Every 6 Hours PRN         07/07/25 1136    07/07/25 0000  methIMAzole (TAPAZOLE) 5 MG tablet  Daily         07/07/25 1136    07/05/25 0900  metoprolol succinate XL (TOPROL-XL) 24 hr tablet 25 mg  Every 24 Hours Scheduled         07/05/25 0758    07/01/25 1500  enoxaparin sodium (LOVENOX) syringe 40 mg  Every 24 Hours         07/01/25 1434    07/01/25 1439  Potassium Replacement - Follow Nurse / BPA Driven Protocol  As Needed         07/01/25 1439    07/01/25 1439  Magnesium Standard Dose Replacement - Follow Nurse / BPA Driven Protocol  As Needed         07/01/25 1439    07/01/25 1439  Phosphorus Replacement - Follow Nurse / BPA Driven Protocol  As Needed         07/01/25 1439    07/01/25 1439  Calcium Replacement - Follow Nurse / BPA Driven Protocol  As Needed         07/01/25 1439    06/30/25 1747  HYDROcodone-acetaminophen (NORCO) 5-325 MG per tablet 1 tablet  Every 6 Hours PRN         06/30/25 1747    06/27/25 1316  Change Dressing  Once        Comments: Change saturated dressing to the left hip    06/27/25 1315  "   06/26/25 1145  Diclofenac Sodium (VOLTAREN) 1 % gel 4 g  2 Times Daily         06/26/25 1046    06/26/25 1145  diazePAM (VALIUM) tablet 2 mg  Once         06/26/25 1049    06/26/25 0959  naloxone (NARCAN) injection 0.4 mg  Every 5 Minutes PRN        Placed in \"And\" Linked Group    06/26/25 1000    06/25/25 1524  Initiate & Follow Heparin Protocol  Continuous         06/25/25 1524    06/25/25 1524  Notify Provider Platelet Count Less Than 28775  Continuous        Comments: Open Order Report to View Parameters Requiring Provider Notification    06/25/25 1524    06/25/25 1524  Stop Infusion & Notify Provider if Bleeding Occurs  Continuous        Comments: Open Order Report to View Parameters Requiring Provider Notification    06/25/25 1524    06/25/25 1003  HYDROcodone-acetaminophen (NORCO) 5-325 MG per tablet 1 tablet  Every 6 Hours PRN         06/25/25 1003    06/25/25 0236  Follow Pressure Ulcer Prevention Measures Policy  Continuous        Comments: Implement Appropriate Pressure Injury Prevention Measures  - Open Order Report to View Full Instructions  Enter Wound LDA & Document Assessment  Add Wound Care Plan  Add Patient Education Per Policy    06/25/25 0235    06/25/25 0236  Turn Patient  Now Then Every 2 Hours         06/25/25 0235    06/25/25 0236  Elevate Heels Off of Bed  Until Discontinued         06/25/25 0235    06/25/25 0236  Use Seat Cushion When Up In Chair  Continuous         06/25/25 0235    06/25/25 0236  Silicone Border Dressing to Bony Prominences  Every Shift       06/25/25 0235    06/25/25 0236  Do NOT Rub or Massage Any Bony Prominence  Continuous         06/25/25 0235    06/24/25 0000  ceFAZolin 1000 mg IVPB in 100 mL NS (VTB)  Every 8 Hours         06/23/25 1813    06/23/25 1814  PT Consult: Eval & Treat As Tolerated; Post Surgery Care  Once         06/23/25 1813 06/23/25 1814  OT Consult: Eval & Treat Post-Surgery Care  Once         06/23/25 1813    06/23/25 1814  Ambulate Patient  " Every Shift       06/23/25 1813    06/23/25 1814  Diet: Regular/House; Fluid Consistency: Thin (IDDSI 0)  Diet Effective Now         06/23/25 1813    06/23/25 1737  Oxygen Therapy- Nasal Cannula; Titrate 1-6 LPM Per SpO2; 90 - 95%  Continuous         06/23/25 1736    06/23/25 1737  Continuous Pulse Oximetry  Continuous         06/23/25 1736    06/23/25 1737  Call Anesthesiologist for additional IV Fluid bolus for Hypotension/Tachycardia  Until Discontinued         06/23/25 1736    06/23/25 1737  Notify Anesthesia of Any Acute Changes in Patient Condition  Continuous        Comments: Open Order Report to View Parameters Requiring Provider Notification    06/23/25 1736    06/23/25 1737  Notify Anesthesia for Unrelieved Pain  Continuous        Comments: Open Order Report to View Parameters Requiring Provider Notification    06/23/25 1736    06/23/25 1737  Once DC criteria to floor met, follow surgeon's orders.  Until Discontinued         06/23/25 1736 06/23/25 1737  Discharge patient from PACU when discharge criteria is met.  Until Discontinued         06/23/25 1736    06/23/25 1716  PT Consult: Eval & Treat Functional Mobility Below Baseline  Once         06/23/25 1716    06/23/25 1716  OT Consult: Eval & Treat ADL Performance Below Baseline, Post-Surgery Care, Discharge Placement Assessment  Once         06/23/25 1716    06/23/25 1417  Saline Lock & Maintain IV Access  Continuous         06/23/25 1416    06/22/25 2100  multivitamin (THERAGRAN) tablet 1 tablet  Nightly         06/22/25 0906    06/22/25 2100  aspirin EC tablet 81 mg  Nightly         06/22/25 0906    06/22/25 2100  donepezil (ARICEPT) tablet 10 mg  Nightly         06/22/25 0906    06/22/25 2100  cholecalciferol (VITAMIN D3) tablet 4,000 Units  Nightly         06/22/25 0906 06/22/25 1000  escitalopram (LEXAPRO) tablet 10 mg  Daily         06/22/25 0906 06/22/25 1000  [Held by provider]  methIMAzole (TAPAZOLE) tablet 5 mg  Daily        (On hold  "since Tue 6/24/2025 at 1543 until manually unheld; held by Chano Romero DOHold Reason: Abnormal Labs)    06/22/25 0906    06/22/25 1000  rosuvastatin (CRESTOR) tablet 5 mg  Daily         06/22/25 0906    06/22/25 0800  Vital Signs  Every 8 Hours        Comments: Per per hospital policy    06/22/25 0148    06/22/25 0800  Oral Care  2 Times Daily       06/22/25 0148    06/22/25 0600  Strict Intake & Output  Every 6 Hours         06/22/25 0148    06/22/25 0245  sodium chloride 0.9 % flush 10 mL  Every 12 Hours Scheduled         06/22/25 0148    06/22/25 0149  Notify Physician (with Parameters)  Until Discontinued         06/22/25 0148    06/22/25 0149  Maintain Sequential Compression Device  Continuous        Comments: Right leg only    06/22/25 0148    06/22/25 0149  Daily Weights  Daily       06/22/25 0148    06/22/25 0149  Inpatient Orthopedic Surgery Consult  Once        Specialty:  Orthopedic Surgery  Provider:  Cosmo Jay MD    06/22/25 0148    06/22/25 0148  sennosides-docusate (PERICOLACE) 8.6-50 MG per tablet 2 tablet  2 Times Daily PRN        Placed in \"And\" Linked Group    06/22/25 0148    06/22/25 0148  polyethylene glycol (MIRALAX) packet 17 g  Daily PRN        Placed in \"And\" Linked Group    06/22/25 0148    06/22/25 0148  bisacodyl (DULCOLAX) EC tablet 5 mg  Daily PRN        Placed in \"And\" Linked Group    06/22/25 0148    06/22/25 0148  bisacodyl (DULCOLAX) suppository 10 mg  Daily PRN        Placed in \"And\" Linked Group    06/22/25 0148    06/22/25 0148  sodium chloride 0.9 % flush 10 mL  As Needed         06/22/25 0148    06/22/25 0148  sodium chloride 0.9 % infusion 40 mL  As Needed         06/22/25 0148    06/22/25 0054  Code Status and Medical Interventions: CPR (Attempt to Resuscitate); Full Support  Continuous         06/22/25 0055    06/22/25 0051  ECG 12 Lead Other; baseline for surgery  STAT         06/22/25 0050    Unscheduled  Wound Care  As Needed       06/25/25 0235         "             Physician Progress Notes (most recent note)        Chano Romero DO at 25 1501              [facility]     Patient Identification:  Name:  Lizbet Arzola  Age:  80 y.o.  Sex:  female  :  1945  MRN:  [id number]  Visit Number:  [id number]  Primary Care Provider:  Yaakov Riley DO    Length of stay:  16    Chief complaint: Back pain    Subjective:    Patient seen and examined at bedside with patient's family present.  Patient states she is doing well today and has minimal complaints with the exception of mild to moderate low back pain.  She attributes this to being in bed for a prolonged period of time.  She denies any other symptoms at this time.  ----------------------------------------------------------------------------------------------------------------------  Current Hospital Meds:  amLODIPine, 5 mg, Oral, Q24H  aspirin, 81 mg, Oral, Nightly  cholecalciferol, 4,000 Units, Oral, Nightly  diazePAM, 2 mg, Oral, Once  Diclofenac Sodium, 4 g, Topical, BID  donepezil, 10 mg, Oral, Nightly  enoxaparin sodium, 40 mg, Subcutaneous, Q24H  escitalopram, 10 mg, Oral, Daily  [Held by provider] methIMAzole, 5 mg, Oral, Daily  metoprolol succinate XL, 25 mg, Oral, Q24H  multivitamin, 1 tablet, Oral, Nightly  rosuvastatin, 5 mg, Oral, Daily  sodium chloride, 10 mL, Intravenous, Q12H           ----------------------------------------------------------------------------------------------------------------------  Vital Signs:  Temp:  [97.7 °F (36.5 °C)-98.4 °F (36.9 °C)] 98.4 °F (36.9 °C)  Heart Rate:  [69-89] 69  Resp:  [18] 18  BP: (143-178)/(54-71) 152/61      25  0500 25  0500 25  0500   Weight: 69.4 kg (153 lb) 69 kg (152 lb 1.6 oz) 64.5 kg (142 lb 4.8 oz) (IRMA shay)     Body mass index is 22.29 kg/m².    Intake/Output Summary (Last 24 hours) at 2025 1501  Last data filed at 2025 1341  Gross per 24 hour   Intake  420 ml   Output 250 ml   Net 170 ml     Diet: Regular/House; Fluid Consistency: Thin (IDDSI 0)  ----------------------------------------------------------------------------------------------------------------------  Physical exam:  Constitutional: Well-nourished  female in no apparent distress.     HENT:  Head:  Normocephalic and atraumatic.  Mouth:  Moist mucous membranes.    Eyes:  Conjunctivae and EOM are normal.  Pupils are equal, round, and reactive to light.  No scleral icterus.    Neck:  Neck supple. No thyromegaly.  No JVD present.    Cardiovascular:  Regular rate and rhythm with no murmurs, rubs, clicks or gallops appreciated.  Pulmonary/Chest:  Clear to auscultation bilaterally with no crackles, wheezes or rhonchi appreciated.  Abdominal:  Soft. Nontender. Nondistended  Bowel sounds are normal in all four quadrants. No organomegally appreciated.   Musculoskeletal:  No edema, surgical dressing applied to left hip joint  Neurological: Awake, Cranial nerves II-XII intact with no focal deficits.  No facial droop.  No slurred speech.   Skin:  Warm and dry to palpation with no rashes or lesions appreciated.  Peripheral vascular:  2+ radial and pedal pulses in bilateral upper and lower extremities.  Psychiatric: Awake and oriented x3, demonstrates appropriate judgment and insight.    No significant change in physical exam in comparison to 7/7/2025  ---------------------------------------------------------------------------------------  ----------------------------------------------------------------------------------------------------------------------        Results from last 7 days   Lab Units 07/07/25  0055 07/06/25  0258 07/05/25  0159   WBC 10*3/mm3 10.57 10.42 10.78   HEMOGLOBIN g/dL 9.1* 8.8* 9.0*   HEMATOCRIT % 29.1* 28.3* 29.2*   MCV fL 102.8* 101.4* 102.1*   MCHC g/dL 31.3* 31.1* 30.8*   PLATELETS 10*3/mm3 461* 439 432         Results from last 7 days   Lab Units 07/07/25  0055 07/06/25  1541  "07/06/25  0258 07/05/25  0159 07/02/25  1603 07/02/25  0421   SODIUM mmol/L 142  --  141 140   < > 141   POTASSIUM mmol/L 4.1 4.2 3.6 3.7   < > 3.5   MAGNESIUM mg/dL 1.8  --   --   --   --  1.6   CHLORIDE mmol/L 107  --  105 104   < > 102   CO2 mmol/L 23.4  --  24.7 23.5   < > 26.3   BUN mg/dL 24.9*  --  22.5 22.2   < > 23.8*   CREATININE mg/dL 1.00  --  0.96 0.97   < > 1.06*   CALCIUM mg/dL 9.9  --  9.3 9.4   < > 9.3   GLUCOSE mg/dL 103*  --  111* 128*   < > 115*    < > = values in this interval not displayed.   Estimated Creatinine Clearance: 44.2 mL/min (by C-G formula based on SCr of 1 mg/dL).    No results found for: \"AMMONIA\"      No results found for: \"BLOODCX\"  No results found for: \"URINECX\"  No results found for: \"WOUNDCX\"  No results found for: \"STOOLCX\"    I have personally looked at the labs and they are summarized above.  ----------------------------------------------------------------------------------------------------------------------  Imaging Results (Last 24 Hours)       ** No results found for the last 24 hours. **          ----------------------------------------------------------------------------------------------------------------------  Assessment and Plan:    Left femur fracture - patient underwent left hip intertrochanteric nailing on 6/23/2025, patient tolerated the procedure well with no complications.    2. Acute Blood Loss Anemia -patient has required 1 unit PRBC transfusion during this hospital stay.  Hemoglobin and hematocrit are stable.    3.  New onset paroxysmal A-fib -patient remains in normal sinus rhythm, continue Lopressor 25 mg p.o. twice daily.  Transthoracic echo demonstrates normal left ventricular systolic function.  Continue to defer anticoagulation at this time as patient is considered a high fall risk.    4.  Essential hypertension -patient with persistently elevated blood pressures over the past 48 hours.  Will increase Norvasc to 10 mg p.o. daily and monitor for " improvement    5.  Hyperlipidemia -statin    6.  Hyperthyroidism - continue to hold methimazole as TSH is elevated and free T4 is below lower limits of normal    7.  Suspect CKD stage IIIa -serum creatinine essentially unchanged today at 1.  Continue to monitor closely with BMP in the morning.        Disposition currently being evaluated for possible inpatient rehab placement.    Chano Romero DO   07/08/25   15:01 EDT       Electronically signed by Chano Romero DO at 07/08/25 1502          Consult Notes (most recent note)        Jamilah Rosa APRN at 06/22/25 0821       Attestation signed by Cosmo Jay MD at 06/22/25 1017      I have reviewed this documentation and agree.    Cosmo Jay MD                          Referring Provider:   Reason for Consultation: Left intertrochanteric fracture    Patient Care Team:  Yaakov Riley DO as PCP - General (Family Medicine)    Chief complaint fall    Subjective     History of present illness: Vanessa is an 80-year-old female with past medical history of hypertension, hyperlipidemia, osteoarthritis, and hypothyroidism who presents today with a complaint of left hip pain after falling at home.  Patient reports that she was in the process of doing laundry and utilizing her rollator walker in order to ambulate.  She states that she tripped over her walker causing her to sustain a fall onto the floor.  She states that she landed directly against the left side and did hit her head.  Patient reports after sustaining her injury she was unable to get up and bear weight.  Patient reports that she did have to lie on the floor for roughly an hour before her  found her.  Patient was subsequently taken to the emergency room for further evaluation and treatment.  It was noted upon CT imaging that she had sustained a left hip fracture.  Patient denies any other acute injury, injections, or surgical intervention in regard to the left hip.  She states  that she has had corticosteroid injections to the bilateral knees due to arthritis.  In regard to treatment she has been using over-the-counter medication and mobility modification with limited relief.  Orthopedics was consulted in regard to fracture.  Patient presents today for further evaluation and treatment.    Review of Systems  Review of Systems   Constitutional:  Positive for activity change.   Musculoskeletal:  Positive for arthralgias, gait problem, joint swelling and myalgias.   All other systems reviewed and are negative.       History  Past Medical History:   Diagnosis Date    Hyperlipidemia     Hypertension     Hyperthyroidism     Osteoarthritis    , History reviewed. No pertinent surgical history., History reviewed. No pertinent family history.,   Social History     Tobacco Use    Smoking status: Never     Passive exposure: Never    Smokeless tobacco: Never   Vaping Use    Vaping status: Never Used   Substance Use Topics    Alcohol use: Never    Drug use: Never   ,   Medications Prior to Admission   Medication Sig Dispense Refill Last Dose/Taking    acetaminophen (TYLENOL) 500 MG tablet Take 2 tablets by mouth Every Night.   Past Week    acetaminophen (TYLENOL) 500 MG tablet Take 3 tablets by mouth Every Morning.   6/21/2025    amLODIPine (NORVASC) 5 MG tablet Take 1 tablet by mouth Daily.   6/21/2025    aspirin 81 MG EC tablet Take 1 tablet by mouth Every Night.   Past Week    BIOTIN PO Take 2 tablets by mouth Daily.   6/21/2025    Cholecalciferol 50 MCG (2000 UT) tablet Take 2 tablets by mouth Every Night.   Past Week    donepezil (ARICEPT) 10 MG tablet Take 1 tablet by mouth Every Night.   Past Week    escitalopram (LEXAPRO) 10 MG tablet Take 1 tablet by mouth Daily.   6/21/2025    losartan (COZAAR) 50 MG tablet Take 1 tablet by mouth Daily.   6/21/2025    methIMAzole (TAPAZOLE) 5 MG tablet Take 1 tablet by mouth Daily.   6/21/2025    multivitamin tablet tablet Take 1 tablet by mouth Every Night.    Past Week    omega-3 acid ethyl esters (LOVAZA) 1 g capsule Take 2 capsules by mouth 2 (Two) Times a Day.   6/21/2025 Morning    potassium chloride 10 MEQ CR tablet Take 1 tablet by mouth Daily.   6/21/2025    rosuvastatin (CRESTOR) 5 MG tablet Take 1 tablet by mouth Daily.   6/21/2025    triamterene-hydrochlorothiazide (DYAZIDE) 37.5-25 MG per capsule Take 1 capsule by mouth Daily.   6/21/2025    Diclofenac Sodium (VOLTAREN) 1 % gel gel Apply 4 g topically to the appropriate area as directed 4 (Four) Times a Day As Needed (Knee Pain).   Unknown   , Scheduled Meds:  amLODIPine, 5 mg, Oral, Q24H  sodium chloride, 10 mL, Intravenous, Q12H    , Continuous Infusions:  sodium chloride, 100 mL/hr, Last Rate: 100 mL/hr (06/22/25 0211)    , PRN Meds:    senna-docusate sodium **AND** polyethylene glycol **AND** bisacodyl **AND** bisacodyl    Morphine **AND** naloxone    sodium chloride    sodium chloride and Allergies:  Sulfa antibiotics    Objective     Vital Signs   Temp:  [97.5 °F (36.4 °C)-98.6 °F (37 °C)] 98.4 °F (36.9 °C)  Heart Rate:  [77-91] 80  Resp:  [16-20] 18  BP: (139-163)/(60-75) 155/60    Physical Exam:   Physical Exam  HENT:      Head: Normocephalic.      Nose: Nose normal.   Cardiovascular:      Rate and Rhythm: Normal rate.   Pulmonary:      Effort: Pulmonary effort is normal.   Musculoskeletal:      Cervical back: Normal range of motion.      Comments: Upon examination of the left lower extremity there is no edema, ecchymosis, erythema, wounds, drainage, or signs of an infectious process.  Patient sensation appears to be grossly intact to light touch with brisk capillary refill.  Patient able to plantar and dorsiflex at the ankle without complication.  There is a palpable pulse distally.  There is shortening and external rotation noted to the left lower extremity.   Skin:     General: Skin is warm and dry.      Capillary Refill: Capillary refill takes less than 2 seconds.   Neurological:      General: No  "focal deficit present.      Mental Status: She is alert and oriented to person, place, and time.   Psychiatric:         Mood and Affect: Mood normal.         Results Review:     Results from last 7 days   Lab Units 06/22/25  0529 06/22/25  0156 06/21/25 2117   CRP mg/dL  --   --  <0.30   LACTATE mmol/L 1.6 2.1*  --    WBC 10*3/mm3  --  15.90* 17.41*   HEMOGLOBIN g/dL  --  11.3* 11.5*   HEMATOCRIT %  --  37.7 34.5   MCV fL  --  111.9* 98.6*   MCHC g/dL  --  30.0* 33.3   PLATELETS 10*3/mm3  --  248 253         Results from last 7 days   Lab Units 06/22/25  0156 06/21/25 2117   SODIUM mmol/L 140 141   POTASSIUM mmol/L 4.4 4.1   CHLORIDE mmol/L 108* 106   CO2 mmol/L 17.0* 19.7*   BUN mg/dL 30.4* 30.3*   CREATININE mg/dL 1.36* 1.45*   CALCIUM mg/dL 9.2 9.7   GLUCOSE mg/dL 162* 148*   ALBUMIN g/dL 4.1 4.4   BILIRUBIN mg/dL 0.5 0.4   ALK PHOS U/L 69 78   AST (SGOT) U/L 32 29   ALT (SGPT) U/L 20 21   Estimated Creatinine Clearance: 32.5 mL/min (A) (by C-G formula based on SCr of 1.36 mg/dL (H)).  No results found for: \"AMMONIA\"  Results from last 7 days   Lab Units 06/22/25  0156 06/21/25 2117   CK TOTAL U/L 519* 326*         Lab Results   Component Value Date    HGBA1C 5.92 (H) 06/21/2025     Lab Results   Component Value Date    TSH 4.900 (H) 06/21/2025    FREET4 0.85 (L) 06/21/2025     No results found for: \"PREGTESTUR\", \"PREGSERUM\", \"HCG\", \"HCGQUANT\"  Pain Management Panel           No data to display              No results found for: \"BLOODCX\"  No results found for: \"URINECX\"  No results found for: \"WOUNDCX\"  No results found for: \"STOOLCX\"      ---------------------------------------------------------------------------------------------------------------------   Imaging Results (Last 7 Days)       Procedure Component Value Units Date/Time    XR Hip With or Without Pelvis 2 - 3 View Left - In process [[id number]] Resulted: 06/22/25 0757     Updated: 06/22/25 0821    This result has not been signed. Information might " be incomplete.      CT Lumbar Spine Without Contrast [[id number]] Collected: 06/21/25 2334     Updated: 06/21/25 2338    Narrative:      EXAMINATION: CT lumbar spine without contrast     CLINICAL HISTORY: Fall     COMPARISON: None     TECHNIQUE: Contiguous 3 mm thick slices were obtained through the lumbar  spine without contrast. Coronal and sagittal reformats were performed.     FINDINGS:  Sagittal alignment is normal. Mild left convex curvature is centered at  the L4/L5 level. Mild right convex curvature centered at the L1/L2  level. There is no acute fracture. No traumatic listhesis. Moderate  multilevel disc related degenerative changes are noted. At the L4/L5  level there is a posterior disc bulge and comminution with facet  arthrosis and ligamentous thickening. The findings result in at least  moderate central canal stenosis. The disc bulges noted at the L5/S1  level as well.       Impression:      1. Multilevel disc related degenerative changes and facet arthrosis.  2. No evidence of recent trauma.     This report was finalized on 6/21/2025 11:36 PM by Juanjose Rocha MD.       CT Pelvis Without Contrast [[id number]] Collected: 06/21/25 2331     Updated: 06/21/25 2336    Narrative:      EXAMINATION: CT pelvis without contrast     CLINICAL HISTORY: Injury.     COMPARISON: None available.     TECHNIQUE: Contiguous 3 mm thick slices were obtained through the pelvis  without contrast. Coronal and sagittal reformats were performed.     FINDINGS:  Alignment at the hips is normal. Mild bilateral hip osteoarthrosis is  noted. There is an acute comminuted fracture through the  intertrochanteric region of the left proximal femur. There is apex  lateral angulation at the fracture site. Additionally, the distal  fragment is displaced medially with respect to the more proximal  fragment. There is apex anterior angulation at the fracture site as  well. Extensive adjacent soft tissue swelling is noted. No definite  acetabular  fracture is appreciated.       Impression:      1. Acute comminuted left intertrochanteric proximal femur fracture.     This report was finalized on 6/21/2025 11:34 PM by Juanjose Rocha MD.       CT Head Without Contrast [[id number]] Collected: 06/21/25 2323     Updated: 06/21/25 2333    Narrative:      EXAMINATION: CT head without contrast     CLINICAL HISTORY: Fall     COMPARISON: None available.     TECHNIQUE: Contiguous 3 mm thick slices were obtained from the skull  base to the vertex without contrast. Coronal and sagittal reformats were  performed.     FINDINGS:  Moderate generalized volume loss is noted with prominence of the sulci  and ventricular system. White matter changes are noted in a pattern  suggesting chronic small vessel ischemic disease. There is no acute  intracranial hemorrhage. No acute territorial infarct. No extra-axial  collection is identified. There is no shift of midline structures. No  acute calvarial fracture is appreciated. Hyperostosis frontalis is  noted.       Impression:      1. No acute intracranial process.     This report was finalized on 6/21/2025 11:31 PM by Juanjose Rocha MD.               Assessment & Plan       Closed intertrochanteric fracture of left femur      The on-call surgeon Dr. Jay has been made aware of the consult.  Recommendation at this time is for surgical intervention.    Continue with pain control.  Activity as tolerated with pain as the guide.    Patient is to remain nonweightbearing to the left lower extremity.  An order for x-ray imaging will be placed today.    Lengthy discussion was had today with the patient and family in regard to surgical versus conservative intervention.  They were informed that due to the severity of the fracture the recommendation at this time would be for surgical intervention.    The plan will be for a left hip intertrochanteric nailing.  The nature of this procedure, as well as risks, benefits, alternatives, and complications  to the above operation were discussed with the patient.  Risks include but are not limited to: Anesthesia complications, death, injury to nerves/vessels, infection, blood clots, continued pain, and repeat or revision surgery.  Following the discussion the patient verbalized understanding of the risks and benefits of the above procedure and elected to proceed with surgery.    Labs including a PT/INR, PTT, and type/screen will be placed into the system today.    Patient will be n.p.o. after midnight.  An order for a case request and consent will be placed into the system.    Orthopedics will continue to follow.        Jamilah Rosa, KELSEY  25  08:21 EDT            Electronically signed by Cosmo Jay MD at 25 1017       Nutrition Notes (most recent note)    No notes exist for this encounter.          Physical Therapy Notes (most recent note)        Sage Fuentes, PT at 25 7329  Version 1 of 1         Acute Care - Physical Therapy Treatment Note  [facility]     Patient Name: Lizbet Arzola  : 1945  MRN: [id number]  Today's Date: 2025      Visit Dx:     ICD-10-CM ICD-9-CM   1. Closed displaced intertrochanteric fracture of left femur, initial encounter  S72.142A 820.21   2. Acute low back pain with sciatica, sciatica laterality unspecified, unspecified back pain laterality  M54.40 724.2     724.3     Patient Active Problem List   Diagnosis   (none) - all problems resolved or deleted     Past Medical History:   Diagnosis Date    Hyperlipidemia     Hypertension     Hyperthyroidism     Osteoarthritis      Past Surgical History:   Procedure Laterality Date    HIP INTERTROCHANTERIC NAILING Left 2025    Procedure: HIP INTERTROCHANTERIC NAILING LONG WITH INTRAMEDULLARY HIP SCREW;  Surgeon: Cosmo Jay MD;  [facility];  Service: Orthopedics;  Laterality: Left;    LIPOMA EXCISION       PT Assessment (Last 12 Hours)       PT Evaluation and Treatment       Row Name 25  1335          Physical Therapy Time and Intention    Subjective Information complains of;weakness;fatigue  -KM     Document Type therapy note (daily note)  -KM     Mode of Treatment individual therapy;physical therapy  -KM     Patient Effort excellent  -KM     Symptoms Noted During/After Treatment fatigue  -KM       Row Name 07/08/25 1335          General Information    Patient Profile Reviewed yes  -KM     Patient Observations alert;cooperative;agree to therapy  -KM     Existing Precautions/Restrictions fall;weight bearing;other (see comments)  -KM       Row Name 07/08/25 1335          Pain    Pretreatment Pain Rating 0/10 - no pain  -KM     Posttreatment Pain Rating 0/10 - no pain  -KM     Pain Location knee  -KM     Pain Side/Orientation left  -KM       Row Name 07/08/25 1335          Cognition    Affect/Mental Status (Cognition) WFL  -KM     Orientation Status (Cognition) oriented to;person;place;situation  -KM     Follows Commands (Cognition) WFL  -KM       Row Name 07/08/25 1335          Mobility    Extremity Weight-bearing Status left lower extremity  -KM     Left Lower Extremity (Weight-bearing Status) weight-bearing as tolerated (WBAT)  -       Row Name 07/08/25 1335          Bed Mobility    Comment, (Bed Mobility) up in chair upon arrival  -       Row Name 07/08/25 1335          Transfers    Transfers sit-stand transfer;stand-sit transfer  -       Row Name 07/08/25 1335          Sit-Stand Transfer    Sit-Stand [place] (Transfers) verbal cues;minimum assist (75% patient effort)  -     Assistive Device (Sit-Stand Transfers) walker, front-wheeled  -       Row Name 07/08/25 1335          Stand-Sit Transfer    Stand-Sit [place] (Transfers) minimum assist (75% patient effort);verbal cues  -     Assistive Device (Stand-Sit Transfers) walker, front-wheeled  -       Row Name 07/08/25 1335          Gait/Stairs (Locomotion)    Gait/Stairs Locomotion gait/ambulation independence;gait/ambulation  assistive device;distance ambulated  -KM     [place] Level (Gait) minimum assist (75% patient effort)  -KM     Assistive Device (Gait) walker, front-wheeled  -KM     Patient was able to Ambulate yes  -KM     Distance in Feet (Gait) 75  -KM     Pattern (Gait) step-to  -KM     Deviations/Abnormal Patterns (Gait) antalgic;ataxic;base of support, narrow;gait speed decreased  -KM     Bilateral Gait Deviations forward flexed posture;knee buckling bilaterally  -KM     Left Sided Gait Deviations weight shift ability decreased  -KM       Row Name 07/08/25 1335          Safety Issues/Impairments Affecting Functional Mobility    Impairments Affecting Function (Mobility) balance;endurance/activity tolerance;pain;strength  -KM       Row Name 07/08/25 1335          Motor Skills    Comments, Therapeutic Exercise supine ther-ex  -KM       Row Name             Wound 06/23/25 1619 Left hip Surgical    Wound - Properties Group Placement Date: 06/23/25  -KB Placement Time: 1619  -KB Present on Original Admission: N  -KB Side: Left  -KB Location: hip  -KB Primary Wound Type: Surgical  -KB, incision sites x4     Retired Wound - Properties Group Placement Date: 06/23/25  -KB Placement Time: 1619  -KB Present on Original Admission: N  -KB Side: Left  -KB Location: hip  -KB    Retired Wound - Properties Group Placement Date: 06/23/25  -KB Placement Time: 1619  -KB Present on Original Admission: N  -KB Side: Left  -KB Location: hip  -KB    Retired Wound - Properties Group Date first assessed: 06/23/25  -KB Time first assessed: 1619  -KB Present on Original Admission: N  -KB Side: Left  -KB Location: hip  -KB      Row Name 07/08/25 1335          Progress Summary (PT)    Daily Progress Summary (PT) Pt. was able to perform functional mobility skills w/ Amy. She was able to continue ambulating in hallway w/ RW. She continues to tolerate increased activity. She continues showing progress w/ all mobility skills. Pt. is able to tolerate  greater than 3 hours of therapy. Pt. would continue to benefit from more intense PT services.  -KM       Row Name 07/08/25 1335          Physical Therapy Goals    Bed Mobility Goal Selection (PT) bed mobility, PT goal 1  -KM     Transfer Goal Selection (PT) transfer, PT goal 1  -KM     Gait Training Goal Selection (PT) gait training, PT goal 1  -KM       Row Name 07/08/25 1335          Bed Mobility Goal 1 (PT)    Activity/Assistive Device (Bed Mobility Goal 1, PT) bed mobility activities, all  -KM     [place] Level/Cues Needed (Bed Mobility Goal 1, PT) standby assist  -KM     Time Frame (Bed Mobility Goal 1, PT) by discharge  -KM     Progress/Outcomes (Bed Mobility Goal 1, PT) goal revised this date  -KM       Row Name 07/08/25 1335          Transfer Goal 1 (PT)    Activity/Assistive Device (Transfer Goal 1, PT) transfers, all;walker, rolling  -KM     [place] Level/Cues Needed (Transfer Goal 1, PT) contact guard required  -KM     Time Frame (Transfer Goal 1, PT) by discharge  -KM     Progress/Outcome (Transfer Goal 1, PT) goal revised this date  -KM       Row Name 07/08/25 1335          Gait Training Goal 1 (PT)    Activity/Assistive Device (Gait Training Goal 1, PT) gait (walking locomotion);assistive device use;walker, rolling  -KM     [place] Level (Gait Training Goal 1, PT) standby assist  -KM     Distance (Gait Training Goal 1, PT) 120'  -KM     Time Frame (Gait Training Goal 1, PT) by discharge  -KM     Progress/Outcome (Gait Training Goal 1, PT) goal revised this date  -KM               User Key  (r) = Recorded By, (t) = Taken By, (c) = Cosigned By      Initials Name Provider Type    Cheryl Aponte, RN Registered Nurse    KM Sgae Fuentes, PT Physical Therapist                    Physical Therapy Education       Title: PT OT SLP Therapies (Done)       Topic: Physical Therapy (Done)       Point: Mobility training (Done)       Learning Progress Summary            Patient Acceptance,  E,TB, VU,NR by AV at 7/8/2025 1242    Acceptance, E, NR by MF at 7/6/2025 2319    Acceptance, E, VU by SC at 7/6/2025 0301    Acceptance, E, VU by MC at 7/3/2025 1445    Acceptance, E,TB, VU by MP at 7/2/2025 0916    Acceptance, E,TB, VU by HG at 7/2/2025 0511    Acceptance, E,TB, VU by HG at 6/30/2025 0604    Acceptance, E,TB, VU by KM at 6/24/2025 1421   Family Acceptance, E,TB, VU by HG at 7/2/2025 0511    Acceptance, E,TB, VU by HG at 6/30/2025 0604                      Point: Home exercise program (Done)       Learning Progress Summary            Patient Acceptance, E,TB, VU,NR by AV at 7/8/2025 1242    Acceptance, E, NR by MF at 7/6/2025 2319    Acceptance, E, VU by SC at 7/6/2025 0301    Acceptance, E, VU by MC at 7/3/2025 1445    Acceptance, E,TB, VU by MP at 7/2/2025 0916    Acceptance, E,TB, VU by HG at 7/2/2025 0511    Acceptance, E,TB, VU by HG at 6/30/2025 0604    Acceptance, E,TB, VU by KM at 6/24/2025 1421   Family Acceptance, E,TB, VU by HG at 7/2/2025 0511    Acceptance, E,TB, VU by HG at 6/30/2025 0604                      Point: Body mechanics (Done)       Learning Progress Summary            Patient Acceptance, E,TB, VU,NR by AV at 7/8/2025 1242    Acceptance, E, NR by MF at 7/6/2025 2319    Acceptance, E, VU by SC at 7/6/2025 0301    Acceptance, E, VU by MC at 7/3/2025 1445    Acceptance, E,TB, VU by MP at 7/2/2025 0916    Acceptance, E,TB, VU by HG at 7/2/2025 0511    Acceptance, E,TB, VU by HG at 6/30/2025 0604    Acceptance, E,TB, VU by KM at 6/24/2025 1421   Family Acceptance, E,TB, VU by HG at 7/2/2025 0511    Acceptance, E,TB, VU by HG at 6/30/2025 0604                      Point: Precautions (Done)       Learning Progress Summary            Patient Acceptance, E,TB, VU,NR by AV at 7/8/2025 1242    Acceptance, E, NR by MF at 7/6/2025 2319    Acceptance, E, VU by SC at 7/6/2025 0301    Acceptance, E, VU by MC at 7/3/2025 1445    Acceptance, E,TB, VU by MP at 7/2/2025 0916    Acceptance,  E,TB, VU by  at 7/2/2025 0511    Acceptance, E,TB, VU by  at 6/30/2025 0604    Acceptance, E,TB, VU by  at 6/24/2025 1421   Family Acceptance, E,TB, VU by  at 7/2/2025 0511    Acceptance, E,TB, VU by  at 6/30/2025 0604                                      User Key       Initials Effective Dates Name Provider Type Discipline     06/16/21 -  Heaven Burleson, RN Registered Nurse Nurse     04/24/23 -  Collett, Morgan, RN Registered Nurse Nurse    KM 05/24/22 -  Sage Fuentes, YUSUF Physical Therapist PT     06/21/23 -  Brittany Parrish, IRMA Registered Nurse Nurse    HG 01/22/25 -  Christine Lepe RN Registered Nurse Nurse    SC 09/11/24 -  Arielle Macias RN Registered Nurse Nurse     06/03/25 -  Dawson Montaño RNA Registered Nurse Nurse                  PT Recommendation and Plan  Anticipated Discharge Disposition (PT): inpatient rehabilitation facility  Planned Therapy Interventions (PT): balance training, bed mobility training, gait training, home exercise program, patient/family education, postural re-education, ROM (range of motion), strengthening, stretching, transfer training, stair training, wheelchair management/propulsion training  Therapy Frequency (PT): 6 times/wk  Progress Summary (PT)  Daily Progress Summary (PT): Pt. was able to perform functional mobility skills w/ Amy. She was able to continue ambulating in hallway w/ RW. She continues to tolerate increased activity. She continues showing progress w/ all mobility skills. Pt. is able to tolerate greater than 3 hours of therapy. Pt. would continue to benefit from more intense PT services.  Plan of Care Reviewed With: patient  Outcome Evaluation: Pt. evaluation completed during PT session. She was able to perform functional mobility skills w/ maxA x2. She was unable to ambulate at time of evaluation d/t weakness and pain. She demonstrates impaired strength and ROM. Pt. would benefit from inpatient rehab to address weakness, pain, impaired  mobility, safety precautions, and fall risk.       Time Calculation:    PT Charges       Row Name 25 1335             Time Calculation    PT Received On 25  -KM         Time Calculation- PT    Total Timed Code Minutes- PT 40 minute(s)  -KM                User Key  (r) = Recorded By, (t) = Taken By, (c) = Cosigned By      Initials Name Provider Type    KM Sage Fuentes, PT Physical Therapist                  Therapy Charges for Today       Code Description Service Date Service Provider Modifiers Qty    85570171742 HC PT THERAPEUTIC ACT EA 15 MIN 2025 Sage Fuentes, PT GP 1    83817195977 HC GAIT TRAINING EA 15 MIN 2025 Sage Fuentes, PT GP 1    01196633116 HC PT RE-EVAL ESTABLISHED PLAN 2 2025 Sage Fuentes, PT GP 1    01498764027 HC PT THERAPEUTIC ACT EA 15 MIN 2025 Sage Fuentes, PT GP 1    13779078339 HC PT THER PROC EA 15 MIN 2025 Sage Fuentes, PT GP 1    95966895258 HC GAIT TRAINING EA 15 MIN 2025 Sage Fuentes, PT GP 1            PT G-Codes  AM-PAC 6 Clicks Score (PT): 16    Sage Fuentes PT  2025      Electronically signed by Sage Fuentes, PT at 25 1340          Occupational Therapy Notes (most recent note)        Izzy Murillo, OT at 25 1436          Patient Name: Lizbet Arzola  : 1945    MRN: [id number]                              Today's Date: 2025       Admit Date: 2025    Visit Dx:     ICD-10-CM ICD-9-CM   1. Closed displaced intertrochanteric fracture of left femur, initial encounter  S72.142A 820.21   2. Acute low back pain with sciatica, sciatica laterality unspecified, unspecified back pain laterality  M54.40 724.2     724.3     Patient Active Problem List   Diagnosis   (none) - all problems resolved or deleted     Past Medical History:   Diagnosis Date    Hyperlipidemia     Hypertension     Hyperthyroidism     Osteoarthritis      Past Surgical History:   Procedure Laterality Date    HIP INTERTROCHANTERIC NAILING Left  6/23/2025    Procedure: HIP INTERTROCHANTERIC NAILING LONG WITH INTRAMEDULLARY HIP SCREW;  Surgeon: Cosmo Jay MD;  Location: [facility];  Service: Orthopedics;  Laterality: Left;    LIPOMA EXCISION        General Information       Row Name 07/08/25 1436          OT Time and Intention    Subjective Information complains of;weakness;fatigue  -     Document Type therapy note (daily note)  -     Mode of Treatment individual therapy;occupational therapy  -     Patient Effort excellent  -     Symptoms Noted During/After Treatment fatigue  -     Comment Patient agreeable to therapy. Patient's daughter present.  -       Row Name 07/08/25 1436          General Information    Patient Profile Reviewed yes  -     Existing Precautions/Restrictions fall;weight bearing;other (see comments)  -       Row Name 07/08/25 1436          Cognition    Orientation Status (Cognition) oriented to;person;place;situation  -       Row Name 07/08/25 1436          Safety Issues/Impairments Affecting Functional Mobility    Impairments Affecting Function (Mobility) balance;endurance/activity tolerance;pain;strength  -               User Key  (r) = Recorded By, (t) = Taken By, (c) = Cosigned By      Initials Name Provider Type     Izzy Murillo OT Occupational Therapist                     Mobility/ADL's       Row Name 07/08/25 1437          Bed Mobility    Comment, (Bed Mobility) up in chair upon arrival  -       Row Name 07/08/25 1437          Transfers    Transfers sit-stand transfer;stand-sit transfer  -       Row Name 07/08/25 1437          Sit-Stand Transfer    Sit-Stand [place] (Transfers) verbal cues;minimum assist (75% patient effort)  -     Assistive Device (Sit-Stand Transfers) walker, front-wheeled  -       Row Name 07/08/25 1437          Stand-Sit Transfer    Stand-Sit [place] (Transfers) minimum assist (75% patient effort);verbal cues  -     Assistive Device (Stand-Sit Transfers) walker,  front-wheeled  -[facility] Name 07/08/25 1437          Mobility    Extremity Weight-bearing Status left lower extremity  -     Left Lower Extremity (Weight-bearing Status) weight-bearing as tolerated (WBAT)  -[facility] Name 07/08/25 1437          Toileting Assessment/Training    [place] Level (Toileting) toileting skills;maximum assist (25% patient effort)  -     Comment, (Toileting) discussed hand placement and alternating sides with use of rolling walker during hygiene and clothing management  -               User Key  (r) = Recorded By, (t) = Taken By, (c) = Cosigned By      Initials Name Provider Type    Izzy Motta OT Occupational Therapist                   Obj/Interventions       [facility] Name 07/08/25 1438          Motor Skills    Motor Skills functional endurance  -     Functional Endurance good minus  -               User Key  (r) = Recorded By, (t) = Taken By, (c) = Cosigned By      Initials Name Provider Type    Izzy Motta OT Occupational Therapist                   Goals/Plan    No documentation.                  Clinical Impression       [facility] Name 07/08/25 1438          Pain Assessment    Pretreatment Pain Rating 0/10 - no pain  -     Posttreatment Pain Rating 0/10 - no pain  -KP       Row Name 07/08/25 1438          Plan of Care Review    Plan of Care Reviewed With patient;daughter  -     Progress improving  -     Outcome Evaluation Patient seen for OT treatment. She completed transfer from chair level and educated on safety with toileting using rolling walker. Patient and daughter receptive. She would benefit from ongoing OT services to promote highest level of independence and safety. She is progressing well, but will require ongoing services to return home safely.  -       Row Name 07/08/25 1438          Therapy Plan Review/Discharge Plan (OT)    Anticipated Discharge Disposition (OT) inpatient rehabilitation facility  -[facility] Name 07/08/25 1438           Positioning and Restraints    Pre-Treatment Position sitting in chair/recliner  -KP     Post Treatment Position chair  -KP     In Chair sitting;call light within reach;encouraged to call for assist;with family/caregiver  -KP               User Key  (r) = Recorded By, (t) = Taken By, (c) = Cosigned By      Initials Name Provider Type     Izzy Murillo, MICHAEL Occupational Therapist                   Outcome Measures       Row Name 07/08/25 0835          How much help from another person do you currently need...    Turning from your back to your side while in flat bed without using bedrails? 3  -EB (r) AV (t) EB (c)     Moving from lying on back to sitting on the side of a flat bed without bedrails? 3  -EB (r) AV (t) EB (c)     Moving to and from a bed to a chair (including a wheelchair)? 2  -EB (r) AV (t) EB (c)     Standing up from a chair using your arms (e.g., wheelchair, bedside chair)? 3  -EB (r) AV (t) EB (c)     Climbing 3-5 steps with a railing? 2  -EB (r) AV (t) EB (c)     To walk in hospital room? 3  -EB (r) AV (t) EB (c)     AM-PAC 6 Clicks Score (PT) 16  -AV               User Key  (r) = Recorded By, (t) = Taken By, (c) = Cosigned By      Initials Name Provider Type    Nela Anand RN Registered Nurse    Dawson Hernandez RNA Registered Nurse                      OT Recommendation and Plan  Planned Therapy Interventions (OT): activity tolerance training, adaptive equipment training, BADL retraining, functional balance retraining, patient/caregiver education/training, passive ROM/stretching, occupation/activity based interventions, transfer/mobility retraining, strengthening exercise, ROM/therapeutic exercise  Therapy Frequency (OT): 5 times/wk  Plan of Care Review  Plan of Care Reviewed With: patient, daughter  Progress: improving  Outcome Evaluation: Patient seen for OT treatment. She completed transfer from chair level and educated on safety with toileting using rolling walker. Patient and  daughter receptive. She would benefit from ongoing OT services to promote highest level of independence and safety. She is progressing well, but will require ongoing services to return home safely.     Time Calculation:         Time Calculation- OT       Row Name 25 1440             Time Calculation- OT    OT Received On 25  -                User Key  (r) = Recorded By, (t) = Taken By, (c) = Cosigned By      Initials Name Provider Type     Izzy Murillo OT Occupational Therapist                  Therapy Charges for Today       Code Description Service Date Service Provider Modifiers Qty    11667989038 HC OT THERAPEUTIC ACT EA 15 MIN 2025 Izzy Murillo OT GO 1    68313017308 HC OT THERAPEUTIC ACT EA 15 MIN 2025 Izzy Murillo OT GO 1                 Izzy Murillo OT  2025    Electronically signed by Izzy Murillo OT at 25 1440       Sage Fuentes, PT   Physical Therapist  Physical Therapy     Therapy Evaluation     Signed     Date of Service: 25  Creation Time: 25     Signed       Expand All Collapse All[]Expand All by Default    Acute Care - Physical Therapy Initial Evaluation   [facility]     Patient Name: Lizbet Arzola                   : 1945                        MRN: [id number]  Today's Date: 2025                                  Visit Dx:   Visit Diagnosis       ICD-10-CM ICD-9-CM   1. Closed displaced intertrochanteric fracture of left femur, initial encounter  S72.142A 820.21   2. Acute low back pain with sciatica, sciatica laterality unspecified, unspecified back pain laterality  M54.40 724.2       724.3         Problem List       Patient Active Problem List   Diagnosis    Closed intertrochanteric fracture of left femur         Medical History        Past Medical History:   Diagnosis Date    Hyperlipidemia      Hypertension      Hyperthyroidism      Osteoarthritis           Surgical History         Past Surgical History:    Procedure Laterality Date    LIPOMA EXCISION             PT Assessment (Last 12 Hours)            PT Evaluation and Treatment         Row Name 06/24/25 1408                 Physical Therapy Time and Intention     Subjective Information complains of;weakness;fatigue;pain  -KM       Document Type evaluation  -KM       Mode of Treatment physical therapy  -KM       Patient Effort good  -KM       Symptoms Noted During/After Treatment fatigue;increased pain  -KM          Row Name 06/24/25 1408                 General Information     Patient Profile Reviewed yes  -KM       Patient Observations alert;cooperative;agree to therapy  -KM       Prior Level of Function --  Pt. modified independent w/ rollator for mobility and ADLs.  -KM       Existing Precautions/Restrictions fall  L hip nailing; LLE WBAT  -KM       Risks Reviewed patient:;LOB;nausea/vomiting;dizziness;increased discomfort  -KM       Benefits Reviewed patient:;improve function;increase independence;increase strength;increase balance  -KM       Barriers to Rehab none identified  -KM          Row Name 06/24/25 1408                 Living Environment     Current Living Arrangements home  -KM       People in Home spouse  -KM       Primary Care Provided by self  -KM          Row Name 06/24/25 1408                 Home Use of Assistive/Adaptive Equipment     Equipment Currently Used at Home rollator  -KM          Row Name 06/24/25 1408                 Pain     Additional Documentation Pain Scale: FACES Pre/Post-Treatment (Group)  -KM          Row Name 06/24/25 1408                 Pain Scale: FACES Pre/Post-Treatment     Pain: FACES Scale, Pretreatment 2-->hurts little bit  -KM       Posttreatment Pain Rating 4-->hurts little more  -KM          Row Name 06/24/25 1408                 Cognition     Affect/Mental Status (Cognition) WFL  -KM       Orientation Status (Cognition) oriented to;person  -KM       Follows Commands (Cognition) follows one-step commands  -KM           Row Name 06/24/25 1408                 Range of Motion (ROM)     Range of Motion --  BLE ROM limited  -          Row Name 06/24/25 1408                 Strength (Manual Muscle Testing)     Strength (Manual Muscle Testing) --  BLE strength grossly 2/5  -KM          Row Name 06/24/25 1408                 Mobility     Extremity Weight-bearing Status left lower extremity  -KM       Left Lower Extremity (Weight-bearing Status) weight-bearing as tolerated (WBAT)  -          Row Name 06/24/25 1408                 Bed Mobility     Bed Mobility supine-sit  -KM       Supine-Sit Scott (Bed Mobility) maximum assist (25% patient effort);2 person assist;verbal cues  -       Bed Mobility, Safety Issues decreased use of arms for pushing/pulling;decreased use of legs for bridging/pushing  -       Assistive Device (Bed Mobility) bed rails;head of bed elevated  -          Row Name 06/24/25 1408                 Transfers     Transfers sit-stand transfer;stand-sit transfer  -          Row Name 06/24/25 1408                 Sit-Stand Transfer     Sit-Stand Scott (Transfers) dependent (less than 25% patient effort);2 person assist;verbal cues  -       Assistive Device (Sit-Stand Transfers) walker, front-wheeled  -KM       Comment, (Sit-Stand Transfer) unable to complete stand to full upright; attempted 2x  -KM          Row Name 06/24/25 1408                 Stand-Sit Transfer     Stand-Sit Scott (Transfers) maximum assist (25% patient effort);2 person assist;verbal cues  -       Assistive Device (Stand-Sit Transfers) walker, front-wheeled  -          Row Name 06/24/25 1408                 Gait/Stairs (Locomotion)     Patient was able to Ambulate no, other medical factors prevent ambulation  -       Reason Patient was unable to Ambulate Excessive Weakness;Uncontrolled Pain  -          Row Name 06/24/25 1408                 Safety Issues/Impairments Affecting Functional Mobility     Safety  Issues Affecting Function (Mobility) awareness of need for assistance;insight into deficits/self-awareness;safety precaution awareness  -KM       Impairments Affecting Function (Mobility) balance;endurance/activity tolerance;pain;range of motion (ROM);strength  -KM          Row Name 06/24/25 1408                 Balance     Balance Assessment sitting static balance;standing static balance  -KM       Static Sitting Balance contact guard  -KM       Position, Sitting Balance sitting edge of bed  -KM       Static Standing Balance maximum assist;2-person assist  -KM       Position/Device Used, Standing Balance walker, front-wheeled  -KM          Row Name                      Wound 06/23/25 1619 Left hip Surgical     Wound - Properties Group Placement Date: 06/23/25  -KB Placement Time: 1619  -KB Present on Original Admission: N  -KB Side: Left  -KB Location: hip  -KB Primary Wound Type: Surgical  -KB, incision sites x4      Retired Wound - Properties Group Placement Date: 06/23/25  -KB Placement Time: 1619  -KB Present on Original Admission: N  -KB Side: Left  -KB Location: hip  -KB     Retired Wound - Properties Group Placement Date: 06/23/25  -KB Placement Time: 1619  -KB Present on Original Admission: N  -KB Side: Left  -KB Location: hip  -KB     Retired Wound - Properties Group Date first assessed: 06/23/25  -KB Time first assessed: 1619  -KB Present on Original Admission: N  -KB Side: Left  -KB Location: hip  -KB        Row Name 06/24/25 1408                 Plan of Care Review     Plan of Care Reviewed With patient  -       Outcome Evaluation Pt. evaluation completed during PT session. She was able to perform functional mobility skills w/ maxA x2. She was unable to ambulate at time of evaluation d/t weakness and pain. She demonstrates impaired strength and ROM. Pt. would benefit from inpatient rehab to address weakness, pain, impaired mobility, safety precautions, and fall risk.  -          Row Name 06/24/25  1408                 Therapy Assessment/Plan (PT)     Patient/Family Therapy Goals Statement (PT) return to PLOF  -KM       Functional Level at Time of Evaluation (PT) maxA  -KM       PT Diagnosis (PT) decreased mobility secondary to hip fx  -KM       Rehab Potential (PT) good  -KM       Criteria for Skilled Interventions Met (PT) yes;skilled treatment is necessary  -KM       Therapy Frequency (PT) 6 times/wk  -KM       Predicted Duration of Therapy Intervention (PT) until discharge  -KM       Problem List (PT) problems related to;balance;mobility;range of motion (ROM);strength;pain  -KM       Activity Limitations Related to Problem List (PT) unable to ambulate safely;unable to transfer safely  -KM          Row Name 06/24/25 1408                 Therapy Plan Review/Discharge Plan (PT)     Therapy Plan Review (PT) evaluation/treatment results reviewed;care plan/treatment goals reviewed;risks/benefits reviewed;patient;daughter  -          Row Name 06/24/25 140                 Physical Therapy Goals     Bed Mobility Goal Selection (PT) bed mobility, PT goal 1  -KM       Transfer Goal Selection (PT) transfer, PT goal 1  -KM       Gait Training Goal Selection (PT) gait training, PT goal 1  -KM          Row Name 06/24/25 1406                 Bed Mobility Goal 1 (PT)     Activity/Assistive Device (Bed Mobility Goal 1, PT) bed mobility activities, all  -KM       [place] Level/Cues Needed (Bed Mobility Goal 1, PT) minimum assist (75% or more patient effort)  -KM       Time Frame (Bed Mobility Goal 1, PT) by discharge  -          Row Name 06/24/25 1407                 Transfer Goal 1 (PT)     Activity/Assistive Device (Transfer Goal 1, PT) transfers, all;walker, rolling  -KM       [place] Level/Cues Needed (Transfer Goal 1, PT) minimum assist (75% or more patient effort)  -KM       Time Frame (Transfer Goal 1, PT) by discharge  -          Row Name 06/24/25 1401                 Gait Training Goal 1 (PT)      Activity/Assistive Device (Gait Training Goal 1, PT) gait (walking locomotion);assistive device use;walker, rolling  -KM       [place] Level (Gait Training Goal 1, PT) minimum assist (75% or more patient effort)  -KM       Distance (Gait Training Goal 1, PT) 20'  -KM       Time Frame (Gait Training Goal 1, PT) by discharge  -KM                    User Key  (r) = Recorded By, (t) = Taken By, (c) = Cosigned By        Initials Name Provider Type     Cheryl Aponte, RN Registered Nurse     Sage Mccain, PT Physical Therapist                             Physical Therapy Education            Title: PT OT SLP Therapies (Done)         Topic: Physical Therapy (Done)         Point: Mobility training (Done)         Learning Progress Summary             Patient Acceptance, E,TB, VU by  at 6/24/2025 1421                            Point: Home exercise program (Done)         Learning Progress Summary             Patient Acceptance, E,TB, VU by  at 6/24/2025 1421                            Point: Body mechanics (Done)         Learning Progress Summary             Patient Acceptance, E,TB, VU by  at 6/24/2025 1421                            Point: Precautions (Done)         Learning Progress Summary             Patient Acceptance, E,TB, VU by  at 6/24/2025 1421                                                User Key         Initials Effective Dates Name Provider Type Discipline      05/24/22 -  Sage Fuentes, YUSUF Physical Therapist PT                          PT Recommendation and Plan  Anticipated Discharge Disposition (PT): inpatient rehabilitation facility  Planned Therapy Interventions (PT): balance training, bed mobility training, gait training, home exercise program, patient/family education, postural re-education, ROM (range of motion), strengthening, stretching, transfer training, stair training, wheelchair management/propulsion training  Therapy Frequency (PT): 6 times/wk  Plan of Care Reviewed  With: patient  Outcome Evaluation: Pt. evaluation completed during PT session. She was able to perform functional mobility skills w/ maxA x2. She was unable to ambulate at time of evaluation d/t weakness and pain. She demonstrates impaired strength and ROM. Pt. would benefit from inpatient rehab to address weakness, pain, impaired mobility, safety precautions, and fall risk.               Time Calculation:     PT Charges         Row Name 25 1407                       Time Calculation     PT Received On 25  -KM         PT Goal Re-Cert Due Date 25  -KM                      User Key  (r) = Recorded By, (t) = Taken By, (c) = Cosigned By        Initials Name Provider Type     Sage Mccain, PT Physical Therapist                       Therapy Charges for Today         Code Description Service Date Service Provider Modifiers Qty     [id number] HC PT EVAL MOD COMPLEXITY 4 2025 Sage Fuentes, PT GP 1                PT G-Codes  AM-PAC 6 Clicks Score (PT): 8     Sage Fuentes PT                   2025                Izzy Murillo OT   Occupational Therapist  Specialty: Occupational Therapy     Therapy Evaluation     Signed     Date of Service: 25  Creation Time: 25     Signed       Expand All Collapse All[]Expand All by Default    Patient Name: Lizbet Arzola                   : 1945                          MRN: [id number]                              Today's Date: 2025                                   Admit Date: 2025                        Visit Dx:   Visit Diagnosis       ICD-10-CM ICD-9-CM   1. Closed displaced intertrochanteric fracture of left femur, initial encounter  S72.142A 820.21   2. Acute low back pain with sciatica, sciatica laterality unspecified, unspecified back pain laterality  M54.40 724.2       724.3         Problem List       Patient Active Problem List   Diagnosis    Closed intertrochanteric fracture of left femur          Medical History        Past Medical History:   Diagnosis Date    Hyperlipidemia      Hypertension      Hyperthyroidism      Osteoarthritis           Surgical History         Past Surgical History:   Procedure Laterality Date    LIPOMA EXCISION               General Information         Row Name 06/24/25 1519                 OT Time and Intention     Subjective Information complains of;weakness;fatigue;pain  -       Document Type evaluation  -       Mode of Treatment individual therapy;occupational therapy  -       Patient Effort good  -       Symptoms Noted During/After Treatment fatigue  -       Comment Patient agreeable to therapy. Seen for OT evaluation. Patient's daughter present throughout.  -          Row Name 06/24/25 1519                 General Information     Patient Profile Reviewed yes  -       Prior Level of Function independent:  modified independent; use of rollator  -       Existing Precautions/Restrictions fall  -       Barriers to Rehab none identified  -          Row Name 06/24/25 1519                 Occupational Profile     Reason for Services/Referral (Occupational Profile) Patient admitted to [facility] on 6/21/2025. She underwent LLE ORIF on 6/23/2025. Patient was referred for OT evaluation due to change in functional performance with ADLs, functional mobility, and/or tramsfers.  -          Row Name 06/24/25 1519                 Living Environment     Current Living Arrangements home  -       People in Home spouse  -          Row Name 06/24/25 1519                 Home Main Entrance     Number of Stairs, Main Entrance none  -          Row Name 06/24/25 1519                 Cognition     Orientation Status (Cognition) oriented to;person  -          Row Name 06/24/25 1519                 Safety Issues/Impairments Affecting Functional Mobility     Safety Issues Affecting Function (Mobility) awareness of need for assistance;insight into  deficits/self-awareness  -       Impairments Affecting Function (Mobility) balance;endurance/activity tolerance;pain;range of motion (ROM);strength  -                       User Key  (r) = Recorded By, (t) = Taken By, (c) = Cosigned By        Initials Name Provider Type      Izzy Murillo OT Occupational Therapist                                     Mobility/ADL's         Row Name 06/24/25 1521                 Bed Mobility     Bed Mobility supine-sit  -       Supine-Sit [place] (Bed Mobility) maximum assist (25% patient effort);2 person assist;verbal cues  -       Bed Mobility, Safety Issues decreased use of arms for pushing/pulling;decreased use of legs for bridging/pushing  -       Assistive Device (Bed Mobility) bed rails;head of bed elevated  -[facility] Name 06/24/25 1521                 Transfers     Transfers sit-stand transfer;stand-sit transfer  -KP          Row Name 06/24/25 1521                 Sit-Stand Transfer     Sit-Stand [place] (Transfers) dependent (less than 25% patient effort);2 person assist;verbal cues  -[facility] Name 06/24/25 1521                 Stand-Sit Transfer     Stand-Sit [place] (Transfers) maximum assist (25% patient effort);2 person assist;verbal cues  [facility]       Assistive Device (Stand-Sit Transfers) walker, front-wheeled  -          Row Name 06/24/25 1521                 Activities of Daily Living     BADL Assessment/Intervention bathing;upper body dressing;lower body dressing;grooming;feeding;toileting  -[facility] Name 06/24/25 1521                 Mobility     Extremity Weight-bearing Status left lower extremity  -       Left Lower Extremity (Weight-bearing Status) weight-bearing as tolerated (WBAT)  -[facility] Name 06/24/25 1521                 Bathing Assessment/Intervention     [place] Level (Bathing) bathing skills;maximum assist (25% patient effort)  -[facility] Name 06/24/25 1521                 Upper Body  Dressing Assessment/Training     [place] Level (Upper Body Dressing) upper body dressing skills;moderate assist (50% patient effort)  -KP          Row Name 06/24/25 1521                 Lower Body Dressing Assessment/Training     [place] Level (Lower Body Dressing) lower body dressing skills;dependent (less than 25% patient effort)  -KP          Row Name 06/24/25 1521                 Grooming Assessment/Training     [place] Level (Grooming) grooming skills;minimum assist (75% patient effort)  -KP          Row Name 06/24/25 1521                 Self-Feeding Assessment/Training     [place] Level (Feeding) feeding skills;set up  -KP          Row Name 06/24/25 1521                 Toileting Assessment/Training     [place] Level (Toileting) toileting skills;dependent (less than 25% patient effort)  -                       User Key  (r) = Recorded By, (t) = Taken By, (c) = Cosigned By        Initials Name Provider Type      Izzy Murillo OT Occupational Therapist                            Obj/Interventions         Row Name 06/24/25 1522                 Sensory Assessment (Somatosensory)     Sensory Assessment (Somatosensory) UE sensation intact  -KP          Row Name 06/24/25 1522                 Vision Assessment/Intervention     Visual Impairment/Limitations WFL  -KP          Row Name 06/24/25 1522                 Range of Motion Comprehensive     General Range of Motion bilateral upper extremity ROM WFL  -KP          Row Name 06/24/25 1522                 Strength Comprehensive (MMT)     Comment, General Manual Muscle Testing (MMT) Assessment 4-/5 MMT in BUEs  -KP          Row Name 06/24/25 1522                 Motor Skills     Motor Skills coordination;functional endurance  -       Coordination WFL;bilateral;upper extremity  -       Functional Endurance fair  -KP          Row Name 06/24/25 1522                 Balance     Balance Assessment sitting static balance;standing static  balance  -KP       Static Sitting Balance contact guard  -KP       Static Standing Balance maximum assist  -                       User Key  (r) = Recorded By, (t) = Taken By, (c) = Cosigned By        Initials Name Provider Type     Izzy Motta OT Occupational Therapist                            Goals/Plan         Row Name 06/24/25 1524                 Transfer Goal 1 (OT)     Activity/Assistive Device (Transfer Goal 1, OT) toilet;commode, 3-in-1  -       [place] Level/Cues Needed (Transfer Goal 1, OT) minimum assist (75% or more patient effort)  -       Time Frame (Transfer Goal 1, OT) by discharge  -          Row Name 06/24/25 1524                 Problem Specific Goal 1 (OT)     Problem Specific Goal 1 (OT) Patient will perform sustained activity X12 minutes to promote functional endurance/activity tolerance needed for daily routine.  -       Time Frame (Problem Specific Goal 1, OT) by discharge  -          Row Name 06/24/25 1524                 Therapy Assessment/Plan (OT)     Planned Therapy Interventions (OT) activity tolerance training;BADL retraining;functional balance retraining;patient/caregiver education/training;passive ROM/stretching;occupation/activity based interventions;ROM/therapeutic exercise;strengthening exercise;transfer/mobility retraining  -                    User Key  (r) = Recorded By, (t) = Taken By, (c) = Cosigned By        Initials Name Provider Type     Izzy Motta OT Occupational Therapist                         Clinical Impression         Row Name 06/24/25 1523                 Pain Scale: FACES Pre/Post-Treatment     Pain: FACES Scale, Pretreatment 2-->hurts little bit  -       Posttreatment Pain Rating 4-->hurts little more  -          Row Name 06/24/25 1523                 Plan of Care Review     Plan of Care Reviewed With patient  -       Progress no change  -       Outcome Evaluation Patient seen for OT evaluation. She presents with  functional limitations including generalized weakness, pain, limited ROM, impaired balance, decreased safety, and limited functional endurance/activity tolerance. Patient would benefit from ongoing OT services to promote highest level of independence and safety prior to discharge. Patient is a good candidate for intensive therapy to return to prior level of function.  -          Row Name 06/24/25 1523                 Therapy Assessment/Plan (OT)     Patient/Family Therapy Goal Statement (OT) be able to return home  -       Rehab Potential (OT) good  -       Criteria for Skilled Therapeutic Interventions Met (OT) yes;meets criteria;skilled treatment is necessary  -       Therapy Frequency (OT) 5 times/wk  -       Predicted Duration of Therapy Intervention (OT) discharge  -          Row Name 06/24/25 1523                 Therapy Plan Review/Discharge Plan (OT)     Anticipated Discharge Disposition (OT) inpatient rehabilitation facility  -          Row Name 06/24/25 1523                 Positioning and Restraints     Pre-Treatment Position in bed  -       Post Treatment Position bed  -       In Bed sitting EOB;notified nsg;call light within reach;encouraged to call for assist;with family/caregiver  -                       User Key  (r) = Recorded By, (t) = Taken By, (c) = Cosigned By        Initials Name Provider Type     Izzy Motta, OT Occupational Therapist                            Outcome Measures         Row Name 06/24/25 0710                 How much help from another person do you currently need...     Turning from your back to your side while in flat bed without using bedrails? 2  -WB       Moving from lying on back to sitting on the side of a flat bed without bedrails? 2  -WB       Moving to and from a bed to a chair (including a wheelchair)? 1  -WB       Standing up from a chair using your arms (e.g., wheelchair, bedside chair)? 1  -WB       Climbing 3-5 steps with a railing? 1   -WB       To walk in hospital room? 1  -WB       AM-PAC 6 Clicks Score (PT) 8  -WB                       User Key  (r) = Recorded By, (t) = Taken By, (c) = Cosigned By        Initials Name Provider Type     Winter Mattson, RN Registered Nurse                                      OT Recommendation and Plan  Planned Therapy Interventions (OT): activity tolerance training, BADL retraining, functional balance retraining, patient/caregiver education/training, passive ROM/stretching, occupation/activity based interventions, ROM/therapeutic exercise, strengthening exercise, transfer/mobility retraining  Therapy Frequency (OT): 5 times/wk  Plan of Care Review  Plan of Care Reviewed With: patient  Progress: no change  Outcome Evaluation: Patient seen for OT evaluation. She presents with functional limitations including generalized weakness, pain, limited ROM, impaired balance, decreased safety, and limited functional endurance/activity tolerance. Patient would benefit from ongoing OT services to promote highest level of independence and safety prior to discharge. Patient is a good candidate for intensive therapy to return to prior level of function.      Time Calculation:        Time Calculation- OT         Row Name 06/24/25 1525                       Time Calculation- OT     OT Received On 06/24/25  -KP                      User Key  (r) = Recorded By, (t) = Taken By, (c) = Cosigned By        Initials Name Provider Type     Izzy Motta OT Occupational Therapist                       Therapy Charges for Today         Code Description Service Date Service Provider Modifiers Qty     [id number]  OT EVAL MOD COMPLEXITY 4 6/24/2025 Izzy Murillo OT GO 1                   Izzy Murillo OT                   6/24/2025

## 2025-07-09 NOTE — DISCHARGE PLACEMENT REQUEST
"Lizbet Rivera \"Vanessa\" (80 y.o. Female)       Date of Birth   1945    Social Security Number       Address   [address] [place] [address] [facility]    Home Phone       MRN   [id number]       [facility]    Marital Status                               Admission Date   6/21/2025    Admission Type   Emergency    Admitting Provider   Jose Chau MD    Attending Provider   Chano Romero DO    Department, Room/Bed   [address], 3311/1S       Discharge Date       Discharge Disposition   Skilled Nursing Facility (DC - External)    Discharge Destination                                 Attending Provider: Chano Romero DO    Allergies: Sulfa Antibiotics    Isolation: None   Infection: None   Code Status: CPR    Ht: 170.2 cm (67\")   Wt: 62.3 kg (137 lb 4.8 oz)    Admission Cmt: None   Principal Problem: Closed intertrochanteric fracture of left femur [S72.142A]                   Active Insurance as of 6/21/2025       Primary Coverage       Payor Plan Insurance Group Employer/Plan Group    [employer] MEDICARE REPLACEMENT U[employer] Medicare Advantage GROUP PPO [id number]       Payor Plan Address Payor Plan Phone Number Payor Plan Fax Number Effective Dates    [address] [address]   1/1/2025 - None Entered    [facility] [id number]         Subscriber Name Subscriber Birth Date Member ID       LIZBET RIVERA VANESSA 1945 [id number]                     Emergency Contacts        (Rel.) Home Phone Work Phone Mobile Phone    kyara(POA)carlos (Daughter) -- -- [phone]    jakob(POA),maye (Daughter) [phone] -- [phone]    Dariusz(POA)Parveen (Son) -- -- [phone]              After Visit Summary  Lizbet Rivera \"Vanessa\"  MRN: [id number]    Icon primary diagnosis          Closed intertrochanteric fracture of left femur   Icon Date   6/22/2025 - 7/9/2025   Icon Location   [address]   Legal Name: Lizbet OCHOA" Dariusz  Icon Checklist header    Your Next Steps    Icon destination   Destination    THE [facility]   Services: Skilled Nursing   [address] [place] [id number] [phone]    Icon ask where to get these medications   Ask    Icon Checkbox    Ask how to get these medications  acetaminophen  Icon do   Do    Icon Checkbox     these medications from [employer] [place] [address] [facility] [phone] [facility] [phone] FX  amLODIPine  methIMAzole  metoprolol succinate XL  Icon read   Read    Icon Checkbox    Read these attachments  HIP FRACTURE (ENGLISH)  HIP FRACTURE TREATED WITH ORIF  CARE AFTER (ENGLISH)  [employer]    View your After Visit Summary and more online at [email or web address]   After Visit Summary  Instructions    Icon medication changes this visit           Your medications have changed    Icon medications to start taking   START taking:  metoprolol succinate XL (TOPROL-XL)   Start taking on: July 10, 2025  Icon medications to change how you take   CHANGE how you take:  acetaminophen (TYLENOL) -- medication strength, how much to take, when to take this, reasons to take this, Another medication with the same name was removed. Continue taking this medication, and follow the directions you see here.  amLODIPine (NORVASC) -- medication strength, how much to take, when to take this  methIMAzole (TAPAZOLE) -- how much to take  Icon medications to stop taking   STOP taking:  losartan 50 MG tablet (COZAAR)   Review your updated medication list below.  Your Next Steps  Icon destination   Destination  Icon ask where to get these medications   Ask  Icon do   Do  Icon read   Read  If you have any questions about your recovery, please call the [facility] Nurse Call Center at [phone]. A registered nurse is available 24 hours a day 7 days a week to assist you.   If you have any COVID-19 related questions, please call [phone].     Conversations  that Matter: Have you thought about who would speak for you if you could not speak for yourself?     Consider appointing someone to make healthcare decisions for you if you are unable to do so. We can help! Call our Advance Care Planning helpline at [phone], or visit   [email or web address].  Icon activity    Activity instructions    Resume activity as tolerated.      Icon diet    Diet instructions    Resume diet as tolerated.      Icon Orders placed today                  Other instructions    Discharge Follow-up with PCP   Currently Documented PCP:   Yaakov Riley DO   PCP Phone Number:   [phone]   Follow Up Details:   please follow up with pcp in 1 week  Verify the patient's follow-up provider is added in the Provider section of the Discharge navigator with the appropriate appointment information.:   Complete  Discharge Follow-up with Specialty: Orthopedic Surgery; 1 Week   Specialty:   Orthopedic Surgery  Follow Up:   1 Week  Follow Up Details:   follow up left hip nailing  Verify the patient's follow-up provider is added in the Provider section of the Discharge navigator with the appropriate appointment information.:   Complete  What's Next    What's Next          Follow up with Yaakov Riley  please follow up with pcp in 1 week [address] [place] [address] [address]   [facility] [place] [address] [phone]          Follow up with Cosmo Jay in 2 weeks  JULY 21@ 1:45PM [address] [place] [address] [phone]       Continuing Care    Icon destination    Destination    THE [facility]  Services: Skilled Nursing   Address: [facility] [address] , [place] [address]   Phone: [phone]     Your Allergies  Date Reviewed: 6/23/2025  Your Allergies   Allergen Reactions   Sulfa Antibiotics Hives     Patient Belongings Returned    Document Return of Belongings Flowsheet    Were the patient bedside belongings sent home? --   Medication Retrieved from Unit & Sent Home --   Belongings Sent to Safe --   Belongings  sent with: --   Belongings Retrieved from Security & Sent Home --   Medication Retrieved from Unit & Sent Home --   Medications Retrieved from Pharmacy & Sent Home --         Jellyvision Signup    Our records indicate that you have an active [employer] account.     You can view your After Visit Summary by going to [employer] and logging in with your Jellyvision username and password.  If you don't have a Jellyvision username and password but a parent or guardian has access to your record, the parent or guardian should login with their own Jellyvision username and password and access your record to view the After Visit Summary.     If you have questions, you can email [email or web address] or call [phone] or toll free number [phone] to talk to our Jellyvision staff.  Remember, Jellyvision is NOT to be used for urgent needs.  For medical emergencies, dial 911.     Medication List  Medication List     Morning Around Noon Evening Bedtime As Needed   Icon medications to change how you take   acetaminophen 325 MG tablet  Commonly known as: TYLENOL  Take 2 tablets by mouth Every 6 (Six) Hours As Needed for Mild Pain.  What changed:   medication strength  how much to take  when to take this  reasons to take this  Another medication with the same name was removed. Continue taking this medication, and follow the directions you see here.  Last time this was given: 650 mg on July 8, 2025  8:14 AM  Signed by: Chano Romero     2 tablets   Icon medications to change how you take   amLODIPine 10 MG tablet  Commonly known as: NORVASC  Start taking on: July 10, 2025  Take 1 tablet by mouth Daily.  What changed:   medication strength  how much to take  when to take this  Last time this was given: 10 mg on July 9, 2025  8:17 AM  Signed by: Chano Romero Wait until Jul 10     1 tablet        aspirin 81 MG EC tablet  Take 1 tablet by mouth Every Night.  Last time this was given: 81 mg on July 8, 2025  8:13 PM    1  tablet     BIOTIN PO  Take 2 tablets by mouth Daily.    Take 2 tablets by mouth Daily.    Cholecalciferol 50 MCG (2000 UT) tablet  Take 2 tablets by mouth Every Night.  Last time this was given: 4,000 Units on July 8, 2025  8:13 PM    2 tablets     Diclofenac Sodium 1 % gel gel  Commonly known as: VOLTAREN  Apply 4 g topically to the appropriate area as directed 4 (Four) Times a Day As Needed (Knee Pain).  Last time this was given: 4 g on July 9, 2025  8:17 AM    Apply 4 g topically to the appropriate area as directed 4 (Four) Times a Day As Needed (Knee Pain).    donepezil 10 MG tablet  Commonly known as: ARICEPT  Take 1 tablet by mouth Every Night.  Last time this was given: 10 mg on July 8, 2025  8:13 PM    1 tablet     escitalopram 10 MG tablet  Commonly known as: LEXAPRO  Take 1 tablet by mouth Daily.  Last time this was given: 10 mg on July 9, 2025  8:17 AM 1 tablet       Icon medications to change how you take   methIMAzole 5 MG tablet  Commonly known as: TAPAZOLE  Take 0.5 tablets by mouth Daily.  What changed: how much to take  Signed by: Chano Romero 0.5 tablets       Icon medications to start taking   metoprolol succinate XL 25 MG 24 hr tablet  Commonly known as: TOPROL-XL  Start taking on: July 10, 2025  Take 1 tablet by mouth Daily.  Last time this was given: 25 mg on July 9, 2025  8:18 AM  Signed by: Chano Romero Wait until Jul 10     1 tablet        multivitamin tablet tablet  Take 1 tablet by mouth Every Night.  Last time this was given: 1 tablet on July 8, 2025  8:13 PM    1 tablet     omega-3 acid ethyl esters 1 g capsule  Commonly known as: LOVAZA  Take 2 capsules by mouth 2 (Two) Times a Day. 2 capsules   2 capsules     potassium chloride 10 MEQ CR tablet  Take 1 tablet by mouth Daily.  Last time this was given: Ask your nurse or doctor 1 tablet        rosuvastatin 5 MG tablet  Commonly known as: CRESTOR  Take 1 tablet by mouth Daily.  Last time this was given: 5 mg on July 9, 2025  8:17 AM 1  tablet        triamterene-hydrochlorothiazide 37.5-25 MG per capsule  Commonly known as: DYAZIDE  Take 1 capsule by mouth Daily. 1 capsule         Where to  your medications    Icon medication  information                   these medications at [facility] [place] [place] - [address] [facility] - [phone]  - [phone] FX    amLODIPine  methIMAzole  metoprolol succinate XL  Address: [address] [place] [address] [address] [place] [address]   Phone: [phone]     Icon ask where to get these medications    Ask your doctor where to  these medications     acetaminophen 325 MG tablet  Always carry an updated list of your medications with you. If there is an emergency, a responder can quickly see what medications you are taking. Take this paperwork with you the next time you see your health care provider.          Preventing Surgical Site Infections    Surgical Site Infections FAQs     What is a Surgical Site Infection (SSI)?     A surgical site infection is an infection that occurs after surgery in the part of the body where the surgery took place. Most patients who have surgery do not develop an infection. However, infections develop in about 1 to 3 out of every 100 patients who have surgery.     Some of the common symptoms of a surgical site infection are:     Redness and pain around the area where you had surgery  Drainage of cloudy fluid from your surgical wound  Fever     Can SSIs be treated?     Yes. Most surgical site infections can be treated with antibiotics. The antibiotic given to you depends on the bacteria (germs) causing the infection. Sometimes patients with SSIs also need another surgery to treat the infection.     What are some of the things that hospitals are doing to prevent SSIs?     To prevent SSIs, doctors, nurses, and other healthcare providers:     Clean their hands and arms up to their elbows with an antiseptic agent just before the surgery.  Clean their hands with soap  and water or an alcohol-based hand rub before and after caring for each patient.  May remove some of your hair immediately before your surgery using electric clippers if the hair is in the same area where the procedure will occur. They should not shave you with a razor.  Wear special hair covers, masks, gowns, and gloves during surgery to keep the surgery area clean.  Give you antibiotics before your surgery starts. In most cases, you should get antibiotics within 60 minutes before the surgery starts and the antibiotics should be stopped within 24 hours after surgery.  Clean the skin at the site of your surgery with a special soap that kills germs.     What can I do to help prevent SSIs? Before your surgery:     Tell your doctor about other medical problems you may have. Health problems such as allergies, diabetes, and obesity could affect your surgery and your treatment.  Quit smoking. Patients who smoke get more infections. Talk to your doctor about how you can quit before your surgery.  Do not shave near where you will have surgery. Shaving with a razor can irritate your skin and make it easier to develop an infection.     At the time of your surgery:      Speak up if someone tries to shave you with a razor before surgery. Ask why you need to be shaved and talk with your surgeon if you have any concerns.  Ask if you will get antibiotics before surgery.     After your surgery:     Make sure that your healthcare providers clean their hands before examining you, either with soap and water or an alcohol-based hand rub.  If you do not see your providers clean their hands, please ask them to do so.  Family and friends who visit you should not touch the surgical wound or dressings.  Family and friends should clean their hands with soap and water or an alcohol-based hand rub before and after visiting you. If you do not see them clean their hands, ask them to clean their hands.     What do I need to do when I go home from  the hospital?     Before you go home, your doctor or nurse should explain everything you need to know about taking care of your wound. Make sure you understand how to care for your wound before you leave the hospital.  Always clean your hands before and after caring for your wound.  Before you go home, make sure you know who to contact if you have questions or problems after you get home.  If you have any symptoms of an infection, such as redness and pain at the surgery site, drainage, or fever, call your doctor immediately.     If you have additional questions, please ask your doctor or nurse.     Developed and co-sponsored by The Society for Healthcare Epidemiology of [place] (SHEA); Infectious Diseases Society of [place] (IDSA); American Hospital Association; Association for Professionals in Infection Control and Epidemiology (APIC); Centers for Disease Control and Prevention (CDC); and The Joint Commission.     This information is not intended to replace advice given to you by your health care provider. Make sure you discuss any questions you have with your health care provider.     Document Released: 12/23/2014 Document Revised: 11/15/2017 Document Reviewed: 05/08/2017  [employer] Interactive Patient Education © 2019 Elsevier Inc.  [employer] Utility  Icon List of Attachments     Attached Information  HIP FRACTURE (ENGLISH)  Hip Fracture    A hip fracture is a break in the upper part of the thigh bone (femur). This is usually the result of an injury, commonly a fall.  What are the causes?  This condition may be caused by a direct hit or injury (trauma) to the side of the hip, such as from a fall or a car accident.  What increases the risk?  You are more likely to develop this condition if:  You have poor balance or an unsteady walking pattern (gait).  You have thinning or weakening of your bones, which is called osteopenia or osteoporosis.  You have cancer that spreads to the leg bones.  You smoke or use any smokeless  tobacco that contains nicotine.  You take certain medicines, such as steroids.  You have a history of broken bones.  What are the signs or symptoms?  Symptoms of this condition include:  Pain over the injured hip.  Stiffness, bruising, and swelling over the hip.  Difficulty or inability to stand, walk, or use the leg to support body weight.  The leg rolling outward when lying down.  The affected leg being shorter than the other leg.  How is this diagnosed?  This condition may be diagnosed based on:  Your symptoms.  A physical exam.  X-rays. These may be done:  To confirm the diagnosis.  To determine the type and location of the fracture.  To check for other injuries.  MRI or CT scans. These may be done:  If the fracture is not visible on an X-ray.  To get more information about the fracture.  To check for other injuries.  How is this treated?  Treatment for this condition depends on the severity and location of your fracture. In most cases, surgery is necessary. Surgery may involve:  Repairing the fracture with a screw, nail, or piedad to hold the bone in place (open reduction internal fixation).  Replacing the damaged parts of the femur with metal implants, which is called a hip replacement (hemiarthroplasty or arthroplasty).  If your fracture is less severe, or if you are not eligible for surgery, you may have nonsurgical treatment. Nonsurgical treatment may involve:  Using crutches, a walker, or a wheelchair until your health care provider says that you can support (bear) weight on your hip.  Medicines to help reduce pain.  Having regular X-rays to monitor your fracture and make sure that it is healing.  Physical therapy.  Follow these instructions at home:  Activity  Do not use your injured leg to support your body weight until your health care provider says that you can.  Follow standing and walking restrictions as told by your health care provider.  Use crutches, a walker, or a wheelchair as directed.  Avoid any  activities that cause pain in your hip. Ask your health care provider what activities are safe for you.  Do not driveor use machinery for the time period you were told by your health care provider.  If physical therapy was prescribed, do exercises as told by your health care provider.  General instructions    Take over-the-counter and prescription medicines only as told by your health care provider.  If directed, put ice on the injured area. To do this:  Put ice in a plastic bag.  Place a towel between your skin and the bag.  Leave the ice on for 20 minutes, 2-3 times a day.  Remove the ice if your skin turns bright red. This is very important. If you cannot feel pain, heat, or cold, you have a greater risk of damage to the area.  Do not use any products that contain nicotine or tobacco. These products include cigarettes, chewing tobacco, and vaping devices, such as e-cigarettes. These can delay bone healing. If you need help quitting, ask your health care provider.  Keep all follow-up visits. This is important.  How is this prevented?  To prevent falls at home:  Use a cane, walker, or wheelchair as directed.  Make sure your rooms and hallways are free of clutter, obstacles, and cords.  Install grab bars in your bedroom and bathrooms.  Always use handrails when going up and down stairs.  Use night-lights around the house.  Exercise regularly. Ask what forms of exercise are safe for you, such as walking and strength and balance exercises.  Visit an eye doctor regularly to have your eyesight checked. This can help prevent falls.  Make sure you get enough calcium and vitamin D. Ask your health care provider to advise you and to check for osteoporosis or osteopenia.  Do not use any products that contain nicotine or tobacco. These products include cigarettes, chewing tobacco, and vaping devices, such as e-cigarettes. If you need help quitting, ask your health care provider.  Limit alcohol use.  If you have an underlying  condition that may have contributed to your hip fracture, work with your health care provider to manage your condition.  Contact a health care provider if:  Your pain gets worse or it does not get better with rest or medicine.  You develop any of the following in your leg or foot:  Numbness.  Tingling.  A change in skin color (discoloration).  Skin feeling cold to the touch.  Get help right away if:  Your pain suddenly gets worse.  You cannot move your hip.  You notice swelling in your calf. If you also feel pain in the calf, this may be a sign of a blood clot in your leg (deep vein thrombosis).  You have shortness of breath. This may be a sign of a blood clot in the lungs (pulmonary embolism).  These symptoms may be an emergency. Get help right away. Call 911.  Do not wait to see if the symptoms will go away.  Do not drive yourself to the hospital.  Summary  A hip fracture is a break in the upper part of the thigh bone (femur).  Treatment typically requires surgery to restore stability and function to the hip.  Pain medicine and icing of the affected leg can help manage pain and swelling. Follow directions as told by your health care provider.  This information is not intended to replace advice given to you by your health care provider. Make sure you discuss any questions you have with your health care provider.  Document Revised: 11/07/2022 Document Reviewed: 11/07/2022  [employer] Patient Education © 2024 [employer].      Icon List of Attachments     Attached Information  HIP FRACTURE TREATED WITH ORIF  CARE AFTER (ENGLISH)  Hip Fracture Treated With ORIF, Care After  The following information offers guidance on how to care for yourself after your procedure. Your health care provider may also give you more specific instructions. If you have problems or questions, contact your health care provider.  What can I expect after the procedure?  After the procedure, it is common to have:  Pain. You will be given  medicines to treat this.  Swelling.  Difficulty walking.  Some redness or bruising around the incision.  A small amount of fluid or blood from the incision.  Follow these instructions at home:  Medicines  Take over-the-counter and prescription medicines only as told by your health care provider.  You may be given a blood thinner to take for up to 6 weeks. This will help reduce the risk of developing a blood clot. It is important to use this medicine exactly as directed.  Ask your health care provider if the medicine prescribed to you:  Requires you to avoid driving or using machinery.  Can cause constipation. You may need to take these actions to prevent or treat constipation:  Drink enough fluid to keep your urine pale yellow.  Take over-the-counter or prescription medicines.  Eat foods that are high in fiber, such as beans, whole grains, and fresh fruits and vegetables.  Limit foods that are high in fat and processed sugars, such as fried or sweet foods.  You may be given calcium and vitamin D supplements to strengthen your bones.  Bathing  Do not take baths, swim, or use a hot tub until your health care provider approves. Ask your health care provider if you may take showers. You may only be allowed to take sponge baths.  Keep your bandage (dressing) dry.  Incision care    Follow instructions from your health care provider about how to take care of your incision. Make sure you:  Wash your hands with soap and water for at least 20 seconds before and after you change your dressing. If soap and water are not available, use hand .  Change your dressing as told by your health care provider. You may be asked to leave the dressing in place until your clinic visit.  Leave stitches (sutures), staples, skin glue, or adhesive strips in place. These skin closures may need to stay in place for 2 weeks or longer. If adhesive strip edges start to loosen and curl up, you may trim the loose edges. Do not remove adhesive  strips completely unless your health care provider tells you to do that.  Check your incision area every day for signs of infection. Check for:  More redness, swelling, or pain.  More fluid or blood.  Warmth.  Pus or a bad smell.  Managing pain, stiffness, and swelling    If directed, put ice on the affected area. To do this:  Put ice in a plastic bag.  Place a towel between your skin and the bag.  Leave the ice on for 20 minutes, 2-3 times a day.  Remove the ice if your skin turns bright red. This is very important. If you cannot feel pain, heat, or cold, you have a greater risk of damage to the area.  Move your hips, knees, ankles, and toes often to reduce stiffness and swelling.  Raise (elevate) your leg above the level of your heart while you are lying down. To do this, try putting a few pillows under your leg.  Activity    Return to your normal activities as told by your health care provider. Ask your health care provider what activities are safe for you.  Do exercises as told by your health care provider or physical therapist. This will help make your hip stronger and help you recover more quickly.  Do not use your injured limb to support (bear) your body weight until your health care provider says that you can. Follow weight-bearing restrictions as told by your health care provider. Use crutches, a walker, or other devices (assistive devices) to help you move around as directed.  You may feel most comfortable using a raised surface when sitting on the toilet or in a chair.  Consider using a toilet seat riser over the toilet for comfort.  General instructions  Wear compression stockings as told by your health care provider. These stockings help to prevent blood clots and reduce swelling in your legs.  Do not use any products that contain nicotine or tobacco. These products include cigarettes, chewing tobacco, and vaping devices, such as e-cigarettes. These can delay bone healing and increase your risk of  infection. If you need help quitting, ask your health care provider.  Keep all follow-up visits. This is important. This may include visits for:  Physical therapy.  Screening for osteoporosis. Osteoporosis is the thinning and loss of density in your bones.  Contact a health care provider if:  You have a fever.  You have pain that is not helped with medicine.  You have more redness, swelling, or pain at your incision area.  You have more fluid or blood coming from your incision or leaking through your dressing.  You notice that your incision feels warm to the touch.  You have pus or a bad smell coming from your incision area.  Get help right away if:  You notice that the edges of your incision have come apart after the sutures or staples have been removed.  You have pain, warmth, or tenderness in the back of your lower leg (calf).  You have tingling or numbness in your leg.  You have a pale and cold leg.  You have trouble breathing.  You have chest pain.  These symptoms may be an emergency. Get help right away. Call 911.  Do not wait to see if the symptoms will go away.  Do not drive yourself to the hospital.  Summary  After the procedure, it is common to have some pain and swelling.  Take pain medicines as directed by your health care provider. Icing may also help with pain control.  Contact your health care provider if you have any signs of infection, such as more redness, swelling, or pain at your incision area, or more fluid or blood coming from your incision.  This information is not intended to replace advice given to you by your health care provider. Make sure you discuss any questions you have with your health care provider.  Document Revised: 11/03/2022 Document Reviewed: 11/03/2022  Elsevier Patient Education © 2024 [employer]              Stroke Symptoms    Call 911 or have someone take you to the Emergency Department if you have any of the following:  Sudden numbness or weakness of your face, arm or  leg especially on one side of the body  Sudden confusion, difficulty speaking or trouble understanding   Changes in your vision or loss of sight in one eye  Sudden severe headache with no known cause  Sudden dizziness, trouble walking, loss of balance or coordination     It is important to seek emergency care right away if you have further stroke symptoms. If you get emergency help quickly, the powerful clot-dissolving medicines can reduce the disabilities caused by a stroke.      For more information:  American Stroke Association  0-956-9-STROKE  www.strokeassociation.org  Suicidal Feelings    If you feel like life is too tough and are thinking of suicide or injuring yourself, get help right away!  Call or text 988 to speak to someone.     Patient Experience    Thank you for choosing [facility]. You may receive a survey following your visit. Please take a moment to share what went well, where we need improvement, and which staff members deserve recognition. We value your input.     Discharge Summary        Chano Romero DO at 25 1443              [facility] HOSPITALIST MEDICINE DISCHARGE SUMMARY    Patient Identification:  Name:  Lizbet Arzola  Age:  80 y.o.  Sex:  female  :  1945  MRN:  [id number]  Visit Number:  [id number]    Date of Admission: 2025  Date of Discharge: 2025    PCP: Yaakov Riley DO    DISCHARGE DIAGNOSIS   Left femur fracture  Acute blood loss anemia  New onset paroxysmal A-fib  Essential hypertension  Hyperlipidemia  Hyperthyroidism      CONSULTS  Dr. Jay, Orthopedic Surgery      PROCEDURES PERFORMED   Patient underwent left subtrochanteric hip fracture repair with long cephalomedullary nail on 2025.  Please see procedure note for specific details.  Patient tolerated the procedure well with no postprocedural complications.      HOSPITAL COURSE  Ms. Arzola is a 80 y.o. female who presented to [facility] ED on 2025  with a chief complaint of left hip pain.  Patient has a past medical history remarkable for essential hypertension, hyperlipidemia, hyperthyroidism and osteoarthritis.  Patient reports having a mechanical fall at home where she was doing laundry, tripped over her rollator and fell to the floor.  She reported immediate pain in her left hip and for this reason was brought to the emergency department for further treatment and evaluation.  Initial evaluation the emergency department did consist of basic laboratory work as well as physical exam and vital signs.  Please see initial H&P for specific details.  After imaging in the emergency department, patient was diagnosed with closed intertrochanteric fracture of left femur and admitted for further treatment and evaluation.    Orthopedic surgeries was consulted who evaluated the patient.  After thorough evaluation, recommendation was made to proceed with placement of long cephalomedullary nail to left hip joint.  Please see procedure note for specific details.  Patient tolerated the procedure well with no postprocedural complications.  Patient did require 1 unit PRBC transfusion postoperatively and hemoglobin/hematocrit have been stable since that time.  It should be noted patient did develop new onset A-fib with spontaneous conversion to normal sinus rhythm during this hospitalization.  Did discuss with patient starting Eliquis and metoprolol.  However, patient is still considered high fall risk and as such decision was made to hold anticoagulation at this time.  Transthoracic echo was obtained during this hospitalization which demonstrated normal left ventricular systolic function with estimated EF 60 to 65% with grade 1 diastolic dysfunction.  Patient did have a lengthy hospitalization as multiple attempts were made at getting patient approved for inpatient rehab.  Unfortunately, her insurance did not agree with physical therapist assessment or her primary attending  assessment of need for inpatient rehab.  As such, patient was denied access to inpatient rehab by her insurance company.  With this in mind, it was felt patient would still benefit from physical therapy and case management did assist with placement in a local skilled nursing facility.  With this in mind, it is felt patient has reached maximum medical benefit of current hospitalization and she will be discharged to the [facility] in stable condition today.  The beforementioned plan was thoroughly discussed with the patient and her family and they all expressed their understanding and willingness to proceed with the beforementioned plan.    VITAL SIGNS:      07/07/25  0500 07/08/25  0500 07/09/25  0500   Weight: 69 kg (152 lb 1.6 oz) 64.5 kg (142 lb 4.8 oz) (RN Lin shay) 62.3 kg (137 lb 4.8 oz)     Body mass index is 21.5 kg/m².    PHYSICAL EXAM:  Constitutional: Well-nourished  female in no apparent distress.     HENT:  Head:  Normocephalic and atraumatic.  Mouth:  Moist mucous membranes.    Eyes:  Conjunctivae and EOM are normal.  Pupils are equal, round, and reactive to light.  No scleral icterus.    Neck:  Neck supple. No thyromegaly.  No JVD present.    Cardiovascular:  Regular rate and rhythm with no murmurs, rubs, clicks or gallops appreciated.  Pulmonary/Chest:  Clear to auscultation bilaterally with no crackles, wheezes or rhonchi appreciated.  Abdominal:  Soft. Nontender. Nondistended  Bowel sounds are normal in all four quadrants. No organomegally appreciated.   Musculoskeletal:  No edema, surgical dressing applied to left hip joint  Neurological: Awake, Cranial nerves II-XII intact with no focal deficits.  No facial droop.  No slurred speech.   Skin:  Warm and dry to palpation with no rashes or lesions appreciated.  Peripheral vascular:  2+ radial and pedal pulses in bilateral upper and lower extremities.  Psychiatric: Awake and oriented x3, demonstrates appropriate judgment and  insight.    DISCHARGE DISPOSITION   Stable    DISCHARGE MEDICATIONS:     Discharge Medications        Changes to Medications        Instructions Start Date   acetaminophen 325 MG tablet  Commonly known as: TYLENOL  What changed:   medication strength  how much to take  when to take this  reasons to take this  Another medication with the same name was removed. Continue taking this medication, and follow the directions you see here.   650 mg, Oral, Every 6 Hours PRN      amLODIPine 10 MG tablet  Commonly known as: NORVASC  What changed:   medication strength  how much to take  when to take this   10 mg, Oral, Every 24 Hours Scheduled   Start Date: July 10, 2025     methIMAzole 5 MG tablet  Commonly known as: TAPAZOLE  What changed: how much to take   2.5 mg, Oral, Daily             Continue These Medications        Instructions Start Date   aspirin 81 MG EC tablet   81 mg, Oral, Nightly      BIOTIN PO   2 tablets, Daily      Cholecalciferol 50 MCG (2000 UT) tablet   2 tablets, Nightly      Diclofenac Sodium 1 % gel gel  Commonly known as: VOLTAREN   4 g, Topical, 4 Times Daily PRN      donepezil 10 MG tablet  Commonly known as: ARICEPT   10 mg, Oral, Nightly      escitalopram 10 MG tablet  Commonly known as: LEXAPRO   10 mg, Oral, Daily      losartan 50 MG tablet  Commonly known as: COZAAR   50 mg, Oral, Daily      multivitamin tablet tablet   1 tablet, Oral, Nightly      omega-3 acid ethyl esters 1 g capsule  Commonly known as: LOVAZA   2 g, Oral, 2 Times Daily      potassium chloride 10 MEQ CR tablet   10 mEq, Oral, Daily      rosuvastatin 5 MG tablet  Commonly known as: CRESTOR   5 mg, Oral, Daily      triamterene-hydrochlorothiazide 37.5-25 MG per capsule  Commonly known as: DYAZIDE   1 capsule, Oral, Daily               Diet Instructions    Resume diet as tolerated.            Activity Instructions    Resume activity as tolerated.          Additional Instructions for the Follow-ups that You Need to Schedule        Discharge Follow-up with PCP   As directed       Currently Documented PCP:    Yaakov Riley DO    PCP Phone Number:    [phone]     Follow Up Details: please follow up with pcp in 1 week        Discharge Follow-up with Specialty: Orthopedic Surgery; 1 Week   As directed      Specialty: Orthopedic Surgery   Follow Up: 1 Week   Follow Up Details: follow up left hip nailing               Contact information for follow-up providers       Yaakov Riley DO .    Specialty: Family Medicine  Why: please follow up with pcp in 1 week  Contact information:  [address] [place] [address] [address]  [facility] [address] [phone]               Cosmo Jay MD Follow up in 2 week(s).    Specialty: Orthopedic Surgery  Why: JULY 21@ 1:45PM  Contact information:  [address] [place] [address] [phone]                       Contact information for after-discharge care       Destination       [facility] .    Service: Skilled Nursing  Contact information:  [address] [facility] [address] [phone]                                   TEST  RESULTS PENDING AT DISCHARGE       The ASCVD Risk score (Jb DK, et al., 2019) failed to calculate for the following reasons:    The 2019 ASCVD risk score is only valid for ages 40 to 79   Chano Romero DO  07/09/25  14:43 EDT    Please note that this discharge summary required more than 30 minutes to complete.    Please send a copy of this dictation to the following providers:  Yaakov Riley DO    Electronically signed by Chano Romero DO at 07/09/25 1504       Discharge Order (From admission, onward)       Start     Ordered    07/09/25 1358  Discharge patient  Once        Expected Discharge Date: 07/09/25   Expected Discharge Time: Afternoon   Discharge Disposition: Skilled Nursing Facility (DC - External)   Physician of Record for Attribution - Please select from Treatment Team: CHANO ROMERO [[id number]]   Review needed by CMO to  determine Physician of Record: No      Question Answer Comment   Physician of Record for Attribution - Please select from Treatment Team KENNEDI KING    Review needed by CMO to determine Physician of Record No        07/09/25 1358    07/07/25 1017  Discharge patient  Once,   Status:  Canceled        Expected Discharge Date: 07/07/25   Expected Discharge Time: Morning   Discharge Disposition: Skilled Nursing Facility (DC - External)   Physician of Record for Attribution - Please select from Treatment Team: KENNEDI KING [[id number]]   Review needed by CMO to determine Physician of Record: No      Question Answer Comment   Physician of Record for Attribution - Please select from Treatment Team KENNEDI KING    Review needed by CMO to determine Physician of Record No        07/07/25 1021

## 2025-07-09 NOTE — NURSING NOTE
Received report from IRMA Collado at this time. I agree with the previous assessment charted. Pt on RA with sats maintaining above 90%. No s/s of acute distress noted at this time. Plan of care ongoing.

## 2025-07-09 NOTE — THERAPY TREATMENT NOTE
Patient Name: Lizbet Arzola  : 1945    MRN: [id number]                              Today's Date: 2025       Admit Date: 2025    Visit Dx:     ICD-10-CM ICD-9-CM   1. Closed displaced intertrochanteric fracture of left femur, initial encounter  S72.142A 820.21   2. Acute low back pain with sciatica, sciatica laterality unspecified, unspecified back pain laterality  M54.40 724.2     724.3     Patient Active Problem List   Diagnosis   (none) - all problems resolved or deleted     Past Medical History:   Diagnosis Date    Hyperlipidemia     Hypertension     Hyperthyroidism     Osteoarthritis      Past Surgical History:   Procedure Laterality Date    HIP INTERTROCHANTERIC NAILING Left 2025    Procedure: HIP INTERTROCHANTERIC NAILING LONG WITH INTRAMEDULLARY HIP SCREW;  Surgeon: Cosmo Jay MD;  Location: [facility];  Service: Orthopedics;  Laterality: Left;    LIPOMA EXCISION        General Information       Row Name 25 1414          OT Time and Intention    Subjective Information complains of;weakness;fatigue  -     Document Type therapy note (daily note)  -     Mode of Treatment individual therapy;occupational therapy  -     Patient Effort excellent  -     Symptoms Noted During/After Treatment fatigue  -     Comment Patient agreeable to therapy.  -       Row Name 25 1414          General Information    Patient Profile Reviewed yes  -     Existing Precautions/Restrictions fall;weight bearing  -     Barriers to Rehab none identified  -       Row Name 25 1414          Cognition    Orientation Status (Cognition) oriented to;person;place;situation  -       Row Name 25 1414          Safety Issues/Impairments Affecting Functional Mobility    Impairments Affecting Function (Mobility) balance;endurance/activity tolerance;pain;strength  -               User Key  (r) = Recorded By, (t) = Taken By, (c) = Cosigned By      Initials Name Provider Type    KP  Izzy Murillo OT Occupational Therapist                     Mobility/ADL's       Row Name 07/09/25 1416          Bed Mobility    Comment, (Bed Mobility) sitting up at edge of chair  -[facility] Name 07/09/25 1416          Transfers    Transfers toilet transfer  -[facility] Name 07/09/25 1416          Toilet Transfer    Type (Toilet Transfer) stand pivot/stand step  -     [place] Level (Toilet Transfer) minimum assist (75% patient effort)  -     Assistive Device (Toilet Transfer) commode, 3-in-1;walker, front-wheeled  -       Row Name 07/09/25 1416          Mobility    Extremity Weight-bearing Status left lower extremity  -     Left Lower Extremity (Weight-bearing Status) weight-bearing as tolerated (WBAT)  -[facility] Name 07/09/25 1416          Lower Body Dressing Assessment/Training    [place] Level (Lower Body Dressing) lower body dressing skills;maximum assist (25% patient effort)  -     Comment, (Lower Body Dressing) attempted to encourage patient to complete pulling brief over hip but patient anxious/fearful and unable to let go of walker  -[facility] Name 07/09/25 1416          Toileting Assessment/Training    [place] Level (Toileting) toileting skills;maximum assist (25% patient effort)  -               User Key  (r) = Recorded By, (t) = Taken By, (c) = Cosigned By      Initials Name Provider Type    Izzy Motta OT Occupational Therapist                   Obj/Interventions       Row Name 07/09/25 1418          Motor Skills    Motor Skills functional endurance  -KP     Functional Endurance good minus  -               User Key  (r) = Recorded By, (t) = Taken By, (c) = Cosigned By      Initials Name Provider Type    Izzy Motta OT Occupational Therapist                   Goals/Plan    No documentation.                  Clinical Impression       Row Name 07/09/25 1419          Pain Assessment    Pretreatment Pain Rating 0/10 - no pain  -     Posttreatment  Pain Rating 0/10 - no pain  -       Row Name 07/09/25 1419          Plan of Care Review    Plan of Care Reviewed With patient  -     Progress improving  -     Outcome Evaluation Patient agreeable to therapy. She was assisted to and from [facility] to complete toileting. Patient with possible discharge later on this date.  -       Row Name 07/09/25 1419          Therapy Plan Review/Discharge Plan (OT)    Anticipated Discharge Disposition (OT) inpatient rehabilitation facility  -       Row Name 07/09/25 1419          Positioning and Restraints    Pre-Treatment Position in bed  -     Post Treatment Position bed  -     In Bed fowlers;call light within reach;encouraged to call for assist;exit alarm on  -               User Key  (r) = Recorded By, (t) = Taken By, (c) = Cosigned By      Initials Name Provider Type    Izzy Motta OT Occupational Therapist                   Outcome Measures       Row Name 07/09/25 0817          How much help from another person do you currently need...    Turning from your back to your side while in flat bed without using bedrails? 3  -MP     Moving from lying on back to sitting on the side of a flat bed without bedrails? 3  -MP     Moving to and from a bed to a chair (including a wheelchair)? 3  -MP     Standing up from a chair using your arms (e.g., wheelchair, bedside chair)? 3  -MP     Climbing 3-5 steps with a railing? 2  -MP     To walk in hospital room? 2  -MP     AM-PAC 6 Clicks Score (PT) 16  -MP               User Key  (r) = Recorded By, (t) = Taken By, (c) = Cosigned By      Initials Name Provider Type    Heaven Glaser RN Registered Nurse                      OT Recommendation and Plan  Planned Therapy Interventions (OT): activity tolerance training, adaptive equipment training, BADL retraining, functional balance retraining, patient/caregiver education/training, passive ROM/stretching, occupation/activity based interventions, transfer/mobility retraining,  strengthening exercise, ROM/therapeutic exercise  Therapy Frequency (OT): 5 times/wk  Plan of Care Review  Plan of Care Reviewed With: patient  Progress: improving  Outcome Evaluation: Patient agreeable to therapy. She was assisted to and from [facility] to complete toileting. Patient with possible discharge later on this date.     Time Calculation:         Time Calculation- OT       Row Name 07/09/25 1421             Time Calculation- OT    OT Received On 07/09/25  -                User Key  (r) = Recorded By, (t) = Taken By, (c) = Cosigned By      Initials Name Provider Type    Izzy Motta OT Occupational Therapist                  Therapy Charges for Today       Code Description Service Date Service Provider Modifiers Qty    39591347165 HC OT THERAPEUTIC ACT EA 15 MIN 7/8/2025 Izzy Murillo OT GO 1    41867287901 HC OT SELF CARE/MGMT/TRAIN EA 15 MIN 7/9/2025 Izzy Murillo OT GO 1    54842031858 HC OT THERAPEUTIC ACT EA 15 MIN 7/9/2025 Izzy Murillo OT GO 1                 Izzy Murillo OT  7/9/2025

## 2025-07-09 NOTE — SIGNIFICANT NOTE
07/09/25 0814   Post Acute Pre-Cert Documentation   Request Submitted by Facility - Type: Post Acute   Post-Acute Authorization Type Submitted: IRF   Date Post Acute Pre-Cert Completed 07/09/25   Response Received from Insurance? Denial   Response Communicated to:    Post Acute Pre-Cert Initiated Comment [facility] Medicare called with peer to peer denial.  They stated she is more appropriate for a lower level of care such as SNF.  Fast appeals phone number is [phone], and fax number is [phone].

## 2025-07-09 NOTE — DISCHARGE PLACEMENT REQUEST
"Dariusz Lizbetitzel Mendez \"Vanessa\" (80 y.o. Female)       Date of Birth   1945    Social Security Number       Address   [address] [place], [place] [address] [address]    Home Phone       MRN   [id number]       [facility]    Marital Status                               Admission Date   6/21/2025    Admission Type   Emergency    Admitting Provider   Jose Chau MD    Attending Provider   Chano Romero DO    Department, Room/Bed   [address], 3311/1S       Discharge Date       Discharge Disposition       Discharge Destination                                 Attending Provider: Chano Romero DO    Allergies: Sulfa Antibiotics    Isolation: None   Infection: None   Code Status: CPR    Ht: 170.2 cm (67\")   Wt: 62.3 kg (137 lb 4.8 oz)    Admission Cmt: None   Principal Problem: Closed intertrochanteric fracture of left femur [S72.142A]                   Active Insurance as of 6/21/2025       Primary Coverage       Payor Plan Insurance Group Employer/Plan Group    [employer] MEDICARE REPLACEMENT [employer] Medicare Advantage GROUP PPO [id number]       Payor Plan Address Payor Plan Phone Number Payor Plan Fax Number Effective Dates    [address] [address]   1/1/2025 - None Entered    [facility] [address]         Subscriber Name Subscriber Birth Date Member ID       LIZBET RIVERA 1945 [id number]                     Emergency Contacts        (Rel.) Home Phone Work Phone Mobile Phone    kyara(POA)carlos (Daughter) -- -- [phone]    jakob(POA)maye (Daughter) [phone] -- [phone]    CaseyElizabeth(POA)Parveen (Son) -- -- [phone]              Emergency Contact Information       Name Relation Home Work Mobile    markosargtarsha(POA)carlos Daughter   [phone]    jakob(POA)maye Daughter [phone]    CaseyElizabeth(POA)Parveen Son   [phone]          Other Contacts    None on File       Insurance Information             "      [employer] MEDICARE REPLACEMENT/[facility] Medicare Advantage GROUP PPO Phone: --    Subscriber: Lizbet Arzola Subscriber#: [id number]    Group#: [id number] Precert#: --    Authorization#: -- Effective Date: --             History & Physical        Jose Chau MD at 25 0131          Hospitalist History and Physical        Patient Identification  Name: Lizbet Arzola  Age/Sex: 80 y.o. female  :  1945        MRN: [id number]  Visit Number: [id number]  Admit Date: 2025   PCP: Yaakov Riley DO          Chief complaint fall, left hip pain    History of Present Illness:  Patient is a 80 y.o. female with history of HTN, HLD, OA and hyperthyroidism on methimazole, who presents with complaints of left hip pain after falling at home. She reports she was doing laundry when she tripped over her rollator and fell to the floor, hitting her head and landing on her left side. She was unable to get up and laid in the floor for an hour before her  found her. She was brought to the ED and found to have suffered a left intertronchanteric femur fracture. She also suffered a posterior scalp laceration from the fall which was stabled by her ED provider.     Review of Systems  Review of Systems   Constitutional:  Negative for activity change, appetite change, chills, diaphoresis, fatigue, fever and unexpected weight change.   HENT:  Negative for congestion, postnasal drip, rhinorrhea, sinus pressure, sinus pain and sore throat.    Eyes:  Negative for photophobia and visual disturbance.   Respiratory:  Negative for cough, shortness of breath and wheezing.    Cardiovascular:  Negative for chest pain, palpitations and leg swelling.   Gastrointestinal:  Negative for abdominal distention, abdominal pain, constipation, diarrhea, nausea and vomiting.   Genitourinary:  Negative for difficulty urinating, dysuria, flank pain and frequency.   Musculoskeletal:  Positive for arthralgias (left hip (acute),  multiple other joints including left knee (chronic)).   Skin:  Positive for wound (laceration back of head from fall).   Neurological:  Negative for dizziness, tremors, seizures, syncope, weakness, light-headedness, numbness and headaches.   Hematological:  Negative for adenopathy. Does not bruise/bleed easily.   Psychiatric/Behavioral:  Negative for agitation, behavioral problems and confusion.        History  Past Medical History:   Diagnosis Date    Hyperlipidemia     Hypertension     Hyperthyroidism     Osteoarthritis      History reviewed. No pertinent surgical history.  History reviewed. No pertinent family history.     (Not in a hospital admission)    Allergies:  Sulfa antibiotics    Objective    Vital Signs  Temp:  [98.6 °F (37 °C)] 98.6 °F (37 °C)  Heart Rate:  [77-91] 91  Resp:  [16] 16  BP: (150-163)/(63-71) 150/63  Body mass index is 21.93 kg/m².    Physical Exam:  Physical Exam  Constitutional:       General: She is not in acute distress.     Appearance: Normal appearance. She is not ill-appearing.   HENT:      Head: Normocephalic and atraumatic.      Right Ear: External ear normal.      Left Ear: External ear normal.      Nose: Nose normal.      Mouth/Throat:      Mouth: Mucous membranes are moist.      Pharynx: Oropharynx is clear.   Eyes:      Extraocular Movements: Extraocular movements intact.      Conjunctiva/sclera: Conjunctivae normal.      Pupils: Pupils are equal, round, and reactive to light.   Cardiovascular:      Rate and Rhythm: Normal rate and regular rhythm.      Pulses: Normal pulses.      Heart sounds: Normal heart sounds. No murmur heard.  Pulmonary:      Effort: Pulmonary effort is normal. No respiratory distress.      Breath sounds: Normal breath sounds. No wheezing or rales.   Abdominal:      General: Abdomen is flat. Bowel sounds are normal. There is no distension.      Palpations: Abdomen is soft.      Tenderness: There is no abdominal tenderness.   Musculoskeletal:      Cervical  back: Normal range of motion and neck supple. No tenderness.      Right lower leg: No edema.      Left lower leg: No edema.      Comments: Left leg appx 1 inch shorter than right.    Lymphadenopathy:      Cervical: No cervical adenopathy.   Skin:     General: Skin is warm and dry.      Capillary Refill: Capillary refill takes less than 2 seconds.      Comments: Laceration posterior scalp which has been stapled   Neurological:      General: No focal deficit present.      Mental Status: She is alert and oriented to person, place, and time.   Psychiatric:         Mood and Affect: Mood normal.         Behavior: Behavior normal.           Results Review:       Lab Results:  Results from last 7 days   Lab Units 06/21/25 2117   WBC 10*3/mm3 17.41*   HEMOGLOBIN g/dL 11.5*   PLATELETS 10*3/mm3 253     Results from last 7 days   Lab Units 06/21/25 2117   CRP mg/dL <0.30     Results from last 7 days   Lab Units 06/21/25 2117   SODIUM mmol/L 141   POTASSIUM mmol/L 4.1   CHLORIDE mmol/L 106   CO2 mmol/L 19.7*   BUN mg/dL 30.3*   CREATININE mg/dL 1.45*   CALCIUM mg/dL 9.7   GLUCOSE mg/dL 148*         Hemoglobin A1C   Date Value Ref Range Status   06/21/2025 5.92 (H) 4.80 - 5.60 % Final     Results from last 7 days   Lab Units 06/21/25 2117   BILIRUBIN mg/dL 0.4   ALK PHOS U/L 78   AST (SGOT) U/L 29   ALT (SGPT) U/L 21     Results from last 7 days   Lab Units 06/21/25 2117   CK TOTAL U/L 326*                   I have reviewed the patient's laboratory results.    Imaging:  Imaging Results (Last 72 Hours)       Procedure Component Value Units Date/Time    CT Lumbar Spine Without Contrast [[id number]] Collected: 06/21/25 2334     Updated: 06/21/25 2338    Narrative:      EXAMINATION: CT lumbar spine without contrast     CLINICAL HISTORY: Fall     COMPARISON: None     TECHNIQUE: Contiguous 3 mm thick slices were obtained through the lumbar  spine without contrast. Coronal and sagittal reformats were performed.      FINDINGS:  Sagittal alignment is normal. Mild left convex curvature is centered at  the L4/L5 level. Mild right convex curvature centered at the L1/L2  level. There is no acute fracture. No traumatic listhesis. Moderate  multilevel disc related degenerative changes are noted. At the L4/L5  level there is a posterior disc bulge and comminution with facet  arthrosis and ligamentous thickening. The findings result in at least  moderate central canal stenosis. The disc bulges noted at the L5/S1  level as well.       Impression:      1. Multilevel disc related degenerative changes and facet arthrosis.  2. No evidence of recent trauma.     This report was finalized on 6/21/2025 11:36 PM by Juanjose Rocha MD.       CT Pelvis Without Contrast [[id number]] Collected: 06/21/25 2331     Updated: 06/21/25 2336    Narrative:      EXAMINATION: CT pelvis without contrast     CLINICAL HISTORY: Injury.     COMPARISON: None available.     TECHNIQUE: Contiguous 3 mm thick slices were obtained through the pelvis  without contrast. Coronal and sagittal reformats were performed.     FINDINGS:  Alignment at the hips is normal. Mild bilateral hip osteoarthrosis is  noted. There is an acute comminuted fracture through the  intertrochanteric region of the left proximal femur. There is apex  lateral angulation at the fracture site. Additionally, the distal  fragment is displaced medially with respect to the more proximal  fragment. There is apex anterior angulation at the fracture site as  well. Extensive adjacent soft tissue swelling is noted. No definite  acetabular fracture is appreciated.       Impression:      1. Acute comminuted left intertrochanteric proximal femur fracture.     This report was finalized on 6/21/2025 11:34 PM by Juanjose Rocha MD.       CT Head Without Contrast [[id number]] Collected: 06/21/25 2323     Updated: 06/21/25 2333    Narrative:      EXAMINATION: CT head without contrast     CLINICAL HISTORY: Fall    "  COMPARISON: None available.     TECHNIQUE: Contiguous 3 mm thick slices were obtained from the skull  base to the vertex without contrast. Coronal and sagittal reformats were  performed.     FINDINGS:  Moderate generalized volume loss is noted with prominence of the sulci  and ventricular system. White matter changes are noted in a pattern  suggesting chronic small vessel ischemic disease. There is no acute  intracranial hemorrhage. No acute territorial infarct. No extra-axial  collection is identified. There is no shift of midline structures. No  acute calvarial fracture is appreciated. Hyperostosis frontalis is  noted.       Impression:      1. No acute intracranial process.     This report was finalized on 6/21/2025 11:31 PM by Juanjose Rocha MD.               I have personally reviewed the patient's radiologic imaging.        EKG: ordered, results pending        Assessment & Plan    - Closed intertrochanteric fracture of left femur: admit to medical surgical unit. IV morphine available PRN. Keep NPO since already after midnight. Orthopedic surgery notified by ED; Dr Jay to see patient in consultation in the morning.   - Mild CK elevation, likely secondary to above fracture and prolonged time down on the floor at home: does not meet criteria for rhabdomyolysis at this time. Hydrate with IV fluids and continue to trend.  - Renal insufficiency, acute vs chronic: no prior labs for comparison. Patient denies history of CKD, but her daughter mentioned that her creatinine does run \"a little high\". Elevated BUN:cr ratio suggests dehydration. Avoid nephrotoxic home meds (losartan, triamterene-HCTZ). Repeat labs in the morning to monitor response to IV fluid hydration.  - Leukocytosis: likely reactive from hip fracture. CRP and procal are normal. Will check UA to look for evidence of bacteruria pre-op.   - HTN: BP stable thus far. Holding losartan and triamterene-HCTZ as noted above. Plan to continue amlodipine in the " morning. Continue same dose since pain medication may help to keep BP controlled, but if not then can increase amlodipine from 5 to 10mg moving forward.  - HLD: hold statin for now in light of CK elevation.   - Hyperthyroidism: TSH mildly elevated at 4.900. Check free T4. Plan to continue methimazole once reconciled by pharmacy.   - Mild hyperglycemia: likely reactive to some extent from hip fracture, but A1c is mildly elevated as well at 5.92 indicating pre-diabetes. Recommend lifestyle modification moving forward.     DVT Prophylaxis: SCD to right leg only    Estimated Length of Stay >2 midnights    I discussed the patient's findings, assessment and plan with the patient, her daughter at bedside, and ED provider Quincy Story PA-C    Patient is high risk due to left intertrochanteric femur fracture requiring parenteral opioids for pain control    Jose Chau MD  06/22/25  01:34 EDT      Electronically signed by Jose Chau MD at 06/22/25 0146       Vital Signs (last day)       Date/Time Temp Temp src Pulse Resp BP Patient Position SpO2    07/09/25 0658 97.6 (36.4) Oral 85 16 155/69 Lying 94    07/09/25 0337 98.3 (36.8) Oral 79 16 154/60 Lying 95    07/08/25 2342 98.8 (37.1) Oral 89 16 172/78 Lying 95    07/08/25 2015 98.7 (37.1) Oral 73 16 159/67 Lying 95    07/08/25 1453 98.4 (36.9) Oral 69 18 152/61 Lying 96    07/08/25 1109 97.7 (36.5) Oral 89 18 169/64 Lying 95    07/08/25 0654 97.8 (36.6) Oral 77 18 143/60 Lying 96    07/08/25 0319 98.2 (36.8) Oral 84 18 168/56 Lying 94          Lines, Drains & Airways       Active LDAs       None                  Current Facility-Administered Medications   Medication Dose Route Frequency Provider Last Rate Last Admin    acetaminophen (TYLENOL) tablet 650 mg  650 mg Oral Q6H PRN Chano Romero DO   650 mg at 07/08/25 0814    amLODIPine (NORVASC) tablet 10 mg  10 mg Oral Q24H Chano Romero DO   10 mg at 07/09/25 0817    aspirin EC tablet  81 mg  81 mg Oral Nightly Cosmo Jay MD   81 mg at 07/08/25 2013    sennosides-docusate (PERICOLACE) 8.6-50 MG per tablet 2 tablet  2 tablet Oral BID PRN Cosmo Jay MD   2 tablet at 06/28/25 0923    And    polyethylene glycol (MIRALAX) packet 17 g  17 g Oral Daily PRN Cosmo Jay MD   17 g at 06/28/25 2126    And    bisacodyl (DULCOLAX) EC tablet 5 mg  5 mg Oral Daily PRN Cosmo Jay MD   5 mg at 06/24/25 1612    And    bisacodyl (DULCOLAX) suppository 10 mg  10 mg Rectal Daily PRN Cosmo Jay MD        calcium carbonate (TUMS) chewable tablet 500 mg (200 mg elemental)  2 tablet Oral TID PRN Chano Romero DO   2 tablet at 07/08/25 1401    Calcium Replacement - Follow Nurse / BPA Driven Protocol   Not Applicable PRN Mando Shahid DO        cholecalciferol (VITAMIN D3) tablet 4,000 Units  4,000 Units Oral Nightly Cosmo Jay MD   4,000 Units at 07/08/25 2013    diazePAM (VALIUM) tablet 2 mg  2 mg Oral Once Chano Romero DO        Diclofenac Sodium (VOLTAREN) 1 % gel 4 g  4 g Topical BID Chano Romero DO   4 g at 07/09/25 0817    donepezil (ARICEPT) tablet 10 mg  10 mg Oral Nightly Cosmo Jay MD   10 mg at 07/08/25 2013    enoxaparin sodium (LOVENOX) syringe 40 mg  40 mg Subcutaneous Q24H Mando Shahid DO   40 mg at 07/08/25 1401    escitalopram (LEXAPRO) tablet 10 mg  10 mg Oral Daily Cosmo Jay MD   10 mg at 07/09/25 0817    Lidocaine 4 % 1 patch  1 patch Transdermal Q24H Chano Romero DO        Magnesium Standard Dose Replacement - Follow Nurse / BPA Driven Protocol   Not Applicable Mando Dominguez DO        [Held by provider] methIMAzole (TAPAZOLE) tablet 5 mg  5 mg Oral Daily Cosmo Jay MD   5 mg at 06/24/25 0830    metoprolol succinate XL (TOPROL-XL) 24 hr tablet 25 mg  25 mg Oral Q24H Mando Shahid DO   25 mg at 07/09/25 0818    multivitamin (THERAGRAN) tablet 1 tablet  1 tablet Oral Nightly  Cosmo Jay MD   1 tablet at 07/08/25 2013    naloxone (NARCAN) injection 0.4 mg  0.4 mg Intravenous Q5 Min PRN Chano Romero,         Phosphorus Replacement - Follow Nurse / BPA Driven Protocol   Not Applicable PRN Mando Shahid DO        Potassium Replacement - Follow Nurse / BPA Driven Protocol   Not Applicable PRN Mando Shahid DO        rosuvastatin (CRESTOR) tablet 5 mg  5 mg Oral Daily Cosmo Jay MD   5 mg at 07/09/25 0817    sodium chloride 0.9 % flush 10 mL  10 mL Intravenous Q12H Cosmo Jay MD   10 mL at 07/04/25 0859    sodium chloride 0.9 % flush 10 mL  10 mL Intravenous PRN Cosmo Jay MD   10 mL at 06/30/25 0846    sodium chloride 0.9 % infusion 40 mL  40 mL Intravenous PRN Cosmo Jay MD         Lab Results (most recent)       Procedure Component Value Units Date/Time    Gastrointestinal Panel, PCR - Stool, Per Rectum [[id number]]  (Normal) Collected: 07/08/25 1902    Specimen: Stool from Per Rectum Updated: 07/08/25 2045     Campylobacter Not Detected     Plesiomonas shigelloides Not Detected     Salmonella Not Detected     Vibrio Not Detected     Vibrio cholerae Not Detected     Yersinia enterocolitica Not Detected     Enteroaggregative E. coli (EAEC) Not Detected     Enteropathogenic E. coli (EPEC) Not Detected     Enterotoxigenic E. coli (ETEC) lt/st Not Detected     Shiga-like toxin-producing E. coli (STEC) stx1/stx2 Not Detected     Shigella/Enteroinvasive E. coli (EIEC) Not Detected     Cryptosporidium Not Detected     Cyclospora cayetanensis Not Detected     Entamoeba histolytica Not Detected     Giardia lamblia Not Detected     Adenovirus F40/41 Not Detected     Astrovirus Not Detected     Norovirus GI/GII Not Detected     Rotavirus A Not Detected     Sapovirus (I, II, IV or V) Not Detected    Clostridioides difficile Toxin - Stool, Per Rectum [[id number]] Collected: 07/08/25 1902    Specimen: Stool from Per Rectum Updated: 07/08/25 2016     Narrative:      The following orders were created for panel order Clostridioides difficile Toxin - Stool, Per Rectum.  Procedure                               Abnormality         Status                     ---------                               -----------         ------                     Clostridioides difficile...[[id number]]                      Final result                 Please view results for these tests on the individual orders.    Clostridioides difficile Toxin, PCR - Stool, Per Rectum [[id number]] Collected: 07/08/25 1902    Specimen: Stool from Per Rectum Updated: 07/08/25 2016     Toxigenic C. difficile by PCR Negative     027 Toxin Presumptive Negative    Narrative:      The result indicates the absence of toxigenic C. difficile from stool specimen.     Basic Metabolic Panel [[id number]]  (Abnormal) Collected: 07/07/25 0055    Specimen: Blood Updated: 07/07/25 0221     Glucose 103 mg/dL      BUN 24.9 mg/dL      Creatinine 1.00 mg/dL      Sodium 142 mmol/L      Potassium 4.1 mmol/L      Chloride 107 mmol/L      CO2 23.4 mmol/L      Calcium 9.9 mg/dL      BUN/Creatinine Ratio 24.9     Anion Gap 11.6 mmol/L      eGFR 56.4 mL/min/1.73     Narrative:      GFR Categories in Chronic Kidney Disease (CKD)              GFR Category          GFR (mL/min/1.73)    Interpretation  G1                    90 or greater        Normal or high (1)  G2                    60-89                Mild decrease (1)  G3a                   45-59                Mild to moderate decrease  G3b                   30-44                Moderate to severe decrease  G4                    15-29                Severe decrease  G5                    14 or less           Kidney failure    (1)In the absence of evidence of kidney disease, neither GFR category G1 or G2 fulfill the criteria for CKD.    eGFR calculation 2021 CKD-EPI creatinine equation, which does not include race as a factor    Magnesium [[id number]]  (Normal) Collected: 07/07/25  0055    Specimen: Blood Updated: 07/07/25 0221     Magnesium 1.8 mg/dL     CBC & Differential [[id number]]  (Abnormal) Collected: 07/07/25 0055    Specimen: Blood Updated: 07/07/25 0205    Narrative:      The following orders were created for panel order CBC & Differential.  Procedure                               Abnormality         Status                     ---------                               -----------         ------                     CBC Auto Differential[[id number]]        Abnormal            Final result                 Please view results for these tests on the individual orders.    CBC Auto Differential [[id number]]  (Abnormal) Collected: 07/07/25 0055    Specimen: Blood Updated: 07/07/25 0205     WBC 10.57 10*3/mm3      RBC 2.83 10*6/mm3      Hemoglobin 9.1 g/dL      Hematocrit 29.1 %      .8 fL      MCH 32.2 pg      MCHC 31.3 g/dL      RDW 14.5 %      RDW-SD 53.4 fl      MPV 9.5 fL      Platelets 461 10*3/mm3      Neutrophil % 60.0 %      Lymphocyte % 26.0 %      Monocyte % 9.9 %      Eosinophil % 2.5 %      Basophil % 0.7 %      Immature Grans % 0.9 %      Neutrophils, Absolute 6.35 10*3/mm3      Lymphocytes, Absolute 2.75 10*3/mm3      Monocytes, Absolute 1.05 10*3/mm3      Eosinophils, Absolute 0.26 10*3/mm3      Basophils, Absolute 0.07 10*3/mm3      Immature Grans, Absolute 0.09 10*3/mm3      nRBC 0.0 /100 WBC     Potassium [[id number]]  (Normal) Collected: 07/06/25 1543    Specimen: Blood Updated: 07/06/25 1622     Potassium 4.2 mmol/L     Basic Metabolic Panel [[id number]]  (Abnormal) Collected: 07/06/25 0258    Specimen: Blood Updated: 07/06/25 0406     Glucose 111 mg/dL      BUN 22.5 mg/dL      Creatinine 0.96 mg/dL      Sodium 141 mmol/L      Potassium 3.6 mmol/L      Chloride 105 mmol/L      CO2 24.7 mmol/L      Calcium 9.3 mg/dL      BUN/Creatinine Ratio 23.4     Anion Gap 11.3 mmol/L      eGFR 59.2 mL/min/1.73     Narrative:      GFR Categories in Chronic Kidney Disease (CKD)               GFR Category          GFR (mL/min/1.73)    Interpretation  G1                    90 or greater        Normal or high (1)  G2                    60-89                Mild decrease (1)  G3a                   45-59                Mild to moderate decrease  G3b                   30-44                Moderate to severe decrease  G4                    15-29                Severe decrease  G5                    14 or less           Kidney failure    (1)In the absence of evidence of kidney disease, neither GFR category G1 or G2 fulfill the criteria for CKD.    eGFR calculation 2021 CKD-EPI creatinine equation, which does not include race as a factor    CBC & Differential [[id number]]  (Abnormal) Collected: 07/06/25 0258    Specimen: Blood Updated: 07/06/25 0341    Narrative:      The following orders were created for panel order CBC & Differential.  Procedure                               Abnormality         Status                     ---------                               -----------         ------                     CBC Auto Differential[[id number]]        Abnormal            Final result                 Please view results for these tests on the individual orders.    CBC Auto Differential [[id number]]  (Abnormal) Collected: 07/06/25 0258    Specimen: Blood Updated: 07/06/25 0341     WBC 10.42 10*3/mm3      RBC 2.79 10*6/mm3      Hemoglobin 8.8 g/dL      Hematocrit 28.3 %      .4 fL      MCH 31.5 pg      MCHC 31.1 g/dL      RDW 14.4 %      RDW-SD 51.3 fl      MPV 9.4 fL      Platelets 439 10*3/mm3      Neutrophil % 58.9 %      Lymphocyte % 26.2 %      Monocyte % 9.8 %      Eosinophil % 3.5 %      Basophil % 0.8 %      Immature Grans % 0.8 %      Neutrophils, Absolute 6.15 10*3/mm3      Lymphocytes, Absolute 2.73 10*3/mm3      Monocytes, Absolute 1.02 10*3/mm3      Eosinophils, Absolute 0.36 10*3/mm3      Basophils, Absolute 0.08 10*3/mm3      Immature Grans, Absolute 0.08 10*3/mm3      nRBC 0.0 /100 WBC      Urinalysis With Culture If Indicated - Urine, Clean Catch [[id number]]  (Abnormal) Collected: 07/04/25 1400    Specimen: Urine, Clean Catch Updated: 07/04/25 1416     Color, UA Yellow     Appearance, UA Clear     pH, UA 6.5     Specific Gravity, UA 1.012     Glucose, UA Negative     Ketones, UA Negative     Bilirubin, UA Negative     Blood, UA Negative     Protein, UA Trace     Leuk Esterase, UA Negative     Nitrite, UA Negative     Urobilinogen, UA 1.0 E.U./dL    Narrative:      In absence of clinical symptoms, the presence of pyuria, bacteria, and/or nitrites on the urinalysis result does not correlate with infection.  Urine microscopic not indicated.    Potassium [[id number]]  (Normal) Collected: 07/02/25 1603    Specimen: Blood Updated: 07/02/25 1706     Potassium 4.5 mmol/L     Magnesium [[id number]]  (Normal) Collected: 07/02/25 0421    Specimen: Blood Updated: 07/02/25 0937     Magnesium 1.6 mg/dL     CBC (No Diff) [[id number]]  (Abnormal) Collected: 07/01/25 0227    Specimen: Blood Updated: 07/01/25 0324     WBC 11.46 10*3/mm3      RBC 2.69 10*6/mm3      Hemoglobin 8.7 g/dL      Hematocrit 26.8 %      MCV 99.6 fL      MCH 32.3 pg      MCHC 32.5 g/dL      RDW 13.3 %      RDW-SD 46.7 fl      MPV 9.0 fL      Platelets 436 10*3/mm3     CBC (No Diff) [[id number]]  (Abnormal) Collected: 06/29/25 0107    Specimen: Blood Updated: 06/29/25 0154     WBC 11.12 10*3/mm3      RBC 2.62 10*6/mm3      Hemoglobin 8.4 g/dL      Hematocrit 25.6 %      MCV 97.7 fL      MCH 32.1 pg      MCHC 32.8 g/dL      RDW 12.8 %      RDW-SD 45.1 fl      MPV 9.2 fL      Platelets 338 10*3/mm3     Uric Acid [[id number]]  (Normal) Collected: 06/28/25 0342    Specimen: Blood Updated: 06/28/25 1358     Uric Acid 5.3 mg/dL     Heparin Anti-Xa [[id number]]  (Abnormal) Collected: 06/26/25 0834    Specimen: Blood Updated: 06/26/25 0855     Heparin Anti-Xa (UFH) 0.26 IU/ml     Hemoglobin & Hematocrit, Blood [[id number]]  (Abnormal) Collected: 06/26/25 0834     Specimen: Blood Updated: 06/26/25 0844     Hemoglobin 9.0 g/dL      Hematocrit 27.4 %     Heparin Anti-Xa [[id number]]  (Normal) Collected: 06/25/25 2304    Specimen: Blood Updated: 06/25/25 2328     Heparin Anti-Xa (UFH) 0.33 IU/ml     Protime-INR [[id number]]  (Abnormal) Collected: 06/25/25 1534    Specimen: Blood Updated: 06/25/25 1602     Protime 15.5 Seconds      INR 1.15    Narrative:      Suggested INR therapeutic range for stable oral anticoagulant therapy:    Low Intensity therapy:   1.5-2.0  Moderate Intensity therapy:   2.0-3.0  High Intensity therapy:   2.5-4.0    aPTT [[id number]]  (Abnormal) Collected: 06/25/25 1534    Specimen: Blood Updated: 06/25/25 1602     PTT 36.3 seconds     Narrative:      PTT Heparin Therapeutic Range:  45 - 116 seconds      Hemoglobin & Hematocrit, Blood [[id number]]  (Abnormal) Collected: 06/24/25 1414    Specimen: Blood Updated: 06/24/25 1429     Hemoglobin 9.2 g/dL      Hematocrit 28.0 %     Narrative:      Post Transfusion Specimen        Comprehensive Metabolic Panel [[id number]]  (Abnormal) Collected: 06/23/25 0228    Specimen: Blood Updated: 06/23/25 0313     Glucose 132 mg/dL      BUN 27.0 mg/dL      Creatinine 1.20 mg/dL      Sodium 140 mmol/L      Potassium 3.6 mmol/L      Chloride 104 mmol/L      CO2 25.2 mmol/L      Calcium 8.5 mg/dL      Total Protein 5.6 g/dL      Albumin 3.7 g/dL      ALT (SGPT) 18 U/L      AST (SGOT) 36 U/L      Alkaline Phosphatase 52 U/L      Total Bilirubin 0.6 mg/dL      Globulin 1.9 gm/dL      A/G Ratio 1.9 g/dL      BUN/Creatinine Ratio 22.5     Anion Gap 10.8 mmol/L      eGFR 45.3 mL/min/1.73     Narrative:      GFR Categories in Chronic Kidney Disease (CKD)              GFR Category          GFR (mL/min/1.73)    Interpretation  G1                    90 or greater        Normal or high (1)  G2                    60-89                Mild decrease (1)  G3a                   45-59                Mild to moderate decrease  G3b                    30-44                Moderate to severe decrease  G4                    15-29                Severe decrease  G5                    14 or less           Kidney failure    (1)In the absence of evidence of kidney disease, neither GFR category G1 or G2 fulfill the criteria for CKD.    eGFR calculation 2021 CKD-EPI creatinine equation, which does not include race as a factor    CK [[id number]]  (Abnormal) Collected: 06/23/25 0228    Specimen: Blood Updated: 06/23/25 0313     Creatine Kinase 692 U/L     Protime-INR [[id number]]  (Abnormal) Collected: 06/22/25 0835    Specimen: Blood Updated: 06/22/25 0859     Protime 15.2 Seconds      INR 1.13    Narrative:      Suggested INR therapeutic range for stable oral anticoagulant therapy:    Low Intensity therapy:   1.5-2.0  Moderate Intensity therapy:   2.0-3.0  High Intensity therapy:   2.5-4.0    aPTT [[id number]]  (Normal) Collected: 06/22/25 0835    Specimen: Blood Updated: 06/22/25 0859     PTT 33.1 seconds     Narrative:      PTT Heparin Therapeutic Range:  45 - 116 seconds      Urinalysis With Culture If Indicated - Urine, Clean Catch [[id number]]  (Abnormal) Collected: 06/22/25 0657    Specimen: Urine, Clean Catch Updated: 06/22/25 0715     Color, UA Yellow     Appearance, UA Clear     pH, UA <=5.0     Specific Gravity, UA 1.019     Glucose, UA Negative     Ketones, UA Negative     Bilirubin, UA Negative     Blood, UA Negative     Protein, UA 30 mg/dL (1+)     Leuk Esterase, UA Trace     Nitrite, UA Negative     Urobilinogen, UA 0.2 E.U./dL    Narrative:      In absence of clinical symptoms, the presence of pyuria, bacteria, and/or nitrites on the urinalysis result does not correlate with infection.    Urinalysis, Microscopic Only - Urine, Clean Catch [[id number]]  (Abnormal) Collected: 06/22/25 0657    Specimen: Urine, Clean Catch Updated: 06/22/25 0715     RBC, UA 0-2 /HPF      WBC, UA 3-5 /HPF      Comment: Urine culture not indicated.        Bacteria, UA None Seen  /HPF      Squamous Epithelial Cells, UA 3-6 /HPF      Hyaline Casts, UA None Seen /LPF      Methodology Automated Microscopy    STAT Lactic Acid, Reflex [[id number]]  (Normal) Collected: 06/22/25 0529    Specimen: Blood Updated: 06/22/25 0703     Lactate 1.6 mmol/L     Lactic Acid, Plasma [[id number]]  (Abnormal) Collected: 06/22/25 0156    Specimen: Blood Updated: 06/22/25 0308     Lactate 2.1 mmol/L     Comprehensive Metabolic Panel [[id number]]  (Abnormal) Collected: 06/22/25 0156    Specimen: Blood Updated: 06/22/25 0304     Glucose 162 mg/dL      BUN 30.4 mg/dL      Creatinine 1.36 mg/dL      Sodium 140 mmol/L      Potassium 4.4 mmol/L      Chloride 108 mmol/L      CO2 17.0 mmol/L      Calcium 9.2 mg/dL      Total Protein 6.5 g/dL      Albumin 4.1 g/dL      ALT (SGPT) 20 U/L      AST (SGOT) 32 U/L      Alkaline Phosphatase 69 U/L      Total Bilirubin 0.5 mg/dL      Globulin 2.4 gm/dL      A/G Ratio 1.7 g/dL      BUN/Creatinine Ratio 22.4     Anion Gap 15.0 mmol/L      eGFR 39.0 mL/min/1.73     Narrative:      GFR Categories in Chronic Kidney Disease (CKD)              GFR Category          GFR (mL/min/1.73)    Interpretation  G1                    90 or greater        Normal or high (1)  G2                    60-89                Mild decrease (1)  G3a                   45-59                Mild to moderate decrease  G3b                   30-44                Moderate to severe decrease  G4                    15-29                Severe decrease  G5                    14 or less           Kidney failure    (1)In the absence of evidence of kidney disease, neither GFR category G1 or G2 fulfill the criteria for CKD.    eGFR calculation 2021 CKD-EPI creatinine equation, which does not include race as a factor    CK [[id number]]  (Abnormal) Collected: 06/22/25 0156    Specimen: Blood Updated: 06/22/25 0256     Creatine Kinase 519 U/L     T4, Free [[id number]]  (Abnormal) Collected: 06/21/25 2117    Specimen: Blood from  "Arm, Left Updated: 06/22/25 0206     Free T4 0.85 ng/dL     TSH [[id number]]  (Abnormal) Collected: 06/21/25 2117    Specimen: Blood from Arm, Left Updated: 06/22/25 0134     TSH 4.900 uIU/mL     Procalcitonin [[id number]]  (Normal) Collected: 06/21/25 2117    Specimen: Blood from Arm, Left Updated: 06/22/25 0113     Procalcitonin 0.04 ng/mL     Narrative:      As a Marker for Sepsis (Non-Neonates):    1. <0.5 ng/mL represents a low risk of severe sepsis and/or septic shock.  2. >2 ng/mL represents a high risk of severe sepsis and/or septic shock.    As a Marker for Lower Respiratory Tract Infections that require antibiotic therapy:    PCT on Admission    Antibiotic Therapy       6-12 Hrs later    >0.5                Strongly Recommended  >0.25 - <0.5        Recommended   0.1 - 0.25          Discouraged              Remeasure/reassess PCT  <0.1                Strongly Discouraged     Remeasure/reassess PCT    As 28 day mortality risk marker: \"Change in Procalcitonin Result\" (>80% or <=80%) if Day 0 (or Day 1) and Day 4 values are available. Refer to [email or web address]    Change in PCT <=80%  A decrease of PCT levels below or equal to 80% defines a positive change in PCT test result representing a higher risk for 28-day all-cause mortality of patients diagnosed with severe sepsis for septic shock.    Change in PCT >80%  A decrease of PCT levels of more than 80% defines a negative change in PCT result representing a lower risk for 28-day all-cause mortality of patients diagnosed with severe sepsis or septic shock.       Hemoglobin A1c [[id number]]  (Abnormal) Collected: 06/21/25 2117    Specimen: Blood from Arm, Left Updated: 06/22/25 0111     Hemoglobin A1C 5.92 %     Narrative:      Hemoglobin A1C Ranges:    Increased Risk for Diabetes  5.7% to 6.4%  Diabetes                     >= 6.5%  Diabetic Goal                < 7.0%    C-reactive Protein [[id number]]  (Normal) Collected: 06/21/25 2117    " Specimen: Blood from Arm, Left Updated: 06/22/25 0106     C-Reactive Protein <0.30 mg/dL     CK Total & CKMB [[id number]]  (Abnormal) Collected: 06/21/25 2117    Specimen: Blood from Arm, Left Updated: 06/21/25 2327     CKMB 6.45 ng/mL      Creatine Kinase 326 U/L     Narrative:      CKMB results may be falsely decreased if patient taking Biotin.    Lynn Draw [[id number]] Collected: 06/21/25 2117    Specimen: Blood from Arm, Left Updated: 06/21/25 2132    Narrative:      The following orders were created for panel order Lynn Draw.  Procedure                               Abnormality         Status                     ---------                               -----------         ------                     Green Top (Gel)[[id number]]                                  Final result               Lavender Top[[id number]]                                     Final result               Gold Top - SST[[id number]]                                   Final result               Light Blue Top[[id number]]                                   Final result                 Please view results for these tests on the individual orders.    Green Top (Gel) [[id number]] Collected: 06/21/25 2117    Specimen: Blood from Arm, Left Updated: 06/21/25 2132     Extra Tube Hold for add-ons.     Comment: Auto resulted.       Lavender Top [[id number]] Collected: 06/21/25 2117    Specimen: Blood from Arm, Left Updated: 06/21/25 2132     Extra Tube hold for add-on     Comment: Auto resulted       Gold Top - SST [[id number]] Collected: 06/21/25 2117    Specimen: Blood from Arm, Left Updated: 06/21/25 2132     Extra Tube Hold for add-ons.     Comment: Auto resulted.       Light Blue Top [[id number]] Collected: 06/21/25 2117    Specimen: Blood from Arm, Left Updated: 06/21/25 2132     Extra Tube Hold for add-ons.     Comment: Auto resulted             Orders (last 24 hrs)        Start     Ordered    07/10/25 8299  Auto Discontinue GI Panel in 48 Hours if not  Collected  ONCE GI PANEL         07/08/25 1858    07/09/25 0900  amLODIPine (NORVASC) tablet 10 mg  Every 24 Hours Scheduled         07/08/25 1503    07/08/25 2108  Telemetry Scan  Once         07/08/25 2108    07/08/25 1859  Gastrointestinal Panel, PCR - Stool, Per Rectum  Once         07/08/25 1858    07/08/25 1850  Clostridioides difficile Toxin - Stool, Per Rectum  Once         07/08/25 1859    07/08/25 1850  Clostridioides difficile Toxin, PCR - Stool, Per Rectum  PROCEDURE ONCE         07/08/25 1859    07/08/25 1600  Lidocaine 4 % 1 patch  Every 24 Hours Scheduled         07/08/25 1503    07/08/25 1351  calcium carbonate (TUMS) chewable tablet 500 mg (200 mg elemental)  3 Times Daily PRN         07/08/25 1351    07/07/25 1800  Dietary Nutrition Supplements Magic Cup  Daily With Lunch & Dinner       07/07/25 1623    07/07/25 1040  acetaminophen (TYLENOL) tablet 650 mg  Every 6 Hours PRN         07/07/25 1040    07/07/25 0000  Discharge Follow-up with PCP         07/07/25 1021    07/07/25 0000  acetaminophen (TYLENOL) 325 MG tablet  Every 6 Hours PRN         07/07/25 1136    07/07/25 0000  methIMAzole (TAPAZOLE) 5 MG tablet  Daily         07/07/25 1136    07/05/25 0900  metoprolol succinate XL (TOPROL-XL) 24 hr tablet 25 mg  Every 24 Hours Scheduled         07/05/25 0758    07/01/25 1500  enoxaparin sodium (LOVENOX) syringe 40 mg  Every 24 Hours         07/01/25 1434    07/01/25 1439  Potassium Replacement - Follow Nurse / BPA Driven Protocol  As Needed         07/01/25 1439    07/01/25 1439  Magnesium Standard Dose Replacement - Follow Nurse / BPA Driven Protocol  As Needed         07/01/25 1439    07/01/25 1439  Phosphorus Replacement - Follow Nurse / BPA Driven Protocol  As Needed         07/01/25 1439    07/01/25 1439  Calcium Replacement - Follow Nurse / BPA Driven Protocol  As Needed         07/01/25 1439    06/26/25 1145  Diclofenac Sodium (VOLTAREN) 1 % gel 4 g  2 Times Daily         06/26/25 1046     "06/26/25 1145  diazePAM (VALIUM) tablet 2 mg  Once         06/26/25 1049    06/26/25 0959  naloxone (NARCAN) injection 0.4 mg  Every 5 Minutes PRN        Placed in \"And\" Linked Group    06/26/25 1000    06/25/25 0236  Silicone Border Dressing to Bony Prominences  Every Shift       06/25/25 0235    06/23/25 1814  Ambulate Patient  Every Shift       06/23/25 1813    06/22/25 2100  multivitamin (THERAGRAN) tablet 1 tablet  Nightly         06/22/25 0906    06/22/25 2100  aspirin EC tablet 81 mg  Nightly         06/22/25 0906    06/22/25 2100  donepezil (ARICEPT) tablet 10 mg  Nightly         06/22/25 0906 06/22/25 2100  cholecalciferol (VITAMIN D3) tablet 4,000 Units  Nightly         06/22/25 0906 06/22/25 1000  escitalopram (LEXAPRO) tablet 10 mg  Daily         06/22/25 0906 06/22/25 1000  [Held by provider]  methIMAzole (TAPAZOLE) tablet 5 mg  Daily        (On hold since Tue 6/24/2025 at 1543 until manually unheld; held by Chano Romero DOHold Reason: Abnormal Labs)    06/22/25 0906 06/22/25 1000  rosuvastatin (CRESTOR) tablet 5 mg  Daily         06/22/25 0906 06/22/25 0900  amLODIPine (NORVASC) tablet 5 mg  Every 24 Hours Scheduled,   Status:  Discontinued         06/22/25 0142    06/22/25 0800  Oral Care  2 Times Daily       06/22/25 0148    06/22/25 0245  sodium chloride 0.9 % flush 10 mL  Every 12 Hours Scheduled         06/22/25 0148    06/22/25 0149  Daily Weights  Daily       06/22/25 0148    06/22/25 0148  sennosides-docusate (PERICOLACE) 8.6-50 MG per tablet 2 tablet  2 Times Daily PRN        Placed in \"And\" Linked Group    06/22/25 0148    06/22/25 0148  polyethylene glycol (MIRALAX) packet 17 g  Daily PRN        Placed in \"And\" Linked Group    06/22/25 0148    06/22/25 0148  bisacodyl (DULCOLAX) EC tablet 5 mg  Daily PRN        Placed in \"And\" Linked Group    06/22/25 0148    06/22/25 0148  bisacodyl (DULCOLAX) suppository 10 mg  Daily PRN        Placed in \"And\" Linked Group    " 25 014  sodium chloride 0.9 % flush 10 mL  As Needed         25  sodium chloride 0.9 % infusion 40 mL  As Needed         25 0000  amLODIPine (NORVASC) 5 MG tablet  Daily         25 0000  escitalopram (LEXAPRO) 10 MG tablet  Daily         25 0000  Diclofenac Sodium (VOLTAREN) 1 % gel gel  4 Times Daily PRN         25 0000  triamterene-hydrochlorothiazide (DYAZIDE) 37.5-25 MG per capsule  Daily         25 0000  losartan (COZAAR) 50 MG tablet  Daily         25 0000  omega-3 acid ethyl esters (LOVAZA) 1 g capsule  2 Times Daily         06/21/25 2052    04/15/25 0000  potassium chloride 10 MEQ CR tablet  Daily         25 0000  rosuvastatin (CRESTOR) 5 MG tablet  Daily         25 0000  donepezil (ARICEPT) 10 MG tablet  Nightly         25    Unscheduled  Wound Care  As Needed       25    --  acetaminophen (TYLENOL) 500 MG tablet  Nightly         25    --  multivitamin tablet tablet  Nightly         25    --  aspirin 81 MG EC tablet  Nightly         25    --  acetaminophen (TYLENOL) 500 MG tablet  Every Morning         25    --  BIOTIN PO  Daily         25    --  Cholecalciferol 50 MCG (2000) tablet  Nightly         25                     Physician Progress Notes (most recent note)        Chano Romero DO at 25 1501              [facility]     Patient Identification:  Name:  Lizbet Arzola  Age:  80 y.o.  Sex:  female  :  1945  MRN:  [id number]  Visit Number:  [id number]  Primary Care Provider:  Yaakov Riley DO    Length of stay:  16    Chief complaint: Back pain    Subjective:    Patient seen and examined at bedside with patient's  family present.  Patient states she is doing well today and has minimal complaints with the exception of mild to moderate low back pain.  She attributes this to being in bed for a prolonged period of time.  She denies any other symptoms at this time.  ----------------------------------------------------------------------------------------------------------------------  Current [facility] Meds:  amLODIPine, 5 mg, Oral, Q24H  aspirin, 81 mg, Oral, Nightly  cholecalciferol, 4,000 Units, Oral, Nightly  diazePAM, 2 mg, Oral, Once  Diclofenac Sodium, 4 g, Topical, BID  donepezil, 10 mg, Oral, Nightly  enoxaparin sodium, 40 mg, Subcutaneous, Q24H  escitalopram, 10 mg, Oral, Daily  [Held by provider] methIMAzole, 5 mg, Oral, Daily  metoprolol succinate XL, 25 mg, Oral, Q24H  multivitamin, 1 tablet, Oral, Nightly  rosuvastatin, 5 mg, Oral, Daily  sodium chloride, 10 mL, Intravenous, Q12H           ----------------------------------------------------------------------------------------------------------------------  Vital Signs:  Temp:  [97.7 °F (36.5 °C)-98.4 °F (36.9 °C)] 98.4 °F (36.9 °C)  Heart Rate:  [69-89] 69  Resp:  [18] 18  BP: (143-178)/(54-71) 152/61      07/06/25  0500 07/07/25  0500 07/08/25  0500   Weight: 69.4 kg (153 lb) 69 kg (152 lb 1.6 oz) 64.5 kg (142 lb 4.8 oz) (IRMA shay)     Body mass index is 22.29 kg/m².    Intake/Output Summary (Last 24 hours) at 7/8/2025 1501  Last data filed at 7/8/2025 1341  Gross per 24 hour   Intake 420 ml   Output 250 ml   Net 170 ml     Diet: Regular/House; Fluid Consistency: Thin (IDDSI 0)  ----------------------------------------------------------------------------------------------------------------------  Physical exam:  Constitutional: Well-nourished  female in no apparent distress.     HENT:  Head:  Normocephalic and atraumatic.  Mouth:  Moist mucous membranes.    Eyes:  Conjunctivae and EOM are normal.  Pupils are equal, round, and reactive to light.  No  scleral icterus.    Neck:  Neck supple. No thyromegaly.  No JVD present.    Cardiovascular:  Regular rate and rhythm with no murmurs, rubs, clicks or gallops appreciated.  Pulmonary/Chest:  Clear to auscultation bilaterally with no crackles, wheezes or rhonchi appreciated.  Abdominal:  Soft. Nontender. Nondistended  Bowel sounds are normal in all four quadrants. No organomegally appreciated.   Musculoskeletal:  No edema, surgical dressing applied to left hip joint  Neurological: Awake, Cranial nerves II-XII intact with no focal deficits.  No facial droop.  No slurred speech.   Skin:  Warm and dry to palpation with no rashes or lesions appreciated.  Peripheral vascular:  2+ radial and pedal pulses in bilateral upper and lower extremities.  Psychiatric: Awake and oriented x3, demonstrates appropriate judgment and insight.    No significant change in physical exam in comparison to 7/7/2025  ---------------------------------------------------------------------------------------  ----------------------------------------------------------------------------------------------------------------------        Results from last 7 days   Lab Units 07/07/25  0055 07/06/25  0258 07/05/25  0159   WBC 10*3/mm3 10.57 10.42 10.78   HEMOGLOBIN g/dL 9.1* 8.8* 9.0*   HEMATOCRIT % 29.1* 28.3* 29.2*   MCV fL 102.8* 101.4* 102.1*   MCHC g/dL 31.3* 31.1* 30.8*   PLATELETS 10*3/mm3 461* 439 432         Results from last 7 days   Lab Units 07/07/25  0055 07/06/25  1543 07/06/25  0258 07/05/25  0159 07/02/25  1603 07/02/25  0421   SODIUM mmol/L 142  --  141 140   < > 141   POTASSIUM mmol/L 4.1 4.2 3.6 3.7   < > 3.5   MAGNESIUM mg/dL 1.8  --   --   --   --  1.6   CHLORIDE mmol/L 107  --  105 104   < > 102   CO2 mmol/L 23.4  --  24.7 23.5   < > 26.3   BUN mg/dL 24.9*  --  22.5 22.2   < > 23.8*   CREATININE mg/dL 1.00  --  0.96 0.97   < > 1.06*   CALCIUM mg/dL 9.9  --  9.3 9.4   < > 9.3   GLUCOSE mg/dL 103*  --  111* 128*   < > 115*    < > =  "values in this interval not displayed.   Estimated Creatinine Clearance: 44.2 mL/min (by C-G formula based on SCr of 1 mg/dL).    No results found for: \"AMMONIA\"      No results found for: \"BLOODCX\"  No results found for: \"URINECX\"  No results found for: \"WOUNDCX\"  No results found for: \"STOOLCX\"    I have personally looked at the labs and they are summarized above.  ----------------------------------------------------------------------------------------------------------------------  Imaging Results (Last 24 Hours)       ** No results found for the last 24 hours. **          ----------------------------------------------------------------------------------------------------------------------  Assessment and Plan:    Left femur fracture - patient underwent left hip intertrochanteric nailing on 6/23/2025, patient tolerated the procedure well with no complications.    2. Acute Blood Loss Anemia -patient has required 1 unit PRBC transfusion during this hospital stay.  Hemoglobin and hematocrit are stable.    3.  New onset paroxysmal A-fib -patient remains in normal sinus rhythm, continue Lopressor 25 mg p.o. twice daily.  Transthoracic echo demonstrates normal left ventricular systolic function.  Continue to defer anticoagulation at this time as patient is considered a high fall risk.    4.  Essential hypertension -patient with persistently elevated blood pressures over the past 48 hours.  Will increase Norvasc to 10 mg p.o. daily and monitor for improvement    5.  Hyperlipidemia -statin    6.  Hyperthyroidism - continue to hold methimazole as TSH is elevated and free T4 is below lower limits of normal    7.  Suspect CKD stage IIIa -serum creatinine essentially unchanged today at 1.  Continue to monitor closely with BMP in the morning.        Disposition currently being evaluated for possible inpatient rehab placement.    Chano Romero DO   07/08/25   15:01 EDT       Electronically signed by Chano Romero " DO Ciro at 07/08/25 1502          Consult Notes (most recent note)        Jamilah Rosa APRN at 06/22/25 0821       Attestation signed by Cosmo Jay MD at 06/22/25 1017      I have reviewed this documentation and agree.    Cosmo Jay MD                          Referring Provider:   Reason for Consultation: Left intertrochanteric fracture    Patient Care Team:  Yaakov Riley DO as PCP - General (Family Medicine)    Chief complaint fall    Subjective     History of present illness: Vanessa is an 80-year-old female with past medical history of hypertension, hyperlipidemia, osteoarthritis, and hypothyroidism who presents today with a complaint of left hip pain after falling at home.  Patient reports that she was in the process of doing laundry and utilizing her rollator walker in order to ambulate.  She states that she tripped over her walker causing her to sustain a fall onto the floor.  She states that she landed directly against the left side and did hit her head.  Patient reports after sustaining her injury she was unable to get up and bear weight.  Patient reports that she did have to lie on the floor for roughly an hour before her  found her.  Patient was subsequently taken to the emergency room for further evaluation and treatment.  It was noted upon CT imaging that she had sustained a left hip fracture.  Patient denies any other acute injury, injections, or surgical intervention in regard to the left hip.  She states that she has had corticosteroid injections to the bilateral knees due to arthritis.  In regard to treatment she has been using over-the-counter medication and mobility modification with limited relief.  Orthopedics was consulted in regard to fracture.  Patient presents today for further evaluation and treatment.    Review of Systems  Review of Systems   Constitutional:  Positive for activity change.   Musculoskeletal:  Positive for arthralgias, gait problem, joint swelling and  myalgias.   All other systems reviewed and are negative.       History  Past Medical History:   Diagnosis Date    Hyperlipidemia     Hypertension     Hyperthyroidism     Osteoarthritis    , History reviewed. No pertinent surgical history., History reviewed. No pertinent family history.,   Social History     Tobacco Use    Smoking status: Never     Passive exposure: Never    Smokeless tobacco: Never   Vaping Use    Vaping status: Never Used   Substance Use Topics    Alcohol use: Never    Drug use: Never   ,   Medications Prior to Admission   Medication Sig Dispense Refill Last Dose/Taking    acetaminophen (TYLENOL) 500 MG tablet Take 2 tablets by mouth Every Night.   Past Week    acetaminophen (TYLENOL) 500 MG tablet Take 3 tablets by mouth Every Morning.   6/21/2025    amLODIPine (NORVASC) 5 MG tablet Take 1 tablet by mouth Daily.   6/21/2025    aspirin 81 MG EC tablet Take 1 tablet by mouth Every Night.   Past Week    BIOTIN PO Take 2 tablets by mouth Daily.   6/21/2025    Cholecalciferol 50 MCG (2000 UT) tablet Take 2 tablets by mouth Every Night.   Past Week    donepezil (ARICEPT) 10 MG tablet Take 1 tablet by mouth Every Night.   Past Week    escitalopram (LEXAPRO) 10 MG tablet Take 1 tablet by mouth Daily.   6/21/2025    losartan (COZAAR) 50 MG tablet Take 1 tablet by mouth Daily.   6/21/2025    methIMAzole (TAPAZOLE) 5 MG tablet Take 1 tablet by mouth Daily.   6/21/2025    multivitamin tablet tablet Take 1 tablet by mouth Every Night.   Past Week    omega-3 acid ethyl esters (LOVAZA) 1 g capsule Take 2 capsules by mouth 2 (Two) Times a Day.   6/21/2025 Morning    potassium chloride 10 MEQ CR tablet Take 1 tablet by mouth Daily.   6/21/2025    rosuvastatin (CRESTOR) 5 MG tablet Take 1 tablet by mouth Daily.   6/21/2025    triamterene-hydrochlorothiazide (DYAZIDE) 37.5-25 MG per capsule Take 1 capsule by mouth Daily.   6/21/2025    Diclofenac Sodium (VOLTAREN) 1 % gel gel Apply 4 g topically to the appropriate  area as directed 4 (Four) Times a Day As Needed (Knee Pain).   Unknown   , Scheduled Meds:  amLODIPine, 5 mg, Oral, Q24H  sodium chloride, 10 mL, Intravenous, Q12H    , Continuous Infusions:  sodium chloride, 100 mL/hr, Last Rate: 100 mL/hr (06/22/25 0211)    , PRN Meds:    senna-docusate sodium **AND** polyethylene glycol **AND** bisacodyl **AND** bisacodyl    Morphine **AND** naloxone    sodium chloride    sodium chloride and Allergies:  Sulfa antibiotics    Objective     Vital Signs   Temp:  [97.5 °F (36.4 °C)-98.6 °F (37 °C)] 98.4 °F (36.9 °C)  Heart Rate:  [77-91] 80  Resp:  [16-20] 18  BP: (139-163)/(60-75) 155/60    Physical Exam:   Physical Exam  HENT:      Head: Normocephalic.      Nose: Nose normal.   Cardiovascular:      Rate and Rhythm: Normal rate.   Pulmonary:      Effort: Pulmonary effort is normal.   Musculoskeletal:      Cervical back: Normal range of motion.      Comments: Upon examination of the left lower extremity there is no edema, ecchymosis, erythema, wounds, drainage, or signs of an infectious process.  Patient sensation appears to be grossly intact to light touch with brisk capillary refill.  Patient able to plantar and dorsiflex at the ankle without complication.  There is a palpable pulse distally.  There is shortening and external rotation noted to the left lower extremity.   Skin:     General: Skin is warm and dry.      Capillary Refill: Capillary refill takes less than 2 seconds.   Neurological:      General: No focal deficit present.      Mental Status: She is alert and oriented to person, place, and time.   Psychiatric:         Mood and Affect: Mood normal.         Results Review:     Results from last 7 days   Lab Units 06/22/25  0529 06/22/25  0156 06/21/25  2117   CRP mg/dL  --   --  <0.30   LACTATE mmol/L 1.6 2.1*  --    WBC 10*3/mm3  --  15.90* 17.41*   HEMOGLOBIN g/dL  --  11.3* 11.5*   HEMATOCRIT %  --  37.7 34.5   MCV fL  --  111.9* 98.6*   MCHC g/dL  --  30.0* 33.3  "  PLATELETS 10*3/mm3  --  248 253         Results from last 7 days   Lab Units 06/22/25  0156 06/21/25 2117   SODIUM mmol/L 140 141   POTASSIUM mmol/L 4.4 4.1   CHLORIDE mmol/L 108* 106   CO2 mmol/L 17.0* 19.7*   BUN mg/dL 30.4* 30.3*   CREATININE mg/dL 1.36* 1.45*   CALCIUM mg/dL 9.2 9.7   GLUCOSE mg/dL 162* 148*   ALBUMIN g/dL 4.1 4.4   BILIRUBIN mg/dL 0.5 0.4   ALK PHOS U/L 69 78   AST (SGOT) U/L 32 29   ALT (SGPT) U/L 20 21   Estimated Creatinine Clearance: 32.5 mL/min (A) (by C-G formula based on SCr of 1.36 mg/dL (H)).  No results found for: \"AMMONIA\"  Results from last 7 days   Lab Units 06/22/25  0156 06/21/25 2117   CK TOTAL U/L 519* 326*         Lab Results   Component Value Date    HGBA1C 5.92 (H) 06/21/2025     Lab Results   Component Value Date    TSH 4.900 (H) 06/21/2025    FREET4 0.85 (L) 06/21/2025     No results found for: \"PREGTESTUR\", \"PREGSERUM\", \"HCG\", \"HCGQUANT\"  Pain Management Panel           No data to display              No results found for: \"BLOODCX\"  No results found for: \"URINECX\"  No results found for: \"WOUNDCX\"  No results found for: \"STOOLCX\"      ---------------------------------------------------------------------------------------------------------------------   Imaging Results (Last 7 Days)       Procedure Component Value Units Date/Time    XR Hip With or Without Pelvis 2 - 3 View Left - In process [[id number]] Resulted: 06/22/25 0757     Updated: 06/22/25 0821    This result has not been signed. Information might be incomplete.      CT Lumbar Spine Without Contrast [[id number]] Collected: 06/21/25 2334     Updated: 06/21/25 2338    Narrative:      EXAMINATION: CT lumbar spine without contrast     CLINICAL HISTORY: Fall     COMPARISON: None     TECHNIQUE: Contiguous 3 mm thick slices were obtained through the lumbar  spine without contrast. Coronal and sagittal reformats were performed.     FINDINGS:  Sagittal alignment is normal. Mild left convex curvature is centered at  the " L4/L5 level. Mild right convex curvature centered at the L1/L2  level. There is no acute fracture. No traumatic listhesis. Moderate  multilevel disc related degenerative changes are noted. At the L4/L5  level there is a posterior disc bulge and comminution with facet  arthrosis and ligamentous thickening. The findings result in at least  moderate central canal stenosis. The disc bulges noted at the L5/S1  level as well.       Impression:      1. Multilevel disc related degenerative changes and facet arthrosis.  2. No evidence of recent trauma.     This report was finalized on 6/21/2025 11:36 PM by Juanjose Rocha MD.       CT Pelvis Without Contrast [[id number]] Collected: 06/21/25 2331     Updated: 06/21/25 2336    Narrative:      EXAMINATION: CT pelvis without contrast     CLINICAL HISTORY: Injury.     COMPARISON: None available.     TECHNIQUE: Contiguous 3 mm thick slices were obtained through the pelvis  without contrast. Coronal and sagittal reformats were performed.     FINDINGS:  Alignment at the hips is normal. Mild bilateral hip osteoarthrosis is  noted. There is an acute comminuted fracture through the  intertrochanteric region of the left proximal femur. There is apex  lateral angulation at the fracture site. Additionally, the distal  fragment is displaced medially with respect to the more proximal  fragment. There is apex anterior angulation at the fracture site as  well. Extensive adjacent soft tissue swelling is noted. No definite  acetabular fracture is appreciated.       Impression:      1. Acute comminuted left intertrochanteric proximal femur fracture.     This report was finalized on 6/21/2025 11:34 PM by Juanjose Rocha MD.       CT Head Without Contrast [[id number]] Collected: 06/21/25 2323     Updated: 06/21/25 2333    Narrative:      EXAMINATION: CT head without contrast     CLINICAL HISTORY: Fall     COMPARISON: None available.     TECHNIQUE: Contiguous 3 mm thick slices were obtained from the  skull  base to the vertex without contrast. Coronal and sagittal reformats were  performed.     FINDINGS:  Moderate generalized volume loss is noted with prominence of the sulci  and ventricular system. White matter changes are noted in a pattern  suggesting chronic small vessel ischemic disease. There is no acute  intracranial hemorrhage. No acute territorial infarct. No extra-axial  collection is identified. There is no shift of midline structures. No  acute calvarial fracture is appreciated. Hyperostosis frontalis is  noted.       Impression:      1. No acute intracranial process.     This report was finalized on 6/21/2025 11:31 PM by Juanjose Rocha MD.               Assessment & Plan       Closed intertrochanteric fracture of left femur      The on-call surgeon Dr. Jay has been made aware of the consult.  Recommendation at this time is for surgical intervention.    Continue with pain control.  Activity as tolerated with pain as the guide.    Patient is to remain nonweightbearing to the left lower extremity.  An order for x-ray imaging will be placed today.    Lengthy discussion was had today with the patient and family in regard to surgical versus conservative intervention.  They were informed that due to the severity of the fracture the recommendation at this time would be for surgical intervention.    The plan will be for a left hip intertrochanteric nailing.  The nature of this procedure, as well as risks, benefits, alternatives, and complications to the above operation were discussed with the patient.  Risks include but are not limited to: Anesthesia complications, death, injury to nerves/vessels, infection, blood clots, continued pain, and repeat or revision surgery.  Following the discussion the patient verbalized understanding of the risks and benefits of the above procedure and elected to proceed with surgery.    Labs including a PT/INR, PTT, and type/screen will be placed into the system  today.    Patient will be n.p.o. after midnight.  An order for a case request and consent will be placed into the system.    Orthopedics will continue to follow.        KELSEY Mccann  25  08:21 EDT            Electronically signed by Cosmo Jay MD at 25 1017          Physical Therapy Notes (most recent note)        Sage Fuentes, PT at 25 1334  Version 1 of 1         Acute Care - Physical Therapy Treatment Note   [place]     Patient Name: Lizbet Arzola  : 1945  MRN: [id number]  Today's Date: 2025      Visit Dx:     ICD-10-CM ICD-9-CM   1. Closed displaced intertrochanteric fracture of left femur, initial encounter  S72.142A 820.21   2. Acute low back pain with sciatica, sciatica laterality unspecified, unspecified back pain laterality  M54.40 724.2     724.3     Patient Active Problem List   Diagnosis   (none) - all problems resolved or deleted     Past Medical History:   Diagnosis Date    Hyperlipidemia     Hypertension     Hyperthyroidism     Osteoarthritis      Past Surgical History:   Procedure Laterality Date    HIP INTERTROCHANTERIC NAILING Left 2025    Procedure: HIP INTERTROCHANTERIC NAILING LONG WITH INTRAMEDULLARY HIP SCREW;  Surgeon: Cosmo Jay MD;  Location: [facility];  Service: Orthopedics;  Laterality: Left;    LIPOMA EXCISION       PT Assessment (Last 12 Hours)       PT Evaluation and Treatment       Row Name 25 9849          Physical Therapy Time and Intention    Subjective Information complains of;weakness;fatigue  -KM     Document Type therapy note (daily note)  -KM     Mode of Treatment individual therapy;physical therapy  -KM     Patient Effort excellent  -KM     Symptoms Noted During/After Treatment fatigue  -KM       Row Name 25 6225          General Information    Patient Profile Reviewed yes  -KM     Patient Observations alert;cooperative;agree to therapy  -KM     Existing Precautions/Restrictions fall;weight bearing;other  (see comments)  -KM       Row Name 07/08/25 1335          Pain    Pretreatment Pain Rating 0/10 - no pain  -KM     Posttreatment Pain Rating 0/10 - no pain  -KM     Pain Location knee  -KM     Pain Side/Orientation left  -KM       Row Name 07/08/25 1335          Cognition    Affect/Mental Status (Cognition) WFL  -     Orientation Status (Cognition) oriented to;person;place;situation  -KM     Follows Commands (Cognition) WFL  -KM       Row Name 07/08/25 1335          Mobility    Extremity Weight-bearing Status left lower extremity  -KM     Left Lower Extremity (Weight-bearing Status) weight-bearing as tolerated (WBAT)  -KM       Row Name 07/08/25 1335          Bed Mobility    Comment, (Bed Mobility) up in chair upon arrival  -       Row Name 07/08/25 1335          Transfers    Transfers sit-stand transfer;stand-sit transfer  -       Row Name 07/08/25 1335          Sit-Stand Transfer    Sit-Stand [place] (Transfers) verbal cues;minimum assist (75% patient effort)  -     Assistive Device (Sit-Stand Transfers) walker, front-wheeled  -       Row Name 07/08/25 1335          Stand-Sit Transfer    Stand-Sit [place] (Transfers) minimum assist (75% patient effort);verbal cues  -     Assistive Device (Stand-Sit Transfers) walker, front-wheeled  -       Row Name 07/08/25 133          Gait/Stairs (Locomotion)    Gait/Stairs Locomotion gait/ambulation independence;gait/ambulation assistive device;distance ambulated  -KM     [place] Level (Gait) minimum assist (75% patient effort)  -KM     Assistive Device (Gait) walker, front-wheeled  -     Patient was able to Ambulate yes  -KM     Distance in Feet (Gait) 75  -KM     Pattern (Gait) step-to  -KM     Deviations/Abnormal Patterns (Gait) antalgic;ataxic;base of support, narrow;gait speed decreased  -KM     Bilateral Gait Deviations forward flexed posture;knee buckling bilaterally  -KM     Left Sided Gait Deviations weight shift ability decreased  -KM        Row Name 07/08/25 1335          Safety Issues/Impairments Affecting Functional Mobility    Impairments Affecting Function (Mobility) balance;endurance/activity tolerance;pain;strength  -KM       Row Name 07/08/25 1335          Motor Skills    Comments, Therapeutic Exercise supine ther-ex  -KM       Row Name             Wound 06/23/25 1619 Left hip Surgical    Wound - Properties Group Placement Date: 06/23/25  -KB Placement Time: 1619  -KB Present on Original Admission: N  -KB Side: Left  -KB Location: hip  -KB Primary Wound Type: Surgical  -KB, incision sites x4     Retired Wound - Properties Group Placement Date: 06/23/25  -KB Placement Time: 1619  -KB Present on Original Admission: N  -KB Side: Left  -KB Location: hip  -KB    Retired Wound - Properties Group Placement Date: 06/23/25  -KB Placement Time: 1619  -KB Present on Original Admission: N  -KB Side: Left  -KB Location: hip  -KB    Retired Wound - Properties Group Date first assessed: 06/23/25  -KB Time first assessed: 1619  -KB Present on Original Admission: N  -KB Side: Left  -KB Location: hip  -KB      Row Name 07/08/25 1335          Progress Summary (PT)    Daily Progress Summary (PT) Pt. was able to perform functional mobility skills w/ Amy. She was able to continue ambulating in hallway w/ RW. She continues to tolerate increased activity. She continues showing progress w/ all mobility skills. Pt. is able to tolerate greater than 3 hours of therapy. Pt. would continue to benefit from more intense PT services.  -KM       Row Name 07/08/25 1333          Physical Therapy Goals    Bed Mobility Goal Selection (PT) bed mobility, PT goal 1  -KM     Transfer Goal Selection (PT) transfer, PT goal 1  -KM     Gait Training Goal Selection (PT) gait training, PT goal 1  -KM       Row Name 07/08/25 1339          Bed Mobility Goal 1 (PT)    Activity/Assistive Device (Bed Mobility Goal 1, PT) bed mobility activities, all  -KM     [place] Level/Cues Needed  (Bed Mobility Goal 1, PT) standby assist  -KM     Time Frame (Bed Mobility Goal 1, PT) by discharge  -KM     Progress/Outcomes (Bed Mobility Goal 1, PT) goal revised this date  -KM       Row Name 07/08/25 1335          Transfer Goal 1 (PT)    Activity/Assistive Device (Transfer Goal 1, PT) transfers, all;walker, rolling  -KM     [place] Level/Cues Needed (Transfer Goal 1, PT) contact guard required  -KM     Time Frame (Transfer Goal 1, PT) by discharge  -KM     Progress/Outcome (Transfer Goal 1, PT) goal revised this date  -KM       Row Name 07/08/25 1335          Gait Training Goal 1 (PT)    Activity/Assistive Device (Gait Training Goal 1, PT) gait (walking locomotion);assistive device use;walker, rolling  -KM     [place] Level (Gait Training Goal 1, PT) standby assist  -KM     Distance (Gait Training Goal 1, PT) 120'  -KM     Time Frame (Gait Training Goal 1, PT) by discharge  -KM     Progress/Outcome (Gait Training Goal 1, PT) goal revised this date  -KM               User Key  (r) = Recorded By, (t) = Taken By, (c) = Cosigned By      Initials Name Provider Type    Cheryl Aponte, RN Registered Nurse    Sage Mccain PT Physical Therapist                    Physical Therapy Education       Title: PT OT SLP Therapies (Done)       Topic: Physical Therapy (Done)       Point: Mobility training (Done)       Learning Progress Summary            Patient Acceptance, E,TB, VU,NR by AV at 7/8/2025 1242    Acceptance, E, NR by MF at 7/6/2025 2319    Acceptance, E, VU by SC at 7/6/2025 0301    Acceptance, E, VU by MC at 7/3/2025 1445    Acceptance, E,TB, VU by MP at 7/2/2025 0916    Acceptance, E,TB, VU by  at 7/2/2025 0511    Acceptance, E,TB, VU by  at 6/30/2025 0604    Acceptance, E,TB, VU by KM at 6/24/2025 1421   Family Acceptance, E,TB, VU by HG at 7/2/2025 0511    Acceptance, E,TB, VU by  at 6/30/2025 0604                      Point: Home exercise program (Done)       Learning Progress  Summary            Patient Acceptance, E,TB, VU,NR by AV at 7/8/2025 1242    Acceptance, E, NR by MF at 7/6/2025 2319    Acceptance, E, VU by SC at 7/6/2025 0301    Acceptance, E, VU by MC at 7/3/2025 1445    Acceptance, E,TB, VU by MP at 7/2/2025 0916    Acceptance, E,TB, VU by HG at 7/2/2025 0511    Acceptance, E,TB, VU by HG at 6/30/2025 0604    Acceptance, E,TB, VU by KM at 6/24/2025 1421   Family Acceptance, E,TB, VU by HG at 7/2/2025 0511    Acceptance, E,TB, VU by HG at 6/30/2025 0604                      Point: Body mechanics (Done)       Learning Progress Summary            Patient Acceptance, E,TB, VU,NR by AV at 7/8/2025 1242    Acceptance, E, NR by MF at 7/6/2025 2319    Acceptance, E, VU by SC at 7/6/2025 0301    Acceptance, E, VU by MC at 7/3/2025 1445    Acceptance, E,TB, VU by MP at 7/2/2025 0916    Acceptance, E,TB, VU by HG at 7/2/2025 0511    Acceptance, E,TB, VU by HG at 6/30/2025 0604    Acceptance, E,TB, VU by KM at 6/24/2025 1421   Family Acceptance, E,TB, VU by HG at 7/2/2025 0511    Acceptance, E,TB, VU by HG at 6/30/2025 0604                      Point: Precautions (Done)       Learning Progress Summary            Patient Acceptance, E,TB, VU,NR by AV at 7/8/2025 1242    Acceptance, E, NR by MF at 7/6/2025 2319    Acceptance, E, VU by SC at 7/6/2025 0301    Acceptance, E, VU by MC at 7/3/2025 1445    Acceptance, E,TB, VU by MP at 7/2/2025 0916    Acceptance, E,TB, VU by HG at 7/2/2025 0511    Acceptance, E,TB, VU by HG at 6/30/2025 0604    Acceptance, E,TB, VU by KM at 6/24/2025 1421   Family Acceptance, E,TB, VU by  at 7/2/2025 0511    Acceptance, E,TB, VU by  at 6/30/2025 0604                                      User Key       Initials Effective Dates Name Provider Type Discipline     06/16/21 -  Heaven Burleson, RN Registered Nurse Nurse     04/24/23 -  Collett, Morgan, RN Registered Nurse Nurse     05/24/22 -  Sage Fuentes, YUSUF Physical Therapist PT     06/21/23 -  Francois  Brittany, RN Registered Nurse Nurse     01/22/25 -  Christine Lepe RN Registered Nurse Nurse    SC 09/11/24 -  Arielle Macias RN Registered Nurse Nurse    AV 06/03/25 -  Dawson Montaño RNA Registered Nurse Nurse                  PT Recommendation and Plan  Anticipated Discharge Disposition (PT): inpatient rehabilitation facility  Planned Therapy Interventions (PT): balance training, bed mobility training, gait training, home exercise program, patient/family education, postural re-education, ROM (range of motion), strengthening, stretching, transfer training, stair training, wheelchair management/propulsion training  Therapy Frequency (PT): 6 times/wk  Progress Summary (PT)  Daily Progress Summary (PT): Pt. was able to perform functional mobility skills w/ Amy. She was able to continue ambulating in hallway w/ RW. She continues to tolerate increased activity. She continues showing progress w/ all mobility skills. Pt. is able to tolerate greater than 3 hours of therapy. Pt. would continue to benefit from more intense PT services.  Plan of Care Reviewed With: patient  Outcome Evaluation: Pt. evaluation completed during PT session. She was able to perform functional mobility skills w/ maxA x2. She was unable to ambulate at time of evaluation d/t weakness and pain. She demonstrates impaired strength and ROM. Pt. would benefit from inpatient rehab to address weakness, pain, impaired mobility, safety precautions, and fall risk.       Time Calculation:    PT Charges       Row Name 07/08/25 1335             Time Calculation    PT Received On 07/08/25  -KM         Time Calculation- PT    Total Timed Code Minutes- PT 40 minute(s)  -KM                User Key  (r) = Recorded By, (t) = Taken By, (c) = Cosigned By      Initials Name Provider Type    Sage Mccain, PT Physical Therapist                  Therapy Charges for Today       Code Description Service Date Service Provider Modifiers Qty    06384678644 HC PT THERAPEUTIC  ACT EA 15 MIN 2025 Sage Fuentes, PT GP 1    33395392829 HC GAIT TRAINING EA 15 MIN 2025 Sage Fuentes, PT GP 1    88725340560 HC PT RE-EVAL ESTABLISHED PLAN 2 2025 Sage Fuentes, PT GP 1    44248186814 HC PT THERAPEUTIC ACT EA 15 MIN 2025 Sage Fuentes, PT GP 1    98862570828 HC PT THER PROC EA 15 MIN 2025 Sage Fuentes, PT GP 1    70127303169 HC GAIT TRAINING EA 15 MIN 2025 Sage Fuentes, PT GP 1            PT G-Codes  AM-PAC 6 Clicks Score (PT): 16    Sage Fuentse PT  2025      Electronically signed by Sage Fuentes, PT at 25 1340          Occupational Therapy Notes (most recent note)        Izzy Murillo, OT at 25 1436          Patient Name: Lizbet Arzola  : 1945    MRN: [id number]                              Today's Date: 2025       Admit Date: 2025    Visit Dx:     ICD-10-CM ICD-9-CM   1. Closed displaced intertrochanteric fracture of left femur, initial encounter  S72.142A 820.21   2. Acute low back pain with sciatica, sciatica laterality unspecified, unspecified back pain laterality  M54.40 724.2     724.3     Patient Active Problem List   Diagnosis   (none) - all problems resolved or deleted     Past Medical History:   Diagnosis Date    Hyperlipidemia     Hypertension     Hyperthyroidism     Osteoarthritis      Past Surgical History:   Procedure Laterality Date    HIP INTERTROCHANTERIC NAILING Left 2025    Procedure: HIP INTERTROCHANTERIC NAILING LONG WITH INTRAMEDULLARY HIP SCREW;  Surgeon: Cosmo Jay MD;  Location: [facility];  Service: Orthopedics;  Laterality: Left;    LIPOMA EXCISION        General Information       Row Name 25 1436          OT Time and Intention    Subjective Information complains of;weakness;fatigue  -KP     Document Type therapy note (daily note)  -KP     Mode of Treatment individual therapy;occupational therapy  -KP     Patient Effort excellent  -KP     Symptoms Noted During/After Treatment fatigue  -KP      Comment Patient agreeable to therapy. Patient's daughter present.  -[facility] Name 07/08/25 1436          General Information    Patient Profile Reviewed yes  -     Existing Precautions/Restrictions fall;weight bearing;other (see comments)  -[facility] Name 07/08/25 1436          Cognition    Orientation Status (Cognition) oriented to;person;place;situation  -[facility] Name 07/08/25 1436          Safety Issues/Impairments Affecting Functional Mobility    Impairments Affecting Function (Mobility) balance;endurance/activity tolerance;pain;strength  -               User Key  (r) = Recorded By, (t) = Taken By, (c) = Cosigned By      Initials Name Provider Type     Izzy Murillo, OT Occupational Therapist                     Mobility/ADL's       [facility] Name 07/08/25 1437          Bed Mobility    Comment, (Bed Mobility) up in chair upon arrival  -[facility] Name 07/08/25 1437          Transfers    Transfers sit-stand transfer;stand-sit transfer  -KP       Row Name 07/08/25 1437          Sit-Stand Transfer    Sit-Stand [place] (Transfers) verbal cues;minimum assist (75% patient effort)  -     Assistive Device (Sit-Stand Transfers) walker, front-wheeled  -[facility] Name 07/08/25 1437          Stand-Sit Transfer    Stand-Sit [place] (Transfers) minimum assist (75% patient effort);verbal cues  -     Assistive Device (Stand-Sit Transfers) walker, front-wheeled  -[facility] Name 07/08/25 1437          Mobility    Extremity Weight-bearing Status left lower extremity  -     Left Lower Extremity (Weight-bearing Status) weight-bearing as tolerated (WBAT)  -[facility] Name 07/08/25 1437          Toileting Assessment/Training    [place] Level (Toileting) toileting skills;maximum assist (25% patient effort)  -     Comment, (Toileting) discussed hand placement and alternating sides with use of rolling walker during hygiene and clothing management  -               User Key  (r) = Recorded  By, (t) = Taken By, (c) = Cosigned By      Initials Name Provider Type    Izzy Motta OT Occupational Therapist                   Obj/Interventions       Row Name 07/08/25 1438          Motor Skills    Motor Skills functional endurance  -     Functional Endurance good minus  -               User Key  (r) = Recorded By, (t) = Taken By, (c) = Cosigned By      Initials Name Provider Type    Izzy Motta OT Occupational Therapist                   Goals/Plan    No documentation.                  Clinical Impression       Row Name 07/08/25 1438          Pain Assessment    Pretreatment Pain Rating 0/10 - no pain  -KP     Posttreatment Pain Rating 0/10 - no pain  -KP       Row Name 07/08/25 1438          Plan of Care Review    Plan of Care Reviewed With patient;daughter  -     Progress improving  -     Outcome Evaluation Patient seen for OT treatment. She completed transfer from chair level and educated on safety with toileting using rolling walker. Patient and daughter receptive. She would benefit from ongoing OT services to promote highest level of independence and safety. She is progressing well, but will require ongoing services to return home safely.  -       Row Name 07/08/25 1438          Therapy Plan Review/Discharge Plan (OT)    Anticipated Discharge Disposition (OT) inpatient rehabilitation facility  -       Row Name 07/08/25 1438          Positioning and Restraints    Pre-Treatment Position sitting in chair/recliner  -     Post Treatment Position chair  -     In Chair sitting;call light within reach;encouraged to call for assist;with family/caregiver  -               User Key  (r) = Recorded By, (t) = Taken By, (c) = Cosigned By      Initials Name Provider Type    Izzy Motta OT Occupational Therapist                   Outcome Measures       Row Name 07/08/25 0835          How much help from another person do you currently need...    Turning from your back to your side while  in flat bed without using bedrails? 3  -EB (r) AV (t) EB (c)     Moving from lying on back to sitting on the side of a flat bed without bedrails? 3  -EB (r) AV (t) EB (c)     Moving to and from a bed to a chair (including a wheelchair)? 2  -EB (r) AV (t) EB (c)     Standing up from a chair using your arms (e.g., wheelchair, bedside chair)? 3  -EB (r) AV (t) EB (c)     Climbing 3-5 steps with a railing? 2  -EB (r) AV (t) EB (c)     To walk in hospital room? 3  -EB (r) AV (t) EB (c)     AM-PAC 6 Clicks Score (PT) 16  -AV               User Key  (r) = Recorded By, (t) = Taken By, (c) = Cosigned By      Initials Name Provider Type    Nela Anand RN Registered Nurse    Dawson Hernandez RNA Registered Nurse                      OT Recommendation and Plan  Planned Therapy Interventions (OT): activity tolerance training, adaptive equipment training, BADL retraining, functional balance retraining, patient/caregiver education/training, passive ROM/stretching, occupation/activity based interventions, transfer/mobility retraining, strengthening exercise, ROM/therapeutic exercise  Therapy Frequency (OT): 5 times/wk  Plan of Care Review  Plan of Care Reviewed With: patient, daughter  Progress: improving  Outcome Evaluation: Patient seen for OT treatment. She completed transfer from chair level and educated on safety with toileting using rolling walker. Patient and daughter receptive. She would benefit from ongoing OT services to promote highest level of independence and safety. She is progressing well, but will require ongoing services to return home safely.     Time Calculation:         Time Calculation- OT       Row Name 07/08/25 1440             Time Calculation- OT    OT Received On 07/08/25  -                User Key  (r) = Recorded By, (t) = Taken By, (c) = Cosigned By      Initials Name Provider Type    Izzy Motta OT Occupational Therapist                  Therapy Charges for Today       Code Description  Service Date Service Provider Modifiers Qty    [id number]  OT THERAPEUTIC ACT EA 15 MIN 7/7/2025 Izzy Murillo OT GO 1    [id number]  OT THERAPEUTIC ACT EA 15 MIN 7/8/2025 Izzy Murillo OT GO 1                 Izzy Murillo OT  7/8/2025    Electronically signed by Izzy Murillo OT at 07/08/25 1440       Speech Language Pathology Notes (most recent note)    No notes exist for this encounter.       ADL Documentation (most recent)      Flowsheet Row Most Recent Value   Transferring 3 - assistive equipment and person   Toileting 3 - assistive equipment and person   Bathing 2 - assistive person   Dressing 2 - assistive person   Eating 0 - independent   Communication 2 - difficulty understanding (not related to language barrier)   Swallowing 0 - swallows foods/liquids without difficulty   Equipment Currently Used at Home rollator, walker, standard, cane, straight, other (see comments)  [Elevated toilet seat]

## 2025-07-09 NOTE — PLAN OF CARE
Goal Outcome Evaluation:         Pt being d/c to [facility], family at bedside aware

## 2025-07-10 NOTE — CASE MANAGEMENT/SOCIAL WORK
Discharge Planning Assessment   [place]     Patient Name: Lizbet Arzola  MRN: [id number]  Today's Date: 7/10/2025    Admit Date: 6/21/2025    Plan: AVS has been sent to [facility] and awaiting facility to accept report.       Discharge Plan       Row Name 07/10/25 0847       Plan    Final Discharge Disposition Code 03 - skilled nursing facility (SNF)    Final Note Pt discharged to [facility] via EMS.                  Continued Care and Services - Discharged on 7/9/2025 Admission date: 6/21/2025 - Discharge disposition: Skilled Nursing Facility (DC - External)      Destination Coordination complete.      Service Provider Request Status Services Address Phone Fax Patient Preferred    [facility]  Selected Skilled Nursing [facility] [address], [place] [address] [phone] --                  Expected Discharge Date and Time       Expected Discharge Date Expected Discharge Time    Jul 9, 2025         NATALIE Woods

## 2025-07-17 ENCOUNTER — LAB REQUISITION (OUTPATIENT)
Dept: LAB | Facility: HOSPITAL | Age: 80
End: 2025-07-17
Payer: MEDICARE

## 2025-07-17 DIAGNOSIS — D84.1 DEFECTS IN THE COMPLEMENT SYSTEM: ICD-10-CM

## 2025-07-17 DIAGNOSIS — R30.0 DYSURIA: ICD-10-CM

## 2025-07-17 DIAGNOSIS — E78.5 HYPERLIPIDEMIA, UNSPECIFIED: ICD-10-CM

## 2025-07-17 DIAGNOSIS — I10 ESSENTIAL (PRIMARY) HYPERTENSION: ICD-10-CM

## 2025-07-17 LAB
ALBUMIN SERPL-MCNC: 3.8 G/DL (ref 3.5–5.2)
ALBUMIN SERPL-MCNC: 3.9 G/DL (ref 3.5–5.2)
ALBUMIN/GLOB SERPL: 1.7 G/DL
ALP SERPL-CCNC: 121 U/L (ref 39–117)
ALP SERPL-CCNC: 122 U/L (ref 39–117)
ALT SERPL W P-5'-P-CCNC: 14 U/L (ref 1–33)
ALT SERPL W P-5'-P-CCNC: 14 U/L (ref 1–33)
ANION GAP SERPL CALCULATED.3IONS-SCNC: 10.2 MMOL/L (ref 5–15)
AST SERPL-CCNC: 16 U/L (ref 1–32)
AST SERPL-CCNC: 17 U/L (ref 1–32)
BACTERIA UR QL AUTO: ABNORMAL /HPF
BASOPHILS # BLD AUTO: 0.07 10*3/MM3 (ref 0–0.2)
BASOPHILS NFR BLD AUTO: 0.8 % (ref 0–1.5)
BILIRUB CONJ SERPL-MCNC: 0.3 MG/DL (ref 0–0.3)
BILIRUB INDIRECT SERPL-MCNC: 0.2 MG/DL
BILIRUB SERPL-MCNC: 0.5 MG/DL (ref 0–1.2)
BILIRUB SERPL-MCNC: 0.5 MG/DL (ref 0–1.2)
BILIRUB UR QL STRIP: NEGATIVE
BUN SERPL-MCNC: 36.5 MG/DL (ref 8–23)
BUN/CREAT SERPL: 30.7 (ref 7–25)
CALCIUM SPEC-SCNC: 10.8 MG/DL (ref 8.6–10.5)
CHLORIDE SERPL-SCNC: 102 MMOL/L (ref 98–107)
CHOLEST SERPL-MCNC: 93 MG/DL (ref 0–200)
CLARITY UR: CLEAR
CO2 SERPL-SCNC: 24.8 MMOL/L (ref 22–29)
COLOR UR: YELLOW
CREAT SERPL-MCNC: 1.19 MG/DL (ref 0.57–1)
CRP SERPL-MCNC: <0.3 MG/DL (ref 0–0.5)
DEPRECATED RDW RBC AUTO: 49.1 FL (ref 37–54)
EGFRCR SERPLBLD CKD-EPI 2021: 45.7 ML/MIN/1.73
EOSINOPHIL # BLD AUTO: 0.2 10*3/MM3 (ref 0–0.4)
EOSINOPHIL NFR BLD AUTO: 2.2 % (ref 0.3–6.2)
ERYTHROCYTE [DISTWIDTH] IN BLOOD BY AUTOMATED COUNT: 13.7 % (ref 12.3–15.4)
ERYTHROCYTE [SEDIMENTATION RATE] IN BLOOD: 22 MM/HR (ref 0–30)
GLOBULIN UR ELPH-MCNC: 2.2 GM/DL
GLUCOSE SERPL-MCNC: 101 MG/DL (ref 65–99)
GLUCOSE UR STRIP-MCNC: NEGATIVE MG/DL
HCT VFR BLD AUTO: 32.7 % (ref 34–46.6)
HDLC SERPL-MCNC: 33 MG/DL (ref 40–60)
HGB BLD-MCNC: 10.5 G/DL (ref 12–15.9)
HGB UR QL STRIP.AUTO: NEGATIVE
HYALINE CASTS UR QL AUTO: ABNORMAL /LPF
IMM GRANULOCYTES # BLD AUTO: 0.03 10*3/MM3 (ref 0–0.05)
IMM GRANULOCYTES NFR BLD AUTO: 0.3 % (ref 0–0.5)
KETONES UR QL STRIP: NEGATIVE
LDLC SERPL CALC-MCNC: 35 MG/DL (ref 0–100)
LDLC/HDLC SERPL: 0.93 {RATIO}
LEUKOCYTE ESTERASE UR QL STRIP.AUTO: ABNORMAL
LYMPHOCYTES # BLD AUTO: 3.1 10*3/MM3 (ref 0.7–3.1)
LYMPHOCYTES NFR BLD AUTO: 34.4 % (ref 19.6–45.3)
MCH RBC QN AUTO: 31.6 PG (ref 26.6–33)
MCHC RBC AUTO-ENTMCNC: 32.1 G/DL (ref 31.5–35.7)
MCV RBC AUTO: 98.5 FL (ref 79–97)
MONOCYTES # BLD AUTO: 0.84 10*3/MM3 (ref 0.1–0.9)
MONOCYTES NFR BLD AUTO: 9.3 % (ref 5–12)
NEUTROPHILS NFR BLD AUTO: 4.77 10*3/MM3 (ref 1.7–7)
NEUTROPHILS NFR BLD AUTO: 53 % (ref 42.7–76)
NITRITE UR QL STRIP: POSITIVE
NRBC BLD AUTO-RTO: 0 /100 WBC (ref 0–0.2)
PH UR STRIP.AUTO: <=5 [PH] (ref 5–8)
PLATELET # BLD AUTO: 362 10*3/MM3 (ref 140–450)
PMV BLD AUTO: 10.7 FL (ref 6–12)
POTASSIUM SERPL-SCNC: 4.1 MMOL/L (ref 3.5–5.2)
PROT SERPL-MCNC: 6 G/DL (ref 6–8.5)
PROT SERPL-MCNC: 6 G/DL (ref 6–8.5)
PROT UR QL STRIP: NEGATIVE
RBC # BLD AUTO: 3.32 10*6/MM3 (ref 3.77–5.28)
RBC # UR STRIP: ABNORMAL /HPF
REF LAB TEST METHOD: ABNORMAL
SODIUM SERPL-SCNC: 137 MMOL/L (ref 136–145)
SP GR UR STRIP: 1.01 (ref 1–1.03)
SQUAMOUS #/AREA URNS HPF: ABNORMAL /HPF
T3 SERPL-MCNC: 166 NG/DL (ref 80–200)
T4 SERPL-MCNC: 8.29 MCG/DL (ref 4.5–11.7)
TRIGL SERPL-MCNC: 147 MG/DL (ref 0–150)
TSH SERPL DL<=0.05 MIU/L-ACNC: 0.02 UIU/ML (ref 0.27–4.2)
UROBILINOGEN UR QL STRIP: ABNORMAL
VLDLC SERPL-MCNC: 25 MG/DL (ref 5–40)
WBC # UR STRIP: ABNORMAL /HPF
WBC NRBC COR # BLD AUTO: 9.01 10*3/MM3 (ref 3.4–10.8)

## 2025-07-17 PROCEDURE — 82248 BILIRUBIN DIRECT: CPT | Performed by: INTERNAL MEDICINE

## 2025-07-17 PROCEDURE — 84436 ASSAY OF TOTAL THYROXINE: CPT | Performed by: INTERNAL MEDICINE

## 2025-07-17 PROCEDURE — 80061 LIPID PANEL: CPT | Performed by: INTERNAL MEDICINE

## 2025-07-17 PROCEDURE — 87086 URINE CULTURE/COLONY COUNT: CPT | Performed by: NURSE PRACTITIONER

## 2025-07-17 PROCEDURE — 85652 RBC SED RATE AUTOMATED: CPT | Performed by: INTERNAL MEDICINE

## 2025-07-17 PROCEDURE — 87077 CULTURE AEROBIC IDENTIFY: CPT | Performed by: NURSE PRACTITIONER

## 2025-07-17 PROCEDURE — 80053 COMPREHEN METABOLIC PANEL: CPT | Performed by: INTERNAL MEDICINE

## 2025-07-17 PROCEDURE — 85025 COMPLETE CBC W/AUTO DIFF WBC: CPT | Performed by: INTERNAL MEDICINE

## 2025-07-17 PROCEDURE — 84480 ASSAY TRIIODOTHYRONINE (T3): CPT | Performed by: INTERNAL MEDICINE

## 2025-07-17 PROCEDURE — 84443 ASSAY THYROID STIM HORMONE: CPT | Performed by: INTERNAL MEDICINE

## 2025-07-17 PROCEDURE — 81001 URINALYSIS AUTO W/SCOPE: CPT | Performed by: NURSE PRACTITIONER

## 2025-07-17 PROCEDURE — 86140 C-REACTIVE PROTEIN: CPT | Performed by: INTERNAL MEDICINE

## 2025-07-17 PROCEDURE — 87186 SC STD MICRODIL/AGAR DIL: CPT | Performed by: NURSE PRACTITIONER

## 2025-07-18 ENCOUNTER — LAB REQUISITION (OUTPATIENT)
Dept: LAB | Facility: HOSPITAL | Age: 80
End: 2025-07-18
Payer: MEDICARE

## 2025-07-18 DIAGNOSIS — I10 ESSENTIAL (PRIMARY) HYPERTENSION: ICD-10-CM

## 2025-07-18 LAB — D-LACTATE SERPL-SCNC: 2.1 MMOL/L (ref 0.5–2)

## 2025-07-18 PROCEDURE — 83605 ASSAY OF LACTIC ACID: CPT | Performed by: NURSE PRACTITIONER

## 2025-07-19 LAB — BACTERIA SPEC AEROBE CULT: ABNORMAL

## 2025-08-05 ENCOUNTER — LAB REQUISITION (OUTPATIENT)
Dept: LAB | Facility: HOSPITAL | Age: 80
End: 2025-08-05
Payer: MEDICARE

## 2025-08-05 DIAGNOSIS — N39.0 URINARY TRACT INFECTION, SITE NOT SPECIFIED: ICD-10-CM

## 2025-08-05 LAB
BACTERIA UR QL AUTO: ABNORMAL /HPF
BILIRUB UR QL STRIP: NEGATIVE
CLARITY UR: ABNORMAL
COD CRY URNS QL: ABNORMAL /HPF
COLOR UR: YELLOW
GLUCOSE UR STRIP-MCNC: NEGATIVE MG/DL
GRAN CASTS URNS QL MICRO: ABNORMAL /LPF
HGB UR QL STRIP.AUTO: NEGATIVE
HYALINE CASTS UR QL AUTO: ABNORMAL /LPF
KETONES UR QL STRIP: ABNORMAL
LEUKOCYTE ESTERASE UR QL STRIP.AUTO: ABNORMAL
NITRITE UR QL STRIP: NEGATIVE
PH UR STRIP.AUTO: 5.5 [PH] (ref 5–8)
PROT UR QL STRIP: NEGATIVE
RBC # UR STRIP: ABNORMAL /HPF
REF LAB TEST METHOD: ABNORMAL
SP GR UR STRIP: 1.02 (ref 1–1.03)
SQUAMOUS #/AREA URNS HPF: ABNORMAL /HPF
UROBILINOGEN UR QL STRIP: ABNORMAL
WBC # UR STRIP: ABNORMAL /HPF
YEAST URNS QL MICRO: ABNORMAL /HPF

## 2025-08-05 PROCEDURE — 81001 URINALYSIS AUTO W/SCOPE: CPT | Performed by: NURSE PRACTITIONER

## 2025-08-05 PROCEDURE — 87086 URINE CULTURE/COLONY COUNT: CPT | Performed by: NURSE PRACTITIONER

## 2025-08-06 LAB — BACTERIA SPEC AEROBE CULT: NO GROWTH

## (undated) DEVICE — OPTIFOAM GENTLE AG,POST-OP STRIP,3.5X14: Brand: MEDLINE

## (undated) DEVICE — Device

## (undated) DEVICE — SUT MNCRYL PLS ANTIB UD 2/0 CP1 27IN

## (undated) DEVICE — GLV SURG SENSICARE W/ALOE PF LF 8.5 STRL

## (undated) DEVICE — CVR POST PERINATAL PAD

## (undated) DEVICE — GOWN,SIRUS,NONRNF,SETINSLV,2XL,18/CS: Brand: MEDLINE

## (undated) DEVICE — PROXIMATE RH ROTATING HEAD SKIN STAPLERS (35 WIDE) CONTAINS 35 STAINLESS STEEL STAPLES: Brand: PROXIMATE

## (undated) DEVICE — SPNG GZ WOVN 4X4IN 12PLY 10/BX STRL

## (undated) DEVICE — GW FOR TROCH NAIL 3.2X400MM

## (undated) DEVICE — BNDG ELAS CO-FLEX SLF ADHR 4IN5YD LF STRL

## (undated) DEVICE — COVER,MAYO STAND,STERILE: Brand: MEDLINE

## (undated) DEVICE — SUCTION MAT (LOW PROFILE), 50X34: Brand: NEPTUNE

## (undated) DEVICE — MAT FLR ABSORBENT LG 4FT 10 2.5FT

## (undated) DEVICE — DRSNG WND STRIP OPTIFOAM AG A/MIC LF 3.5X6IN STRL

## (undated) DEVICE — GLV SURG SENSICARE PI PF LF 8.5 GRN STRL

## (undated) DEVICE — PK BASIC 70

## (undated) DEVICE — DRP C/ARM W/BAND W/CLIPS 41X74IN

## (undated) DEVICE — SYS COLD/THRP POLAR/CARE/CUBE

## (undated) DEVICE — DRP C/ARMOR

## (undated) DEVICE — PATIENT RETURN ELECTRODE, SINGLE-USE, CONTACT QUALITY MONITORING, ADULT, WITH 9FT CORD, FOR PATIENTS WEIGING OVER 33LBS. (15KG): Brand: MEGADYNE

## (undated) DEVICE — HOLDER: Brand: DEROYAL

## (undated) DEVICE — BIT DRL 3FLUT QC NDL PT 4.2X145MM STRL

## (undated) DEVICE — 6619 2 PTNT ISO SYS INCISE AREA&LT;(&GT;&&LT;)&GT;P: Brand: STERI-DRAPE™ IOBAN™ 2

## (undated) DEVICE — PENCL SMOKE/EVAC MEGADYNE TELESCP 10FT

## (undated) DEVICE — SUT VIC 0 CP1 27IN J267H

## (undated) DEVICE — GLV SURG PREMIERPRO MIC LTX PF SZ8 BRN